# Patient Record
Sex: MALE | Race: WHITE | NOT HISPANIC OR LATINO | Employment: OTHER | URBAN - METROPOLITAN AREA
[De-identification: names, ages, dates, MRNs, and addresses within clinical notes are randomized per-mention and may not be internally consistent; named-entity substitution may affect disease eponyms.]

---

## 2019-11-26 ENCOUNTER — OFFICE VISIT (OUTPATIENT)
Dept: FAMILY MEDICINE | Facility: CLINIC | Age: 44
End: 2019-11-26
Payer: MEDICARE

## 2019-11-26 VITALS
HEIGHT: 74 IN | OXYGEN SATURATION: 96 % | SYSTOLIC BLOOD PRESSURE: 122 MMHG | BODY MASS INDEX: 37.81 KG/M2 | TEMPERATURE: 98 F | DIASTOLIC BLOOD PRESSURE: 80 MMHG | WEIGHT: 294.63 LBS | HEART RATE: 73 BPM

## 2019-11-26 DIAGNOSIS — J30.9 ALLERGIC RHINITIS, UNSPECIFIED SEASONALITY, UNSPECIFIED TRIGGER: ICD-10-CM

## 2019-11-26 DIAGNOSIS — I10 ESSENTIAL HYPERTENSION: Primary | ICD-10-CM

## 2019-11-26 DIAGNOSIS — E78.2 HYPERLIPIDEMIA, MIXED: ICD-10-CM

## 2019-11-26 DIAGNOSIS — Z00.00 ANNUAL PHYSICAL EXAM: ICD-10-CM

## 2019-11-26 DIAGNOSIS — J45.20 MILD INTERMITTENT ASTHMA WITHOUT COMPLICATION: ICD-10-CM

## 2019-11-26 LAB
ALBUMIN SERPL BCP-MCNC: 3.9 G/DL (ref 3.5–5.2)
ALP SERPL-CCNC: 147 U/L (ref 55–135)
ALT SERPL W/O P-5'-P-CCNC: 26 U/L (ref 10–44)
ANION GAP SERPL CALC-SCNC: 12 MMOL/L (ref 8–16)
AST SERPL-CCNC: 25 U/L (ref 10–40)
BASOPHILS # BLD AUTO: 0.07 K/UL (ref 0–0.2)
BASOPHILS NFR BLD: 0.8 % (ref 0–1.9)
BILIRUB SERPL-MCNC: 1 MG/DL (ref 0.1–1)
BUN SERPL-MCNC: 10 MG/DL (ref 6–20)
CALCIUM SERPL-MCNC: 9.4 MG/DL (ref 8.7–10.5)
CHLORIDE SERPL-SCNC: 103 MMOL/L (ref 95–110)
CHOLEST SERPL-MCNC: 171 MG/DL (ref 120–199)
CHOLEST/HDLC SERPL: 5.3 {RATIO} (ref 2–5)
CO2 SERPL-SCNC: 26 MMOL/L (ref 23–29)
CREAT SERPL-MCNC: 0.8 MG/DL (ref 0.5–1.4)
DIFFERENTIAL METHOD: NORMAL
EOSINOPHIL # BLD AUTO: 0.4 K/UL (ref 0–0.5)
EOSINOPHIL NFR BLD: 4.1 % (ref 0–8)
ERYTHROCYTE [DISTWIDTH] IN BLOOD BY AUTOMATED COUNT: 12.7 % (ref 11.5–14.5)
EST. GFR  (AFRICAN AMERICAN): >60 ML/MIN/1.73 M^2
EST. GFR  (NON AFRICAN AMERICAN): >60 ML/MIN/1.73 M^2
GLUCOSE SERPL-MCNC: 116 MG/DL (ref 70–110)
HCT VFR BLD AUTO: 47.3 % (ref 40–54)
HDLC SERPL-MCNC: 32 MG/DL (ref 40–75)
HDLC SERPL: 18.7 % (ref 20–50)
HGB BLD-MCNC: 15.4 G/DL (ref 14–18)
IMM GRANULOCYTES # BLD AUTO: 0.04 K/UL (ref 0–0.04)
IMM GRANULOCYTES NFR BLD AUTO: 0.5 % (ref 0–0.5)
LDLC SERPL CALC-MCNC: 102.6 MG/DL (ref 63–159)
LYMPHOCYTES # BLD AUTO: 1.8 K/UL (ref 1–4.8)
LYMPHOCYTES NFR BLD: 20.4 % (ref 18–48)
MCH RBC QN AUTO: 29.5 PG (ref 27–31)
MCHC RBC AUTO-ENTMCNC: 32.6 G/DL (ref 32–36)
MCV RBC AUTO: 91 FL (ref 82–98)
MONOCYTES # BLD AUTO: 0.7 K/UL (ref 0.3–1)
MONOCYTES NFR BLD: 7.7 % (ref 4–15)
NEUTROPHILS # BLD AUTO: 5.9 K/UL (ref 1.8–7.7)
NEUTROPHILS NFR BLD: 66.5 % (ref 38–73)
NONHDLC SERPL-MCNC: 139 MG/DL
NRBC BLD-RTO: 0 /100 WBC
PLATELET # BLD AUTO: 324 K/UL (ref 150–350)
PMV BLD AUTO: 9.7 FL (ref 9.2–12.9)
POTASSIUM SERPL-SCNC: 3.6 MMOL/L (ref 3.5–5.1)
PROT SERPL-MCNC: 8.1 G/DL (ref 6–8.4)
RBC # BLD AUTO: 5.22 M/UL (ref 4.6–6.2)
SODIUM SERPL-SCNC: 141 MMOL/L (ref 136–145)
T4 FREE SERPL-MCNC: 1.17 NG/DL (ref 0.71–1.51)
TRIGL SERPL-MCNC: 182 MG/DL (ref 30–150)
TSH SERPL DL<=0.005 MIU/L-ACNC: 1.35 UIU/ML (ref 0.4–4)
WBC # BLD AUTO: 8.84 K/UL (ref 3.9–12.7)

## 2019-11-26 PROCEDURE — 99386 PR PREVENTIVE VISIT,NEW,40-64: ICD-10-PCS | Mod: S$GLB,,, | Performed by: INTERNAL MEDICINE

## 2019-11-26 PROCEDURE — 80061 LIPID PANEL: CPT

## 2019-11-26 PROCEDURE — 84443 ASSAY THYROID STIM HORMONE: CPT

## 2019-11-26 PROCEDURE — 84439 ASSAY OF FREE THYROXINE: CPT

## 2019-11-26 PROCEDURE — 36415 COLL VENOUS BLD VENIPUNCTURE: CPT

## 2019-11-26 PROCEDURE — 85025 COMPLETE CBC W/AUTO DIFF WBC: CPT

## 2019-11-26 PROCEDURE — 80053 COMPREHEN METABOLIC PANEL: CPT

## 2019-11-26 PROCEDURE — 99999 PR PBB SHADOW E&M-NEW PATIENT-LVL III: CPT | Mod: PBBFAC,,, | Performed by: INTERNAL MEDICINE

## 2019-11-26 PROCEDURE — 99999 PR PBB SHADOW E&M-NEW PATIENT-LVL III: ICD-10-PCS | Mod: PBBFAC,,, | Performed by: INTERNAL MEDICINE

## 2019-11-26 PROCEDURE — 99386 PREV VISIT NEW AGE 40-64: CPT | Mod: S$GLB,,, | Performed by: INTERNAL MEDICINE

## 2019-11-26 RX ORDER — AMLODIPINE BESYLATE 10 MG/1
TABLET ORAL
Refills: 1 | COMMUNITY
Start: 2019-11-02 | End: 2021-06-17 | Stop reason: SDUPTHER

## 2019-11-26 RX ORDER — AZELASTINE 1 MG/ML
1 SPRAY, METERED NASAL 2 TIMES DAILY
Qty: 30 ML | Refills: 11 | Status: SHIPPED | OUTPATIENT
Start: 2019-11-26 | End: 2021-03-03 | Stop reason: SDUPTHER

## 2019-11-26 RX ORDER — MONTELUKAST SODIUM 10 MG/1
TABLET ORAL
Refills: 1 | COMMUNITY
Start: 2019-11-01 | End: 2021-10-28 | Stop reason: SDUPTHER

## 2019-11-26 RX ORDER — ESCITALOPRAM OXALATE 20 MG/1
TABLET ORAL
Refills: 3 | COMMUNITY
Start: 2019-11-16 | End: 2020-10-07 | Stop reason: SDUPTHER

## 2019-11-26 RX ORDER — FLUTICASONE FUROATE AND VILANTEROL 100; 25 UG/1; UG/1
1 POWDER RESPIRATORY (INHALATION) DAILY
Qty: 1 EACH | Refills: 11 | Status: SHIPPED | OUTPATIENT
Start: 2019-11-26 | End: 2021-01-26 | Stop reason: SDUPTHER

## 2019-11-26 RX ORDER — AZELASTINE HCL 205.5 UG/1
SPRAY NASAL
COMMUNITY
End: 2019-11-26

## 2019-11-26 RX ORDER — FLUTICASONE PROPIONATE 50 MCG
SPRAY, SUSPENSION (ML) NASAL
COMMUNITY
End: 2020-12-29 | Stop reason: SDUPTHER

## 2019-11-26 RX ORDER — ALBUTEROL SULFATE 0.83 MG/ML
SOLUTION RESPIRATORY (INHALATION)
COMMUNITY
End: 2021-01-06 | Stop reason: SDUPTHER

## 2019-11-26 RX ORDER — PRAVASTATIN SODIUM 20 MG/1
TABLET ORAL
Refills: 1 | COMMUNITY
Start: 2019-11-02 | End: 2021-12-27 | Stop reason: SDUPTHER

## 2019-11-26 RX ORDER — ALBUTEROL SULFATE 90 UG/1
AEROSOL, METERED RESPIRATORY (INHALATION)
Refills: 2 | COMMUNITY
Start: 2019-10-31 | End: 2023-04-17 | Stop reason: SDUPTHER

## 2019-11-26 NOTE — PROGRESS NOTES
[facility] Note    Chief Complaint      Chief Complaint   Patient presents with    [facility]    Asthma    Nasal Congestion       History of Present Illness      Teddy Ennis is a 44 y.o. male with chronic conditions of HTN, HLD, asthma, moderate persistent, allergic rhinitis, depression who presents today for: establish care and annual preventative visit. Previously seen by Dr. Rodriguez.    HTN: BP at goal on amlodipine.    HLD: Controlled on pravastatin.  LDL requested from [facility].    Asthma, moderate persistent: On proair, singulair.  Has been taking rescue every day.   Allergic rhinitis: Controlled on singulair and flonase, azelastine.  Diet: Prepares own food but needs to clean up carbs and fatty foods.    Exercise: Stays active and walks.  Needs to increase.  Denies drinking and driving, drinking more than 4 drinks on occasion, drug use.     Depression: Controlled on lexapro.  Doing well on this regimen.  Flu shot UTD.  Td unsure, thinks 2005.      Past Medical History:  History reviewed. No pertinent past medical history.    Past Surgical History:   has a past surgical history that includes Ankle surgery (Left, 1995).    Family History:  family history includes Hypertension in his mother; No Known Problems in his father; Stroke in his mother.     Social History:  Social History     Tobacco Use    Smoking status: Never Smoker   Substance Use Topics    Alcohol use: Not Currently    Drug use: Not on file       Review of Systems   Constitutional: Negative for chills, fever and malaise/fatigue.   Respiratory: Negative for shortness of breath.    Cardiovascular: Negative for chest pain.   Gastrointestinal: Negative for constipation, diarrhea, nausea and vomiting.   Skin: Negative for rash.   Neurological: Negative for weakness.        Medications:  Outpatient Encounter Medications as of 11/26/2019   Medication Sig Dispense Refill    albuterol (PROVENTIL) 2.5 mg /3 mL (0.083 %) nebulizer  "solution albuterol sulfate 2.5 mg/3 mL (0.083 %) solution for nebulization      amLODIPine (NORVASC) 10 MG tablet TK 1 T PO D  1    azelastine 0.15 % (205.5 mcg) Spry azelastine 0.15 % (205.5 mcg) nasal spray      escitalopram oxalate (LEXAPRO) 20 MG tablet TK 1 T PO D  3    fluticasone propionate (FLONASE) 50 mcg/actuation nasal spray fluticasone propionate 50 mcg/actuation nasal spray,suspension      montelukast (SINGULAIR) 10 mg tablet TK 1 T PO D  1    pravastatin (PRAVACHOL) 20 MG tablet TK 1 T PO D  1    VENTOLIN HFA 90 mcg/actuation inhaler INL 2 PFS ITL TID PRF WHZ  2     No facility-administered encounter medications on file as of 11/26/2019.        Allergies:  Review of patient's allergies indicates:   Allergen Reactions    Penicillins Hives       Health Maintenance:    There is no immunization history on file for this patient.   Health Maintenance   Topic Date Due    Lipid Panel  1976    TETANUS VACCINE  08/11/1994        Physical Exam      Vital Signs  Temp: 97.8 °F (36.6 °C)  Temp src: Oral  Pulse: 73  SpO2: 96 %  BP: 122/80  BP Location: Right arm  Patient Position: Sitting  Height and Weight  Height: 6' 2" (188 cm)  Weight: 133.7 kg (294 lb 10.3 oz)  BSA (Calculated - sq m): 2.64 sq meters  BMI (Calculated): 37.8  Weight in (lb) to have BMI = 25: 194.3]    Physical Exam   Constitutional: He appears well-developed and well-nourished.   HENT:   Head: Normocephalic and atraumatic.   Right Ear: External ear normal.   Left Ear: External ear normal.   Mouth/Throat: Oropharynx is clear and moist.   Eyes: Pupils are equal, round, and reactive to light. Conjunctivae and EOM are normal.   Cardiovascular: Normal rate, regular rhythm, normal heart sounds and intact distal pulses.   No murmur heard.  Pulmonary/Chest: Effort normal and breath sounds normal. He has no wheezes. He has no rales.   Abdominal: Soft. Bowel sounds are normal. He exhibits no distension and no abdominal bruit. There is no " hepatosplenomegaly. There is no tenderness.   Vitals reviewed.       Laboratory:  CBC:      CMP:        Invalid input(s): CREATININ  URINALYSIS:       LIPIDS:      TSH:      A1C:        Assessment/Plan     Teddy Ennis is a 44 y.o.male with:    1. Annual physical exam  - CBC auto differential  - Comprehensive metabolic panel  - Lipid panel  - TSH  - T4, free  Discussed diet and exercise, vaccines and cancer screening, risk factors.  Screening labs ordered.     2. Essential hypertension  - CBC auto differential  - Comprehensive metabolic panel  - Lipid panel  - TSH  - T4, free  Continue current meds.    3. Hyperlipidemia, mixed  - CBC auto differential  - Comprehensive metabolic panel  - Lipid panel  - TSH  - T4, free  Continue current meds.    4. Mild intermittent asthma without complication  Continue current meds.    5. Allergic rhinitis, unspecified seasonality, unspecified trigger  Continue current meds.      Chronic conditions status updated as per HPI.  Other than changes above, cont current medications and maintain follow up with specialists.  Return to clinic in 6 months.    Doni Wood MD  [facility]

## 2019-11-28 DIAGNOSIS — R73.01 IMPAIRED FASTING GLUCOSE: Primary | ICD-10-CM

## 2019-11-28 NOTE — PROGRESS NOTES
Labs show elevated blood sugar.  Recommend sending back to lab to get an A1C to further evaluate for diabetes.  Otherwise labs ok.  Order in.

## 2019-12-03 ENCOUNTER — LAB VISIT (OUTPATIENT)
Dept: LAB | Facility: HOSPITAL | Age: 44
End: 2019-12-03
Attending: INTERNAL MEDICINE
Payer: MEDICARE

## 2019-12-03 DIAGNOSIS — R73.01 IMPAIRED FASTING GLUCOSE: ICD-10-CM

## 2019-12-03 LAB
ESTIMATED AVG GLUCOSE: 108 MG/DL (ref 68–131)
HBA1C MFR BLD HPLC: 5.4 % (ref 4–5.6)

## 2019-12-03 PROCEDURE — 83036 HEMOGLOBIN GLYCOSYLATED A1C: CPT

## 2019-12-03 PROCEDURE — 36415 COLL VENOUS BLD VENIPUNCTURE: CPT

## 2019-12-03 NOTE — PROGRESS NOTES
Further testing confirms that patient does not have diabetes.  Elevated blood sugar must have been a temporary finding.  In general, recommend paying attention to low carb, low sugar diet.

## 2020-02-12 ENCOUNTER — TELEPHONE (OUTPATIENT)
Dept: FAMILY MEDICINE | Facility: CLINIC | Age: 45
End: 2020-02-12

## 2020-02-12 ENCOUNTER — OFFICE VISIT (OUTPATIENT)
Dept: URGENT CARE | Facility: CLINIC | Age: 45
End: 2020-02-12
Payer: MEDICARE

## 2020-02-12 VITALS
OXYGEN SATURATION: 97 % | BODY MASS INDEX: 37.73 KG/M2 | HEIGHT: 74 IN | DIASTOLIC BLOOD PRESSURE: 82 MMHG | HEART RATE: 73 BPM | SYSTOLIC BLOOD PRESSURE: 133 MMHG | RESPIRATION RATE: 20 BRPM | TEMPERATURE: 98 F | WEIGHT: 294 LBS

## 2020-02-12 DIAGNOSIS — J01.00 ACUTE MAXILLARY SINUSITIS, RECURRENCE NOT SPECIFIED: Primary | ICD-10-CM

## 2020-02-12 PROCEDURE — 99214 OFFICE O/P EST MOD 30 MIN: CPT | Mod: 25,S$GLB,, | Performed by: FAMILY MEDICINE

## 2020-02-12 PROCEDURE — 99214 PR OFFICE/OUTPT VISIT, EST, LEVL IV, 30-39 MIN: ICD-10-PCS | Mod: 25,S$GLB,, | Performed by: FAMILY MEDICINE

## 2020-02-12 PROCEDURE — 96372 PR INJECTION,THERAP/PROPH/DIAG2ST, IM OR SUBCUT: ICD-10-PCS | Mod: S$GLB,,, | Performed by: FAMILY MEDICINE

## 2020-02-12 PROCEDURE — 96372 THER/PROPH/DIAG INJ SC/IM: CPT | Mod: S$GLB,,, | Performed by: FAMILY MEDICINE

## 2020-02-12 RX ORDER — DOXYCYCLINE 100 MG/1
100 CAPSULE ORAL 2 TIMES DAILY
Qty: 14 CAPSULE | Refills: 0 | Status: SHIPPED | OUTPATIENT
Start: 2020-02-12 | End: 2020-02-19

## 2020-02-12 RX ORDER — BETAMETHASONE SODIUM PHOSPHATE AND BETAMETHASONE ACETATE 3; 3 MG/ML; MG/ML
6 INJECTION, SUSPENSION INTRA-ARTICULAR; INTRALESIONAL; INTRAMUSCULAR; SOFT TISSUE
Status: COMPLETED | OUTPATIENT
Start: 2020-02-12 | End: 2020-02-12

## 2020-02-12 RX ADMIN — BETAMETHASONE SODIUM PHOSPHATE AND BETAMETHASONE ACETATE 6 MG: 3; 3 INJECTION, SUSPENSION INTRA-ARTICULAR; INTRALESIONAL; INTRAMUSCULAR; SOFT TISSUE at 10:02

## 2020-02-12 NOTE — TELEPHONE ENCOUNTER
----- Message from Marcia Martinez sent at 2/12/2020 10:23 AM CST -----  Contact: self  Patient is requesting a call back concerning Merly(mother) to the pt took him to Urgent Care for having a bad sinus infection.

## 2020-02-12 NOTE — TELEPHONE ENCOUNTER
"Spoke with patient's mother, Merly. Merly states that "pt is sick as a dog and has caught what she had."  Merly states that the patient has a sinus drip. She also states that patient is unavailable for me to talk to. Merly refuses to schedule appointment for patient to come in to be seen. Merly states that she would like a medication called in. Informed Merly that I could only forward a message to Dr Wood and once he responds someone would given them a call back. Merly verbalizes understanding.  "

## 2020-02-12 NOTE — PATIENT INSTRUCTIONS
Sinusitis (Antibiotic Treatment)    The sinuses are air-filled spaces within the bones of the face. They connect to the inside of the nose. Sinusitis is an inflammation of the tissue lining the sinus cavity. Sinus inflammation can occur during a cold. It can also be due to allergies to pollens and other particles in the air. Sinusitis can cause symptoms of sinus congestion and fullness. A sinus infection causes fever, headache and facial pain. There is often green or yellow drainage from the nose or into the back of the throat (post-nasal drip). You have been given antibiotics to treat this condition.  Home care:  · Take the full course of antibiotics as instructed. Do not stop taking them, even if you feel better.  · Drink plenty of water, hot tea, and other liquids. This may help thin mucus. It also may promote sinus drainage.  · Heat may help soothe painful areas of the face. Use a towel soaked in hot water. Or,  the shower and direct the hot spray onto your face. Using a vaporizer along with a menthol rub at night may also help.   · An expectorant containing guaifenesin may help thin the mucus and promote drainage from the sinuses.  · Over-the-counter decongestants may be used unless a similar medicine was prescribed. Nasal sprays work the fastest. Use one that contains phenylephrine or oxymetazoline. First blow the nose gently. Then use the spray. Do not use these medicines more often than directed on the label or symptoms may get worse. You may also use tablets containing pseudoephedrine. Avoid products that combine ingredients, because side effects may be increased. Read labels. You can also ask the pharmacist for help. (NOTE: Persons with high blood pressure should not use decongestants. They can raise blood pressure.)  · Over-the-counter antihistamines may help if allergies contributed to your sinusitis.    · Do not use nasal rinses or irrigation during an acute sinus infection, unless told to by  your health care provider. Rinsing may spread the infection to other sinuses.  · Use acetaminophen or ibuprofen to control pain, unless another pain medicine was prescribed. (If you have chronic liver or kidney disease or ever had a stomach ulcer, talk with your doctor before using these medicines. Aspirin should never be used in anyone under 18 years of age who is ill with a fever. It may cause severe liver damage.)  · Don't smoke. This can worsen symptoms.  Follow-up care  Follow up with your healthcare provider or our staff if you are not improving within the next week.  When to seek medical advice  Call your healthcare provider if any of these occur:  · Facial pain or headache becoming more severe  · Stiff neck  · Unusual drowsiness or confusion  · Swelling of the forehead or eyelids  · Vision problems, including blurred or double vision  · Fever of 100.4ºF (38ºC) or higher, or as directed by your healthcare provider  · Seizure  · Breathing problems  · Symptoms not resolving within 10 days  Date Last Reviewed: 4/13/2015  © 2833-2226 [employer]. [address] [place] [address], [place] [address]. All rights reserved. This information is not intended as a substitute for professional medical care. Always follow your healthcare professional's instructions.

## 2020-02-12 NOTE — TELEPHONE ENCOUNTER
Spoke with patient's mother, Merly. Merly states that she took patient to Urgent Care for evaluation.

## 2020-02-12 NOTE — PROGRESS NOTES
"Subjective:       Patient ID: Teddy Ennis is a 44 y.o. male.    Vitals:  height is 6' 2" (1.88 m) and weight is 133.4 kg (294 lb). His oral temperature is 97.9 °F (36.6 °C). His blood pressure is 133/82 and his pulse is 73. His respiration is 20 and oxygen saturation is 97%.     Chief Complaint: Sinus Problem    This is a 44 y.o. male   who presents today with a chief complaint of a sinus problem that began a week ago. He's complaining of sinus pain, sinus pressure and a cough. He's been taking allegra and nasal spray to help relieve his symptoms.     Sinus Problem   This is a new problem. The current episode started in the past 7 days. The problem has been gradually worsening since onset. There has been no fever. His pain is at a severity of 8/10. The pain is severe. Associated symptoms include coughing and sinus pressure. Pertinent negatives include no chills, congestion, diaphoresis, ear pain, shortness of breath or sore throat. Treatments tried: allegra and nasal spray. The treatment provided no relief.       Constitution: Negative for chills, sweating, fatigue and fever.   HENT: Positive for sinus pain and sinus pressure. Negative for ear pain, congestion, sore throat and voice change.    Neck: Negative for painful lymph nodes.   Eyes: Negative for eye redness.   Respiratory: Positive for cough. Negative for chest tightness, sputum production, bloody sputum, COPD, shortness of breath, stridor, wheezing and asthma.    Gastrointestinal: Negative for nausea and vomiting.   Musculoskeletal: Negative for muscle ache.   Skin: Negative for rash.   Allergic/Immunologic: Negative for seasonal allergies and asthma.   Hematologic/Lymphatic: Negative for swollen lymph nodes.       Objective:      Physical Exam   Constitutional: He appears well-developed and well-nourished.   HENT:   Head: Normocephalic and atraumatic.   Nose: Mucosal edema, rhinorrhea and purulent discharge present. Right sinus exhibits maxillary " sinus tenderness. Left sinus exhibits maxillary sinus tenderness.   Mouth/Throat: Posterior oropharyngeal erythema present.   Eyes: Pupils are equal, round, and reactive to light. EOM are normal.   Neck: Normal range of motion. Neck supple.   Cardiovascular: Normal rate, regular rhythm and normal heart sounds.   Pulmonary/Chest: Effort normal and breath sounds normal.   Abdominal: Soft.   Nursing note and vitals reviewed.        Assessment:       1. Acute maxillary sinusitis, recurrence not specified        Plan:         Acute maxillary sinusitis, recurrence not specified  -     betamethasone acetate-betamethasone sodium phosphate injection 6 mg  -     doxycycline (VIBRAMYCIN) 100 MG Cap; Take 1 capsule (100 mg total) by mouth 2 (two) times daily. for 7 days  Dispense: 14 capsule; Refill: 0    recommend OTC cold remedies

## 2020-03-10 ENCOUNTER — TELEPHONE (OUTPATIENT)
Dept: FAMILY MEDICINE | Facility: CLINIC | Age: 45
End: 2020-03-10

## 2020-03-10 NOTE — TELEPHONE ENCOUNTER
----- Message from Merly Tomlinson sent at 3/10/2020 11:32 AM CDT -----  Contact: [phone] Merly mother   Pt's mother is requesting to speak with you re: pt's asthma and past wellness history from February 2020 . Pt's mother would like to know if the pt should attend the [place] parad. She is not certain if the pt should be in big crowds due to the asthma. Please advise

## 2020-03-10 NOTE — TELEPHONE ENCOUNTER
Not so worried about the crowds but definitely with the pollen count recently.  I would recommend avoiding being outside for prolonged times considering his asthma history.

## 2020-03-16 ENCOUNTER — TELEPHONE (OUTPATIENT)
Dept: FAMILY MEDICINE | Facility: CLINIC | Age: 45
End: 2020-03-16

## 2020-03-16 NOTE — TELEPHONE ENCOUNTER
Patient is not having any symptoms just wanted to be tested, explained that we can not just do testing if she is having no symptoms at all

## 2020-03-16 NOTE — TELEPHONE ENCOUNTER
----- Message from Cherri Elizalde sent at 3/16/2020  4:20 PM CDT -----  Contact: Pt Mom Merly   Pt is requesting a call back regarding testing for COVID-19     Pt Mom Merly can be reached at [phone]

## 2020-04-16 ENCOUNTER — TELEPHONE (OUTPATIENT)
Dept: FAMILY MEDICINE | Facility: CLINIC | Age: 45
End: 2020-04-16

## 2020-04-16 NOTE — LETTER
[place] [address]  [place] [address]  Phone: [phone]  Fax: [phone] April 20, 2020    Teddy Ennis  [address] [place] [id number]      To Whom It May Concern:    Teddy Ennis is unable to participate in jury duty due to chronic conditions of hypertension and asthma which put him at a higher risk if he were to contract COVID-19.    If you have any questions or concerns, please feel free to call my office.    Sincerely,         Doni Wood MD

## 2020-04-16 NOTE — TELEPHONE ENCOUNTER
Spoke with patient's mother, Lizzie.  Lizzie would like a letter to excuse patient from jury duty.  Informed lizzie that I would forward this message to Dr Wood and once he responds someone would give her a call back. Lizzie verbalizes understanding.

## 2020-04-16 NOTE — TELEPHONE ENCOUNTER
----- Message from Romi Almonte sent at 4/16/2020  2:35 PM CDT -----  Contact: Merly carson [phone]  Patients mother is calling to get an excuse letter from the doctor since patient received a letter to attend Jury Duty.

## 2020-04-28 ENCOUNTER — TELEPHONE (OUTPATIENT)
Dept: FAMILY MEDICINE | Facility: CLINIC | Age: 45
End: 2020-04-28

## 2020-04-28 ENCOUNTER — OFFICE VISIT (OUTPATIENT)
Dept: FAMILY MEDICINE | Facility: CLINIC | Age: 45
End: 2020-04-28
Payer: MEDICARE

## 2020-04-28 DIAGNOSIS — J32.9 SINUSITIS, UNSPECIFIED CHRONICITY, UNSPECIFIED LOCATION: Primary | ICD-10-CM

## 2020-04-28 PROCEDURE — 99441 PR PHYSICIAN TELEPHONE EVALUATION 5-10 MIN: ICD-10-PCS | Mod: HCNC,95,, | Performed by: INTERNAL MEDICINE

## 2020-04-28 PROCEDURE — 99441 PR PHYSICIAN TELEPHONE EVALUATION 5-10 MIN: CPT | Mod: HCNC,95,, | Performed by: INTERNAL MEDICINE

## 2020-04-28 RX ORDER — AZITHROMYCIN 250 MG/1
TABLET, FILM COATED ORAL
Qty: 6 TABLET | Refills: 0 | Status: SHIPPED | OUTPATIENT
Start: 2020-04-28 | End: 2020-05-03

## 2020-04-28 NOTE — TELEPHONE ENCOUNTER
----- Message from Noemy Medrano sent at 4/28/2020  7:35 AM CDT -----  Contact: Mother [phone]  Patient's mother is calling to report he has a sinus infection and would like to be seen. Please call and advise.

## 2020-05-22 ENCOUNTER — TELEPHONE (OUTPATIENT)
Dept: FAMILY MEDICINE | Facility: CLINIC | Age: 45
End: 2020-05-22

## 2020-06-02 ENCOUNTER — OFFICE VISIT (OUTPATIENT)
Dept: FAMILY MEDICINE | Facility: CLINIC | Age: 45
End: 2020-06-02
Payer: MEDICARE

## 2020-06-02 VITALS
DIASTOLIC BLOOD PRESSURE: 70 MMHG | TEMPERATURE: 98 F | BODY MASS INDEX: 38.04 KG/M2 | HEIGHT: 74 IN | HEART RATE: 81 BPM | WEIGHT: 296.44 LBS | SYSTOLIC BLOOD PRESSURE: 120 MMHG | OXYGEN SATURATION: 97 %

## 2020-06-02 DIAGNOSIS — R73.01 IMPAIRED FASTING GLUCOSE: ICD-10-CM

## 2020-06-02 DIAGNOSIS — Z11.59 SCREENING FOR VIRAL DISEASE: ICD-10-CM

## 2020-06-02 DIAGNOSIS — I10 ESSENTIAL HYPERTENSION: ICD-10-CM

## 2020-06-02 DIAGNOSIS — J30.9 ALLERGIC RHINITIS, UNSPECIFIED SEASONALITY, UNSPECIFIED TRIGGER: Primary | ICD-10-CM

## 2020-06-02 DIAGNOSIS — E78.2 HYPERLIPIDEMIA, MIXED: ICD-10-CM

## 2020-06-02 DIAGNOSIS — J45.20 MILD INTERMITTENT ASTHMA WITHOUT COMPLICATION: ICD-10-CM

## 2020-06-02 DIAGNOSIS — Z20.822 CLOSE EXPOSURE TO COVID-19 VIRUS: ICD-10-CM

## 2020-06-02 PROCEDURE — 99499 RISK ADDL DX/OHS AUDIT: ICD-10-PCS | Mod: HCNC,S$GLB,, | Performed by: INTERNAL MEDICINE

## 2020-06-02 PROCEDURE — 99499 UNLISTED E&M SERVICE: CPT | Mod: HCNC,S$GLB,, | Performed by: INTERNAL MEDICINE

## 2020-06-02 PROCEDURE — 99214 PR OFFICE/OUTPT VISIT, EST, LEVL IV, 30-39 MIN: ICD-10-PCS | Mod: HCNC,S$GLB,, | Performed by: INTERNAL MEDICINE

## 2020-06-02 PROCEDURE — 99999 PR PBB SHADOW E&M-EST. PATIENT-LVL IV: CPT | Mod: PBBFAC,HCNC,, | Performed by: INTERNAL MEDICINE

## 2020-06-02 PROCEDURE — 3078F PR MOST RECENT DIASTOLIC BLOOD PRESSURE < 80 MM HG: ICD-10-PCS | Mod: HCNC,CPTII,S$GLB, | Performed by: INTERNAL MEDICINE

## 2020-06-02 PROCEDURE — 3078F DIAST BP <80 MM HG: CPT | Mod: HCNC,CPTII,S$GLB, | Performed by: INTERNAL MEDICINE

## 2020-06-02 PROCEDURE — 3074F SYST BP LT 130 MM HG: CPT | Mod: HCNC,CPTII,S$GLB, | Performed by: INTERNAL MEDICINE

## 2020-06-02 PROCEDURE — 3008F PR BODY MASS INDEX (BMI) DOCUMENTED: ICD-10-PCS | Mod: HCNC,CPTII,S$GLB, | Performed by: INTERNAL MEDICINE

## 2020-06-02 PROCEDURE — 3074F PR MOST RECENT SYSTOLIC BLOOD PRESSURE < 130 MM HG: ICD-10-PCS | Mod: HCNC,CPTII,S$GLB, | Performed by: INTERNAL MEDICINE

## 2020-06-02 PROCEDURE — 3008F BODY MASS INDEX DOCD: CPT | Mod: HCNC,CPTII,S$GLB, | Performed by: INTERNAL MEDICINE

## 2020-06-02 PROCEDURE — 99214 OFFICE O/P EST MOD 30 MIN: CPT | Mod: HCNC,S$GLB,, | Performed by: INTERNAL MEDICINE

## 2020-06-02 PROCEDURE — 99999 PR PBB SHADOW E&M-EST. PATIENT-LVL IV: ICD-10-PCS | Mod: PBBFAC,HCNC,, | Performed by: INTERNAL MEDICINE

## 2020-06-02 NOTE — PROGRESS NOTES
[facility] Note    Chief Complaint      Chief Complaint   Patient presents with    Hypertension     6 month       History of Present Illness      Teddy Ennis is a 44 y.o. male with chronic conditions of HTN, HLD, asthma, IFG, depression, allergic rhinitis who presents today for: follow up chronic conditions.    HTN: BP at goal on amlodipine.  HLD: Controlled on pravastatin.  last check.  Asthma, mild intermittent, allergic rhinitis: previously on immunotherapy.  Asking for referral to allergist.   IFG: Blood sugar 116 last check.  Has been working on diet.  Will update.  Flu shot UTD.  Td unsure, thinks 2005.      Past Medical History:  History reviewed. No pertinent past medical history.    Past Surgical History:   has a past surgical history that includes Ankle surgery (Left, 1995).    Family History:  family history includes Hypertension in his mother; No Known Problems in his father; Stroke in his mother.     Social History:  Social History     Tobacco Use    Smoking status: Never Smoker    Smokeless tobacco: Never Used   Substance Use Topics    Alcohol use: Not Currently    Drug use: Not on file       Review of Systems   Constitutional: Negative for chills, fever and malaise/fatigue.   Respiratory: Negative for shortness of breath.    Cardiovascular: Negative for chest pain.   Gastrointestinal: Negative for constipation, diarrhea, nausea and vomiting.   Skin: Negative for rash.   Neurological: Negative for weakness.        Medications:  Outpatient Encounter Medications as of 6/2/2020   Medication Sig Dispense Refill    albuterol (PROVENTIL) 2.5 mg /3 mL (0.083 %) nebulizer solution albuterol sulfate 2.5 mg/3 mL (0.083 %) solution for nebulization      amLODIPine (NORVASC) 10 MG tablet TK 1 T PO D  1    azelastine (ASTELIN) 137 mcg (0.1 %) nasal spray 1 spray (137 mcg total) by Nasal route 2 (two) times daily. 30 mL 11    escitalopram oxalate (LEXAPRO) 20 MG tablet TK 1 T  "PO D  3    fluticasone furoate-vilanterol (BREO ELLIPTA) 100-25 mcg/dose diskus inhaler Inhale 1 puff into the lungs once daily. Controller 1 each 11    fluticasone propionate (FLONASE) 50 mcg/actuation nasal spray fluticasone propionate 50 mcg/actuation nasal spray,suspension      montelukast (SINGULAIR) 10 mg tablet TK 1 T PO D  1    pravastatin (PRAVACHOL) 20 MG tablet TK 1 T PO D  1    VENTOLIN HFA 90 mcg/actuation inhaler INL 2 PFS ITL TID PRF WHZ  2     No facility-administered encounter medications on file as of 6/2/2020.        Allergies:  Review of patient's allergies indicates:   Allergen Reactions    Penicillins Hives       Health Maintenance:    There is no immunization history on file for this patient.   Health Maintenance   Topic Date Due    TETANUS VACCINE  08/11/1994    Lipid Panel  11/26/2024        Physical Exam      Vital Signs  Temp: 98 °F (36.7 °C)  Temp src: Oral  Pulse: 81  SpO2: 97 %  BP: 120/70  BP Location: Left arm  Patient Position: Sitting  Pain Score: 0-No pain  Height and Weight  Height: 6' 2" (188 cm)  Weight: 134.5 kg (296 lb 6.5 oz)  BSA (Calculated - sq m): 2.65 sq meters  BMI (Calculated): 38  Weight in (lb) to have BMI = 25: 194.3]    Physical Exam   Constitutional: He appears well-developed and well-nourished.   HENT:   Head: Normocephalic and atraumatic.   Right Ear: External ear normal.   Left Ear: External ear normal.   Mouth/Throat: Oropharynx is clear and moist.   Eyes: Pupils are equal, round, and reactive to light. Conjunctivae and EOM are normal.   Cardiovascular: Normal rate, regular rhythm, normal heart sounds and intact distal pulses.   No murmur heard.  Pulmonary/Chest: Effort normal and breath sounds normal. He has no wheezes. He has no rales.   Abdominal: Soft. Bowel sounds are normal. He exhibits no distension and no abdominal bruit. There is no hepatosplenomegaly. There is no tenderness.   Vitals reviewed.       Laboratory:  CBC:  Recent Labs   Lab " 11/26/19  0803   WBC 8.84   RBC 5.22   Hemoglobin 15.4   Hematocrit 47.3   Platelets 324   Mean Corpuscular Volume 91   Mean Corpuscular Hemoglobin 29.5   Mean Corpuscular Hemoglobin Conc 32.6     CMP:  Recent Labs   Lab 11/26/19  0803   Glucose 116 H   Calcium 9.4   Albumin 3.9   Total Protein 8.1   Sodium 141   Potassium 3.6   CO2 26   Chloride 103   BUN, Bld 10   Alkaline Phosphatase 147 H   ALT 26   AST 25   Total Bilirubin 1.0     URINALYSIS:       LIPIDS:  Recent Labs   Lab 11/26/19  0803   TSH 1.351   HDL 32 L   Cholesterol 171   Triglycerides 182 H   LDL Cholesterol 102.6   Hdl/Cholesterol Ratio 18.7 L   Non-HDL Cholesterol 139   Total Cholesterol/HDL Ratio 5.3 H     TSH:  Recent Labs   Lab 11/26/19  0803   TSH 1.351     A1C:  Recent Labs   Lab 12/03/19  0803   Hemoglobin A1C 5.4       Assessment/Plan     Teddy Ennis is a 44 y.o.male with:    1. Essential hypertension  - Comprehensive metabolic panel; Future  Continue current meds.    2. Hyperlipidemia, mixed  - Comprehensive metabolic panel; Future  - Lipid Panel; Future  Continue current meds.    3. Impaired fasting glucose  - Hemoglobin A1C; Future  Update labs  4. Mild intermittent asthma without complication  Continue current meds.    5. Allergic rhinitis, unspecified seasonality, unspecified trigger  - Ambulatory referral/consult to Allergy; Future  Continue current meds.  Referring to allergist to evaluate for immunotherapy again.  6. Screening for viral disease  - COVID-19 (SARS CoV-2) IgG Antibody; Future    7. Close Exposure to Covid-19 Virus  - COVID-19 (SARS CoV-2) IgG Antibody; Future       Chronic conditions status updated as per HPI.  Other than changes above, cont current medications and maintain follow up with specialists.  Return to clinic in 6 months.    Doni Wood MD  [facility]

## 2020-06-03 NOTE — PROGRESS NOTES
Patient, Teddy Ennis (MRN #[id number]), presented with a recorded BMI of 38.06 kg/m^2 and a documented comorbidity(s):  - Hypertension  - Hyperlipidemia  to which the severe obesity is a contributing factor. This is consistent with the definition of severe obesity (BMI 35.0-39.9) with comorbidity (ICD-10 E66.01, Z68.35). The patient's severe obesity was monitored, evaluated, addressed and/or treated. This addendum to the medical record is made on 06/03/2020.

## 2020-06-15 ENCOUNTER — PATIENT OUTREACH (OUTPATIENT)
Dept: ADMINISTRATIVE | Facility: OTHER | Age: 45
End: 2020-06-15

## 2020-06-16 ENCOUNTER — OFFICE VISIT (OUTPATIENT)
Dept: ALLERGY | Facility: CLINIC | Age: 45
End: 2020-06-16
Payer: MEDICARE

## 2020-06-16 ENCOUNTER — LAB VISIT (OUTPATIENT)
Dept: LAB | Facility: HOSPITAL | Age: 45
End: 2020-06-16
Payer: MEDICARE

## 2020-06-16 VITALS — BODY MASS INDEX: 38.7 KG/M2 | TEMPERATURE: 97 F | HEIGHT: 74 IN | WEIGHT: 301.56 LBS

## 2020-06-16 DIAGNOSIS — R05.3 CHRONIC COUGH: ICD-10-CM

## 2020-06-16 DIAGNOSIS — J31.0 CHRONIC RHINITIS: ICD-10-CM

## 2020-06-16 DIAGNOSIS — H10.423 SIMPLE CHRONIC CONJUNCTIVITIS OF BOTH EYES: Primary | ICD-10-CM

## 2020-06-16 DIAGNOSIS — H10.423 SIMPLE CHRONIC CONJUNCTIVITIS OF BOTH EYES: ICD-10-CM

## 2020-06-16 LAB
BASOPHILS # BLD AUTO: 0.08 K/UL (ref 0–0.2)
BASOPHILS NFR BLD: 0.7 % (ref 0–1.9)
DIFFERENTIAL METHOD: ABNORMAL
EOSINOPHIL # BLD AUTO: 0.3 K/UL (ref 0–0.5)
EOSINOPHIL NFR BLD: 2.4 % (ref 0–8)
ERYTHROCYTE [DISTWIDTH] IN BLOOD BY AUTOMATED COUNT: 12.8 % (ref 11.5–14.5)
HCT VFR BLD AUTO: 49.4 % (ref 40–54)
HGB BLD-MCNC: 15.9 G/DL (ref 14–18)
IMM GRANULOCYTES # BLD AUTO: 0.05 K/UL (ref 0–0.04)
IMM GRANULOCYTES NFR BLD AUTO: 0.4 % (ref 0–0.5)
LYMPHOCYTES # BLD AUTO: 2.2 K/UL (ref 1–4.8)
LYMPHOCYTES NFR BLD: 17.9 % (ref 18–48)
MCH RBC QN AUTO: 29 PG (ref 27–31)
MCHC RBC AUTO-ENTMCNC: 32.2 G/DL (ref 32–36)
MCV RBC AUTO: 90 FL (ref 82–98)
MONOCYTES # BLD AUTO: 0.8 K/UL (ref 0.3–1)
MONOCYTES NFR BLD: 6.6 % (ref 4–15)
NEUTROPHILS # BLD AUTO: 8.8 K/UL (ref 1.8–7.7)
NEUTROPHILS NFR BLD: 72 % (ref 38–73)
NRBC BLD-RTO: 0 /100 WBC
PLATELET # BLD AUTO: 355 K/UL (ref 150–350)
PMV BLD AUTO: 9.2 FL (ref 9.2–12.9)
RBC # BLD AUTO: 5.48 M/UL (ref 4.6–6.2)
WBC # BLD AUTO: 12.26 K/UL (ref 3.9–12.7)

## 2020-06-16 PROCEDURE — 86003 ALLG SPEC IGE CRUDE XTRC EA: CPT | Mod: HCNC

## 2020-06-16 PROCEDURE — 82784 ASSAY IGA/IGD/IGG/IGM EACH: CPT | Mod: 59,HCNC

## 2020-06-16 PROCEDURE — 82784 ASSAY IGA/IGD/IGG/IGM EACH: CPT | Mod: HCNC

## 2020-06-16 PROCEDURE — 99204 PR OFFICE/OUTPT VISIT, NEW, LEVL IV, 45-59 MIN: ICD-10-PCS | Mod: HCNC,S$GLB,, | Performed by: ALLERGY & IMMUNOLOGY

## 2020-06-16 PROCEDURE — 82785 ASSAY OF IGE: CPT | Mod: HCNC

## 2020-06-16 PROCEDURE — 85025 COMPLETE CBC W/AUTO DIFF WBC: CPT | Mod: HCNC

## 2020-06-16 PROCEDURE — 99999 PR PBB SHADOW E&M-EST. PATIENT-LVL III: CPT | Mod: PBBFAC,HCNC,, | Performed by: ALLERGY & IMMUNOLOGY

## 2020-06-16 PROCEDURE — 82787 IGG 1 2 3 OR 4 EACH: CPT | Mod: HCNC

## 2020-06-16 PROCEDURE — 86003 ALLG SPEC IGE CRUDE XTRC EA: CPT | Mod: 59,HCNC

## 2020-06-16 PROCEDURE — 99999 PR PBB SHADOW E&M-EST. PATIENT-LVL III: ICD-10-PCS | Mod: PBBFAC,HCNC,, | Performed by: ALLERGY & IMMUNOLOGY

## 2020-06-16 PROCEDURE — 3008F BODY MASS INDEX DOCD: CPT | Mod: HCNC,CPTII,S$GLB, | Performed by: ALLERGY & IMMUNOLOGY

## 2020-06-16 PROCEDURE — 99204 OFFICE O/P NEW MOD 45 MIN: CPT | Mod: HCNC,S$GLB,, | Performed by: ALLERGY & IMMUNOLOGY

## 2020-06-16 PROCEDURE — 86774 TETANUS ANTIBODY: CPT | Mod: HCNC

## 2020-06-16 PROCEDURE — 3008F PR BODY MASS INDEX (BMI) DOCUMENTED: ICD-10-PCS | Mod: HCNC,CPTII,S$GLB, | Performed by: ALLERGY & IMMUNOLOGY

## 2020-06-16 PROCEDURE — 36415 COLL VENOUS BLD VENIPUNCTURE: CPT | Mod: HCNC,PO

## 2020-06-16 NOTE — PROGRESS NOTES
Subjective:       Patient ID: Teddy Ennis is a 44 y.o. male.    Chief Complaint:  Allergies (PND, sneezing, runny nose, itchy watery eyes) and Asthma      43 yo man presents for new patient evaluation of allergies and asthma. He is accompanied by his mother who gives lot of history. He has long time history of allergic rhinitis and asthma. He was seen Dr Sutherland. He had allergy test via intradermals and had shots for 3 years. He reports they did help but been off for awhile. He has lots of sneeze and runny nose with thick white mucus. He has itchy nose, eyes and ears. He has PND and congestion with sinus pressure. He has chest tightness and feels pressure in chest. Is bad in AM. He often has to get up and use his albuterol for relief of tight chest. Does most days. He takes azelastine and Flonase daily, Singulair daily, Breo 100 daily and albuterol. He has been in hospital for asthma, last 2018. He is worse in winter. Worse round dust. No other triggers he can tell. No eczema. No known food, insect or latex allergy.       Environmental History: see history section for home environment  Review of Systems   Constitutional: Positive for fatigue. Negative for activity change, appetite change, chills, fever and unexpected weight change.   HENT: Positive for congestion, postnasal drip, rhinorrhea, sinus pressure and sneezing. Negative for ear discharge, ear pain, facial swelling, hearing loss, mouth sores, nosebleeds, sore throat, tinnitus, trouble swallowing and voice change.    Eyes: Positive for discharge, redness and itching. Negative for visual disturbance.   Respiratory: Positive for cough and chest tightness. Negative for shortness of breath and wheezing.    Cardiovascular: Negative for chest pain, palpitations and leg swelling.   Gastrointestinal: Negative for abdominal distention, abdominal pain, constipation, diarrhea, nausea and vomiting.   Genitourinary: Negative for difficulty urinating.    Musculoskeletal: Negative for arthralgias, back pain, joint swelling and myalgias.   Skin: Negative for color change, pallor and rash.   Neurological: Positive for headaches. Negative for dizziness, tremors, speech difficulty, weakness and light-headedness.   Hematological: Negative for adenopathy. Does not bruise/bleed easily.   Psychiatric/Behavioral: Negative for agitation, confusion, decreased concentration and sleep disturbance. The patient is not nervous/anxious.         Objective:      Physical Exam  Vitals signs and nursing note reviewed.   Constitutional:       General: He is not in acute distress.     Appearance: He is well-developed.   HENT:      Head: Normocephalic and atraumatic.      Right Ear: Hearing, tympanic membrane, ear canal and external ear normal.      Left Ear: Hearing, tympanic membrane, ear canal and external ear normal.      Nose: No septal deviation, mucosal edema (pink turbinates) or rhinorrhea.      Mouth/Throat:      Pharynx: No uvula swelling.   Eyes:      General:         Right eye: No discharge.         Left eye: No discharge.      Conjunctiva/sclera: Conjunctivae normal.   Neck:      Musculoskeletal: Normal range of motion.      Thyroid: No thyromegaly.   Cardiovascular:      Rate and Rhythm: Normal rate and regular rhythm.      Heart sounds: Normal heart sounds. No murmur.   Pulmonary:      Effort: Pulmonary effort is normal. No respiratory distress.      Breath sounds: Normal breath sounds. No wheezing.   Abdominal:      General: There is no distension.      Palpations: Abdomen is soft.      Tenderness: There is no abdominal tenderness.   Musculoskeletal: Normal range of motion.   Lymphadenopathy:      Cervical: No cervical adenopathy.   Skin:     General: Skin is warm and dry.      Findings: No erythema or rash.   Neurological:      Mental Status: He is alert and oriented to person, place, and time.      Coordination: Coordination normal.   Psychiatric:         Behavior:  Behavior normal.         Thought Content: Thought content normal.         Judgment: Judgment normal.         Laboratory:   none performed   Assessment:       1. Simple chronic conjunctivitis of both eyes    2. Chronic rhinitis    3. Chronic cough         Plan:       1. Immunocaps to identify if any allergic triggers  2. Increase azelastine to 2 SEN BID and fluticasone to 1 SEN BID  3. Continue montelukast daily and Breo 1 puff daily, may need to increase Breo dose  4. albuterol 2 puffs every 4 hours as needed  5. Phone review

## 2020-06-16 NOTE — LETTER
June 16, 2020      Doni Wood MD  [address]  [address]  [place] [address]           [facility]  [address].  [place] [address]  Phone: [phone]          Patient: Teddy Enins   MR Number: [id number]   YOB: 1975   Date of Visit: 6/16/2020       Dear Dr. Doni Wood:    Thank you for referring Teddy Ennis to me for evaluation. Attached you will find relevant portions of my assessment and plan of care.    If you have questions, please do not hesitate to call me. I look forward to following Teddy Ennis along with you.    Sincerely,    Inna Lugo MD    Enclosure  CC:  No Recipients    If you would like to receive this communication electronically, please contact [email or web address] or [phone] to request more information on [employer] Link access.    For providers and/or their staff who would like to refer a patient to [facility], please contact us through our one-stop-shop provider referral line, Cleopatra Gonzales, at [phone].    If you feel you have received this communication in error or would no longer like to receive these types of communications, please e-mail [email or web address]

## 2020-06-17 LAB
IGA SERPL-MCNC: 454 MG/DL (ref 40–350)
IGE SERPL-ACNC: <35 IU/ML (ref 0–100)
IGG SERPL-MCNC: 1597 MG/DL (ref 650–1600)
IGM SERPL-MCNC: 111 MG/DL (ref 50–300)

## 2020-06-19 LAB
A ALTERNATA IGE QN: <0.1 KU/L
A FUMIGATUS IGE QN: <0.1 KU/L
ALLERGEN CHAETOMIUM GLOBOSUM IGE: <0.1 KU/L
ALLERGEN WALNUT TREE IGE: <0.1 KU/L
ALLERGEN WHITE PINE TREE IGE: <0.1 KU/L
BAHIA GRASS IGE QN: <0.1 KU/L
BALD CYPRESS IGE QN: <0.1 KU/L
BERMUDA GRASS IGE QN: <0.1 KU/L
C HERBARUM IGE QN: <0.1 KU/L
C LUNATA IGE QN: <0.1 KU/L
CAT DANDER IGE QN: <0.1 KU/L
CHAETOMIUM GLOB. CLASS: NORMAL
COMMON RAGWEED IGE QN: <0.1 KU/L
COTTONWOOD IGE QN: <0.1 KU/L
D FARINAE IGE QN: 1.44 KU/L
D PTERONYSS IGE QN: 0.93 KU/L
DEPRECATED A ALTERNATA IGE RAST QL: NORMAL
DEPRECATED A FUMIGATUS IGE RAST QL: NORMAL
DEPRECATED BAHIA GRASS IGE RAST QL: NORMAL
DEPRECATED BALD CYPRESS IGE RAST QL: NORMAL
DEPRECATED BERMUDA GRASS IGE RAST QL: NORMAL
DEPRECATED C HERBARUM IGE RAST QL: NORMAL
DEPRECATED C LUNATA IGE RAST QL: NORMAL
DEPRECATED CAT DANDER IGE RAST QL: NORMAL
DEPRECATED COMMON RAGWEED IGE RAST QL: NORMAL
DEPRECATED COTTONWOOD IGE RAST QL: NORMAL
DEPRECATED D FARINAE IGE RAST QL: ABNORMAL
DEPRECATED D PTERONYSS IGE RAST QL: ABNORMAL
DEPRECATED DOG DANDER IGE RAST QL: NORMAL
DEPRECATED ELDER IGE RAST QL: NORMAL
DEPRECATED ENGL PLANTAIN IGE RAST QL: NORMAL
DEPRECATED JOHNSON GRASS IGE RAST QL: NORMAL
DEPRECATED LONDON PLANE IGE RAST QL: NORMAL
DEPRECATED MUGWORT IGE RAST QL: NORMAL
DEPRECATED P NOTATUM IGE RAST QL: NORMAL
DEPRECATED PECAN/HICK TREE IGE RAST QL: NORMAL
DEPRECATED ROACH IGE RAST QL: NORMAL
DEPRECATED S ROSTRATA IGE RAST QL: NORMAL
DEPRECATED SALTWORT IGE RAST QL: NORMAL
DEPRECATED SILVER BIRCH IGE RAST QL: NORMAL
DEPRECATED TIMOTHY IGE RAST QL: NORMAL
DEPRECATED WHITE OAK IGE RAST QL: NORMAL
DEPRECATED WILLOW IGE RAST QL: NORMAL
DOG DANDER IGE QN: <0.1 KU/L
ELDER IGE QN: <0.1 KU/L
ENGL PLANTAIN IGE QN: <0.1 KU/L
IGG1 SER-MCNC: ABNORMAL MG/DL (ref 382–929)
IGG2 SER-MCNC: 459 MG/DL (ref 242–700)
IGG3 SER-MCNC: 66 MG/DL (ref 22–176)
IGG4 SER-MCNC: 72 MG/DL (ref 4–86)
JOHNSON GRASS IGE QN: <0.1 KU/L
LONDON PLANE IGE QN: <0.1 KU/L
MUGWORT IGE QN: <0.1 KU/L
P NOTATUM IGE QN: <0.1 KU/L
PECAN/HICK TREE IGE QN: <0.1 KU/L
ROACH IGE QN: <0.1 KU/L
S ROSTRATA IGE QN: <0.1 KU/L
SALTWORT IGE QN: <0.1 KU/L
SILVER BIRCH IGE QN: <0.1 KU/L
TIMOTHY IGE QN: <0.1 KU/L
WALNUT TREE CLASS: NORMAL
WHITE OAK IGE QN: <0.1 KU/L
WHITE PINE CLASS: NORMAL
WILLOW IGE QN: <0.1 KU/L

## 2020-06-24 ENCOUNTER — TELEPHONE (OUTPATIENT)
Dept: ALLERGY | Facility: CLINIC | Age: 45
End: 2020-06-24

## 2020-06-24 NOTE — TELEPHONE ENCOUNTER
----- Message from Bee Harvey sent at 6/24/2020 12:14 PM CDT -----  Regarding: Results  Reason: Patient calling to see if results are in          Contact: [phone]

## 2020-06-25 LAB
C DIPHTHERIAE AB SER IA-ACNC: 0.8 IU/ML
C TETANI TOXOID AB SER-ACNC: 3.22 IU/ML
DEPRECATED S PNEUM23 IGG SER-MCNC: 4.9 UG/ML
DEPRECATED S PNEUM4 IGG SER-MCNC: 1.4 UG/ML
HAEM INFLU B IGG SER IA-MCNC: <0.15 MCG/ML
S PN DA SERO 19F IGG SER-MCNC: 2.7 UG/ML
S PNEUM DA 1 IGG SER-MCNC: 5.6 UG/ML
S PNEUM DA 14 IGG SER-MCNC: 4.8
S PNEUM DA 18C IGG SER-MCNC: 0.5
S PNEUM DA 19A IGG SER-MCNC: 1.3 UG/ML
S PNEUM DA 3 IGG SER-MCNC: 0.6 UG/ML
S PNEUM DA 5 IGG SER-MCNC: 2.3 UG/ML
S PNEUM DA 6A IGG SER-MCNC: 2.5 UG/ML
S PNEUM DA 6B IGG SER-MCNC: 0.3 UG/ML
S PNEUM DA 7F IGG SER-MCNC: 6.2 UG/ML
S PNEUM DA 9V IGG SER-MCNC: <0.3 UG/ML

## 2020-06-25 NOTE — TELEPHONE ENCOUNTER
Patient was notified of below. Patient states he is doing much better with the nose spray but couldn't tell me the names of the the nose sprays nor which one he had increased. I re-educated patient on directions for the nasal sprays.     Increase azelastine to 2 SEN BID and fluticasone to 1 SEN BID

## 2020-07-02 ENCOUNTER — TELEPHONE (OUTPATIENT)
Dept: FAMILY MEDICINE | Facility: CLINIC | Age: 45
End: 2020-07-02

## 2020-07-02 DIAGNOSIS — J32.9 SINUSITIS, UNSPECIFIED CHRONICITY, UNSPECIFIED LOCATION: Primary | ICD-10-CM

## 2020-07-02 RX ORDER — DOXYCYCLINE HYCLATE 100 MG
100 TABLET ORAL 2 TIMES DAILY
Qty: 14 TABLET | Refills: 0 | Status: SHIPPED | OUTPATIENT
Start: 2020-07-02 | End: 2021-02-26

## 2020-07-02 NOTE — TELEPHONE ENCOUNTER
Spoke to Ms Moreland, she said that Teddy needs a note written from you stating that he doesn't have to wear a mask? Said they went out yesterday an pt had an asthma attack and couldn't breath/almost passed out do to wearing his mask...

## 2020-07-02 NOTE — TELEPHONE ENCOUNTER
----- Message from Phoebe Chang sent at 7/2/2020  2:10 PM CDT -----  Contact: pt  Pt had a asthma attack yesterday from wearing a mask and need antibiotic called into pharmacy on file.

## 2020-07-02 NOTE — TELEPHONE ENCOUNTER
Patient said he had an asthma attack from wearing his mask., he is asking for ABX for this?   Mom(lizzie) is asking for a note for patient so he does NOT have to wear mask in public, he does NOT work,this letter would be only so when he goes out he does NOT have to wear the mask???

## 2020-07-10 ENCOUNTER — TELEPHONE (OUTPATIENT)
Dept: FAMILY MEDICINE | Facility: CLINIC | Age: 45
End: 2020-07-10

## 2020-07-10 NOTE — TELEPHONE ENCOUNTER
----- Message from David Chase sent at 7/10/2020  2:15 PM CDT -----  Regarding: Asthma flare up  Type:  Needs Medical Advice    Who Called:  Merly  Symptoms (please be specific): asthma flare up  How long has patient had these symptoms:   since yesterday  Would the patient rather a call back or a response via [facility]?  Call back  Best Call Back Number: [phone]  Additional Information:  wearing the mask is causing him severe distress as he cannot breathe

## 2020-07-10 NOTE — TELEPHONE ENCOUNTER
This is the same person that we have already said no... Can you please call and speak to him/mother

## 2020-08-14 ENCOUNTER — TELEPHONE (OUTPATIENT)
Dept: PRIMARY CARE CLINIC | Facility: CLINIC | Age: 45
End: 2020-08-14

## 2020-08-14 NOTE — TELEPHONE ENCOUNTER
Spoke with Merly. He seen the allergist and only had dust mite allergy. He is using his inhaler and nebulizer. Cough been going on for 2 weeks and now has a sinus drip.

## 2020-08-14 NOTE — TELEPHONE ENCOUNTER
I can see that Dr. Lugo recommended Azelastine nasal spray 2 sprays each nostril twice daily and fluticasone nasal spray 1 spray each nostril twice daily.  He's also taking singulair, breo and albuterol.  I don't know anything else to recommend beyond what he's already taking.  I'd recommend calling his allergist to see if he's a candidate for allergy shots.

## 2020-08-14 NOTE — TELEPHONE ENCOUNTER
----- Message from Hilaria Thakkar sent at 8/14/2020  3:18 PM CDT -----  Pt mom called stating that pt has a cough and sinus drip she wants to speak to the office please reach out to mom at [phone]

## 2020-08-15 ENCOUNTER — OFFICE VISIT (OUTPATIENT)
Dept: URGENT CARE | Facility: CLINIC | Age: 45
End: 2020-08-15
Payer: MEDICARE

## 2020-08-15 VITALS
HEIGHT: 74 IN | TEMPERATURE: 97 F | RESPIRATION RATE: 16 BRPM | WEIGHT: 301 LBS | HEART RATE: 107 BPM | OXYGEN SATURATION: 97 % | SYSTOLIC BLOOD PRESSURE: 110 MMHG | DIASTOLIC BLOOD PRESSURE: 79 MMHG | BODY MASS INDEX: 38.63 KG/M2

## 2020-08-15 DIAGNOSIS — R06.02 SHORTNESS OF BREATH: ICD-10-CM

## 2020-08-15 DIAGNOSIS — J20.8 ACUTE BACTERIAL BRONCHITIS: Primary | ICD-10-CM

## 2020-08-15 DIAGNOSIS — R05.9 COUGH: ICD-10-CM

## 2020-08-15 DIAGNOSIS — B96.89 ACUTE BACTERIAL BRONCHITIS: Primary | ICD-10-CM

## 2020-08-15 DIAGNOSIS — J45.20 MILD INTERMITTENT ASTHMA WITHOUT COMPLICATION: ICD-10-CM

## 2020-08-15 DIAGNOSIS — R05.8 COUGH PRODUCTIVE OF YELLOW SPUTUM: ICD-10-CM

## 2020-08-15 PROCEDURE — 99214 PR OFFICE/OUTPT VISIT, EST, LEVL IV, 30-39 MIN: ICD-10-PCS | Mod: S$GLB,,, | Performed by: NURSE PRACTITIONER

## 2020-08-15 PROCEDURE — 71046 XR CHEST PA AND LATERAL: ICD-10-PCS | Mod: S$GLB,,, | Performed by: RADIOLOGY

## 2020-08-15 PROCEDURE — 99499 RISK ADDL DX/OHS AUDIT: ICD-10-PCS | Mod: HCNC,S$GLB,, | Performed by: NURSE PRACTITIONER

## 2020-08-15 PROCEDURE — 99214 OFFICE O/P EST MOD 30 MIN: CPT | Mod: S$GLB,,, | Performed by: NURSE PRACTITIONER

## 2020-08-15 PROCEDURE — 99499 UNLISTED E&M SERVICE: CPT | Mod: HCNC,S$GLB,, | Performed by: NURSE PRACTITIONER

## 2020-08-15 PROCEDURE — U0003 INFECTIOUS AGENT DETECTION BY NUCLEIC ACID (DNA OR RNA); SEVERE ACUTE RESPIRATORY SYNDROME CORONAVIRUS 2 (SARS-COV-2) (CORONAVIRUS DISEASE [COVID-19]), AMPLIFIED PROBE TECHNIQUE, MAKING USE OF HIGH THROUGHPUT TECHNOLOGIES AS DESCRIBED BY CMS-2020-01-R: HCPCS | Mod: HCNC

## 2020-08-15 PROCEDURE — 71046 X-RAY EXAM CHEST 2 VIEWS: CPT | Mod: S$GLB,,, | Performed by: RADIOLOGY

## 2020-08-15 RX ORDER — AZITHROMYCIN 250 MG/1
TABLET, FILM COATED ORAL
Qty: 6 TABLET | Refills: 0 | Status: SHIPPED | OUTPATIENT
Start: 2020-08-15 | End: 2021-02-26

## 2020-08-15 RX ORDER — PREDNISONE 20 MG/1
40 TABLET ORAL DAILY
Qty: 10 TABLET | Refills: 0 | Status: SHIPPED | OUTPATIENT
Start: 2020-08-15 | End: 2020-08-20

## 2020-08-15 NOTE — PROGRESS NOTES
"Subjective:       Patient ID: Teddy Ennis is a 45 y.o. male.    Vitals:  height is 6' 2" (1.88 m) and weight is 136.5 kg (301 lb) (abnormal). His temperature is 97.3 °F (36.3 °C). His blood pressure is 110/79 and his pulse is 107. His respiration is 16 and oxygen saturation is 97%.     Chief Complaint: Asthma and Cough    Pt is a 45 y.o. male complaining of cough and asthma. PT states this has been going on for 2 weeks. Pt states this normally happens during in the winter, it is atypical for this time of year. PT takes albuterol, flonase, Singulair.     Asthma  He complains of cough, shortness of breath and sputum production. There is no hemoptysis or wheezing. This is a chronic problem. The current episode started 1 to 4 weeks ago. The problem has been gradually worsening. The cough is productive of sputum, hacking, harsh, productive and dry. Associated symptoms include chest pain, sneezing, a sore throat and sweats. Pertinent negatives include no ear pain, fever or myalgias. His symptoms are aggravated by climbing stairs and minimal activity. His symptoms are alleviated by OTC cough suppressant. He reports no improvement on treatment. His past medical history is significant for asthma.   Cough  Associated symptoms include chest pain, a sore throat, shortness of breath and sweats. Pertinent negatives include no chills, ear pain, eye redness, fever, hemoptysis, myalgias, rash or wheezing. His past medical history is significant for asthma.       Constitution: Positive for sweating. Negative for chills, fatigue and fever.   HENT: Positive for sinus pain, sinus pressure and sore throat. Negative for ear pain, congestion and voice change.    Neck: Negative for painful lymph nodes.   Cardiovascular: Positive for chest pain.   Eyes: Negative for eye redness.   Respiratory: Positive for chest tightness, cough, sputum production, shortness of breath and asthma. Negative for bloody sputum, COPD, stridor and wheezing.  "   Gastrointestinal: Negative for nausea and vomiting.   Musculoskeletal: Negative for muscle ache.   Skin: Negative for rash.   Allergic/Immunologic: Positive for seasonal allergies, asthma and sneezing.   Hematologic/Lymphatic: Negative for swollen lymph nodes.       Objective:      Physical Exam   Constitutional: He is oriented to person, place, and time. He appears well-developed. He is cooperative.  Non-toxic appearance. He does not appear ill. No distress.   HENT:   Head: Normocephalic and atraumatic.   Ears:   Right Ear: Hearing and external ear normal.   Left Ear: Hearing and external ear normal.   Nose: Nose normal. No mucosal edema, rhinorrhea or nasal deformity. No epistaxis. Right sinus exhibits no maxillary sinus tenderness and no frontal sinus tenderness. Left sinus exhibits no maxillary sinus tenderness and no frontal sinus tenderness.   Mouth/Throat: Uvula is midline, oropharynx is clear and moist and mucous membranes are normal. No trismus in the jaw. Normal dentition. No uvula swelling. No oropharyngeal exudate, posterior oropharyngeal edema or posterior oropharyngeal erythema.   Eyes: Conjunctivae and lids are normal. No scleral icterus.   Neck: Trachea normal, full passive range of motion without pain and phonation normal. Neck supple. No neck rigidity. No edema and no erythema present.   Cardiovascular: Normal rate, regular rhythm, normal heart sounds and normal pulses.   Pulmonary/Chest: Effort normal. No accessory muscle usage. No respiratory distress. He has no decreased breath sounds. He has wheezes. He has rhonchi.   Abdominal: Normal appearance.   Musculoskeletal: Normal range of motion.         General: No deformity.   Neurological: He is alert and oriented to person, place, and time. He exhibits normal muscle tone. Coordination normal.   Skin: Skin is warm, dry, intact, not diaphoretic and not pale. Psychiatric: His speech is normal and behavior is normal. Judgment and thought content  normal.   Nursing note and vitals reviewed.    X-ray Chest Pa And Lateral    Result Date: 8/15/2020  EXAMINATION: XR CHEST PA AND LATERAL CLINICAL HISTORY: Cough TECHNIQUE: PA and lateral views of the chest were performed. COMPARISON: None FINDINGS: The lungs are clear, with normal appearance of pulmonary vasculature and no pleural effusion or pneumothorax. The cardiac silhouette is normal in size. The hilar and mediastinal contours are unremarkable. Bones are intact.     No acute abnormality. Electronically signed by: Meliton Castro MD Date:    08/15/2020 Time:    10:11        Assessment:       1. Acute bacterial bronchitis    2. Cough    3. Cough productive of yellow sputum    4. Mild intermittent asthma without complication        Plan:         Acute bacterial bronchitis  -     azithromycin (Z-RM) 250 MG tablet; Take 2 tablets by mouth on day 1; Take 1 tablet by mouth on days 2-5  Dispense: 6 tablet; Refill: 0  -     predniSONE (DELTASONE) 20 MG tablet; Take 2 tablets (40 mg total) by mouth once daily. for 5 days  Dispense: 10 tablet; Refill: 0    Cough  -     X-Ray Chest PA And Lateral; Future; Expected date: 08/15/2020  -     COVID-19 Routine Screening  -     predniSONE (DELTASONE) 20 MG tablet; Take 2 tablets (40 mg total) by mouth once daily. for 5 days  Dispense: 10 tablet; Refill: 0    Cough productive of yellow sputum  -     X-Ray Chest PA And Lateral; Future; Expected date: 08/15/2020  -     azithromycin (Z-RM) 250 MG tablet; Take 2 tablets by mouth on day 1; Take 1 tablet by mouth on days 2-5  Dispense: 6 tablet; Refill: 0  -     predniSONE (DELTASONE) 20 MG tablet; Take 2 tablets (40 mg total) by mouth once daily. for 5 days  Dispense: 10 tablet; Refill: 0    Mild intermittent asthma without complication  -     predniSONE (DELTASONE) 20 MG tablet; Take 2 tablets (40 mg total) by mouth once daily. for 5 days  Dispense: 10 tablet; Refill: 0

## 2020-08-15 NOTE — PATIENT INSTRUCTIONS
Return to Urgent Care or go to ER if symptoms worsen or fail to improve.  Follow up with PCP as recommended for further management.   We will contact you in 3-5 days with your test results.  Please remain home quarantined at least until you are notified of your test results.   If your Covid-19 PCR test is positive, you will need to remain in isolation x 10 days from today and at least 3 days with no fever, without the use of fever reducing medications (ibuprofen or acetaminophen)--- whichever is longer.             Bronchitis, Antibiotic Treatment (Adult)    Bronchitis is an infection of the air passages (bronchial tubes) in your lungs. It often occurs when you have a cold. This illness is contagious during the first few days and is spread through the air by coughing and sneezing, or by direct contact (touching the sick person and then touching your own eyes, nose, or mouth).  Symptoms of bronchitis include cough with mucus (phlegm) and low-grade fever. Bronchitis usually lasts 7 to 14 days. Mild cases can be treated with simple home remedies. More severe infection is treated with an antibiotic.  Home care  Follow these guidelines when caring for yourself at home:  · If your symptoms are severe, rest at home for the first 2 to 3 days. When you go back to your usual activities, don't let yourself get too tired.  · Do not smoke. Also avoid being exposed to secondhand smoke.  · You may use over-the-counter medicines to control fever or pain, unless another medicine was prescribed. (Note: If you have chronic liver or kidney disease or have ever had a stomach ulcer or gastrointestinal bleeding, talk with your healthcare provider before using these medicines. Also talk to your provider if you are taking medicine to prevent blood clots.) Aspirin should never be given to anyone younger than 18 years of age who is ill with a viral infection or fever. It may cause severe liver or brain damage.  · Your appetite may be poor, so  a light diet is fine. Avoid dehydration by drinking 6 to 8 glasses of fluids per day (such as water, soft drinks, sports drinks, juices, tea, or soup). Extra fluids will help loosen secretions in the nose and lungs.  · Over-the-counter cough, cold, and sore-throat medicines will not shorten the length of the illness, but they may be helpful to reduce symptoms. (Note: Do not use decongestants if you have high blood pressure.)  · Finish all antibiotic medicine. Do this even if you are feeling better after only a few days.  Follow-up care  Follow up with your healthcare provider, or as advised. If you had an X-ray or ECG (electrocardiogram), a specialist will review it. You will be notified of any new findings that may affect your care.  Note: If you are age 65 or older, or if you have a chronic lung disease or condition that affects your immune system, or you smoke, talk to your healthcare provider about having pneumococcal vaccinations and a yearly influenza vaccination (flu shot).  When to seek medical advice  Call your healthcare provider right away if any of these occur:  · Fever of 100.4°F (38°C) or higher  · Coughing up increased amounts of colored sputum  · Weakness, drowsiness, headache, facial pain, ear pain, or a stiff neck  Call 911, or get immediate medical care  Contact emergency services right away if any of these occur.  · Coughing up blood  · Worsening weakness, drowsiness, headache, or stiff neck  · Trouble breathing, wheezing, or pain with breathing  Date Last Reviewed: 9/13/2015  © 5156-2035 [employer]. [address] [place] [address], [place] [address]. All rights reserved. This information is not intended as a substitute for professional medical care. Always follow your healthcare professional's instructions.

## 2020-08-17 ENCOUNTER — TELEPHONE (OUTPATIENT)
Dept: FAMILY MEDICINE | Facility: CLINIC | Age: 45
End: 2020-08-17

## 2020-08-17 NOTE — TELEPHONE ENCOUNTER
Patient wife is calling because she took patient to urgent care Saturday. Patient has bronchitis and had an asthma. Patient wife wanted to know if Dr. Wood needs him to follow up after the 14 days because of the bronchitis.

## 2020-08-17 NOTE — TELEPHONE ENCOUNTER
----- Message from Romi Almonte sent at 8/17/2020 11:37 AM CDT -----  Regarding: call back  Contact: [phone]  Patient's wife is calling to talk to nurse in regards to his visit to Urgent Care and covi19 test done there and diagnosis was bronchitis and also had a asthma attack.

## 2020-08-18 LAB — SARS-COV-2 RNA RESP QL NAA+PROBE: NOT DETECTED

## 2020-08-19 ENCOUNTER — TELEPHONE (OUTPATIENT)
Dept: URGENT CARE | Facility: CLINIC | Age: 45
End: 2020-08-19

## 2020-08-19 NOTE — TELEPHONE ENCOUNTER
----- Message from Sita Hernandez NP sent at 8/19/2020 10:07 AM CDT -----  Please notify patient of negative covid pcr test result.

## 2020-09-25 ENCOUNTER — TELEPHONE (OUTPATIENT)
Dept: FAMILY MEDICINE | Facility: CLINIC | Age: 45
End: 2020-09-25

## 2020-09-25 NOTE — TELEPHONE ENCOUNTER
----- Message from Jerome Castro sent at 9/25/2020  9:27 AM CDT -----  Regarding: Lab Request  Contact: Self/ [phone]  Patient is requesting orders for lab work. Please advise.

## 2020-10-07 RX ORDER — ESCITALOPRAM OXALATE 20 MG/1
20 TABLET ORAL DAILY
Qty: 90 TABLET | Refills: 3 | Status: SHIPPED | OUTPATIENT
Start: 2020-10-07 | End: 2021-08-27 | Stop reason: SDUPTHER

## 2020-10-07 NOTE — TELEPHONE ENCOUNTER
No new care gaps identified.  Powered by [employer]. Reference number: [id number]. 10/07/2020 1:58:11 PM   CDT

## 2020-10-27 ENCOUNTER — TELEPHONE (OUTPATIENT)
Dept: FAMILY MEDICINE | Facility: CLINIC | Age: 45
End: 2020-10-27

## 2020-10-27 NOTE — TELEPHONE ENCOUNTER
----- Message from Noemy Medrano sent at 10/27/2020  7:50 AM CDT -----  Type:  Needs Medical Advice    Who Called: Merly grandmother  Symptoms (please be specific): exposed to covid   How long has patient had these symptoms:    Pharmacy name and phone #:    Would the patient rather a call back or a response via [facility]?   Best Call Back Number: [phone]  Additional Information:

## 2020-10-28 ENCOUNTER — OFFICE VISIT (OUTPATIENT)
Dept: URGENT CARE | Facility: CLINIC | Age: 45
End: 2020-10-28
Payer: MEDICARE

## 2020-10-28 VITALS
SYSTOLIC BLOOD PRESSURE: 135 MMHG | BODY MASS INDEX: 38.63 KG/M2 | DIASTOLIC BLOOD PRESSURE: 82 MMHG | HEART RATE: 82 BPM | RESPIRATION RATE: 18 BRPM | OXYGEN SATURATION: 97 % | WEIGHT: 301 LBS | TEMPERATURE: 97 F | HEIGHT: 74 IN

## 2020-10-28 DIAGNOSIS — J32.9 SINUSITIS, UNSPECIFIED CHRONICITY, UNSPECIFIED LOCATION: Primary | ICD-10-CM

## 2020-10-28 LAB
CTP QC/QA: YES
SARS-COV-2 RDRP RESP QL NAA+PROBE: NEGATIVE

## 2020-10-28 PROCEDURE — U0002: ICD-10-PCS | Mod: QW,S$GLB,, | Performed by: PHYSICIAN ASSISTANT

## 2020-10-28 PROCEDURE — 99214 OFFICE O/P EST MOD 30 MIN: CPT | Mod: 25,S$GLB,, | Performed by: PHYSICIAN ASSISTANT

## 2020-10-28 PROCEDURE — 96372 THER/PROPH/DIAG INJ SC/IM: CPT | Mod: S$GLB,,, | Performed by: EMERGENCY MEDICINE

## 2020-10-28 PROCEDURE — 96372 PR INJECTION,THERAP/PROPH/DIAG2ST, IM OR SUBCUT: ICD-10-PCS | Mod: S$GLB,,, | Performed by: EMERGENCY MEDICINE

## 2020-10-28 PROCEDURE — U0002 COVID-19 LAB TEST NON-CDC: HCPCS | Mod: QW,S$GLB,, | Performed by: PHYSICIAN ASSISTANT

## 2020-10-28 PROCEDURE — 99214 PR OFFICE/OUTPT VISIT, EST, LEVL IV, 30-39 MIN: ICD-10-PCS | Mod: 25,S$GLB,, | Performed by: PHYSICIAN ASSISTANT

## 2020-10-28 RX ORDER — INFLUENZA A VIRUS A/VICTORIA/2454/2019 IVR-207 (H1N1) ANTIGEN (PROPIOLACTONE INACTIVATED), INFLUENZA A VIRUS A/HONG KONG/2671/2019 IVR-208 (H3N2) ANTIGEN (PROPIOLACTONE INACTIVATED), INFLUENZA B VIRUS B/VICTORIA/705/2018 BVR-11 ANTIGEN (PROPIOLACTONE INACTIVATED), INFLUENZA B VIRUS B/PHUKET/3073/2013 BVR-1B ANTIGEN (PROPIOLACTONE INACTIVATED) 15; 15; 15; 15 UG/.5ML; UG/.5ML; UG/.5ML; UG/.5ML
INJECTION, SUSPENSION INTRAMUSCULAR
COMMUNITY
Start: 2020-09-12 | End: 2023-11-28

## 2020-10-28 RX ORDER — BETAMETHASONE SODIUM PHOSPHATE AND BETAMETHASONE ACETATE 3; 3 MG/ML; MG/ML
9 INJECTION, SUSPENSION INTRA-ARTICULAR; INTRALESIONAL; INTRAMUSCULAR; SOFT TISSUE
Status: COMPLETED | OUTPATIENT
Start: 2020-10-28 | End: 2020-10-28

## 2020-10-28 RX ADMIN — BETAMETHASONE SODIUM PHOSPHATE AND BETAMETHASONE ACETATE 9 MG: 3; 3 INJECTION, SUSPENSION INTRA-ARTICULAR; INTRALESIONAL; INTRAMUSCULAR; SOFT TISSUE at 10:10

## 2020-10-28 NOTE — PATIENT INSTRUCTIONS
Self-Care for Sinusitis     Drinking plenty of water can help sinuses drain.   Sinusitis can often be managed with self-care. Self-care can keep sinuses moist and make you feel more comfortable. Remember to follow your doctor's instructions closely. This can make a big difference in getting your sinus problem under control.  Drink fluids  Drinking extra fluids helps thin your mucus. This lets it drain from your sinuses more easily. Have a glass of water every hour or two. A humidifier helps in much the same way. Fluids can also offset the drying effects of certain medicines. If you use a humidifier, follow the product maker's instructions on how to use it. Clean it on a regular schedule.  Use saltwater rinses  Rinses help keep your sinuses and nose moist. Mix a teaspoon of salt in 8 ounces of fresh, warm water. Use a bulb syringe to gently squirt the water into your nose a few times a day. You can also buy ready-made saline nasal sprays.  Apply hot or cold packs  Applying heat to the area surrounding your sinuses may make you feel more comfortable. Use a hot water bottle or a hand towel dipped in hot water. Some people also find ice packs effective for relieving pain.  Medicines  Your doctor may prescribe medications to help treat your sinusitis. If you have an infection, antibiotics can help clear it up. If you are prescribed antibiotics, take all pills on schedule until they are gone, even if you feel better. Decongestants help relieve swelling. Use decongestant sprays for short periods only under the direction of your doctor. If you have allergies, your doctor may prescribe medications to help relieve them.   Date Last Reviewed: 10/1/2016  © 3226-3189 [employer]. [address] [place] [address]. All rights reserved. This information is not intended as a substitute for professional medical care. Always follow your healthcare professional's instructions.    Please follow up with your  Primary care provider within 2-5 days if your signs and symptoms have not resolved or worsen.     If your condition worsens or fails to improve we recommend that you receive another evaluation at the emergency room immediately or contact your primary medical clinic to discuss your concerns.   You must understand that you have received an Urgent Care treatment only and that you may be released before all of your medical problems are known or treated. You, the patient, will arrange for follow up care as instructed.     RED FLAGS/WARNING SYMPTOMS DISCUSSED WITH PATIENT THAT WOULD WARRANT EMERGENT MEDICAL ATTENTION. PATIENT VERBALIZED UNDERSTANDING.

## 2020-10-28 NOTE — PROGRESS NOTES
"Subjective:       Patient ID: Teddy Ennis is a 45 y.o. male.    Vitals:  height is 6' 2" (1.88 m) and weight is 136.5 kg (301 lb) (abnormal). His temperature is 97.4 °F (36.3 °C). His blood pressure is 135/82 and his pulse is 82. His respiration is 18 and oxygen saturation is 97%.     Chief Complaint: Sinus Problem    This is a 45 y.o. male who presents today with a chief complaint of   Sinus pressure and headache started yesterday. Taking Aleve    Sinus Problem  This is a new problem. The current episode started yesterday. The problem has been gradually worsening since onset. There has been no fever. His pain is at a severity of 6/10. The pain is moderate. Associated symptoms include congestion, headaches, sinus pressure and sneezing. Pertinent negatives include no chills, coughing, diaphoresis, ear pain, hoarse voice, neck pain, shortness of breath, sore throat or swollen glands. Treatments tried: aleve. The treatment provided no relief.       Constitution: Negative for chills, sweating, fatigue and fever.   HENT: Positive for congestion and sinus pressure. Negative for ear pain, sinus pain, sore throat and voice change.    Neck: Negative for neck pain and painful lymph nodes.   Eyes: Negative for eye redness.   Respiratory: Negative for chest tightness, cough, sputum production, bloody sputum, COPD, shortness of breath, stridor, wheezing and asthma.    Gastrointestinal: Negative for nausea and vomiting.   Musculoskeletal: Negative for muscle ache.   Skin: Negative for rash.   Allergic/Immunologic: Positive for sneezing. Negative for seasonal allergies and asthma.   Neurological: Positive for headaches.   Hematologic/Lymphatic: Negative for swollen lymph nodes.       Objective:      Physical Exam   Constitutional: He is oriented to person, place, and time. He appears well-developed. He is cooperative.  Non-toxic appearance. He does not appear ill. No distress.   HENT:   Head: Normocephalic and atraumatic. "   Ears:   Right Ear: Hearing, tympanic membrane, external ear and ear canal normal.   Left Ear: Hearing, tympanic membrane, external ear and ear canal normal.   Nose: Mucosal edema present. No rhinorrhea or nasal deformity. No epistaxis. Right sinus exhibits maxillary sinus tenderness. Right sinus exhibits no frontal sinus tenderness. Left sinus exhibits maxillary sinus tenderness. Left sinus exhibits no frontal sinus tenderness.   Mouth/Throat: Uvula is midline, oropharynx is clear and moist and mucous membranes are normal. No trismus in the jaw. Normal dentition. No uvula swelling. No oropharyngeal exudate, posterior oropharyngeal edema or posterior oropharyngeal erythema.   Eyes: Conjunctivae and lids are normal. No scleral icterus.   Neck: Trachea normal, full passive range of motion without pain and phonation normal. Neck supple. No neck rigidity. No edema and no erythema present.   Cardiovascular: Normal rate, regular rhythm, normal heart sounds and normal pulses.   Pulmonary/Chest: Effort normal and breath sounds normal. No respiratory distress. He has no decreased breath sounds. He has no rhonchi.   Abdominal: Normal appearance.   Musculoskeletal: Normal range of motion.         General: No deformity.   Lymphadenopathy:        Head (right side): No submental, no submandibular, no tonsillar, no preauricular and no posterior auricular adenopathy present.        Head (left side): No submental, no submandibular, no tonsillar, no preauricular and no posterior auricular adenopathy present.     He has no cervical adenopathy.        Right cervical: No superficial cervical and no deep cervical adenopathy present.       Left cervical: No superficial cervical and no deep cervical adenopathy present.   Neurological: He is alert and oriented to person, place, and time. He exhibits normal muscle tone. Coordination normal.   Skin: Skin is warm, dry, intact, not diaphoretic and not pale. Psychiatric: His speech is normal  and behavior is normal. Judgment and thought content normal.   Nursing note and vitals reviewed.        Assessment:       1. Sinusitis, unspecified chronicity, unspecified location        Plan:         Sinusitis, unspecified chronicity, unspecified location  -     POCT COVID-19 Rapid Screening    Other orders  -     betamethasone acetate-betamethasone sodium phosphate injection 9 mg    reviewed lab results with patient. Discussed diagnosis with patient as well as treatment and home care. Discussed risks and benefits of steroids. Patient still wanted to obtain steroid injection. Discussed return to clinic precautions vs ER precautions. All patients questions answered. Patient verbalized understanding. Patient agreed with plan of care.         Self-Care for Sinusitis     Drinking plenty of water can help sinuses drain.   Sinusitis can often be managed with self-care. Self-care can keep sinuses moist and make you feel more comfortable. Remember to follow your doctor's instructions closely. This can make a big difference in getting your sinus problem under control.  Drink fluids  Drinking extra fluids helps thin your mucus. This lets it drain from your sinuses more easily. Have a glass of water every hour or two. A humidifier helps in much the same way. Fluids can also offset the drying effects of certain medicines. If you use a humidifier, follow the product maker's instructions on how to use it. Clean it on a regular schedule.  Use saltwater rinses  Rinses help keep your sinuses and nose moist. Mix a teaspoon of salt in 8 ounces of fresh, warm water. Use a bulb syringe to gently squirt the water into your nose a few times a day. You can also buy ready-made saline nasal sprays.  Apply hot or cold packs  Applying heat to the area surrounding your sinuses may make you feel more comfortable. Use a hot water bottle or a hand towel dipped in hot water. Some people also find ice packs effective for relieving  pain.  Medicines  Your doctor may prescribe medications to help treat your sinusitis. If you have an infection, antibiotics can help clear it up. If you are prescribed antibiotics, take all pills on schedule until they are gone, even if you feel better. Decongestants help relieve swelling. Use decongestant sprays for short periods only under the direction of your doctor. If you have allergies, your doctor may prescribe medications to help relieve them.   Date Last Reviewed: 10/1/2016  © 3555-7918 [employer]. [address] [place] [address]. All rights reserved. This information is not intended as a substitute for professional medical care. Always follow your healthcare professional's instructions.    Please follow up with your Primary care provider within 2-5 days if your signs and symptoms have not resolved or worsen.     If your condition worsens or fails to improve we recommend that you receive another evaluation at the emergency room immediately or contact your primary medical clinic to discuss your concerns.   You must understand that you have received an Urgent Care treatment only and that you may be released before all of your medical problems are known or treated. You, the patient, will arrange for follow up care as instructed.     RED FLAGS/WARNING SYMPTOMS DISCUSSED WITH PATIENT THAT WOULD WARRANT EMERGENT MEDICAL ATTENTION. PATIENT VERBALIZED UNDERSTANDING.

## 2020-11-03 ENCOUNTER — LAB VISIT (OUTPATIENT)
Dept: LAB | Facility: HOSPITAL | Age: 45
End: 2020-11-03
Attending: INTERNAL MEDICINE
Payer: MEDICARE

## 2020-11-03 DIAGNOSIS — E78.2 HYPERLIPIDEMIA, MIXED: ICD-10-CM

## 2020-11-03 DIAGNOSIS — Z11.59 SCREENING FOR VIRAL DISEASE: ICD-10-CM

## 2020-11-03 DIAGNOSIS — I10 ESSENTIAL HYPERTENSION: ICD-10-CM

## 2020-11-03 DIAGNOSIS — R73.01 IMPAIRED FASTING GLUCOSE: ICD-10-CM

## 2020-11-03 DIAGNOSIS — Z20.822 CLOSE EXPOSURE TO COVID-19 VIRUS: ICD-10-CM

## 2020-11-03 LAB
ALBUMIN SERPL BCP-MCNC: 3.7 G/DL (ref 3.5–5.2)
ALP SERPL-CCNC: 148 U/L (ref 55–135)
ALT SERPL W/O P-5'-P-CCNC: 24 U/L (ref 10–44)
ANION GAP SERPL CALC-SCNC: 9 MMOL/L (ref 8–16)
AST SERPL-CCNC: 17 U/L (ref 10–40)
BILIRUB SERPL-MCNC: 1.3 MG/DL (ref 0.1–1)
BUN SERPL-MCNC: 12 MG/DL (ref 6–20)
CALCIUM SERPL-MCNC: 8.7 MG/DL (ref 8.7–10.5)
CHLORIDE SERPL-SCNC: 101 MMOL/L (ref 95–110)
CHOLEST SERPL-MCNC: 157 MG/DL (ref 120–199)
CHOLEST/HDLC SERPL: 4.6 {RATIO} (ref 2–5)
CO2 SERPL-SCNC: 30 MMOL/L (ref 23–29)
CREAT SERPL-MCNC: 0.8 MG/DL (ref 0.5–1.4)
EST. GFR  (AFRICAN AMERICAN): >60 ML/MIN/1.73 M^2
EST. GFR  (NON AFRICAN AMERICAN): >60 ML/MIN/1.73 M^2
ESTIMATED AVG GLUCOSE: 108 MG/DL (ref 68–131)
GLUCOSE SERPL-MCNC: 116 MG/DL (ref 70–110)
HBA1C MFR BLD HPLC: 5.4 % (ref 4–5.6)
HDLC SERPL-MCNC: 34 MG/DL (ref 40–75)
HDLC SERPL: 21.7 % (ref 20–50)
LDLC SERPL CALC-MCNC: 99.4 MG/DL (ref 63–159)
NONHDLC SERPL-MCNC: 123 MG/DL
POTASSIUM SERPL-SCNC: 3.5 MMOL/L (ref 3.5–5.1)
PROT SERPL-MCNC: 7.7 G/DL (ref 6–8.4)
SARS-COV-2 IGG SERPLBLD QL IA.RAPID: NEGATIVE
SODIUM SERPL-SCNC: 140 MMOL/L (ref 136–145)
TRIGL SERPL-MCNC: 118 MG/DL (ref 30–150)

## 2020-11-03 PROCEDURE — 80053 COMPREHEN METABOLIC PANEL: CPT | Mod: HCNC

## 2020-11-03 PROCEDURE — 83036 HEMOGLOBIN GLYCOSYLATED A1C: CPT | Mod: HCNC

## 2020-11-03 PROCEDURE — 86769 SARS-COV-2 COVID-19 ANTIBODY: CPT | Mod: HCNC

## 2020-11-03 PROCEDURE — 80061 LIPID PANEL: CPT | Mod: HCNC

## 2020-11-10 ENCOUNTER — OFFICE VISIT (OUTPATIENT)
Dept: FAMILY MEDICINE | Facility: CLINIC | Age: 45
End: 2020-11-10
Payer: MEDICARE

## 2020-11-10 VITALS
HEIGHT: 74 IN | OXYGEN SATURATION: 96 % | HEART RATE: 86 BPM | BODY MASS INDEX: 38.68 KG/M2 | WEIGHT: 301.38 LBS | TEMPERATURE: 98 F | DIASTOLIC BLOOD PRESSURE: 76 MMHG | SYSTOLIC BLOOD PRESSURE: 110 MMHG

## 2020-11-10 DIAGNOSIS — J45.20 MILD INTERMITTENT ASTHMA WITHOUT COMPLICATION: ICD-10-CM

## 2020-11-10 DIAGNOSIS — J30.9 ALLERGIC RHINITIS, UNSPECIFIED SEASONALITY, UNSPECIFIED TRIGGER: ICD-10-CM

## 2020-11-10 DIAGNOSIS — E78.2 HYPERLIPIDEMIA, MIXED: ICD-10-CM

## 2020-11-10 DIAGNOSIS — R73.01 IMPAIRED FASTING GLUCOSE: ICD-10-CM

## 2020-11-10 DIAGNOSIS — I10 ESSENTIAL HYPERTENSION: Primary | ICD-10-CM

## 2020-11-10 PROCEDURE — 3008F BODY MASS INDEX DOCD: CPT | Mod: HCNC,CPTII,S$GLB, | Performed by: INTERNAL MEDICINE

## 2020-11-10 PROCEDURE — 3074F PR MOST RECENT SYSTOLIC BLOOD PRESSURE < 130 MM HG: ICD-10-PCS | Mod: HCNC,CPTII,S$GLB, | Performed by: INTERNAL MEDICINE

## 2020-11-10 PROCEDURE — 99214 OFFICE O/P EST MOD 30 MIN: CPT | Mod: HCNC,S$GLB,, | Performed by: INTERNAL MEDICINE

## 2020-11-10 PROCEDURE — 99999 PR PBB SHADOW E&M-EST. PATIENT-LVL III: CPT | Mod: PBBFAC,HCNC,, | Performed by: INTERNAL MEDICINE

## 2020-11-10 PROCEDURE — 3078F PR MOST RECENT DIASTOLIC BLOOD PRESSURE < 80 MM HG: ICD-10-PCS | Mod: HCNC,CPTII,S$GLB, | Performed by: INTERNAL MEDICINE

## 2020-11-10 PROCEDURE — 3008F PR BODY MASS INDEX (BMI) DOCUMENTED: ICD-10-PCS | Mod: HCNC,CPTII,S$GLB, | Performed by: INTERNAL MEDICINE

## 2020-11-10 PROCEDURE — 99999 PR PBB SHADOW E&M-EST. PATIENT-LVL III: ICD-10-PCS | Mod: PBBFAC,HCNC,, | Performed by: INTERNAL MEDICINE

## 2020-11-10 PROCEDURE — 3078F DIAST BP <80 MM HG: CPT | Mod: HCNC,CPTII,S$GLB, | Performed by: INTERNAL MEDICINE

## 2020-11-10 PROCEDURE — 99214 PR OFFICE/OUTPT VISIT, EST, LEVL IV, 30-39 MIN: ICD-10-PCS | Mod: HCNC,S$GLB,, | Performed by: INTERNAL MEDICINE

## 2020-11-10 PROCEDURE — 3074F SYST BP LT 130 MM HG: CPT | Mod: HCNC,CPTII,S$GLB, | Performed by: INTERNAL MEDICINE

## 2020-11-10 NOTE — PROGRESS NOTES
[facility] Note    Chief Complaint      Chief Complaint   Patient presents with    Hypertension     6 month f/u       History of Present Illness      Teddy Ennis is a 45 y.o. male with chronic conditions of HTN, HLD, asthma, IFG, depression, allergic rhinitis who presents today for: follow up chronic conditions.  HTN: BP at goal on amlodipine.  HLD: Controlled on pravastatin. LDL 99.  Asthma, mild intermittent, allergic rhinitis: previously on immunotherapy.  Had exacerbation seen at urgent care.    IFG: Blood sugar 116. A1C 5.4.  Flu shot UTD.  TDAP 2019.    Past Medical History:  Past Medical History:   Diagnosis Date    Allergy     Asthma        Past Surgical History:   has a past surgical history that includes Ankle surgery (Left, 1995); Adenoidectomy; and Sinus surgery.    Family History:  family history includes Hypertension in his mother; No Known Problems in his father; Stroke in his mother.     Social History:  Social History     Tobacco Use    Smoking status: Never Smoker    Smokeless tobacco: Never Used   Substance Use Topics    Alcohol use: Not Currently    Drug use: Not on file       I personally reviewed all past medical, surgical, social and family history.    Review of Systems   Constitutional: Negative for chills, fever and malaise/fatigue.   Respiratory: Negative for shortness of breath.    Cardiovascular: Negative for chest pain.   Gastrointestinal: Negative for constipation, diarrhea, nausea and vomiting.   Skin: Negative for rash.   Neurological: Negative for weakness.   All other systems reviewed and are negative.       Medications:  Outpatient Encounter Medications as of 11/10/2020   Medication Sig Dispense Refill    albuterol (PROVENTIL) 2.5 mg /3 mL (0.083 %) nebulizer solution albuterol sulfate 2.5 mg/3 mL (0.083 %) solution for nebulization      amLODIPine (NORVASC) 10 MG tablet TK 1 T PO D  1    azelastine (ASTELIN) 137 mcg (0.1 %) nasal spray 1 spray (137  "mcg total) by Nasal route 2 (two) times daily. 30 mL 11    doxycycline (VIBRA-TABS) 100 MG tablet Take 1 tablet (100 mg total) by mouth 2 (two) times daily. 14 tablet 0    escitalopram oxalate (LEXAPRO) 20 MG tablet Take 1 tablet (20 mg total) by mouth once daily. 90 tablet 3    fluticasone furoate-vilanterol (BREO ELLIPTA) 100-25 mcg/dose diskus inhaler Inhale 1 puff into the lungs once daily. Controller 1 each 11    fluticasone propionate (FLONASE) 50 mcg/actuation nasal spray fluticasone propionate 50 mcg/actuation nasal spray,suspension      montelukast (SINGULAIR) 10 mg tablet TK 1 T PO D  1    pravastatin (PRAVACHOL) 20 MG tablet TK 1 T PO D  1    VENTOLIN HFA 90 mcg/actuation inhaler INL 2 PFS ITL TID PRF WHZ  2    AFLURIA QD 2020-21,3YR UP,,PF, 60 mcg (15 mcg x 4)/0.5 mL Syrg ADM 0.5ML IM UTD      azithromycin (Z-RM) 250 MG tablet Take 2 tablets by mouth on day 1; Take 1 tablet by mouth on days 2-5 (Patient not taking: Reported on 11/10/2020) 6 tablet 0     No facility-administered encounter medications on file as of 11/10/2020.        Allergies:  Review of patient's allergies indicates:   Allergen Reactions    Penicillins Hives       Health Maintenance:  Immunization History   Administered Date(s) Administered    Influenza - Quadrivalent - PF *Preferred* (6 months and older) 09/03/2016, 09/29/2018, 08/26/2019, 09/12/2020    Influenza Split 09/30/2009, 09/30/2010, 09/27/2011, 09/30/2013, 09/17/2014    Tdap 09/29/2018, 11/29/2019      Health Maintenance   Topic Date Due    Hepatitis C Screening  1975    Pneumococcal Vaccine (Medium Risk) (1 of 1 - PPSV23) 08/11/1995    Lipid Panel  11/03/2025    TETANUS VACCINE  11/29/2029        Physical Exam      Vital Signs  Temp: 97.7 °F (36.5 °C)  Temp src: Oral  Pulse: 86  SpO2: 96 %  BP: 110/76  BP Location: Right arm  Patient Position: Sitting  Pain Score: 0-No pain  Height and Weight  Height: 6' 2" (188 cm)  Weight: (!) 136.7 kg (301 lb 5.9 " oz)  BSA (Calculated - sq m): 2.67 sq meters  BMI (Calculated): 38.7  Weight in (lb) to have BMI = 25: 194.3]    Physical Exam  Vitals signs reviewed.   Constitutional:       Appearance: He is well-developed.   HENT:      Head: Normocephalic and atraumatic.      Right Ear: External ear normal.      Left Ear: External ear normal.   Eyes:      Conjunctiva/sclera: Conjunctivae normal.      Pupils: Pupils are equal, round, and reactive to light.   Cardiovascular:      Rate and Rhythm: Normal rate and regular rhythm.      Heart sounds: Normal heart sounds. No murmur.   Pulmonary:      Effort: Pulmonary effort is normal.      Breath sounds: Normal breath sounds. No wheezing or rales.   Abdominal:      General: Bowel sounds are normal. There is no distension or abdominal bruit.      Palpations: Abdomen is soft.      Tenderness: There is no abdominal tenderness.          Laboratory:  CBC:  Recent Labs   Lab 11/26/19  0803 06/16/20  1455   WBC 8.84 12.26   RBC 5.22 5.48   Hemoglobin 15.4 15.9   Hematocrit 47.3 49.4   Platelets 324 355 H   MCV 91 90   MCH 29.5 29.0   MCHC 32.6 32.2     CMP:  Recent Labs   Lab 11/26/19  0803 11/03/20  0740   Glucose 116 H 116 H   Calcium 9.4 8.7   Albumin 3.9 3.7   Total Protein 8.1 7.7   Sodium 141 140   Potassium 3.6 3.5   CO2 26 30 H   Chloride 103 101   BUN 10 12   Alkaline Phosphatase 147 H 148 H   ALT 26 24   AST 25 17   Total Bilirubin 1.0 1.3 H     URINALYSIS:       LIPIDS:  Recent Labs   Lab 11/26/19  0803 11/03/20  0740   TSH 1.351  --    HDL 32 L 34 L   Cholesterol 171 157   Triglycerides 182 H 118   LDL Cholesterol 102.6 99.4   HDL/Cholesterol Ratio 18.7 L 21.7   Non-HDL Cholesterol 139 123   Total Cholesterol/HDL Ratio 5.3 H 4.6     TSH:  Recent Labs   Lab 11/26/19  0803   TSH 1.351     A1C:  Recent Labs   Lab 12/03/19  0803 11/03/20  0740   Hemoglobin A1C 5.4 5.4       Assessment/Plan     Teddy Ennis is a 45 y.o.male with:    1. Essential hypertension  Continue current  meds.    2. Hyperlipidemia, mixed  Continue current meds.  Labs reviewed  3. Mild intermittent asthma without complication  4. Allergic rhinitis, unspecified seasonality, unspecified trigger  Continue current meds.    5. Impaired fasting glucose  Cont diet.    Chronic conditions status updated as per HPI.  Other than changes above, cont current medications and maintain follow up with specialists.  Return to clinic in 6 months.    Doni Wood MD  [facility]

## 2020-12-29 RX ORDER — FLUTICASONE PROPIONATE 50 MCG
SPRAY, SUSPENSION (ML) NASAL
Qty: 16 G | Refills: 11 | Status: SHIPPED | OUTPATIENT
Start: 2020-12-29 | End: 2022-03-24 | Stop reason: SDUPTHER

## 2020-12-29 NOTE — TELEPHONE ENCOUNTER
No new care gaps identified.  Powered by [employer]. Reference number: [id number]. 12/29/2020 8:42:31 AM   CST

## 2020-12-29 NOTE — TELEPHONE ENCOUNTER
----- Message from Romi Almonte sent at 12/29/2020  8:32 AM CST -----  Regarding: Refills  Contact: [phone]  Patient is calling to get refills on his medication sent to [id number] - [place], [place] - [address] AT [facility] [address] [place] [address] [phone] (Phone) [phone] (Fax)    1. fluticasone propionate (FLONASE) 50 mcg/actuation nasal spray

## 2020-12-29 NOTE — TELEPHONE ENCOUNTER
----- Message from Reema Medel sent at 12/29/2020  9:10 AM CST -----  Contact: Felicitas lorenzo/ [facility] [phone]  Type: Requesting to speak with nurse    Who Called: Felicitas   Regarding: needs refill on fluticasone propionate (FLONASE) 50 mcg/actuation nasal spray  Would the patient rather a call back or a response via [facility]? Call back  Best Call Back Number: [phone]  Additional Information: [facility] [address] and [place]

## 2021-01-06 ENCOUNTER — TELEPHONE (OUTPATIENT)
Dept: FAMILY MEDICINE | Facility: CLINIC | Age: 46
End: 2021-01-06

## 2021-01-06 RX ORDER — ALBUTEROL SULFATE 0.83 MG/ML
SOLUTION RESPIRATORY (INHALATION)
Qty: 1 BOX | Refills: 11 | Status: SHIPPED | OUTPATIENT
Start: 2021-01-06 | End: 2022-01-11 | Stop reason: SDUPTHER

## 2021-01-25 ENCOUNTER — TELEPHONE (OUTPATIENT)
Dept: PRIMARY CARE CLINIC | Facility: CLINIC | Age: 46
End: 2021-01-25

## 2021-01-25 ENCOUNTER — TELEPHONE (OUTPATIENT)
Dept: FAMILY MEDICINE | Facility: CLINIC | Age: 46
End: 2021-01-25

## 2021-01-25 DIAGNOSIS — J45.901 EXACERBATION OF ASTHMA, UNSPECIFIED ASTHMA SEVERITY, UNSPECIFIED WHETHER PERSISTENT: Primary | ICD-10-CM

## 2021-01-25 RX ORDER — METHYLPREDNISOLONE 4 MG/1
TABLET ORAL
Qty: 1 PACKAGE | Refills: 0 | Status: SHIPPED | OUTPATIENT
Start: 2021-01-25 | End: 2021-02-15

## 2021-01-26 DIAGNOSIS — J45.20 MILD INTERMITTENT ASTHMA WITHOUT COMPLICATION: ICD-10-CM

## 2021-01-26 RX ORDER — FLUTICASONE FUROATE AND VILANTEROL TRIFENATATE 100; 25 UG/1; UG/1
1 POWDER RESPIRATORY (INHALATION) DAILY
Qty: 1 EACH | Refills: 11 | Status: SHIPPED | OUTPATIENT
Start: 2021-01-26 | End: 2021-06-07 | Stop reason: SDUPTHER

## 2021-02-03 ENCOUNTER — TELEPHONE (OUTPATIENT)
Dept: FAMILY MEDICINE | Facility: CLINIC | Age: 46
End: 2021-02-03

## 2021-02-03 ENCOUNTER — TELEPHONE (OUTPATIENT)
Dept: ALLERGY | Facility: CLINIC | Age: 46
End: 2021-02-03

## 2021-02-04 ENCOUNTER — PATIENT OUTREACH (OUTPATIENT)
Dept: ADMINISTRATIVE | Facility: OTHER | Age: 46
End: 2021-02-04

## 2021-02-05 ENCOUNTER — OFFICE VISIT (OUTPATIENT)
Dept: ALLERGY | Facility: CLINIC | Age: 46
End: 2021-02-05
Payer: MEDICARE

## 2021-02-05 VITALS — HEIGHT: 74 IN | HEART RATE: 95 BPM | BODY MASS INDEX: 40.07 KG/M2 | WEIGHT: 312.19 LBS | OXYGEN SATURATION: 93 %

## 2021-02-05 DIAGNOSIS — R05.3 CHRONIC COUGH: ICD-10-CM

## 2021-02-05 DIAGNOSIS — J31.0 CHRONIC RHINITIS: Primary | ICD-10-CM

## 2021-02-05 DIAGNOSIS — H10.423 SIMPLE CHRONIC CONJUNCTIVITIS OF BOTH EYES: ICD-10-CM

## 2021-02-05 PROCEDURE — 3008F PR BODY MASS INDEX (BMI) DOCUMENTED: ICD-10-PCS | Mod: CPTII,S$GLB,, | Performed by: ALLERGY & IMMUNOLOGY

## 2021-02-05 PROCEDURE — 99214 PR OFFICE/OUTPT VISIT, EST, LEVL IV, 30-39 MIN: ICD-10-PCS | Mod: S$GLB,,, | Performed by: ALLERGY & IMMUNOLOGY

## 2021-02-05 PROCEDURE — 99214 OFFICE O/P EST MOD 30 MIN: CPT | Mod: S$GLB,,, | Performed by: ALLERGY & IMMUNOLOGY

## 2021-02-05 PROCEDURE — 99999 PR PBB SHADOW E&M-EST. PATIENT-LVL III: ICD-10-PCS | Mod: PBBFAC,,, | Performed by: ALLERGY & IMMUNOLOGY

## 2021-02-05 PROCEDURE — 99999 PR PBB SHADOW E&M-EST. PATIENT-LVL III: CPT | Mod: PBBFAC,,, | Performed by: ALLERGY & IMMUNOLOGY

## 2021-02-05 PROCEDURE — 3008F BODY MASS INDEX DOCD: CPT | Mod: CPTII,S$GLB,, | Performed by: ALLERGY & IMMUNOLOGY

## 2021-02-05 RX ORDER — PREDNISONE 10 MG/1
TABLET ORAL
Qty: 24 TABLET | Refills: 0 | Status: SHIPPED | OUTPATIENT
Start: 2021-02-05 | End: 2021-02-26

## 2021-02-05 RX ORDER — MINERAL OIL
180 ENEMA (ML) RECTAL DAILY
COMMUNITY

## 2021-02-19 ENCOUNTER — TELEPHONE (OUTPATIENT)
Dept: ALLERGY | Facility: CLINIC | Age: 46
End: 2021-02-19

## 2021-02-26 ENCOUNTER — TELEPHONE (OUTPATIENT)
Dept: PRIMARY CARE CLINIC | Facility: CLINIC | Age: 46
End: 2021-02-26

## 2021-02-26 DIAGNOSIS — J06.9 URTI (ACUTE UPPER RESPIRATORY INFECTION): Primary | ICD-10-CM

## 2021-02-26 RX ORDER — AZITHROMYCIN 250 MG/1
TABLET, FILM COATED ORAL
Qty: 6 TABLET | Refills: 0 | Status: SHIPPED | OUTPATIENT
Start: 2021-02-26 | End: 2021-03-03

## 2021-03-01 DIAGNOSIS — J30.9 ALLERGIC RHINITIS, UNSPECIFIED SEASONALITY, UNSPECIFIED TRIGGER: ICD-10-CM

## 2021-03-03 DIAGNOSIS — J30.9 ALLERGIC RHINITIS, UNSPECIFIED SEASONALITY, UNSPECIFIED TRIGGER: ICD-10-CM

## 2021-03-03 RX ORDER — AZELASTINE 1 MG/ML
1 SPRAY, METERED NASAL 2 TIMES DAILY
Qty: 30 ML | Refills: 11 | OUTPATIENT
Start: 2021-03-03 | End: 2022-03-03

## 2021-03-03 RX ORDER — AZELASTINE 1 MG/ML
1 SPRAY, METERED NASAL 2 TIMES DAILY
Qty: 30 ML | Refills: 11 | Status: SHIPPED | OUTPATIENT
Start: 2021-03-03 | End: 2023-04-21 | Stop reason: SDUPTHER

## 2021-05-11 ENCOUNTER — OFFICE VISIT (OUTPATIENT)
Dept: FAMILY MEDICINE | Facility: CLINIC | Age: 46
End: 2021-05-11
Payer: MEDICARE

## 2021-05-11 VITALS
HEIGHT: 74 IN | SYSTOLIC BLOOD PRESSURE: 120 MMHG | TEMPERATURE: 98 F | DIASTOLIC BLOOD PRESSURE: 70 MMHG | WEIGHT: 309.19 LBS | BODY MASS INDEX: 39.68 KG/M2 | HEART RATE: 81 BPM | OXYGEN SATURATION: 97 %

## 2021-05-11 DIAGNOSIS — R73.01 IMPAIRED FASTING GLUCOSE: ICD-10-CM

## 2021-05-11 DIAGNOSIS — J45.20 MILD INTERMITTENT ASTHMA WITHOUT COMPLICATION: ICD-10-CM

## 2021-05-11 DIAGNOSIS — E66.01 MORBID (SEVERE) OBESITY DUE TO EXCESS CALORIES: ICD-10-CM

## 2021-05-11 DIAGNOSIS — I10 ESSENTIAL HYPERTENSION: Primary | ICD-10-CM

## 2021-05-11 DIAGNOSIS — J30.9 ALLERGIC RHINITIS, UNSPECIFIED SEASONALITY, UNSPECIFIED TRIGGER: ICD-10-CM

## 2021-05-11 DIAGNOSIS — E78.2 HYPERLIPIDEMIA, MIXED: ICD-10-CM

## 2021-05-11 DIAGNOSIS — L72.9 SKIN CYST: ICD-10-CM

## 2021-05-11 PROCEDURE — 3074F PR MOST RECENT SYSTOLIC BLOOD PRESSURE < 130 MM HG: ICD-10-PCS | Mod: CPTII,S$GLB,, | Performed by: INTERNAL MEDICINE

## 2021-05-11 PROCEDURE — 99214 PR OFFICE/OUTPT VISIT, EST, LEVL IV, 30-39 MIN: ICD-10-PCS | Mod: S$GLB,,, | Performed by: INTERNAL MEDICINE

## 2021-05-11 PROCEDURE — 3008F BODY MASS INDEX DOCD: CPT | Mod: CPTII,S$GLB,, | Performed by: INTERNAL MEDICINE

## 2021-05-11 PROCEDURE — 99499 RISK ADDL DX/OHS AUDIT: ICD-10-PCS | Mod: S$GLB,,, | Performed by: INTERNAL MEDICINE

## 2021-05-11 PROCEDURE — 99499 UNLISTED E&M SERVICE: CPT | Mod: S$GLB,,, | Performed by: INTERNAL MEDICINE

## 2021-05-11 PROCEDURE — 1126F PR PAIN SEVERITY QUANTIFIED, NO PAIN PRESENT: ICD-10-PCS | Mod: S$GLB,,, | Performed by: INTERNAL MEDICINE

## 2021-05-11 PROCEDURE — 99999 PR PBB SHADOW E&M-EST. PATIENT-LVL IV: CPT | Mod: PBBFAC,,, | Performed by: INTERNAL MEDICINE

## 2021-05-11 PROCEDURE — 99999 PR PBB SHADOW E&M-EST. PATIENT-LVL IV: ICD-10-PCS | Mod: PBBFAC,,, | Performed by: INTERNAL MEDICINE

## 2021-05-11 PROCEDURE — 99214 OFFICE O/P EST MOD 30 MIN: CPT | Mod: S$GLB,,, | Performed by: INTERNAL MEDICINE

## 2021-05-11 PROCEDURE — 3078F DIAST BP <80 MM HG: CPT | Mod: CPTII,S$GLB,, | Performed by: INTERNAL MEDICINE

## 2021-05-11 PROCEDURE — 3078F PR MOST RECENT DIASTOLIC BLOOD PRESSURE < 80 MM HG: ICD-10-PCS | Mod: CPTII,S$GLB,, | Performed by: INTERNAL MEDICINE

## 2021-05-11 PROCEDURE — 3008F PR BODY MASS INDEX (BMI) DOCUMENTED: ICD-10-PCS | Mod: CPTII,S$GLB,, | Performed by: INTERNAL MEDICINE

## 2021-05-11 PROCEDURE — 1126F AMNT PAIN NOTED NONE PRSNT: CPT | Mod: S$GLB,,, | Performed by: INTERNAL MEDICINE

## 2021-05-11 PROCEDURE — 3074F SYST BP LT 130 MM HG: CPT | Mod: CPTII,S$GLB,, | Performed by: INTERNAL MEDICINE

## 2021-06-07 ENCOUNTER — OFFICE VISIT (OUTPATIENT)
Dept: URGENT CARE | Facility: CLINIC | Age: 46
End: 2021-06-07
Payer: MEDICARE

## 2021-06-07 ENCOUNTER — TELEPHONE (OUTPATIENT)
Dept: FAMILY MEDICINE | Facility: CLINIC | Age: 46
End: 2021-06-07

## 2021-06-07 VITALS
BODY MASS INDEX: 30.8 KG/M2 | RESPIRATION RATE: 22 BRPM | DIASTOLIC BLOOD PRESSURE: 75 MMHG | WEIGHT: 240 LBS | OXYGEN SATURATION: 97 % | HEART RATE: 80 BPM | HEIGHT: 74 IN | SYSTOLIC BLOOD PRESSURE: 123 MMHG | TEMPERATURE: 98 F

## 2021-06-07 DIAGNOSIS — J45.901 EXACERBATION OF ASTHMA, UNSPECIFIED ASTHMA SEVERITY, UNSPECIFIED WHETHER PERSISTENT: Primary | ICD-10-CM

## 2021-06-07 DIAGNOSIS — J45.20 MILD INTERMITTENT ASTHMA WITHOUT COMPLICATION: ICD-10-CM

## 2021-06-07 LAB
CTP QC/QA: YES
SARS-COV-2 RDRP RESP QL NAA+PROBE: NEGATIVE

## 2021-06-07 PROCEDURE — 94640 AIRWAY INHALATION TREATMENT: CPT | Mod: S$GLB,,, | Performed by: FAMILY MEDICINE

## 2021-06-07 PROCEDURE — 96372 THER/PROPH/DIAG INJ SC/IM: CPT | Mod: 59,S$GLB,, | Performed by: FAMILY MEDICINE

## 2021-06-07 PROCEDURE — 94640 PR INHAL RX, AIRWAY OBST/DX SPUTUM INDUCT: ICD-10-PCS | Mod: S$GLB,,, | Performed by: FAMILY MEDICINE

## 2021-06-07 PROCEDURE — 99214 OFFICE O/P EST MOD 30 MIN: CPT | Mod: 25,S$GLB,, | Performed by: FAMILY MEDICINE

## 2021-06-07 PROCEDURE — 3008F PR BODY MASS INDEX (BMI) DOCUMENTED: ICD-10-PCS | Mod: CPTII,S$GLB,, | Performed by: FAMILY MEDICINE

## 2021-06-07 PROCEDURE — 96372 PR INJECTION,THERAP/PROPH/DIAG2ST, IM OR SUBCUT: ICD-10-PCS | Mod: 59,S$GLB,, | Performed by: FAMILY MEDICINE

## 2021-06-07 PROCEDURE — 3008F BODY MASS INDEX DOCD: CPT | Mod: CPTII,S$GLB,, | Performed by: FAMILY MEDICINE

## 2021-06-07 PROCEDURE — U0002: ICD-10-PCS | Mod: QW,S$GLB,, | Performed by: FAMILY MEDICINE

## 2021-06-07 PROCEDURE — 99214 PR OFFICE/OUTPT VISIT, EST, LEVL IV, 30-39 MIN: ICD-10-PCS | Mod: 25,S$GLB,, | Performed by: FAMILY MEDICINE

## 2021-06-07 PROCEDURE — U0002 COVID-19 LAB TEST NON-CDC: HCPCS | Mod: QW,S$GLB,, | Performed by: FAMILY MEDICINE

## 2021-06-07 RX ORDER — FLUTICASONE FUROATE AND VILANTEROL TRIFENATATE 100; 25 UG/1; UG/1
1 POWDER RESPIRATORY (INHALATION) DAILY
Qty: 1 EACH | Refills: 10 | Status: SHIPPED | OUTPATIENT
Start: 2021-06-07 | End: 2021-11-10

## 2021-06-07 RX ORDER — PREDNISONE 20 MG/1
40 TABLET ORAL DAILY
Qty: 10 TABLET | Refills: 0 | Status: SHIPPED | OUTPATIENT
Start: 2021-06-07 | End: 2021-06-12

## 2021-06-07 RX ORDER — ALBUTEROL SULFATE 90 UG/1
2 AEROSOL, METERED RESPIRATORY (INHALATION) EVERY 6 HOURS PRN
Qty: 18 G | Refills: 3 | Status: SHIPPED | OUTPATIENT
Start: 2021-06-07 | End: 2022-01-11 | Stop reason: SDUPTHER

## 2021-06-07 RX ORDER — ALBUTEROL SULFATE 0.83 MG/ML
2.5 SOLUTION RESPIRATORY (INHALATION)
Status: COMPLETED | OUTPATIENT
Start: 2021-06-07 | End: 2021-06-07

## 2021-06-07 RX ORDER — IPRATROPIUM BROMIDE 0.5 MG/2.5ML
0.5 SOLUTION RESPIRATORY (INHALATION)
Status: COMPLETED | OUTPATIENT
Start: 2021-06-07 | End: 2021-06-07

## 2021-06-07 RX ORDER — BETAMETHASONE SODIUM PHOSPHATE AND BETAMETHASONE ACETATE 3; 3 MG/ML; MG/ML
9 INJECTION, SUSPENSION INTRA-ARTICULAR; INTRALESIONAL; INTRAMUSCULAR; SOFT TISSUE
Status: COMPLETED | OUTPATIENT
Start: 2021-06-07 | End: 2021-06-07

## 2021-06-07 RX ADMIN — IPRATROPIUM BROMIDE 0.5 MG: 0.5 SOLUTION RESPIRATORY (INHALATION) at 01:06

## 2021-06-07 RX ADMIN — ALBUTEROL SULFATE 2.5 MG: 0.83 SOLUTION RESPIRATORY (INHALATION) at 01:06

## 2021-06-07 RX ADMIN — BETAMETHASONE SODIUM PHOSPHATE AND BETAMETHASONE ACETATE 9 MG: 3; 3 INJECTION, SUSPENSION INTRA-ARTICULAR; INTRALESIONAL; INTRAMUSCULAR; SOFT TISSUE at 01:06

## 2021-06-09 ENCOUNTER — TELEPHONE (OUTPATIENT)
Dept: FAMILY MEDICINE | Facility: CLINIC | Age: 46
End: 2021-06-09

## 2021-06-09 DIAGNOSIS — R05.9 COUGH: Primary | ICD-10-CM

## 2021-06-09 RX ORDER — PROMETHAZINE HYDROCHLORIDE AND DEXTROMETHORPHAN HYDROBROMIDE 6.25; 15 MG/5ML; MG/5ML
5 SYRUP ORAL EVERY 4 HOURS PRN
Qty: 118 ML | Refills: 0 | Status: SHIPPED | OUTPATIENT
Start: 2021-06-09 | End: 2023-04-17 | Stop reason: ALTCHOICE

## 2021-06-17 ENCOUNTER — OFFICE VISIT (OUTPATIENT)
Dept: FAMILY MEDICINE | Facility: CLINIC | Age: 46
End: 2021-06-17
Payer: MEDICARE

## 2021-06-17 VITALS
TEMPERATURE: 98 F | DIASTOLIC BLOOD PRESSURE: 70 MMHG | SYSTOLIC BLOOD PRESSURE: 130 MMHG | BODY MASS INDEX: 39.27 KG/M2 | HEART RATE: 77 BPM | WEIGHT: 306 LBS | HEIGHT: 74 IN | OXYGEN SATURATION: 98 %

## 2021-06-17 DIAGNOSIS — I10 ESSENTIAL HYPERTENSION: Primary | ICD-10-CM

## 2021-06-17 DIAGNOSIS — J45.20 MILD INTERMITTENT ASTHMA WITHOUT COMPLICATION: ICD-10-CM

## 2021-06-17 PROCEDURE — 1126F PR PAIN SEVERITY QUANTIFIED, NO PAIN PRESENT: ICD-10-PCS | Mod: S$GLB,,, | Performed by: INTERNAL MEDICINE

## 2021-06-17 PROCEDURE — 1126F AMNT PAIN NOTED NONE PRSNT: CPT | Mod: S$GLB,,, | Performed by: INTERNAL MEDICINE

## 2021-06-17 PROCEDURE — 3075F PR MOST RECENT SYSTOLIC BLOOD PRESS GE 130-139MM HG: ICD-10-PCS | Mod: CPTII,S$GLB,, | Performed by: INTERNAL MEDICINE

## 2021-06-17 PROCEDURE — 3008F BODY MASS INDEX DOCD: CPT | Mod: CPTII,S$GLB,, | Performed by: INTERNAL MEDICINE

## 2021-06-17 PROCEDURE — 3078F DIAST BP <80 MM HG: CPT | Mod: CPTII,S$GLB,, | Performed by: INTERNAL MEDICINE

## 2021-06-17 PROCEDURE — 3008F PR BODY MASS INDEX (BMI) DOCUMENTED: ICD-10-PCS | Mod: CPTII,S$GLB,, | Performed by: INTERNAL MEDICINE

## 2021-06-17 PROCEDURE — 99214 OFFICE O/P EST MOD 30 MIN: CPT | Mod: S$GLB,,, | Performed by: INTERNAL MEDICINE

## 2021-06-17 PROCEDURE — 99999 PR PBB SHADOW E&M-EST. PATIENT-LVL III: CPT | Mod: PBBFAC,,, | Performed by: INTERNAL MEDICINE

## 2021-06-17 PROCEDURE — 3078F PR MOST RECENT DIASTOLIC BLOOD PRESSURE < 80 MM HG: ICD-10-PCS | Mod: CPTII,S$GLB,, | Performed by: INTERNAL MEDICINE

## 2021-06-17 PROCEDURE — 99999 PR PBB SHADOW E&M-EST. PATIENT-LVL III: ICD-10-PCS | Mod: PBBFAC,,, | Performed by: INTERNAL MEDICINE

## 2021-06-17 PROCEDURE — 99214 PR OFFICE/OUTPT VISIT, EST, LEVL IV, 30-39 MIN: ICD-10-PCS | Mod: S$GLB,,, | Performed by: INTERNAL MEDICINE

## 2021-06-17 PROCEDURE — 3075F SYST BP GE 130 - 139MM HG: CPT | Mod: CPTII,S$GLB,, | Performed by: INTERNAL MEDICINE

## 2021-06-17 RX ORDER — BENZONATATE 200 MG/1
200 CAPSULE ORAL 3 TIMES DAILY PRN
Qty: 30 CAPSULE | Refills: 0 | Status: SHIPPED | OUTPATIENT
Start: 2021-06-17 | End: 2021-06-27

## 2021-06-17 RX ORDER — AZITHROMYCIN 250 MG/1
TABLET, FILM COATED ORAL
Qty: 6 TABLET | Refills: 0 | Status: SHIPPED | OUTPATIENT
Start: 2021-06-17 | End: 2021-06-22

## 2021-06-17 RX ORDER — AMLODIPINE BESYLATE 10 MG/1
10 TABLET ORAL DAILY
Qty: 30 TABLET | Refills: 5 | Status: SHIPPED | OUTPATIENT
Start: 2021-06-17 | End: 2021-12-17

## 2021-08-27 RX ORDER — ESCITALOPRAM OXALATE 20 MG/1
20 TABLET ORAL DAILY
Qty: 90 TABLET | Refills: 3 | Status: SHIPPED | OUTPATIENT
Start: 2021-08-27 | End: 2022-08-23 | Stop reason: SDUPTHER

## 2021-10-05 ENCOUNTER — TELEPHONE (OUTPATIENT)
Dept: FAMILY MEDICINE | Facility: CLINIC | Age: 46
End: 2021-10-05

## 2021-10-05 DIAGNOSIS — J32.9 SINUSITIS, UNSPECIFIED CHRONICITY, UNSPECIFIED LOCATION: Primary | ICD-10-CM

## 2021-10-05 RX ORDER — AZITHROMYCIN 250 MG/1
TABLET, FILM COATED ORAL
Qty: 6 TABLET | Refills: 0 | Status: SHIPPED | OUTPATIENT
Start: 2021-10-05 | End: 2021-10-10

## 2021-10-11 ENCOUNTER — TELEPHONE (OUTPATIENT)
Dept: FAMILY MEDICINE | Facility: CLINIC | Age: 46
End: 2021-10-11

## 2021-10-11 DIAGNOSIS — I10 ESSENTIAL HYPERTENSION: Primary | ICD-10-CM

## 2021-10-11 DIAGNOSIS — Z12.5 SCREENING FOR MALIGNANT NEOPLASM OF PROSTATE: ICD-10-CM

## 2021-10-11 DIAGNOSIS — E78.2 HYPERLIPIDEMIA, MIXED: ICD-10-CM

## 2021-10-11 DIAGNOSIS — R73.01 IMPAIRED FASTING GLUCOSE: ICD-10-CM

## 2021-10-28 RX ORDER — MONTELUKAST SODIUM 10 MG/1
TABLET ORAL
Qty: 30 TABLET | Refills: 1 | Status: SHIPPED | OUTPATIENT
Start: 2021-10-28 | End: 2021-12-28

## 2021-11-02 ENCOUNTER — TELEPHONE (OUTPATIENT)
Dept: FAMILY MEDICINE | Facility: CLINIC | Age: 46
End: 2021-11-02
Payer: MEDICARE

## 2021-11-02 DIAGNOSIS — J32.9 SINUSITIS, UNSPECIFIED CHRONICITY, UNSPECIFIED LOCATION: Primary | ICD-10-CM

## 2021-11-02 RX ORDER — AZITHROMYCIN 250 MG/1
TABLET, FILM COATED ORAL
Qty: 6 TABLET | Refills: 0 | Status: SHIPPED | OUTPATIENT
Start: 2021-11-02 | End: 2021-11-07

## 2021-11-03 ENCOUNTER — LAB VISIT (OUTPATIENT)
Dept: LAB | Facility: HOSPITAL | Age: 46
End: 2021-11-03
Attending: INTERNAL MEDICINE
Payer: MEDICARE

## 2021-11-03 DIAGNOSIS — R73.01 IMPAIRED FASTING GLUCOSE: ICD-10-CM

## 2021-11-03 DIAGNOSIS — E78.2 HYPERLIPIDEMIA, MIXED: ICD-10-CM

## 2021-11-03 DIAGNOSIS — Z12.5 SCREENING FOR MALIGNANT NEOPLASM OF PROSTATE: ICD-10-CM

## 2021-11-03 LAB
ALBUMIN SERPL BCP-MCNC: 3.7 G/DL (ref 3.5–5.2)
ALP SERPL-CCNC: 149 U/L (ref 55–135)
ALT SERPL W/O P-5'-P-CCNC: 30 U/L (ref 10–44)
ANION GAP SERPL CALC-SCNC: 7 MMOL/L (ref 8–16)
AST SERPL-CCNC: 28 U/L (ref 10–40)
BILIRUB SERPL-MCNC: 0.9 MG/DL (ref 0.1–1)
BUN SERPL-MCNC: 12 MG/DL (ref 6–20)
CALCIUM SERPL-MCNC: 9.2 MG/DL (ref 8.7–10.5)
CHLORIDE SERPL-SCNC: 100 MMOL/L (ref 95–110)
CHOLEST SERPL-MCNC: 144 MG/DL (ref 120–199)
CHOLEST/HDLC SERPL: 4.6 {RATIO} (ref 2–5)
CO2 SERPL-SCNC: 29 MMOL/L (ref 23–29)
COMPLEXED PSA SERPL-MCNC: 0.06 NG/ML (ref 0–4)
CREAT SERPL-MCNC: 0.8 MG/DL (ref 0.5–1.4)
EST. GFR  (AFRICAN AMERICAN): >60 ML/MIN/1.73 M^2
EST. GFR  (NON AFRICAN AMERICAN): >60 ML/MIN/1.73 M^2
ESTIMATED AVG GLUCOSE: 123 MG/DL (ref 68–131)
GLUCOSE SERPL-MCNC: 126 MG/DL (ref 70–110)
HBA1C MFR BLD: 5.9 % (ref 4–5.6)
HDLC SERPL-MCNC: 31 MG/DL (ref 40–75)
HDLC SERPL: 21.5 % (ref 20–50)
LDLC SERPL CALC-MCNC: 93.4 MG/DL (ref 63–159)
NONHDLC SERPL-MCNC: 113 MG/DL
POTASSIUM SERPL-SCNC: 3.5 MMOL/L (ref 3.5–5.1)
PROT SERPL-MCNC: 8 G/DL (ref 6–8.4)
SODIUM SERPL-SCNC: 136 MMOL/L (ref 136–145)
TRIGL SERPL-MCNC: 98 MG/DL (ref 30–150)

## 2021-11-03 PROCEDURE — 80061 LIPID PANEL: CPT | Performed by: INTERNAL MEDICINE

## 2021-11-03 PROCEDURE — 80053 COMPREHEN METABOLIC PANEL: CPT | Performed by: INTERNAL MEDICINE

## 2021-11-03 PROCEDURE — 84153 ASSAY OF PSA TOTAL: CPT | Performed by: INTERNAL MEDICINE

## 2021-11-03 PROCEDURE — 83036 HEMOGLOBIN GLYCOSYLATED A1C: CPT | Performed by: INTERNAL MEDICINE

## 2021-11-03 PROCEDURE — 36415 COLL VENOUS BLD VENIPUNCTURE: CPT | Performed by: INTERNAL MEDICINE

## 2021-11-10 ENCOUNTER — OFFICE VISIT (OUTPATIENT)
Dept: FAMILY MEDICINE | Facility: CLINIC | Age: 46
End: 2021-11-10
Payer: MEDICARE

## 2021-11-10 VITALS
SYSTOLIC BLOOD PRESSURE: 120 MMHG | OXYGEN SATURATION: 98 % | WEIGHT: 310.63 LBS | TEMPERATURE: 98 F | HEIGHT: 74 IN | DIASTOLIC BLOOD PRESSURE: 68 MMHG | BODY MASS INDEX: 39.87 KG/M2 | HEART RATE: 83 BPM

## 2021-11-10 DIAGNOSIS — J30.9 ALLERGIC RHINITIS, UNSPECIFIED SEASONALITY, UNSPECIFIED TRIGGER: ICD-10-CM

## 2021-11-10 DIAGNOSIS — E78.2 HYPERLIPIDEMIA, MIXED: ICD-10-CM

## 2021-11-10 DIAGNOSIS — J45.20 MILD INTERMITTENT ASTHMA WITHOUT COMPLICATION: ICD-10-CM

## 2021-11-10 DIAGNOSIS — R73.01 IMPAIRED FASTING GLUCOSE: ICD-10-CM

## 2021-11-10 DIAGNOSIS — E66.01 MORBID (SEVERE) OBESITY DUE TO EXCESS CALORIES: ICD-10-CM

## 2021-11-10 DIAGNOSIS — I10 ESSENTIAL HYPERTENSION: Primary | ICD-10-CM

## 2021-11-10 PROCEDURE — 99214 PR OFFICE/OUTPT VISIT, EST, LEVL IV, 30-39 MIN: ICD-10-PCS | Mod: S$GLB,,, | Performed by: INTERNAL MEDICINE

## 2021-11-10 PROCEDURE — 3074F SYST BP LT 130 MM HG: CPT | Mod: CPTII,S$GLB,, | Performed by: INTERNAL MEDICINE

## 2021-11-10 PROCEDURE — 3078F DIAST BP <80 MM HG: CPT | Mod: CPTII,S$GLB,, | Performed by: INTERNAL MEDICINE

## 2021-11-10 PROCEDURE — 3008F PR BODY MASS INDEX (BMI) DOCUMENTED: ICD-10-PCS | Mod: CPTII,S$GLB,, | Performed by: INTERNAL MEDICINE

## 2021-11-10 PROCEDURE — 99499 UNLISTED E&M SERVICE: CPT | Mod: S$GLB,,, | Performed by: INTERNAL MEDICINE

## 2021-11-10 PROCEDURE — 3044F HG A1C LEVEL LT 7.0%: CPT | Mod: CPTII,S$GLB,, | Performed by: INTERNAL MEDICINE

## 2021-11-10 PROCEDURE — 3044F PR MOST RECENT HEMOGLOBIN A1C LEVEL <7.0%: ICD-10-PCS | Mod: CPTII,S$GLB,, | Performed by: INTERNAL MEDICINE

## 2021-11-10 PROCEDURE — 1159F MED LIST DOCD IN RCRD: CPT | Mod: CPTII,S$GLB,, | Performed by: INTERNAL MEDICINE

## 2021-11-10 PROCEDURE — 99999 PR PBB SHADOW E&M-EST. PATIENT-LVL IV: ICD-10-PCS | Mod: PBBFAC,,, | Performed by: INTERNAL MEDICINE

## 2021-11-10 PROCEDURE — 1159F PR MEDICATION LIST DOCUMENTED IN MEDICAL RECORD: ICD-10-PCS | Mod: CPTII,S$GLB,, | Performed by: INTERNAL MEDICINE

## 2021-11-10 PROCEDURE — 3074F PR MOST RECENT SYSTOLIC BLOOD PRESSURE < 130 MM HG: ICD-10-PCS | Mod: CPTII,S$GLB,, | Performed by: INTERNAL MEDICINE

## 2021-11-10 PROCEDURE — 99214 OFFICE O/P EST MOD 30 MIN: CPT | Mod: S$GLB,,, | Performed by: INTERNAL MEDICINE

## 2021-11-10 PROCEDURE — 1160F RVW MEDS BY RX/DR IN RCRD: CPT | Mod: CPTII,S$GLB,, | Performed by: INTERNAL MEDICINE

## 2021-11-10 PROCEDURE — 99499 RISK ADDL DX/OHS AUDIT: ICD-10-PCS | Mod: S$GLB,,, | Performed by: INTERNAL MEDICINE

## 2021-11-10 PROCEDURE — 3008F BODY MASS INDEX DOCD: CPT | Mod: CPTII,S$GLB,, | Performed by: INTERNAL MEDICINE

## 2021-11-10 PROCEDURE — 99999 PR PBB SHADOW E&M-EST. PATIENT-LVL IV: CPT | Mod: PBBFAC,,, | Performed by: INTERNAL MEDICINE

## 2021-11-10 PROCEDURE — 1160F PR REVIEW ALL MEDS BY PRESCRIBER/CLIN PHARMACIST DOCUMENTED: ICD-10-PCS | Mod: CPTII,S$GLB,, | Performed by: INTERNAL MEDICINE

## 2021-11-10 PROCEDURE — 3078F PR MOST RECENT DIASTOLIC BLOOD PRESSURE < 80 MM HG: ICD-10-PCS | Mod: CPTII,S$GLB,, | Performed by: INTERNAL MEDICINE

## 2021-11-10 RX ORDER — FLUTICASONE FUROATE AND VILANTEROL 200; 25 UG/1; UG/1
1 POWDER RESPIRATORY (INHALATION) DAILY
Qty: 90 EACH | Refills: 3 | Status: SHIPPED | OUTPATIENT
Start: 2021-11-10 | End: 2022-03-23

## 2021-11-30 ENCOUNTER — TELEPHONE (OUTPATIENT)
Dept: FAMILY MEDICINE | Facility: CLINIC | Age: 46
End: 2021-11-30
Payer: MEDICARE

## 2021-11-30 DIAGNOSIS — R05.9 COUGH: Primary | ICD-10-CM

## 2021-11-30 RX ORDER — DOXYCYCLINE HYCLATE 100 MG
100 TABLET ORAL 2 TIMES DAILY
Qty: 14 TABLET | Refills: 0 | Status: SHIPPED | OUTPATIENT
Start: 2021-11-30 | End: 2023-04-17 | Stop reason: ALTCHOICE

## 2021-11-30 RX ORDER — CODEINE PHOSPHATE AND GUAIFENESIN 10; 100 MG/5ML; MG/5ML
10 SOLUTION ORAL 3 TIMES DAILY PRN
Qty: 150 ML | Refills: 0 | Status: SHIPPED | OUTPATIENT
Start: 2021-11-30 | End: 2021-12-10

## 2021-12-14 DIAGNOSIS — I10 ESSENTIAL HYPERTENSION: ICD-10-CM

## 2021-12-16 DIAGNOSIS — I10 ESSENTIAL HYPERTENSION: ICD-10-CM

## 2021-12-17 DIAGNOSIS — I10 ESSENTIAL HYPERTENSION: ICD-10-CM

## 2021-12-17 RX ORDER — AMLODIPINE BESYLATE 10 MG/1
TABLET ORAL
Qty: 90 TABLET | Refills: 3 | Status: SHIPPED | OUTPATIENT
Start: 2021-12-17 | End: 2022-08-23 | Stop reason: SDUPTHER

## 2021-12-17 RX ORDER — AMLODIPINE BESYLATE 10 MG/1
10 TABLET ORAL DAILY
Qty: 30 TABLET | Refills: 5 | OUTPATIENT
Start: 2021-12-17

## 2021-12-27 RX ORDER — PRAVASTATIN SODIUM 20 MG/1
TABLET ORAL
Qty: 90 TABLET | Refills: 3 | Status: SHIPPED | OUTPATIENT
Start: 2021-12-27 | End: 2022-08-23 | Stop reason: SDUPTHER

## 2021-12-28 RX ORDER — MONTELUKAST SODIUM 10 MG/1
TABLET ORAL
Qty: 90 TABLET | Refills: 3 | Status: SHIPPED | OUTPATIENT
Start: 2021-12-28 | End: 2022-08-23 | Stop reason: SDUPTHER

## 2021-12-29 ENCOUNTER — TELEPHONE (OUTPATIENT)
Dept: INTERNAL MEDICINE | Facility: CLINIC | Age: 46
End: 2021-12-29
Payer: MEDICARE

## 2021-12-29 ENCOUNTER — LAB VISIT (OUTPATIENT)
Dept: FAMILY MEDICINE | Facility: CLINIC | Age: 46
End: 2021-12-29
Payer: MEDICARE

## 2021-12-29 DIAGNOSIS — R05.9 COUGH: Primary | ICD-10-CM

## 2021-12-29 DIAGNOSIS — R05.9 COUGH: ICD-10-CM

## 2021-12-29 PROCEDURE — U0005 INFEC AGEN DETEC AMPLI PROBE: HCPCS | Performed by: INTERNAL MEDICINE

## 2021-12-29 PROCEDURE — U0003 INFECTIOUS AGENT DETECTION BY NUCLEIC ACID (DNA OR RNA); SEVERE ACUTE RESPIRATORY SYNDROME CORONAVIRUS 2 (SARS-COV-2) (CORONAVIRUS DISEASE [COVID-19]), AMPLIFIED PROBE TECHNIQUE, MAKING USE OF HIGH THROUGHPUT TECHNOLOGIES AS DESCRIBED BY CMS-2020-01-R: HCPCS | Performed by: INTERNAL MEDICINE

## 2021-12-31 LAB
SARS-COV-2 RNA RESP QL NAA+PROBE: NOT DETECTED
SARS-COV-2- CYCLE NUMBER: NORMAL

## 2022-01-11 DIAGNOSIS — J45.901 EXACERBATION OF ASTHMA, UNSPECIFIED ASTHMA SEVERITY, UNSPECIFIED WHETHER PERSISTENT: ICD-10-CM

## 2022-01-11 NOTE — TELEPHONE ENCOUNTER
----- Message from Margareth Romero sent at 1/11/2022  2:44 PM CST -----  Contact: Self [phone]  Requesting an RX refill or new RX.    Is this a refill or new RX: refill    RX name and strength (copy/paste from chart): albuterol (PROAIR HFA) 90 mcg/actuation inhaler    Is this a 30 day or 90 day RX:     Pharmacy name and phone # (copy/paste from chart):     [facility] [address]  [address] [facility] [address]  Phone: [phone] Fax: [phone]    Comments: Calling to refill albuterol (PROAIR HFA) 90 mcg/actuation inhaler. Pt states he purchased albuterol solution last year and would like to confirm if the solution is still good. Pt is requesting a call back.

## 2022-01-11 NOTE — TELEPHONE ENCOUNTER
No new care gaps identified.  Powered by [employer]. Reference number: [id number].   1/11/2022 2:59:50 PM CST

## 2022-01-18 RX ORDER — ALBUTEROL SULFATE 90 UG/1
2 AEROSOL, METERED RESPIRATORY (INHALATION) EVERY 6 HOURS PRN
Qty: 54 G | Refills: 3 | Status: SHIPPED | OUTPATIENT
Start: 2022-01-18 | End: 2022-05-03 | Stop reason: SDUPTHER

## 2022-01-18 RX ORDER — ALBUTEROL SULFATE 0.83 MG/ML
2.5 SOLUTION RESPIRATORY (INHALATION)
Qty: 1080 ML | Refills: 3 | Status: SHIPPED | OUTPATIENT
Start: 2022-01-18 | End: 2023-10-05 | Stop reason: SDUPTHER

## 2022-01-18 NOTE — TELEPHONE ENCOUNTER
Refill Authorization Note   Teddy Ennis  is requesting a refill authorization.  Brief Assessment and Rationale for Refill:  Approve     Medication Therapy Plan:       Medication Reconciliation Completed: No   Comments:   --->Care Gap information included below if applicable.   Orders Placed This Encounter    albuterol (PROAIR HFA) 90 mcg/actuation inhaler    albuterol (PROVENTIL) 2.5 mg /3 mL (0.083 %) nebulizer solution      Requested Prescriptions   Signed Prescriptions Disp Refills    albuterol (PROAIR HFA) 90 mcg/actuation inhaler 54 g 3     Sig: Inhale 2 puffs into the lungs every 6 (six) hours as needed for Wheezing. Rescue       Pulmonology:  [facility] Passed - 1/11/2022  2:59 PM        Passed - Patient is at least 18 years old        Passed - Last Heart Rate in normal range within 360 days     Pulse Readings from Last 1 Encounters:   11/10/21 83              Passed - Valid encounter within last 15 months     Recent Visits  Date Type Provider Dept   11/10/21 Office Visit Doni Wood MD [facility]   05/11/21 Office Visit Doni Wood MD [facility]   11/10/20 Office Visit Doni Wood MD [facility]   06/02/20 Office Visit Doni Wood MD [facility]   04/28/20 Office Visit Doni Wood MD [facility]   Showing recent visits within past 720 days and meeting all other requirements  Future Appointments  No visits were found meeting these conditions.  Showing future appointments within next 150 days and meeting all other requirements      Future Appointments              [facility]                  albuterol (PROVENTIL) 2.5 mg /3 mL (0.083 %) nebulizer solution 1080 mL 3     Sig: Take 3 mLs (2.5 mg total) by nebulization every 4 to 6 hours as needed for Wheezing.       Pulmonology:  [facility] Passed - 1/11/2022  2:59 PM        Passed -  Patient is at least 18 years old        Passed - Last Heart Rate in normal range within 360 days     Pulse Readings from Last 1 Encounters:   11/10/21 83              Passed - Valid encounter within last 15 months     Recent Visits  Date Type Provider Dept   11/10/21 Office Visit Doni Wood MD [facility]   05/11/21 Office Visit Doni Wood MD [facility]   11/10/20 Office Visit Doni Wood MD [facility]   06/02/20 Office Visit Doni Wood MD [facility]   04/28/20 Office Visit Doni Wood MD [facility]   Showing recent visits within past 720 days and meeting all other requirements  Future Appointments  No visits were found meeting these conditions.  Showing future appointments within next 150 days and meeting all other requirements      Future Appointments              In 3 months MD [facility]                    Appointments  past 12m or future 3m with PCP    Date Provider   Last Visit   11/10/2021 Doni Wood MD   Next Visit   5/11/2022 Doni Wood MD   ED visits in past 90 days: 0     Note composed:12:53 PM 01/18/2022

## 2022-03-21 ENCOUNTER — TELEPHONE (OUTPATIENT)
Dept: INTERNAL MEDICINE | Facility: CLINIC | Age: 47
End: 2022-03-21
Payer: MEDICARE

## 2022-03-21 NOTE — TELEPHONE ENCOUNTER
Patient c/o a post nasal drip and cough x 1 week with no other symptoms states his cough has been messing with his asthma. He has been using his inhaler and neb treatment .

## 2022-03-21 NOTE — TELEPHONE ENCOUNTER
----- Message from Lorena Jeffery sent at 3/21/2022 12:04 PM CDT -----  Contact: Pt @[phone]  Patient would like to get medical advice.  Symptoms   Coughing   Asthma     How long have you had these symptoms:   Over a week     Would you like a call back, or a response through your [facility] portal?:   call back     Pharmacy name and phone # (copy from chart):    [id number]  [place] - [address] AT [facility] [address]   Phone:  [phone]  Fax:  [phone]            Comments:     Pt is requesting a call back to advise if something will be sent over.

## 2022-03-22 ENCOUNTER — PATIENT MESSAGE (OUTPATIENT)
Dept: ADMINISTRATIVE | Facility: HOSPITAL | Age: 47
End: 2022-03-22
Payer: MEDICARE

## 2022-03-23 DIAGNOSIS — J45.20 MILD INTERMITTENT ASTHMA WITHOUT COMPLICATION: ICD-10-CM

## 2022-03-23 RX ORDER — FLUTICASONE FUROATE AND VILANTEROL TRIFENATATE 200; 25 UG/1; UG/1
POWDER RESPIRATORY (INHALATION)
Qty: 60 EACH | Refills: 11 | Status: SHIPPED | OUTPATIENT
Start: 2022-03-23 | End: 2022-11-16 | Stop reason: SDUPTHER

## 2022-03-23 NOTE — TELEPHONE ENCOUNTER
No new care gaps identified.  Powered by [employer]. Reference number: [id number].   3/23/2022 10:27:16 AM CDT

## 2022-03-24 NOTE — TELEPHONE ENCOUNTER
No new care gaps identified.  Powered by [facility] by [employer]. Reference number: [id number].   3/24/2022 11:55:41 AM CDT

## 2022-03-24 NOTE — TELEPHONE ENCOUNTER
----- Message from Jessica Chase sent at 3/24/2022 11:28 AM CDT -----  Contact: pt  Requesting an RX refill or new RX.  Is this a refill or new RX: refill  RX name and strength (copy/paste from chart): fluticasone propionate (FLONASE) 50 mcg/actuation nasal spray   Is this a 30 day or 90 day RX:   Pharmacy name and phone # (copy/paste from chart):   [id number] - [place] - [facility] [place] [address] AT [address]  [place] [address] [place] [address]  Phone: [phone] Fax: [phone]  The doctors have asked that we provide their patients with the following 2 reminders -- prescription refills can take up to 72 hours, and a friendly reminder that in the future you can use your [facility] account to request refills:

## 2022-03-25 RX ORDER — FLUTICASONE PROPIONATE 50 MCG
SPRAY, SUSPENSION (ML) NASAL
Qty: 16 G | Refills: 11 | Status: SHIPPED | OUTPATIENT
Start: 2022-03-25 | End: 2023-09-25 | Stop reason: SDUPTHER

## 2022-04-07 ENCOUNTER — TELEPHONE (OUTPATIENT)
Dept: INTERNAL MEDICINE | Facility: CLINIC | Age: 47
End: 2022-04-07
Payer: MEDICARE

## 2022-04-07 NOTE — TELEPHONE ENCOUNTER
----- Message from Naina Romero sent at 4/7/2022  1:07 PM CDT -----  Contact: [phone]  Patient would like to know if he need lab work befor his appointment on 5/11/22, please call and advise.

## 2022-04-22 ENCOUNTER — HOSPITAL ENCOUNTER (EMERGENCY)
Facility: HOSPITAL | Age: 47
Discharge: HOME | End: 2022-04-23
Attending: EMERGENCY MEDICINE
Payer: MEDICARE

## 2022-04-22 DIAGNOSIS — R06.02 SOB (SHORTNESS OF BREATH): ICD-10-CM

## 2022-04-22 DIAGNOSIS — J44.1 COPD EXACERBATION: Primary | ICD-10-CM

## 2022-04-22 DIAGNOSIS — R05.9 COUGH: ICD-10-CM

## 2022-04-22 LAB
ALBUMIN SERPL BCP-MCNC: 3.8 G/DL (ref 3.5–5.2)
ALP SERPL-CCNC: 149 U/L (ref 55–135)
ALT SERPL W/O P-5'-P-CCNC: 23 U/L (ref 10–44)
ANION GAP SERPL CALC-SCNC: 11 MMOL/L (ref 8–16)
AST SERPL-CCNC: 20 U/L (ref 10–40)
BASOPHILS # BLD AUTO: 0.09 K/UL (ref 0–0.2)
BASOPHILS NFR BLD: 0.7 % (ref 0–1.9)
BILIRUB SERPL-MCNC: 0.6 MG/DL (ref 0.1–1)
BNP SERPL-MCNC: <10 PG/ML (ref 0–99)
BUN SERPL-MCNC: 9 MG/DL (ref 6–20)
CALCIUM SERPL-MCNC: 9.3 MG/DL (ref 8.7–10.5)
CHLORIDE SERPL-SCNC: 101 MMOL/L (ref 95–110)
CO2 SERPL-SCNC: 26 MMOL/L (ref 23–29)
CREAT SERPL-MCNC: 0.8 MG/DL (ref 0.5–1.4)
CTP QC/QA: YES
CTP QC/QA: YES
DIFFERENTIAL METHOD: ABNORMAL
EOSINOPHIL # BLD AUTO: 0.3 K/UL (ref 0–0.5)
EOSINOPHIL NFR BLD: 2.3 % (ref 0–8)
ERYTHROCYTE [DISTWIDTH] IN BLOOD BY AUTOMATED COUNT: 12.7 % (ref 11.5–14.5)
EST. GFR  (AFRICAN AMERICAN): >60 ML/MIN/1.73 M^2
EST. GFR  (NON AFRICAN AMERICAN): >60 ML/MIN/1.73 M^2
GLUCOSE SERPL-MCNC: 190 MG/DL (ref 70–110)
HCT VFR BLD AUTO: 45.6 % (ref 40–54)
HGB BLD-MCNC: 15.2 G/DL (ref 14–18)
IMM GRANULOCYTES # BLD AUTO: 0.08 K/UL (ref 0–0.04)
IMM GRANULOCYTES NFR BLD AUTO: 0.6 % (ref 0–0.5)
INR PPP: 1 (ref 0.8–1.2)
LYMPHOCYTES # BLD AUTO: 1.7 K/UL (ref 1–4.8)
LYMPHOCYTES NFR BLD: 13.1 % (ref 18–48)
MCH RBC QN AUTO: 29.6 PG (ref 27–31)
MCHC RBC AUTO-ENTMCNC: 33.3 G/DL (ref 32–36)
MCV RBC AUTO: 89 FL (ref 82–98)
MONOCYTES # BLD AUTO: 0.8 K/UL (ref 0.3–1)
MONOCYTES NFR BLD: 6 % (ref 4–15)
NEUTROPHILS # BLD AUTO: 9.9 K/UL (ref 1.8–7.7)
NEUTROPHILS NFR BLD: 77.3 % (ref 38–73)
NRBC BLD-RTO: 0 /100 WBC
PLATELET # BLD AUTO: 295 K/UL (ref 150–450)
PMV BLD AUTO: 9 FL (ref 9.2–12.9)
POC MOLECULAR INFLUENZA A AGN: NEGATIVE
POC MOLECULAR INFLUENZA B AGN: NEGATIVE
POTASSIUM SERPL-SCNC: 3.2 MMOL/L (ref 3.5–5.1)
PROT SERPL-MCNC: 8.3 G/DL (ref 6–8.4)
PROTHROMBIN TIME: 10.9 SEC (ref 9–12.5)
RBC # BLD AUTO: 5.13 M/UL (ref 4.6–6.2)
SARS-COV-2 RDRP RESP QL NAA+PROBE: NEGATIVE
SODIUM SERPL-SCNC: 138 MMOL/L (ref 136–145)
TROPONIN I SERPL DL<=0.01 NG/ML-MCNC: <0.006 NG/ML (ref 0–0.03)
WBC # BLD AUTO: 12.78 K/UL (ref 3.9–12.7)

## 2022-04-22 PROCEDURE — 94761 N-INVAS EAR/PLS OXIMETRY MLT: CPT

## 2022-04-22 PROCEDURE — 99285 PR EMERGENCY DEPT VISIT,LEVEL V: ICD-10-PCS | Mod: GC,CS,, | Performed by: EMERGENCY MEDICINE

## 2022-04-22 PROCEDURE — 99900035 HC TECH TIME PER 15 MIN (STAT)

## 2022-04-22 PROCEDURE — 93010 ELECTROCARDIOGRAM REPORT: CPT | Mod: ,,, | Performed by: INTERNAL MEDICINE

## 2022-04-22 PROCEDURE — 93010 EKG 12-LEAD: ICD-10-PCS | Mod: ,,, | Performed by: INTERNAL MEDICINE

## 2022-04-22 PROCEDURE — 84484 ASSAY OF TROPONIN QUANT: CPT | Performed by: EMERGENCY MEDICINE

## 2022-04-22 PROCEDURE — 93005 ELECTROCARDIOGRAM TRACING: CPT

## 2022-04-22 PROCEDURE — U0002 COVID-19 LAB TEST NON-CDC: HCPCS

## 2022-04-22 PROCEDURE — 96374 THER/PROPH/DIAG INJ IV PUSH: CPT

## 2022-04-22 PROCEDURE — 85025 COMPLETE CBC W/AUTO DIFF WBC: CPT | Performed by: EMERGENCY MEDICINE

## 2022-04-22 PROCEDURE — 99285 EMERGENCY DEPT VISIT HI MDM: CPT | Mod: GC,CS,, | Performed by: EMERGENCY MEDICINE

## 2022-04-22 PROCEDURE — 87502 INFLUENZA DNA AMP PROBE: CPT

## 2022-04-22 PROCEDURE — 85610 PROTHROMBIN TIME: CPT | Performed by: EMERGENCY MEDICINE

## 2022-04-22 PROCEDURE — 83880 ASSAY OF NATRIURETIC PEPTIDE: CPT | Performed by: EMERGENCY MEDICINE

## 2022-04-22 PROCEDURE — 94640 AIRWAY INHALATION TREATMENT: CPT | Mod: XB

## 2022-04-22 PROCEDURE — 63600175 PHARM REV CODE 636 W HCPCS

## 2022-04-22 PROCEDURE — 25000242 PHARM REV CODE 250 ALT 637 W/ HCPCS

## 2022-04-22 PROCEDURE — 27000221 HC OXYGEN, UP TO 24 HOURS

## 2022-04-22 PROCEDURE — 80053 COMPREHEN METABOLIC PANEL: CPT | Performed by: EMERGENCY MEDICINE

## 2022-04-22 PROCEDURE — 99285 EMERGENCY DEPT VISIT HI MDM: CPT | Mod: 25

## 2022-04-22 RX ORDER — IPRATROPIUM BROMIDE AND ALBUTEROL SULFATE 2.5; .5 MG/3ML; MG/3ML
3 SOLUTION RESPIRATORY (INHALATION)
Status: COMPLETED | OUTPATIENT
Start: 2022-04-22 | End: 2022-04-22

## 2022-04-22 RX ORDER — PREDNISONE 20 MG/1
60 TABLET ORAL
Status: COMPLETED | OUTPATIENT
Start: 2022-04-22 | End: 2022-04-22

## 2022-04-22 RX ORDER — MAGNESIUM SULFATE HEPTAHYDRATE 40 MG/ML
2 INJECTION, SOLUTION INTRAVENOUS ONCE
Status: COMPLETED | OUTPATIENT
Start: 2022-04-22 | End: 2022-04-23

## 2022-04-22 RX ADMIN — PREDNISONE 60 MG: 20 TABLET ORAL at 11:04

## 2022-04-22 RX ADMIN — IPRATROPIUM BROMIDE AND ALBUTEROL SULFATE 3 ML: 2.5; .5 SOLUTION RESPIRATORY (INHALATION) at 11:04

## 2022-04-22 RX ADMIN — MAGNESIUM SULFATE 2 G: 2 INJECTION INTRAVENOUS at 11:04

## 2022-04-23 VITALS
TEMPERATURE: 98 F | DIASTOLIC BLOOD PRESSURE: 88 MMHG | HEART RATE: 82 BPM | SYSTOLIC BLOOD PRESSURE: 144 MMHG | RESPIRATION RATE: 22 BRPM | OXYGEN SATURATION: 99 %

## 2022-04-23 NOTE — DISCHARGE INSTRUCTIONS
Today we gave you steroids, nebulizer treatments, and magnesium to help with your COPD exacerbation.     You should continue to take your home COPD meds and follow up with your PCP in the next week or so.

## 2022-04-23 NOTE — ED NOTES
Adult Physical Assessment    Pt c/o SOB onset 3 hours PTA similar to asthma attacks in the past, pt used inhaler 2X and SX did not improve.   Pt reports he has been coughing for 1 week, has has 2 vaccines, no booster, has not has his flu vaccination     LOC: Teddy Ennis, 46 y.o. male verified via two identifiers.  The patient is awake, alert, oriented and speaking appropriately at this time.  APPEARANCE: Patient resting comfortably and appears to be in no acute distress at this time. Patient is clean and well groomed, patient's clothing is properly fastened.  SKIN:The skin is warm and dry, color consistent with ethnicity, patient has normal skin turgor and moist mucus membranes, skin intact, no breakdown or brusing noted.  MUSCULOSKELETAL: Patient moving all extremities well, no obvious swelling or deformities noted.  RESPIRATORY: Airway is open and patent, respirations are spontaneous, patient has a normal effort and rate, no accessory muscle use noted.o2 sat is 99% on 4 L NC  CARDIAC: Patient has a normal rate and rhythm, no periphreal edema noted in any extremity, capillary refill < 3 seconds in all extremities   ABDOMEN: Soft and non tender to palpation, no abdominal distention noted. Bowel sounds present in all four quadrants.  NEUROLOGIC: Eyes open spontaneously, behavior appropriate to situation, follows commands, facial expression symmetrical, bilateral hand grasp equal and even, purposeful motor response noted, normal sensation in all extremities when touched with a finger.

## 2022-04-23 NOTE — ED NOTES
Patient turned over to me by Dr. Modi at 2300    Briefly:  46-year-old male presents with shortness of breath.  Pending re-evaluation.    Vastly improved after therapy.  Lungs clear to auscultation bilateral.  Will discharge home.     Patient will return to ED for worsening symptoms, inability to eat/drink, fever greater than 100.4, or any other concerns.  Did bedside teaching with return precautions.  All questions answered.  The patient acknowledges understanding.  Gave verbal discharge instructions.     Jimmie Rangel MD  04/23/22 4713

## 2022-04-23 NOTE — ED PROVIDER NOTES
Encounter Date: 4/22/2022       History     Chief Complaint   Patient presents with    Shortness of Breath     Since 5pm. Hx of asthma. No relief with inhalers and treatment. Put on CPAP with EMS for comfort but satting 99% on room air.     Mr. Ennis is a 47 yo M PMH of HTN, HLD, asthma presenting with SOB starting at 5pm today. SOB persistent and not associated with exertion, refractory to home nebulizers and Breo with associated lightheadedness and chest tightness. Pt says he has had a cold for 2 weeks with occasional cough producing non-bloody mucus. Pt's mother at bedside states that pt has asthma attack episodes like today's presentation once or twice a year. No fevers, chills, abdominal pain, N/V/D, no urinary changes, no LE edema, swelling, nor erythema. 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 Pt COVID vaccinated x2 and received flu vaccination last year.         Review of patient's allergies indicates:   Allergen Reactions    Penicillins Hives     Past Medical History:   Diagnosis Date    Allergy     Asthma      Past Surgical History:   Procedure Laterality Date    ADENOIDECTOMY      ANKLE SURGERY Left 1995    SINUS SURGERY       Family History   Problem Relation Age of Onset    Hypertension Mother     Stroke Mother     No Known Problems Father      Social History     Tobacco Use    Smoking status: Never Smoker    Smokeless tobacco: Never Used   Substance Use Topics    Alcohol use: Not Currently     Review of Systems   Constitutional: Negative for fever.   HENT: Negative for sore throat.    Eyes: Negative.    Respiratory: Positive for cough, chest tightness and shortness of breath.    Cardiovascular: Positive for chest pain. Negative for leg swelling.   Gastrointestinal: Negative for abdominal pain, diarrhea, nausea and vomiting.   Genitourinary: Negative for dysuria.   Musculoskeletal: Negative for back pain.   Skin: Negative for rash.   Allergic/Immunologic: Negative.    Neurological: Negative  "for weakness.   Hematological: Does not bruise/bleed easily.   Psychiatric/Behavioral: Negative.         Possible intellectual delay, mother at bedside says pt "is special"       Physical Exam     Initial Vitals [04/22/22 2057]   BP Pulse Resp Temp SpO2   (!) 144/88 104 (!) 26 98 °F (36.7 °C) (!) 94 %      MAP       --         Physical Exam    Nursing note and vitals reviewed.  Constitutional: He appears well-developed and well-nourished.   HENT:   Head: Normocephalic and atraumatic.   Eyes: Conjunctivae and EOM are normal. Pupils are equal, round, and reactive to light.   Neck: Neck supple.   Normal range of motion.  Cardiovascular: Regular rhythm and normal heart sounds.   tachycardia   Pulmonary/Chest: No respiratory distress. He has wheezes. He has no rales.   Abdominal: Abdomen is soft. Bowel sounds are normal. There is no abdominal tenderness.   Musculoskeletal:         General: Normal range of motion.      Cervical back: Normal range of motion and neck supple.     Neurological: He is alert and oriented to person, place, and time.   Skin: Skin is warm and dry.   Psychiatric: He has a normal mood and affect.         ED Course   Procedures  Labs Reviewed   CBC W/ AUTO DIFFERENTIAL - Abnormal; Notable for the following components:       Result Value    WBC 12.78 (*)     MPV 9.0 (*)     Immature Granulocytes 0.6 (*)     Gran # (ANC) 9.9 (*)     Immature Grans (Abs) 0.08 (*)     Gran % 77.3 (*)     Lymph % 13.1 (*)     All other components within normal limits   COMPREHENSIVE METABOLIC PANEL - Abnormal; Notable for the following components:    Potassium 3.2 (*)     Glucose 190 (*)     Alkaline Phosphatase 149 (*)     All other components within normal limits   TROPONIN I   B-TYPE NATRIURETIC PEPTIDE   PROTIME-INR   SARS-COV-2 RDRP GENE    Narrative:     This test utilizes isothermal nucleic acid amplification   technology to detect the SARS-CoV-2 RdRp nucleic acid segment.   The analytical sensitivity (limit of " detection) is 125 genome   equivalents/mL.   A POSITIVE result implies infection with the SARS-CoV-2 virus;   the patient is presumed to be contagious.     A NEGATIVE result means that SARS-CoV-2 nucleic acids are not   present above the limit of detection. A NEGATIVE result should be   treated as presumptive. It does not rule out the possibility of   COVID-19 and should not be the sole basis for treatment decisions.   If COVID-19 is strongly suspected based on clinical and exposure   history, re-testing using an alternate molecular assay should be   considered.   This test is only for use under the Food and Drug   Administration s Emergency Use Authorization (EUA).   Commercial kits are provided by [employer].   Performance characteristics of the EUA have been independently   verified by [facility] Department of   Pathology and Laboratory Medicine.   _________________________________________________________________   The authorized Fact Sheet for Healthcare Providers and the authorized Fact   Sheet for Patients of the ID NOW COVID-19 are available on the FDA   website:     https://www.fda.gov/media/734946/download  https://www.fda.gov/media/574767/download          POCT INFLUENZA A/B MOLECULAR     EKG Readings: (Independently Interpreted)   Initial Reading: No STEMI. Rhythm: Normal Sinus Rhythm. Other Findings: Shortened TX Interval.     ECG Results          EKG 12-lead (Final result)  Result time 04/23/22 15:42:24    Final result by Interface, Lab In [facility] (04/23/22 15:42:24)                 Narrative:    Test Reason :     Vent. Rate : 082 BPM     Atrial Rate : 082 BPM     P-R Int : 102 ms          QRS Dur : 094 ms      QT Int : 380 ms       P-R-T Axes : 030 065 006 degrees     QTc Int : 443 ms    Sinus rhythm with short TX  Possible Right ventricular hypertrophy  Low septal forces  Borderline Abnormal ECG  No previous ECGs available  Confirmed by Alo EDWARDS MD ([id number]) on 4/23/2022 3:42:16  PM    Referred By: BRENDAN   SELF           Confirmed By:Alo EDWARDS MD                            Imaging Results          X-Ray Chest AP Portable (Final result)  Result time 04/22/22 22:30:24    Final result by Edison Mcpherson MD (04/22/22 22:30:24)                 Impression:      Stable cardiomegaly with mild interstitial edema.    No other significant change from prior study.      Electronically signed by: Edison Mcpherson MD  Date:    04/22/2022  Time:    22:30             Narrative:    EXAMINATION:  XR CHEST AP PORTABLE    CLINICAL HISTORY:  sob;    TECHNIQUE:  Single frontal view of the chest was performed.    COMPARISON:  08/15/2020.    FINDINGS:  Stable cardiomegaly.  Mild interstitial edema.    Heart and lungs otherwise appear unchanged when allowing for differences in technique and positioning.                              X-Rays:   Independently Interpreted Readings:   Other Readings:  Central vascular congestion without evidence of pleural effusion nor consolidations concerning for PNA    Medications   magnesium sulfate 2g in water 50mL IVPB (premix) (2 g Intravenous New Bag 4/22/22 8068)   albuterol-ipratropium 2.5 mg-0.5 mg/3 mL nebulizer solution 3 mL (3 mLs Nebulization Given 4/22/22 1381)   predniSONE tablet 60 mg (60 mg Oral Given 4/22/22 3928)     Medical Decision Making:   History:   I obtained history from: someone other than patient.       <> Summary of History: Mother at bedside describes pt's asthma attack frequency and home meds  Old Medical Records: I decided to obtain old medical records.  Old Records Summarized: records from clinic visits.  Initial Assessment:   Pt with hx of asthma appears comfortable on NC, on CPAP while in route with EMS, no signs of respiratory distress, able to phonate and provide history. Tight breath sounds, wheezes auscultated, no rales detected.   Differential Diagnosis:   Acute on chronic hypoxemic/hypercapnic respiratory failure 2/2 asthma exacerbation,  CHF exacerbation, COVID-19, influenza, ACS  Independently Interpreted Test(s):   I have ordered and independently interpreted X-rays - see prior notes.  I have ordered and independently interpreted EKG Reading(s) - see prior notes  Clinical Tests:   Lab Tests: Ordered and Reviewed  Radiological Study: Ordered and Reviewed  ED Management:  Pt on NC with O2 sats >94%.     CBC: leukocytosis 12.78, slight granulocytosis  CMP: noncontributory  Troponin: WNL  BNP: <10  PT/INR: 10.9/1  CXR: Stable cardiomegaly with mild interstitial edema.  EKG: sinus tachycardia with shortened VT interval  COVID-19: negative  Influenza: negative    Pt evaluated after duonebs, steroids, and Mg administration with notable symptom improvement and no longer requiring O2. Pt able to ambulate without need for supplemental O2. In shared decision making with patient and mother, pt discharged with close follow up and advised to continue asthma meds.             Attending Attestation:   Physician Attestation Statement for Resident:  As the supervising MD   Physician Attestation Statement: I have personally seen and examined this patient.   I agree with the above history. -: 46-year-old man presents to the ED by EMS for evaluation of worsening respiratory distress.   As the supervising MD I agree with the above PE.    As the supervising MD I agree with the above treatment, course, plan, and disposition.  I have reviewed and agree with the residents interpretation of the following: lab data, x-rays and EKG.                         Clinical Impression:   Final diagnoses:  [R06.02] SOB (shortness of breath)  [J44.1] COPD exacerbation (Primary)  [R05.9] Cough          ED Disposition Condition    Discharge Stable        ED Prescriptions     None        Follow-up Information     Follow up With Specialties Details Why Contact Info    Doni Wood MD Internal Medicine In 1 week  [address] [facility]  [place] [id number] [phone]      [facility] - Emergency  Dept Emergency Medicine  If symptoms worsen [address] [facility] [address] [address]  [phone]           Carlo Cavanaugh MD  Resident  04/23/22 0258       Mik Modi MD  04/24/22 8433

## 2022-04-25 ENCOUNTER — TELEPHONE (OUTPATIENT)
Dept: INTERNAL MEDICINE | Facility: CLINIC | Age: 47
End: 2022-04-25
Payer: MEDICARE

## 2022-04-25 NOTE — TELEPHONE ENCOUNTER
----- Message from Raine Greene sent at 4/25/2022 10:06 AM CDT -----  Contact: [phone]  Pt wants to let pcp know he visited the ER on 4/22/22 with asthma attack

## 2022-04-27 ENCOUNTER — TELEPHONE (OUTPATIENT)
Dept: INTERNAL MEDICINE | Facility: CLINIC | Age: 47
End: 2022-04-27
Payer: MEDICARE

## 2022-04-27 DIAGNOSIS — J45.40 MODERATE PERSISTENT ASTHMA WITHOUT COMPLICATION: Primary | ICD-10-CM

## 2022-04-27 NOTE — TELEPHONE ENCOUNTER
His asthma is beyond my expertise.  I'm referring him to pulmonology.  Recommend continuing to use the albuterol nebulizer until can be seen

## 2022-04-27 NOTE — TELEPHONE ENCOUNTER
----- Message from Meri Ma sent at 4/27/2022 11:28 AM CDT -----  Contact: Merly nevarez [phone]  1MEDICALADVICE     Patient is calling for Medical Advice regarding:    How long has patient had these symptoms:    Pharmacy name and phone#:    Would like response via [employer]:call back    Comments: Pt's mom is calling because pt had an asthma attack on Friday and stated that he is still having a really bad cough

## 2022-05-03 DIAGNOSIS — J45.901 EXACERBATION OF ASTHMA, UNSPECIFIED ASTHMA SEVERITY, UNSPECIFIED WHETHER PERSISTENT: ICD-10-CM

## 2022-05-03 RX ORDER — ALBUTEROL SULFATE 90 UG/1
2 AEROSOL, METERED RESPIRATORY (INHALATION) EVERY 6 HOURS PRN
Qty: 54 G | Refills: 3 | Status: SHIPPED | OUTPATIENT
Start: 2022-05-03 | End: 2023-04-10 | Stop reason: SDUPTHER

## 2022-05-03 NOTE — TELEPHONE ENCOUNTER
No new care gaps identified.  [facility] Embedded Care Gaps. Reference number: [id number]. 5/03/2022   3:06:04 PM CDT

## 2022-05-03 NOTE — TELEPHONE ENCOUNTER
----- Message from Riccichel Salvatore sent at 5/3/2022  2:51 PM CDT -----  Contact: self [phone]  Requesting an RX refill or new RX.  Is this a refill or new RX: refill  RX name and strength (copy/paste from chart):albuterol (PROAIR HFA) 90 mcg/actuation inhaler    Is this a 30 day or 90 day RX:   Pharmacy name and phone # (copy/paste from chart):    [id number] - [place] - [address]  [place]  [facility] [place] [address]  Phone: [phone] Fax: [phone]      The doctors have asked that we provide their patients with the following 2 reminders -- prescription refills can take up to 72 hours, and a friendly reminder that in the future you can use your [facility] account to request refills: yes    Please call and advise

## 2022-05-11 ENCOUNTER — OFFICE VISIT (OUTPATIENT)
Dept: INTERNAL MEDICINE | Facility: CLINIC | Age: 47
End: 2022-05-11
Payer: MEDICARE

## 2022-05-11 ENCOUNTER — DOCUMENTATION ONLY (OUTPATIENT)
Dept: INTERNAL MEDICINE | Facility: CLINIC | Age: 47
End: 2022-05-11
Payer: MEDICARE

## 2022-05-11 VITALS
BODY MASS INDEX: 40.15 KG/M2 | HEIGHT: 74 IN | SYSTOLIC BLOOD PRESSURE: 123 MMHG | HEART RATE: 79 BPM | OXYGEN SATURATION: 96 % | WEIGHT: 312.81 LBS | DIASTOLIC BLOOD PRESSURE: 69 MMHG

## 2022-05-11 DIAGNOSIS — R73.01 IMPAIRED FASTING GLUCOSE: ICD-10-CM

## 2022-05-11 DIAGNOSIS — Z12.5 SCREENING FOR MALIGNANT NEOPLASM OF PROSTATE: ICD-10-CM

## 2022-05-11 DIAGNOSIS — E66.01 MORBID (SEVERE) OBESITY DUE TO EXCESS CALORIES: ICD-10-CM

## 2022-05-11 DIAGNOSIS — J45.40 MODERATE PERSISTENT ASTHMA WITHOUT COMPLICATION: ICD-10-CM

## 2022-05-11 DIAGNOSIS — Z12.11 SCREEN FOR COLON CANCER: ICD-10-CM

## 2022-05-11 DIAGNOSIS — J30.9 ALLERGIC RHINITIS, UNSPECIFIED SEASONALITY, UNSPECIFIED TRIGGER: ICD-10-CM

## 2022-05-11 DIAGNOSIS — I10 ESSENTIAL HYPERTENSION: Primary | ICD-10-CM

## 2022-05-11 DIAGNOSIS — E78.2 HYPERLIPIDEMIA, MIXED: ICD-10-CM

## 2022-05-11 PROCEDURE — 99214 PR OFFICE/OUTPT VISIT, EST, LEVL IV, 30-39 MIN: ICD-10-PCS | Mod: 25,S$GLB,, | Performed by: INTERNAL MEDICINE

## 2022-05-11 PROCEDURE — 3074F PR MOST RECENT SYSTOLIC BLOOD PRESSURE < 130 MM HG: ICD-10-PCS | Mod: CPTII,S$GLB,, | Performed by: INTERNAL MEDICINE

## 2022-05-11 PROCEDURE — 3008F PR BODY MASS INDEX (BMI) DOCUMENTED: ICD-10-PCS | Mod: CPTII,S$GLB,, | Performed by: INTERNAL MEDICINE

## 2022-05-11 PROCEDURE — 96372 THER/PROPH/DIAG INJ SC/IM: CPT | Mod: S$GLB,,, | Performed by: INTERNAL MEDICINE

## 2022-05-11 PROCEDURE — 99999 PR PBB SHADOW E&M-EST. PATIENT-LVL IV: CPT | Mod: PBBFAC,,, | Performed by: INTERNAL MEDICINE

## 2022-05-11 PROCEDURE — 3074F SYST BP LT 130 MM HG: CPT | Mod: CPTII,S$GLB,, | Performed by: INTERNAL MEDICINE

## 2022-05-11 PROCEDURE — 3078F DIAST BP <80 MM HG: CPT | Mod: CPTII,S$GLB,, | Performed by: INTERNAL MEDICINE

## 2022-05-11 PROCEDURE — 99999 PR PBB SHADOW E&M-EST. PATIENT-LVL IV: ICD-10-PCS | Mod: PBBFAC,,, | Performed by: INTERNAL MEDICINE

## 2022-05-11 PROCEDURE — 3078F PR MOST RECENT DIASTOLIC BLOOD PRESSURE < 80 MM HG: ICD-10-PCS | Mod: CPTII,S$GLB,, | Performed by: INTERNAL MEDICINE

## 2022-05-11 PROCEDURE — 1159F MED LIST DOCD IN RCRD: CPT | Mod: CPTII,S$GLB,, | Performed by: INTERNAL MEDICINE

## 2022-05-11 PROCEDURE — 1160F RVW MEDS BY RX/DR IN RCRD: CPT | Mod: CPTII,S$GLB,, | Performed by: INTERNAL MEDICINE

## 2022-05-11 PROCEDURE — 3008F BODY MASS INDEX DOCD: CPT | Mod: CPTII,S$GLB,, | Performed by: INTERNAL MEDICINE

## 2022-05-11 PROCEDURE — 96372 PR INJECTION,THERAP/PROPH/DIAG2ST, IM OR SUBCUT: ICD-10-PCS | Mod: S$GLB,,, | Performed by: INTERNAL MEDICINE

## 2022-05-11 PROCEDURE — 99214 OFFICE O/P EST MOD 30 MIN: CPT | Mod: 25,S$GLB,, | Performed by: INTERNAL MEDICINE

## 2022-05-11 PROCEDURE — 1160F PR REVIEW ALL MEDS BY PRESCRIBER/CLIN PHARMACIST DOCUMENTED: ICD-10-PCS | Mod: CPTII,S$GLB,, | Performed by: INTERNAL MEDICINE

## 2022-05-11 PROCEDURE — 1159F PR MEDICATION LIST DOCUMENTED IN MEDICAL RECORD: ICD-10-PCS | Mod: CPTII,S$GLB,, | Performed by: INTERNAL MEDICINE

## 2022-05-11 RX ORDER — TRIAMCINOLONE ACETONIDE 40 MG/ML
40 INJECTION, SUSPENSION INTRA-ARTICULAR; INTRAMUSCULAR ONCE
Status: COMPLETED | OUTPATIENT
Start: 2022-05-11 | End: 2022-05-11

## 2022-05-11 RX ADMIN — TRIAMCINOLONE ACETONIDE 40 MG: 40 INJECTION, SUSPENSION INTRA-ARTICULAR; INTRAMUSCULAR at 04:05

## 2022-05-11 NOTE — PROGRESS NOTES
[facility] Note    Chief Complaint      Chief Complaint   Patient presents with    Hypertension     6 month f/u       History of Present Illness      Teddy Ennis is a 46 y.o. male with chronic conditions of HTN, HLD, asthma, IFG, depression, allergic rhinitis who presents today for: follow up chronic conditions. Complains of tight cough and wheezing.  Has had a number of exacerbations since last visit.  Referral placed to pulmonology but earliest appt 7/2022.      HTN: BP at goal on amlodipine.  HLD: Controlled on pravastatin. LDL 93, doing well.    Asthma, moderate persistent, allergic rhinitis: previously on immunotherapy.  Had exacerbation seen by Dr. MARTINEZ  Will increase breo dose    IFG: A1C 5.9 slightly higher than last check.  Counseled on diet and exercise  Flu shot UTD.  TDAP 2019.  CRC screening due.      Past Medical History:  Past Medical History:   Diagnosis Date    Allergy     Asthma        Past Surgical History:   has a past surgical history that includes Ankle surgery (Left, 1995); Adenoidectomy; and Sinus surgery.    Family History:  family history includes Hypertension in his mother; No Known Problems in his father; Stroke in his mother.     Social History:  Social History     Tobacco Use    Smoking status: Never Smoker    Smokeless tobacco: Never Used   Substance Use Topics    Alcohol use: Not Currently       I personally reviewed all past medical, surgical, social and family history.    Review of Systems   Constitutional: Negative for chills, fever and malaise/fatigue.   Respiratory: Negative for shortness of breath.    Cardiovascular: Negative for chest pain.   Gastrointestinal: Negative for constipation, diarrhea, nausea and vomiting.   Skin: Negative for rash.   Neurological: Negative for weakness.   All other systems reviewed and are negative.       Medications:  Outpatient Encounter Medications as of 5/11/2022   Medication Sig Dispense Refill    AFLURIA QD  2020-21,3YR UP,,PF, 60 mcg (15 mcg x 4)/0.5 mL Syrg ADM 0.5ML IM UTD      albuterol (PROAIR HFA) 90 mcg/actuation inhaler Inhale 2 puffs into the lungs every 6 (six) hours as needed for Wheezing. Rescue 54 g 3    albuterol (PROVENTIL) 2.5 mg /3 mL (0.083 %) nebulizer solution Take 3 mLs (2.5 mg total) by nebulization every 4 to 6 hours as needed for Wheezing. 1080 mL 3    amLODIPine (NORVASC) 10 MG tablet TAKE 1 TABLET(10 MG) BY MOUTH EVERY DAY 90 tablet 3    azelastine (ASTELIN) 137 mcg (0.1 %) nasal spray 1 spray (137 mcg total) by Nasal route 2 (two) times daily. 30 mL 11    BREO ELLIPTA 200-25 mcg/dose DsDv diskus inhaler INHALE 1 PUFF INTO THE LUNGS EVERY DAY 60 each 11    doxycycline (VIBRA-TABS) 100 MG tablet Take 1 tablet (100 mg total) by mouth 2 (two) times daily. (Patient not taking: Reported on 5/11/2022) 14 tablet 0    EScitalopram oxalate (LEXAPRO) 20 MG tablet Take 1 tablet (20 mg total) by mouth once daily. 90 tablet 3    fexofenadine (ALLEGRA) 180 MG tablet Take 180 mg by mouth once daily.      fluticasone propionate (FLONASE) 50 mcg/actuation nasal spray 2 spray each nostril daily 16 g 11    montelukast (SINGULAIR) 10 mg tablet TAKE 1 TABLET BY MOUTH EVERY DAY 90 tablet 3    pravastatin (PRAVACHOL) 20 MG tablet TK 1 T PO D 90 tablet 3    promethazine-dextromethorphan (PROMETHAZINE-DM) 6.25-15 mg/5 mL Syrp Take 5 mLs by mouth every 4 (four) hours as needed (cough). (Patient not taking: Reported on 11/10/2021) 118 mL 0    VENTOLIN HFA 90 mcg/actuation inhaler INL 2 PFS ITL TID PRF WHZ  2     Facility-Administered Encounter Medications as of 5/11/2022   Medication Dose Route Frequency Provider Last Rate Last Admin    triamcinolone acetonide injection 40 mg  40 mg Intramuscular Once Doni Wood MD           Allergies:  Review of patient's allergies indicates:   Allergen Reactions    Penicillins Hives       Health Maintenance:  Immunization History   Administered Date(s)  "Administered    Influenza - Quadrivalent - PF *Preferred* (6 months and older) 09/03/2016, 09/29/2018, 08/26/2019, 09/12/2020    Influenza Split 09/30/2009, 09/30/2010, 09/27/2011, 09/30/2013, 09/17/2014    Tdap 09/29/2018, 11/29/2019      Health Maintenance   Topic Date Due    Hepatitis C Screening  Never done    Lipid Panel  11/03/2026    TETANUS VACCINE  11/29/2029        Physical Exam      Vital Signs  Pulse: 79  SpO2: 96 %  BP: 123/69  BP Location: Left arm  Patient Position: Sitting  Pain Score: 0-No pain  Height and Weight  Height: 6' 2" (188 cm)  Weight: (!) 141.9 kg (312 lb 13.3 oz)  BSA (Calculated - sq m): 2.72 sq meters  BMI (Calculated): 40.1  Weight in (lb) to have BMI = 25: 194.3]    Physical Exam  Vitals reviewed.   Constitutional:       Appearance: He is well-developed.   HENT:      Head: Normocephalic and atraumatic.      Right Ear: External ear normal.      Left Ear: External ear normal.   Cardiovascular:      Rate and Rhythm: Normal rate and regular rhythm.      Heart sounds: Normal heart sounds. No murmur heard.  Pulmonary:      Effort: Pulmonary effort is normal.      Breath sounds: Normal breath sounds. No wheezing or rales.   Abdominal:      General: Bowel sounds are normal.      Palpations: Abdomen is soft.          Laboratory:  CBC:  Recent Labs   Lab 11/26/19  0803 06/16/20  1455 04/22/22  2149   WBC 8.84 12.26 12.78 H   RBC 5.22 5.48 5.13   Hemoglobin 15.4 15.9 15.2   Hematocrit 47.3 49.4 45.6   Platelets 324 355 H 295   MCV 91 90 89   MCH 29.5 29.0 29.6   MCHC 32.6 32.2 33.3     CMP:  Recent Labs   Lab 11/03/20  0740 11/03/21  0827 04/22/22  2149   Glucose 116 H 126 H 190 H   Calcium 8.7 9.2 9.3   Albumin 3.7 3.7 3.8   Total Protein 7.7 8.0 8.3   Sodium 140 136 138   Potassium 3.5 3.5 3.2 L   CO2 30 H 29 26   Chloride 101 100 101   BUN 12 12 9   Alkaline Phosphatase 148 H 149 H 149 H   ALT 24 30 23   AST 17 28 20   Total Bilirubin 1.3 H 0.9 0.6     URINALYSIS:       LIPIDS:  Recent " Labs   Lab 11/26/19  0803 11/03/20  0740 11/03/21  0827   TSH 1.351  --   --    HDL 32 L 34 L 31 L   Cholesterol 171 157 144   Triglycerides 182 H 118 98   LDL Cholesterol 102.6 99.4 93.4   HDL/Cholesterol Ratio 18.7 L 21.7 21.5   Non-HDL Cholesterol 139 123 113   Total Cholesterol/HDL Ratio 5.3 H 4.6 4.6     TSH:  Recent Labs   Lab 11/26/19  0803   TSH 1.351     A1C:  Recent Labs   Lab 12/03/19  0803 11/03/20  0740 11/03/21  0827   Hemoglobin A1C 5.4 5.4 5.9 H       Assessment/Plan     Teddy Ennis is a 46 y.o.male with:    1. Essential hypertension  Continue current meds.    2. Hyperlipidemia, mixed  - Comprehensive Metabolic Panel; Future  - Lipid Panel; Future  Continue current meds.    3. Moderate persistent asthma without complication  - triamcinolone acetonide injection 40 mg  Steroid shot today but needs to establish with pulmonology to address uncontrolled asthma and frequent hospitalizations  4. Impaired fasting glucose  - Hemoglobin A1C; Future  Counseled on weight loss  5. Allergic rhinitis, unspecified seasonality, unspecified trigger  Continue current meds.    6. Morbid (severe) obesity due to excess calories  Counseled on weight loss  7. Screening for malignant neoplasm of prostate  - PSA, Screening; Future    8. Screen for colon cancer  - Fecal Immunochemical Test (iFOBT); Future       Chronic conditions status updated as per HPI.  Other than changes above, cont current medications and maintain follow up with specialists.  No follow-ups on file.    Future Appointments   Date Time Provider Department Center   7/7/2022  3:00 PM [facility]  [facility]

## 2022-05-13 ENCOUNTER — LAB VISIT (OUTPATIENT)
Dept: LAB | Facility: HOSPITAL | Age: 47
End: 2022-05-13
Attending: INTERNAL MEDICINE
Payer: MEDICARE

## 2022-05-13 DIAGNOSIS — Z12.11 SCREEN FOR COLON CANCER: ICD-10-CM

## 2022-05-13 PROCEDURE — 82274 ASSAY TEST FOR BLOOD FECAL: CPT | Performed by: INTERNAL MEDICINE

## 2022-05-19 LAB — HEMOCCULT STL QL IA: NEGATIVE

## 2022-06-30 ENCOUNTER — TELEPHONE (OUTPATIENT)
Dept: PULMONOLOGY | Facility: CLINIC | Age: 47
End: 2022-06-30
Payer: MEDICARE

## 2022-06-30 DIAGNOSIS — J45.909 ASTHMA, UNSPECIFIED ASTHMA SEVERITY, UNSPECIFIED WHETHER COMPLICATED, UNSPECIFIED WHETHER PERSISTENT: Primary | ICD-10-CM

## 2022-07-05 ENCOUNTER — TELEPHONE (OUTPATIENT)
Dept: PULMONOLOGY | Facility: CLINIC | Age: 47
End: 2022-07-05
Payer: MEDICARE

## 2022-07-05 NOTE — TELEPHONE ENCOUNTER
Left message on patient voicemail, informing him that I'm contacting him in regards to scheduling his Pulmonary Function Test prior to his appointment with Dr Alexander. I also advised patient that if he has any questions or concerns, he may contact the office. Office number has been provided.

## 2022-07-07 ENCOUNTER — OFFICE VISIT (OUTPATIENT)
Dept: PULMONOLOGY | Facility: CLINIC | Age: 47
End: 2022-07-07
Payer: MEDICARE

## 2022-07-07 VITALS
DIASTOLIC BLOOD PRESSURE: 82 MMHG | OXYGEN SATURATION: 97 % | HEIGHT: 74 IN | HEART RATE: 81 BPM | BODY MASS INDEX: 39.72 KG/M2 | SYSTOLIC BLOOD PRESSURE: 124 MMHG | WEIGHT: 309.5 LBS

## 2022-07-07 DIAGNOSIS — J45.20 MILD INTERMITTENT ASTHMA WITHOUT COMPLICATION: ICD-10-CM

## 2022-07-07 DIAGNOSIS — E66.01 CLASS 2 SEVERE OBESITY DUE TO EXCESS CALORIES WITH SERIOUS COMORBIDITY AND BODY MASS INDEX (BMI) OF 39.0 TO 39.9 IN ADULT: ICD-10-CM

## 2022-07-07 DIAGNOSIS — J45.40 MODERATE PERSISTENT ASTHMA WITHOUT COMPLICATION: ICD-10-CM

## 2022-07-07 DIAGNOSIS — J30.2 SEASONAL ALLERGIC RHINITIS, UNSPECIFIED TRIGGER: ICD-10-CM

## 2022-07-07 PROBLEM — E66.812 CLASS 2 SEVERE OBESITY DUE TO EXCESS CALORIES WITH SERIOUS COMORBIDITY AND BODY MASS INDEX (BMI) OF 39.0 TO 39.9 IN ADULT: Status: ACTIVE | Noted: 2021-05-11

## 2022-07-07 PROCEDURE — 3074F PR MOST RECENT SYSTOLIC BLOOD PRESSURE < 130 MM HG: ICD-10-PCS | Mod: CPTII,S$GLB,, | Performed by: STUDENT IN AN ORGANIZED HEALTH CARE EDUCATION/TRAINING PROGRAM

## 2022-07-07 PROCEDURE — 99999 PR PBB SHADOW E&M-EST. PATIENT-LVL IV: CPT | Mod: PBBFAC,,, | Performed by: STUDENT IN AN ORGANIZED HEALTH CARE EDUCATION/TRAINING PROGRAM

## 2022-07-07 PROCEDURE — 3079F PR MOST RECENT DIASTOLIC BLOOD PRESSURE 80-89 MM HG: ICD-10-PCS | Mod: CPTII,S$GLB,, | Performed by: STUDENT IN AN ORGANIZED HEALTH CARE EDUCATION/TRAINING PROGRAM

## 2022-07-07 PROCEDURE — 3079F DIAST BP 80-89 MM HG: CPT | Mod: CPTII,S$GLB,, | Performed by: STUDENT IN AN ORGANIZED HEALTH CARE EDUCATION/TRAINING PROGRAM

## 2022-07-07 PROCEDURE — 99999 PR PBB SHADOW E&M-EST. PATIENT-LVL IV: ICD-10-PCS | Mod: PBBFAC,,, | Performed by: STUDENT IN AN ORGANIZED HEALTH CARE EDUCATION/TRAINING PROGRAM

## 2022-07-07 PROCEDURE — 99499 UNLISTED E&M SERVICE: CPT | Mod: S$GLB,,, | Performed by: STUDENT IN AN ORGANIZED HEALTH CARE EDUCATION/TRAINING PROGRAM

## 2022-07-07 PROCEDURE — 3008F BODY MASS INDEX DOCD: CPT | Mod: CPTII,S$GLB,, | Performed by: STUDENT IN AN ORGANIZED HEALTH CARE EDUCATION/TRAINING PROGRAM

## 2022-07-07 PROCEDURE — 99203 PR OFFICE/OUTPT VISIT, NEW, LEVL III, 30-44 MIN: ICD-10-PCS | Mod: S$GLB,,, | Performed by: STUDENT IN AN ORGANIZED HEALTH CARE EDUCATION/TRAINING PROGRAM

## 2022-07-07 PROCEDURE — 99499 RISK ADDL DX/OHS AUDIT: ICD-10-PCS | Mod: S$GLB,,, | Performed by: STUDENT IN AN ORGANIZED HEALTH CARE EDUCATION/TRAINING PROGRAM

## 2022-07-07 PROCEDURE — 1159F PR MEDICATION LIST DOCUMENTED IN MEDICAL RECORD: ICD-10-PCS | Mod: CPTII,S$GLB,, | Performed by: STUDENT IN AN ORGANIZED HEALTH CARE EDUCATION/TRAINING PROGRAM

## 2022-07-07 PROCEDURE — 1159F MED LIST DOCD IN RCRD: CPT | Mod: CPTII,S$GLB,, | Performed by: STUDENT IN AN ORGANIZED HEALTH CARE EDUCATION/TRAINING PROGRAM

## 2022-07-07 PROCEDURE — 99203 OFFICE O/P NEW LOW 30 MIN: CPT | Mod: S$GLB,,, | Performed by: STUDENT IN AN ORGANIZED HEALTH CARE EDUCATION/TRAINING PROGRAM

## 2022-07-07 PROCEDURE — 3008F PR BODY MASS INDEX (BMI) DOCUMENTED: ICD-10-PCS | Mod: CPTII,S$GLB,, | Performed by: STUDENT IN AN ORGANIZED HEALTH CARE EDUCATION/TRAINING PROGRAM

## 2022-07-07 PROCEDURE — 3074F SYST BP LT 130 MM HG: CPT | Mod: CPTII,S$GLB,, | Performed by: STUDENT IN AN ORGANIZED HEALTH CARE EDUCATION/TRAINING PROGRAM

## 2022-07-07 RX ORDER — PREDNISONE 20 MG/1
TABLET ORAL
COMMUNITY
Start: 2022-04-22 | End: 2023-01-12

## 2022-07-07 RX ORDER — MAGNESIUM SULFATE HEPTAHYDRATE 40 MG/ML
INJECTION, SOLUTION INTRAVENOUS
COMMUNITY
Start: 2022-04-22

## 2022-07-07 RX ORDER — TRIAMCINOLONE ACETONIDE 40 MG/ML
INJECTION, SUSPENSION INTRA-ARTICULAR; INTRAMUSCULAR
COMMUNITY
Start: 2022-05-11 | End: 2023-01-12

## 2022-07-07 NOTE — ASSESSMENT & PLAN NOTE
Discussed role of allergic rhinitis and ongoing asthma symptoms.  Continue current regimen, but if symptoms worsen or become on resolved, would move to scheduled therapy with both Flonase and Azelastine and an antihistamine.

## 2022-07-07 NOTE — PROGRESS NOTES
Subjective:     Reason for visit:  Establishment of care for asthma    Patient ID:  Teddy Ennis is a 46 y.o. obese male with chronic allergic rhinitis and mild intermittent asthma    Family member present at time of interview who provides additional history    Interval History:  Typical by annual worsening during weather changes in the spring and fall, in particular cold weather.  Going from air-conditioned outside in back again is also a trigger.  Other triggers are cigarette smoker cigar smoke.  Previously followed by Allergy and was on immune shots for 2 years couple years ago but these were stopped due to insurance reasons.  Reports feeling well while on immune shots, but symptoms have worsened back to pre shot levels in the interim.  Was sick back in January around new yea, sputum production.  r's with respiratory illness.  Mother with similar symptoms.  Was very symptomatic for weeks following that but has since resolved.  During the summer months, minimal use of rescue inhaler and no nighttime symptoms.  During seasonal changes and fall and spring, frequent use of rescue inhaler and nebulizer treatments.  No fevers, chills, cough      Additional Pulmonary History:  Childhood Illnesses:  Asthma since childhood, then resolved.  Returned in his 20s.  Occupational Exposures:  None  Environmental Exposures:  None  Tobacco/Smoking History:  Never smoker  Travel History:  No recent travel    Review of Systems   Constitutional: Negative for chills, fever, malaise/fatigue and weight loss.   HENT: Negative for congestion and sinus pain.    Respiratory: Positive for shortness of breath ( during asthma flares). Negative for cough, sputum production and wheezing.    Cardiovascular: Positive for chest pain (Chest tightness during asthma flares). Negative for palpitations, orthopnea and leg swelling.   Gastrointestinal: Negative for abdominal pain, heartburn, nausea and vomiting.   Neurological: Negative for  "dizziness and headaches.   Endo/Heme/Allergies: Positive for environmental allergies.        Objective:     Vitals:    07/07/22 1500   BP: 124/82   BP Location: Left arm   Patient Position: Sitting   Pulse: 81   SpO2: 97%   Weight: (!) 140.4 kg (309 lb 8.4 oz)   Height: 6' 2" (1.88 m)         Physical Exam  Vitals and nursing note reviewed.   Constitutional:       General: He is not in acute distress.     Appearance: He is obese. He is not ill-appearing, toxic-appearing or diaphoretic.   HENT:      Head: Normocephalic and atraumatic.      Nose: No rhinorrhea.      Mouth/Throat:      Mouth: Mucous membranes are moist.      Pharynx: Oropharynx is clear. No oropharyngeal exudate or posterior oropharyngeal erythema.   Eyes:      General: No scleral icterus.     Extraocular Movements: Extraocular movements intact.   Cardiovascular:      Rate and Rhythm: Normal rate and regular rhythm.      Heart sounds: No murmur heard.  Pulmonary:      Effort: No tachypnea, accessory muscle usage, respiratory distress or retractions.      Breath sounds: No decreased breath sounds, wheezing, rhonchi or rales.   Abdominal:      General: There is no distension.      Palpations: Abdomen is soft.      Tenderness: There is no abdominal tenderness. There is no guarding.   Musculoskeletal:      Right lower leg: No edema.      Left lower leg: No edema.   Skin:     General: Skin is warm and dry.      Coloration: Skin is not jaundiced.      Findings: No rash.   Neurological:      General: No focal deficit present.      Mental Status: He is alert. Mental status is at baseline.      Cranial Nerves: No cranial nerve deficit.          Personal Diagnostic Review and Interpretation  04/22/2022 CXR AP:  Enlarged cardiac silhouette with CHF appearance      Pertinent Studies Reviewed and Interpreted:     Pulmonary Function Tests:   No PFTs    6 Minute Walk Tests:   No 6 MWT    Echocardiograms:   No echo    Assessment:     1. Moderate persistent asthma " without complication  Ambulatory referral/consult to Pulmonology   2. Seasonal allergic rhinitis, unspecified trigger     3. Mild intermittent asthma without complication     4. Class 2 severe obesity due to excess calories with serious comorbidity and body mass index (BMI) of 39.0 to 39.9 in adult         Current Outpatient Medications   Medication Instructions    AFLURIA QD 2020-21,3YR UP,,PF, 60 mcg (15 mcg x 4)/0.5 mL Syrg ADM 0.5ML IM UTD    albuterol (PROAIR HFA) 90 mcg/actuation inhaler 2 puffs, Inhalation, Every 6 hours PRN, Rescue    albuterol (PROVENTIL) 2.5 mg, Nebulization, Every 4-6 hours PRN    amLODIPine (NORVASC) 10 MG tablet TAKE 1 TABLET(10 MG) BY MOUTH EVERY DAY    azelastine (ASTELIN) 137 mcg, Nasal, 2 times daily    BREO ELLIPTA 200-25 mcg/dose DsDv diskus inhaler INHALE 1 PUFF INTO THE LUNGS EVERY DAY    doxycycline (VIBRA-TABS) 100 mg, Oral, 2 times daily    EScitalopram oxalate (LEXAPRO) 20 mg, Oral, Daily    fexofenadine (ALLEGRA) 180 mg, Oral, Daily    fluticasone propionate (FLONASE) 50 mcg/actuation nasal spray 2 spray each nostril daily    magnesium sulfate in water (MAGNESIUM SULFATE 2 GRAM/50 ML) 2 gram/50 mL PgBk No dose, route, or frequency recorded.    montelukast (SINGULAIR) 10 mg tablet TAKE 1 TABLET BY MOUTH EVERY DAY    pravastatin (PRAVACHOL) 20 MG tablet TK 1 T PO D    predniSONE (DELTASONE) 20 MG tablet No dose, route, or frequency recorded.    promethazine-dextromethorphan (PROMETHAZINE-DM) 6.25-15 mg/5 mL Syrp 5 mLs, Oral, Every 4 hours PRN    triamcinolone acetonide (KENALOG-40) 40 mg/mL injection No dose, route, or frequency recorded.    VENTOLIN HFA 90 mcg/actuation inhaler INL 2 PFS ITL TID PRF WHZ      Teddy Ennis had no medications administered during this visit.     No orders of the defined types were placed in this encounter.     Plan:       Problem List Items Addressed This Visit        ENT    Allergic rhinitis    Overview     History of  allergy shots.  Symptoms decent we controlled on intermittent nasal steroids and antihistamines           Current Assessment & Plan     Discussed role of allergic rhinitis and ongoing asthma symptoms.  Continue current regimen, but if symptoms worsen or become on resolved, would move to scheduled therapy with both Flonase and Azelastine and an antihistamine.              Pulmonary    Mild intermittent asthma without complication    Overview     Asthma since childhood.  Typical seasonal worsening.  Strong allergic component.  Accompanied by chronic allergic rhinitis.  Currently on Breo daily with albuterol PRN and montelukast.  Minimal rescue inhaler use and no nighttime symptoms at this time, but reports significant worsening during spring and winter changes.           Current Assessment & Plan     Continue current regimen.  Needs baseline PFTs should be obtained prior to his typical exacerbation period.              Endocrine    Class 2 severe obesity due to excess calories with serious comorbidity and body mass index (BMI) of 39.0 to 39.9 in adult    Overview     Obesity places patient at risk for exacerbation of chronic illnesses           Current Assessment & Plan     A significant amount weight loss is likely to help small airways disease                  40 minutes of total time spent on the encounter, which includes face-to-face time and non-face-to-face time preparing to see the patient (eg, review of tests), obtaining and/or reviewing separately obtained history, documenting clinical information in the electronic or other health record, independently interpreting results (not separately reported), and communicating results to the patient/family/caregiver, or care coordination (not separately reported).   Portions of the record may have been created with voice-recognition software. Occasional wrong-word or sound-a-like substitutions may have occurred due to the inherent limitations of voice-recognition  software. Read the chart carefully and recognize, using context, where substitutions have occurred.

## 2022-07-07 NOTE — ASSESSMENT & PLAN NOTE
Continue current regimen.  Needs baseline PFTs should be obtained prior to his typical exacerbation period.

## 2022-07-11 ENCOUNTER — HOSPITAL ENCOUNTER (OUTPATIENT)
Dept: PULMONOLOGY | Facility: CLINIC | Age: 47
Discharge: HOME | End: 2022-07-11
Payer: MEDICARE

## 2022-07-11 DIAGNOSIS — J45.909 ASTHMA, UNSPECIFIED ASTHMA SEVERITY, UNSPECIFIED WHETHER COMPLICATED, UNSPECIFIED WHETHER PERSISTENT: ICD-10-CM

## 2022-07-11 PROCEDURE — 94010 BREATHING CAPACITY TEST: ICD-10-PCS | Mod: 53,S$GLB,, | Performed by: INTERNAL MEDICINE

## 2022-07-11 PROCEDURE — 94010 BREATHING CAPACITY TEST: CPT | Mod: 53,S$GLB,, | Performed by: INTERNAL MEDICINE

## 2022-07-12 ENCOUNTER — TELEPHONE (OUTPATIENT)
Dept: PULMONOLOGY | Facility: CLINIC | Age: 47
End: 2022-07-12
Payer: MEDICARE

## 2022-07-12 NOTE — TELEPHONE ENCOUNTER
Left message on patient voicemail, informing him that I have received his message. I also advised patient that if he has any questions or concerns, he may contact the office. Office number has been provided.

## 2022-07-12 NOTE — TELEPHONE ENCOUNTER
----- Message from Rangel Lares sent at 7/12/2022  1:11 PM CDT -----  Regarding: PULMONARY FUNCTION Test  Good afternoon,       Patient is requesting a callback  as soon as possible regarding Pulmonary Function test not completed yesterday in office visit, and what to know where do go from here to complete test.  Please give the patient a callback at [phone].     Thank you,    KIT Lares

## 2022-08-23 DIAGNOSIS — I10 ESSENTIAL HYPERTENSION: ICD-10-CM

## 2022-08-23 RX ORDER — ESCITALOPRAM OXALATE 20 MG/1
20 TABLET ORAL DAILY
Qty: 90 TABLET | Refills: 3 | Status: SHIPPED | OUTPATIENT
Start: 2022-08-23 | End: 2023-08-15 | Stop reason: SDUPTHER

## 2022-08-23 RX ORDER — AMLODIPINE BESYLATE 10 MG/1
10 TABLET ORAL DAILY
Qty: 90 TABLET | Refills: 3 | Status: SHIPPED | OUTPATIENT
Start: 2022-08-23 | End: 2023-08-02 | Stop reason: SDUPTHER

## 2022-08-23 RX ORDER — MONTELUKAST SODIUM 10 MG/1
TABLET ORAL
Qty: 90 TABLET | Refills: 3 | Status: SHIPPED | OUTPATIENT
Start: 2022-08-23 | End: 2023-08-02 | Stop reason: SDUPTHER

## 2022-08-23 RX ORDER — PRAVASTATIN SODIUM 20 MG/1
TABLET ORAL
Qty: 90 TABLET | Refills: 3 | Status: SHIPPED | OUTPATIENT
Start: 2022-08-23 | End: 2023-08-21 | Stop reason: SDUPTHER

## 2022-08-23 NOTE — TELEPHONE ENCOUNTER
----- Message from Kendra Figueroa sent at 8/23/2022 12:42 PM CDT -----  Contact: [phone]  Requesting an RX refill or new RX.  Is this a refill or new RX: Refill 1  RX name and strength (copy/paste from chart):  EScitalopram oxalate (LEXAPRO) 20 MG tablet  Is this a 30 day or 90 day RX:   Pharmacy name and phone # (copy/paste from chart):  [facility] ([facility]) - [address]   Phone:  [phone] Fax:  [phone]      Requesting an RX refill or new RX.  Is this a refill or new RX: Refill 1  RX name and strength (copy/paste from chart): amLODIPine (NORVASC) 10 MG tablet  Is this a 30 day or 90 day RX:   Pharmacy name and phone # (copy/paste from chart):  [facility] (Now [facility]) - [address]   Phone:  [phone] Fax:  [phone]      Requesting an RX refill or new RX.  Is this a refill or new RX: Refill 2  RX name and strength (copy/paste from chart):  montelukast (SINGULAIR) 10 mg tablet  Is this a 30 day or 90 day RX:   Pharmacy name and phone # (copy/paste from chart):  [facility] ([facility]) - [address]   Phone:  [phone] Fax:  [phone]      Requesting an RX refill or new RX.  Is this a refill or new RX: Refill 3  RX name and strength (copy/paste from chart):  montelukast (SINGULAIR) 10 mg tablet  Is this a 30 day or 90 day RX:   Pharmacy name and phone # (copy/paste from chart):  [facility] ([facility]) - [facility] [address] [place] [address]   Phone:  [phone] Fax:  [phone]      Requesting an RX refill or new RX.  Is this a refill or new RX: Refill 4  RX name and strength (copy/paste from chart):  pravastatin (PRAVACHOL) 20 MG tablet  Is this a 30 day or 90 day RX:   Pharmacy name and phone # (copy/paste from chart):  [facility] ([facility]) - [place], [place] - [address]   Phone:  [phone] Fax:  [phone]    The doctors have asked that we provide their patients with the following 2 reminders -- prescription refills can take up to 72 hours, and a friendly reminder that in the future you can use your [facility] account to request refills:     Patient is requesting a courtesy call from office. Patient stated that [facility] is saying that PCP denied the rx. Thank you

## 2022-08-23 NOTE — TELEPHONE ENCOUNTER
Care Due:                  Date            Visit Type   Department     Provider  --------------------------------------------------------------------------------                                EP -                              PRIMARY      [facility] PRIMARY  Last Visit: 05-      CARE ([facility])   CARE           Doni Piña                               -                              [facility] PRIMARY  Next Visit: 11-      CARE ([facility])   CARE           Doni Piña                                                            Last  Test          Frequency    Reason                     Performed    Due Date  --------------------------------------------------------------------------------    Lipid Panel.  12 months..  pravastatin..............  11-   10-    [facility] Embedded Care Gaps. Reference number: [id number]. 8/23/2022   1:09:07 PM CDT   Continue all your routine medications avoid drinking alcohol in excess and follow-up with her primary care physician in several days return for any problems or concerns

## 2022-08-23 NOTE — TELEPHONE ENCOUNTER
----- Message from Knedra Figueroa sent at 8/23/2022  4:29 PM CDT -----  Contact: [phone]  Patient called, want to inform pcp that he is running low on meds (patient has been told that may take 72 hrs for rx to be refill). Patient is asking for nurse to call him. Thank you

## 2022-09-30 ENCOUNTER — TELEPHONE (OUTPATIENT)
Dept: PULMONOLOGY | Facility: CLINIC | Age: 47
End: 2022-09-30
Payer: MEDICARE

## 2022-09-30 NOTE — TELEPHONE ENCOUNTER
Spoke with informing him that I received his message in regards scheduling a follow up with Dr. Alexander. Patient wanted an appointment in December but the schedule was full. I advised patient that once the schedule for January becomes available I would contact him to put patient on schedule. Patient verbalized that he understands.

## 2022-10-12 ENCOUNTER — TELEPHONE (OUTPATIENT)
Dept: PULMONOLOGY | Facility: CLINIC | Age: 47
End: 2022-10-12
Payer: MEDICARE

## 2022-10-12 NOTE — TELEPHONE ENCOUNTER
Spoke with patient informing him that I received his message in regards to scheduling an appointment for Asthma and chest tightening. I advised patient that Dr. Alexander did not have any available appoints but I could schedule him with another doctor. I advised patient that Dr. Randhawa had an available appointment December 2, 2022 at 3:30 pm. Patient verbalized that he understands and accepted the appointment.

## 2022-11-11 ENCOUNTER — LAB VISIT (OUTPATIENT)
Dept: LAB | Facility: HOSPITAL | Age: 47
End: 2022-11-11
Attending: INTERNAL MEDICINE
Payer: MEDICARE

## 2022-11-11 DIAGNOSIS — R73.01 IMPAIRED FASTING GLUCOSE: ICD-10-CM

## 2022-11-11 DIAGNOSIS — Z12.5 SCREENING FOR MALIGNANT NEOPLASM OF PROSTATE: ICD-10-CM

## 2022-11-11 DIAGNOSIS — E78.2 HYPERLIPIDEMIA, MIXED: ICD-10-CM

## 2022-11-11 LAB
ALBUMIN SERPL BCP-MCNC: 3.6 G/DL (ref 3.5–5.2)
ALP SERPL-CCNC: 154 U/L (ref 55–135)
ALT SERPL W/O P-5'-P-CCNC: 22 U/L (ref 10–44)
ANION GAP SERPL CALC-SCNC: 9 MMOL/L (ref 8–16)
AST SERPL-CCNC: 18 U/L (ref 10–40)
BILIRUB SERPL-MCNC: 0.7 MG/DL (ref 0.1–1)
BUN SERPL-MCNC: 11 MG/DL (ref 6–20)
CALCIUM SERPL-MCNC: 9.1 MG/DL (ref 8.7–10.5)
CHLORIDE SERPL-SCNC: 99 MMOL/L (ref 95–110)
CHOLEST SERPL-MCNC: 153 MG/DL (ref 120–199)
CHOLEST/HDLC SERPL: 4.9 {RATIO} (ref 2–5)
CO2 SERPL-SCNC: 28 MMOL/L (ref 23–29)
COMPLEXED PSA SERPL-MCNC: 0.07 NG/ML (ref 0–4)
CREAT SERPL-MCNC: 0.8 MG/DL (ref 0.5–1.4)
EST. GFR  (NO RACE VARIABLE): >60 ML/MIN/1.73 M^2
ESTIMATED AVG GLUCOSE: 108 MG/DL (ref 68–131)
GLUCOSE SERPL-MCNC: 123 MG/DL (ref 70–110)
HBA1C MFR BLD: 5.4 % (ref 4–5.6)
HDLC SERPL-MCNC: 31 MG/DL (ref 40–75)
HDLC SERPL: 20.3 % (ref 20–50)
LDLC SERPL CALC-MCNC: 98.2 MG/DL (ref 63–159)
NONHDLC SERPL-MCNC: 122 MG/DL
POTASSIUM SERPL-SCNC: 3.7 MMOL/L (ref 3.5–5.1)
PROT SERPL-MCNC: 8.1 G/DL (ref 6–8.4)
SODIUM SERPL-SCNC: 136 MMOL/L (ref 136–145)
TRIGL SERPL-MCNC: 119 MG/DL (ref 30–150)

## 2022-11-11 PROCEDURE — 83036 HEMOGLOBIN GLYCOSYLATED A1C: CPT | Performed by: INTERNAL MEDICINE

## 2022-11-11 PROCEDURE — 36415 COLL VENOUS BLD VENIPUNCTURE: CPT | Performed by: INTERNAL MEDICINE

## 2022-11-11 PROCEDURE — 80053 COMPREHEN METABOLIC PANEL: CPT | Performed by: INTERNAL MEDICINE

## 2022-11-11 PROCEDURE — 80061 LIPID PANEL: CPT | Performed by: INTERNAL MEDICINE

## 2022-11-11 PROCEDURE — 84153 ASSAY OF PSA TOTAL: CPT | Performed by: INTERNAL MEDICINE

## 2022-11-16 ENCOUNTER — OFFICE VISIT (OUTPATIENT)
Dept: INTERNAL MEDICINE | Facility: CLINIC | Age: 47
End: 2022-11-16
Payer: MEDICARE

## 2022-11-16 VITALS
HEART RATE: 78 BPM | HEIGHT: 74 IN | BODY MASS INDEX: 38.66 KG/M2 | SYSTOLIC BLOOD PRESSURE: 131 MMHG | DIASTOLIC BLOOD PRESSURE: 78 MMHG | OXYGEN SATURATION: 97 % | WEIGHT: 301.25 LBS

## 2022-11-16 DIAGNOSIS — J45.20 MILD INTERMITTENT ASTHMA WITHOUT COMPLICATION: ICD-10-CM

## 2022-11-16 DIAGNOSIS — R73.01 IMPAIRED FASTING GLUCOSE: ICD-10-CM

## 2022-11-16 DIAGNOSIS — I10 ESSENTIAL HYPERTENSION: Primary | ICD-10-CM

## 2022-11-16 DIAGNOSIS — E78.2 HYPERLIPIDEMIA, MIXED: ICD-10-CM

## 2022-11-16 PROCEDURE — 3078F DIAST BP <80 MM HG: CPT | Mod: CPTII,S$GLB,, | Performed by: INTERNAL MEDICINE

## 2022-11-16 PROCEDURE — 3078F PR MOST RECENT DIASTOLIC BLOOD PRESSURE < 80 MM HG: ICD-10-PCS | Mod: CPTII,S$GLB,, | Performed by: INTERNAL MEDICINE

## 2022-11-16 PROCEDURE — 1159F PR MEDICATION LIST DOCUMENTED IN MEDICAL RECORD: ICD-10-PCS | Mod: CPTII,S$GLB,, | Performed by: INTERNAL MEDICINE

## 2022-11-16 PROCEDURE — 1159F MED LIST DOCD IN RCRD: CPT | Mod: CPTII,S$GLB,, | Performed by: INTERNAL MEDICINE

## 2022-11-16 PROCEDURE — 99999 PR PBB SHADOW E&M-EST. PATIENT-LVL III: ICD-10-PCS | Mod: PBBFAC,,, | Performed by: INTERNAL MEDICINE

## 2022-11-16 PROCEDURE — 3008F PR BODY MASS INDEX (BMI) DOCUMENTED: ICD-10-PCS | Mod: CPTII,S$GLB,, | Performed by: INTERNAL MEDICINE

## 2022-11-16 PROCEDURE — 3008F BODY MASS INDEX DOCD: CPT | Mod: CPTII,S$GLB,, | Performed by: INTERNAL MEDICINE

## 2022-11-16 PROCEDURE — 3044F HG A1C LEVEL LT 7.0%: CPT | Mod: CPTII,S$GLB,, | Performed by: INTERNAL MEDICINE

## 2022-11-16 PROCEDURE — 99214 PR OFFICE/OUTPT VISIT, EST, LEVL IV, 30-39 MIN: ICD-10-PCS | Mod: S$GLB,,, | Performed by: INTERNAL MEDICINE

## 2022-11-16 PROCEDURE — 3044F PR MOST RECENT HEMOGLOBIN A1C LEVEL <7.0%: ICD-10-PCS | Mod: CPTII,S$GLB,, | Performed by: INTERNAL MEDICINE

## 2022-11-16 PROCEDURE — 99999 PR PBB SHADOW E&M-EST. PATIENT-LVL III: CPT | Mod: PBBFAC,,, | Performed by: INTERNAL MEDICINE

## 2022-11-16 PROCEDURE — 3075F PR MOST RECENT SYSTOLIC BLOOD PRESS GE 130-139MM HG: ICD-10-PCS | Mod: CPTII,S$GLB,, | Performed by: INTERNAL MEDICINE

## 2022-11-16 PROCEDURE — 99214 OFFICE O/P EST MOD 30 MIN: CPT | Mod: S$GLB,,, | Performed by: INTERNAL MEDICINE

## 2022-11-16 PROCEDURE — 3075F SYST BP GE 130 - 139MM HG: CPT | Mod: CPTII,S$GLB,, | Performed by: INTERNAL MEDICINE

## 2022-11-16 RX ORDER — FLUTICASONE FUROATE AND VILANTEROL TRIFENATATE 200; 25 UG/1; UG/1
1 POWDER RESPIRATORY (INHALATION) DAILY
Qty: 60 EACH | Refills: 11 | Status: SHIPPED | OUTPATIENT
Start: 2022-11-16 | End: 2023-04-10 | Stop reason: SDUPTHER

## 2022-11-16 RX ORDER — FLUTICASONE FUROATE AND VILANTEROL 200; 25 UG/1; UG/1
1 POWDER RESPIRATORY (INHALATION) DAILY
Qty: 60 EACH | Refills: 11 | Status: SHIPPED | OUTPATIENT
Start: 2022-11-16 | End: 2022-11-16 | Stop reason: SDUPTHER

## 2022-11-16 NOTE — TELEPHONE ENCOUNTER
No new care gaps identified.  [facility] Embedded Care Gaps. Reference number: [id number]. 11/16/2022   3:51:03 PM CST

## 2022-11-16 NOTE — PROGRESS NOTES
[facility] Note    Chief Complaint      Chief Complaint   Patient presents with    Hypertension     6 month f/u         History of Present Illness      Teddy Ennis is a 47 y.o. male with chronic conditions of HTN, HLD, asthma, IFG, depression, allergic rhinitis who presents today for: follow up chronic conditions.  Complains of right flank pain.    HTN: BP at goal on amlodipine.  HLD: Controlled on pravastatin. LDL 98, doing well.    Asthma, moderate persistent, allergic rhinitis: previously on immunotherapy.  On Breo.    IFG: A1C 5.9 slightly higher than last check.  Counseled on diet and exercise  Flu shot UTD.  TDAP 2019.  FITKIT 2022.      Past Medical History:  Past Medical History:   Diagnosis Date    Allergy     Asthma        Past Surgical History:   has a past surgical history that includes Ankle surgery (Left, 1995); Adenoidectomy; and Sinus surgery.    Family History:  family history includes Hypertension in his mother; No Known Problems in his father; Stroke in his mother.     Social History:  Social History     Tobacco Use    Smoking status: Never    Smokeless tobacco: Never   Substance Use Topics    Alcohol use: Not Currently       I personally reviewed all past medical, surgical, social and family history.    Review of Systems   Constitutional:  Negative for chills, fever and malaise/fatigue.   Respiratory:  Negative for shortness of breath.    Cardiovascular:  Negative for chest pain.   Gastrointestinal:  Negative for constipation, diarrhea, nausea and vomiting.   Skin:  Negative for rash.   Neurological:  Negative for weakness.   All other systems reviewed and are negative.     Medications:  Outpatient Encounter Medications as of 11/16/2022   Medication Sig Dispense Refill    AFLURIA QD 2020-21,3YR UP,,PF, 60 mcg (15 mcg x 4)/0.5 mL Syrg ADM 0.5ML IM UTD      albuterol (PROAIR HFA) 90 mcg/actuation inhaler Inhale 2 puffs into the lungs every 6 (six) hours as needed for  Wheezing. Rescue 54 g 3    albuterol (PROVENTIL) 2.5 mg /3 mL (0.083 %) nebulizer solution Take 3 mLs (2.5 mg total) by nebulization every 4 to 6 hours as needed for Wheezing. 1080 mL 3    amLODIPine (NORVASC) 10 MG tablet Take 1 tablet (10 mg total) by mouth once daily. 90 tablet 3    azelastine (ASTELIN) 137 mcg (0.1 %) nasal spray 1 spray (137 mcg total) by Nasal route 2 (two) times daily. 30 mL 11    doxycycline (VIBRA-TABS) 100 MG tablet Take 1 tablet (100 mg total) by mouth 2 (two) times daily. 14 tablet 0    EScitalopram oxalate (LEXAPRO) 20 MG tablet Take 1 tablet (20 mg total) by mouth once daily. 90 tablet 3    fexofenadine (ALLEGRA) 180 MG tablet Take 180 mg by mouth once daily.      fluticasone furoate-vilanteroL (BREO ELLIPTA) 200-25 mcg/dose DsDv diskus inhaler Inhale 1 puff into the lungs once daily. Controller 60 each 11    fluticasone propionate (FLONASE) 50 mcg/actuation nasal spray 2 spray each nostril daily 16 g 11    magnesium sulfate in water (MAGNESIUM SULFATE 2 GRAM/50 ML) 2 gram/50 mL PgBk       montelukast (SINGULAIR) 10 mg tablet TAKE 1 TABLET BY MOUTH EVERY DAY 90 tablet 3    pravastatin (PRAVACHOL) 20 MG tablet TK 1 T PO D 90 tablet 3    predniSONE (DELTASONE) 20 MG tablet       promethazine-dextromethorphan (PROMETHAZINE-DM) 6.25-15 mg/5 mL Syrp Take 5 mLs by mouth every 4 (four) hours as needed (cough). (Patient not taking: Reported on 11/16/2022) 118 mL 0    triamcinolone acetonide (KENALOG-40) 40 mg/mL injection       VENTOLIN HFA 90 mcg/actuation inhaler INL 2 PFS ITL TID PRF WHZ  2    [DISCONTINUED] BREO ELLIPTA 200-25 mcg/dose DsDv diskus inhaler INHALE 1 PUFF INTO THE LUNGS EVERY DAY 60 each 11     No facility-administered encounter medications on file as of 11/16/2022.       Allergies:  Review of patient's allergies indicates:   Allergen Reactions    Penicillins Hives       Health Maintenance:  Immunization History   Administered Date(s) Administered    COVID-19, MRNA, LN-S, PF  "(Pfizer) (Purple Cap) 07/25/2021, 08/15/2021    Influenza - Quadrivalent - PF *Preferred* (6 months and older) 09/30/2013, 09/17/2014, 09/03/2016, 09/29/2018, 08/26/2019, 09/12/2020, 10/02/2021    Influenza Split 09/30/2009, 09/30/2010, 09/27/2011, 09/30/2013, 09/17/2014    Tdap 09/29/2018, 11/29/2019      Health Maintenance   Topic Date Due    Hepatitis C Screening  Never done    Lipid Panel  11/11/2027    TETANUS VACCINE  11/29/2029        Physical Exam      Vital Signs  Pulse: 78  SpO2: 97 %  BP: 131/78  BP Location: Left arm  Patient Position: Sitting  Pain Score: 0-No pain  Height and Weight  Height: 6' 2" (188 cm)  Weight: (!) 136.7 kg (301 lb 4.1 oz)  BSA (Calculated - sq m): 2.67 sq meters  BMI (Calculated): 38.7  Weight in (lb) to have BMI = 25: 194.3]    Physical Exam  Vitals reviewed.   Constitutional:       Appearance: He is well-developed.   HENT:      Head: Normocephalic and atraumatic.      Right Ear: External ear normal.      Left Ear: External ear normal.   Cardiovascular:      Rate and Rhythm: Normal rate and regular rhythm.      Heart sounds: Normal heart sounds. No murmur heard.  Pulmonary:      Effort: Pulmonary effort is normal.      Breath sounds: Normal breath sounds. No wheezing or rales.   Abdominal:      General: Bowel sounds are normal.      Palpations: Abdomen is soft.        Laboratory:  CBC:  Recent Labs   Lab 11/26/19  0803 06/16/20  1455 04/22/22  2149   WBC 8.84 12.26 12.78 H   RBC 5.22 5.48 5.13   Hemoglobin 15.4 15.9 15.2   Hematocrit 47.3 49.4 45.6   Platelets 324 355 H 295   MCV 91 90 89   MCH 29.5 29.0 29.6   MCHC 32.6 32.2 33.3     CMP:  Recent Labs   Lab 11/03/21  0827 04/22/22  2149 11/11/22  0854   Glucose 126 H 190 H 123 H   Calcium 9.2 9.3 9.1   Albumin 3.7 3.8 3.6   Total Protein 8.0 8.3 8.1   Sodium 136 138 136   Potassium 3.5 3.2 L 3.7   CO2 29 26 28   Chloride 100 101 99   BUN 12 9 11   Alkaline Phosphatase 149 H 149 H 154 H   ALT 30 23 22   AST 28 20 18   Total " Bilirubin 0.9 0.6 0.7     URINALYSIS:       LIPIDS:  Recent Labs   Lab 11/26/19  0803 11/03/20  0740 11/03/21  0827 11/11/22  0854   TSH 1.351  --   --   --    HDL 32 L 34 L 31 L 31 L   Cholesterol 171 157 144 153   Triglycerides 182 H 118 98 119   LDL Cholesterol 102.6 99.4 93.4 98.2   HDL/Cholesterol Ratio 18.7 L 21.7 21.5 20.3   Non-HDL Cholesterol 139 123 113 122   Total Cholesterol/HDL Ratio 5.3 H 4.6 4.6 4.9     TSH:  Recent Labs   Lab 11/26/19  0803   TSH 1.351     A1C:  Recent Labs   Lab 12/03/19  0803 11/03/20  0740 11/03/21  0827 11/11/22  0854   Hemoglobin A1C 5.4 5.4 5.9 H 5.4       Assessment/Plan     Teddy Ennis is a 47 y.o.male with:    1. Essential hypertension  Continue current meds.    2. Hyperlipidemia, mixed  Continue current meds.    3. Impaired fasting glucose  Reviewed labs.   4. Mild intermittent asthma without complication  - fluticasone furoate-vilanteroL (BREO ELLIPTA) 200-25 mcg/dose DsDv diskus inhaler; Inhale 1 puff into the lungs once daily. Controller  Dispense: 60 each; Refill: 11  Continue current meds.      Chronic conditions status updated as per HPI.  Other than changes above, cont current medications and maintain follow up with specialists.  Follow up in about 6 months (around 5/16/2023) for Follow up visit.    Future Appointments   Date Time Provider Department Center   11/16/2022  3:45 PM Doni Wood MD [facility]   12/2/2022  3:30 PM Gogo Randhawa MD [facility]       Doni Wood MD  [facility]

## 2022-12-08 ENCOUNTER — HOSPITAL ENCOUNTER (EMERGENCY)
Facility: HOSPITAL | Age: 47
Discharge: HOME | End: 2022-12-08
Attending: EMERGENCY MEDICINE
Payer: MEDICARE

## 2022-12-08 VITALS
HEART RATE: 85 BPM | OXYGEN SATURATION: 99 % | RESPIRATION RATE: 18 BRPM | WEIGHT: 301 LBS | TEMPERATURE: 99 F | BODY MASS INDEX: 38.63 KG/M2 | SYSTOLIC BLOOD PRESSURE: 135 MMHG | HEIGHT: 74 IN | DIASTOLIC BLOOD PRESSURE: 81 MMHG

## 2022-12-08 DIAGNOSIS — M25.529 ELBOW PAIN, UNSPECIFIED LATERALITY: ICD-10-CM

## 2022-12-08 DIAGNOSIS — M25.511 ACUTE PAIN OF RIGHT SHOULDER: ICD-10-CM
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
## 2022-12-08 PROCEDURE — 99284 EMERGENCY DEPT VISIT MOD MDM: CPT | Mod: ,,, | Performed by: EMERGENCY MEDICINE

## 2022-12-08 PROCEDURE — 99284 PR EMERGENCY DEPT VISIT,LEVEL IV: ICD-10-PCS | Mod: ,,, | Performed by: EMERGENCY MEDICINE

## 2022-12-08 PROCEDURE — 25000003 PHARM REV CODE 250

## 2022-12-08 PROCEDURE — 99284 EMERGENCY DEPT VISIT MOD MDM: CPT | Mod: 25

## 2022-12-08 RX ORDER — ACETAMINOPHEN 500 MG
1000 TABLET ORAL
Status: COMPLETED | OUTPATIENT
Start: 2022-12-08 | End: 2022-12-08

## 2022-12-08 RX ADMIN — ACETAMINOPHEN 1000 MG: 500 TABLET ORAL at 04:12

## 2022-12-08 NOTE — ED NOTES
Tdap    Current Status    Updated on: 6/15/2020  1:01 PM    Name Date Status Dose VIS Date Route Site  Lot# Given By Verified By   Tdap 11/29/2019 Given -- -- IM LEFT ARM [employer] [id number] -- --   Exp. Date NDC # Product Time Location External Inventory Class Comment   -- -- -- -- -- -- -- --   Updated by: Emmy Bhat MA

## 2022-12-08 NOTE — ED PROVIDER NOTES
Encounter Date: 12/8/2022       History     Chief Complaint   Patient presents with    Fall     Pt tripped over object in ground and fell.  C/O right shoulder, elbow and forearm pain.  Denies LOC-did not hit head.     47M hx of HTN, HLD, Asthma, and depression is here for fall. [place] [address] when he tripped and fell on his right side. He hit his shoulder, elbow, and the right side of his chest/abdomen. He rates the pain at a 5/10 in both his shoulder and elbow. The pain is generalized across his arm. He has difficulty with  strength in the right hand. He is unable to move the shoulder. He also reports soreness on the right side of his chest. He denies chest pain, palpitations, or SOB.       Review of patient's allergies indicates:   Allergen Reactions    Penicillins Hives     Past Medical History:   Diagnosis Date    Allergy     Asthma      Past Surgical History:   Procedure Laterality Date    ADENOIDECTOMY      ANKLE SURGERY Left 1995    SINUS SURGERY       Family History   Problem Relation Age of Onset    Hypertension Mother     Stroke Mother     No Known Problems Father      Social History     Tobacco Use    Smoking status: Never    Smokeless tobacco: Never   Substance Use Topics    Alcohol use: Not Currently     Review of Systems   Constitutional:  Negative for chills and fever.   HENT:  Negative for rhinorrhea and sneezing.    Respiratory:  Negative for cough and shortness of breath.    Cardiovascular:  Negative for chest pain and leg swelling.        Chest wall pain secondary to fall.    Gastrointestinal:  Negative for abdominal pain and nausea.        Right sided abdominal wall pain.    Genitourinary:  Negative for dysuria and hematuria.   Musculoskeletal:  Positive for arthralgias and myalgias.   Neurological:  Positive for weakness and numbness. Negative for tremors and headaches.   Psychiatric/Behavioral:  Negative for agitation and confusion.      Physical Exam     Initial Vitals  [12/08/22 1505]   BP Pulse Resp Temp SpO2   (!) 138/91 85 18 98.8 °F (37.1 °C) 98 %      MAP       --         Physical Exam    Constitutional:   Patient is obese.    HENT:   Head: Normocephalic and atraumatic.   Eyes: Pupils are equal, round, and reactive to light.   Neck: No JVD present.   No cervical, thoracic, or lumbar tenderness appreciated.    Normal range of motion.  Cardiovascular:  Normal rate and regular rhythm.           No murmur heard.  Pulmonary/Chest: Breath sounds normal. No respiratory distress. He has no wheezes.   No crepitus or ecchymosis of the chest wall.    Abdominal: Abdomen is soft. Bowel sounds are normal. He exhibits no distension. There is no abdominal tenderness.   No visible ecchymosis of the abdominal wall.  There is no rebound and no guarding.   Musculoskeletal:         General: No tenderness or edema.      Cervical back: Normal range of motion.      Comments: Impaired ROM in the right shoulder, elbow, and hand. Decreased  strength in the right hand. No visible ecchymosis or erythema of both the shoulder joint and elbow. Right arm in sling. 2+ radial pulse bilaterally.      Neurological: He is alert.   Skin: Skin is warm.       ED Course   Procedures  Labs Reviewed - No data to display       Imaging Results    None          Medications   acetaminophen tablet 1,000 mg (1,000 mg Oral Given 12/8/22 1618)     Medical Decision Making:   History:   Old Medical Records: I decided to obtain old medical records.  Initial Assessment:   Patient presents with acute fall on his right side. He hurt his shoulder and elbow. He has great difficulty moving his right shoulder. The patient could have fractures in multiple locations vs. sprain. No cervical or spine fractures suspected.   Differential Diagnosis:   Shoulder and elbow fracture  Shoulder and elbow sprain   Clinical Tests:   Radiological Study: Ordered  ED Management:  We will order complete xray of the right shoulder, chest, humerus,  elbow, forearm, wrist, and hand. We will give 1g tylenol for pain.   Other:   I have discussed this case with another health care provider.       <> Summary of the Discussion: In addition to the imaging studies ordered a TDAP is also indicated given he scraped his forearm on the [place] floor           Attending Attestation:   Physician Attestation Statement for Resident:  As the supervising MD   Physician Attestation Statement: I have personally seen and examined this patient.   I agree with the above history.  -:   As the supervising MD I agree with the above PE.     As the supervising MD I agree with the above treatment, course, plan, and disposition.   -: 46 yo M presents to the emergency department status post mechanical fall: he tripped and fell landed on his right side no loss of consciousness no headache, reports pain in the right shoulder and right upper extremity and right side of the chest.  Not on blood thinners    Patient alert awake and oriented x3 in the emergency department, normocephalic atraumatic, and no midline CTL tenderness palpation, full range of motion of the neck, no chest back or abdomen ecchymosis or tenderness palpation, clear to auscultation bilaterally, regular rate and rhythm,   Lower extremities and left upper extremity no ecchymosis, no deformity, no tenderness palpation, full range of motion of all joints, pelvis stable   Ruq upper extremity in a sling, right posterior surface forearm with abrasions, +2 radial pulse, sensation intact, good , full range of motion of the right wrist, tenderness to palpation over the right shoulder right humerus and right elbow, also reports burning pain over the right forearm abrasions  Will obtain x-rays of the right upper extremity, tetanus shot, Tylenol for pain control    XR neg for fx/disloc, sling removed, he can fully range right elbow and right shoulder. Tetanus cancelled as he had the vaccine in 2019. Discussed pain control and wound care  at home, advised to f/u with his PCP in approx 1 week for reassessment   I have reviewed and agree with the residents interpretation of the following: x-rays.                            Clinical Impression:   Final diagnoses:  [W19.XXXA] Fall (Primary)  [M25.529] Elbow pain, unspecified laterality  [M25.511] Acute pain of right shoulder             Doni Van MD  Resident  12/08/22 1636       Sandy Wu MD  12/08/22 1652       Doni Van MD  Resident  12/08/22 1702       Sandy Wu MD  12/09/22 0902

## 2022-12-08 NOTE — PROVIDER PROGRESS NOTES - EMERGENCY DEPT.
"Encounter Date: 12/8/2022    ED Physician Progress Notes        Physician Note:   Received hand off from Dr. Van due to shift change. Pending imaging to assess for dislocations/fractures from fall. Pain well controlled with tylenol. He continues to complain of "soreness" but states that pain has improved. Images showed no signs of acute abnormalities. Patient has no other complaints. He received his last Tdap in 2019. He is comfortable with being discharge home with PCP follow up.        As the supervising MD I agree with the above  course, plan and disposition. Patient was interviewed and examined by me.   Sandy Wu    "

## 2022-12-09 ENCOUNTER — TELEPHONE (OUTPATIENT)
Dept: INTERNAL MEDICINE | Facility: CLINIC | Age: 47
End: 2022-12-09
Payer: MEDICARE

## 2022-12-09 NOTE — TELEPHONE ENCOUNTER
----- Message from Kendra Figueroa sent at 12/9/2022 10:01 AM CST -----  Contact: [phone]  Patient called, want to inform pcp that he was seen by emergency yesterday 12/08/22, patient stated that he felt and hurt his right side. Please call and advise. Thank you

## 2023-01-11 ENCOUNTER — TELEPHONE (OUTPATIENT)
Dept: INTERNAL MEDICINE | Facility: CLINIC | Age: 48
End: 2023-01-11
Payer: MEDICARE

## 2023-01-11 NOTE — TELEPHONE ENCOUNTER
----- Message from Lorena Hu sent at 1/11/2023  3:12 PM CST -----  Contact: [phone]  Pt wants a call back about having a sinus drip with sore throat for 3 days now and isn't getting any better.

## 2023-01-11 NOTE — TELEPHONE ENCOUNTER
----- Message from Kay Bonilla sent at 1/11/2023  4:18 PM CST -----  Contact: [phone]  Patient is returning a phone call.  Who left a message for the patient: Valeriy Teague MA  Does patient know what this is regarding:  missed call  Would you like a call back, or a response through your [facility] portal?:   no  Comments:

## 2023-01-12 ENCOUNTER — TELEPHONE (OUTPATIENT)
Dept: INTERNAL MEDICINE | Facility: CLINIC | Age: 48
End: 2023-01-12
Payer: MEDICARE

## 2023-01-12 DIAGNOSIS — J06.9 URTI (ACUTE UPPER RESPIRATORY INFECTION): Primary | ICD-10-CM

## 2023-01-12 RX ORDER — METHYLPREDNISOLONE 4 MG/1
TABLET ORAL
Qty: 21 EACH | Refills: 0 | Status: SHIPPED | OUTPATIENT
Start: 2023-01-12 | End: 2023-02-02

## 2023-01-12 NOTE — TELEPHONE ENCOUNTER
----- Message from Karen Elizondo sent at 1/12/2023  8:31 AM CST -----  Contact: geri Moreland   [email or web address]     Patient is calling for Medical Advice regarding: Sinus dip & throat pain     How long has patient had these symptoms: 4 days     Pharmacy name and phone#: [facility] [address]     Would like response via [facility]:  Please call geri Moreland @ [phone]    Comments:

## 2023-01-13 ENCOUNTER — TELEPHONE (OUTPATIENT)
Dept: INTERNAL MEDICINE | Facility: CLINIC | Age: 48
End: 2023-01-13
Payer: MEDICARE

## 2023-01-13 NOTE — TELEPHONE ENCOUNTER
----- Message from Fidelina Novak sent at 1/13/2023  7:37 AM CST -----  Contact: mother/lizzie/[phone]  Pt mother called in regards to checking the call back due to the pt having a sinus infection.     Call back    Please advise

## 2023-02-07 DIAGNOSIS — Z00.00 ENCOUNTER FOR MEDICARE ANNUAL WELLNESS EXAM: ICD-10-CM

## 2023-02-09 DIAGNOSIS — Z00.00 ENCOUNTER FOR MEDICARE ANNUAL WELLNESS EXAM: ICD-10-CM

## 2023-03-02 ENCOUNTER — PES CALL (OUTPATIENT)
Dept: ADMINISTRATIVE | Facility: OTHER | Age: 48
End: 2023-03-02
Payer: MEDICARE

## 2023-03-08 ENCOUNTER — OFFICE VISIT (OUTPATIENT)
Dept: PULMONOLOGY | Facility: CLINIC | Age: 48
End: 2023-03-08
Payer: MEDICARE

## 2023-03-08 ENCOUNTER — TELEPHONE (OUTPATIENT)
Dept: PRIMARY CARE CLINIC | Facility: CLINIC | Age: 48
End: 2023-03-08
Payer: MEDICARE

## 2023-03-08 VITALS
BODY MASS INDEX: 39.92 KG/M2 | DIASTOLIC BLOOD PRESSURE: 76 MMHG | WEIGHT: 311.06 LBS | HEART RATE: 94 BPM | OXYGEN SATURATION: 97 % | HEIGHT: 74 IN | SYSTOLIC BLOOD PRESSURE: 130 MMHG

## 2023-03-08 DIAGNOSIS — J45.20 MILD INTERMITTENT ASTHMA WITHOUT COMPLICATION: ICD-10-CM

## 2023-03-08 DIAGNOSIS — J45.909 ASTHMA, UNSPECIFIED ASTHMA SEVERITY, UNSPECIFIED WHETHER COMPLICATED, UNSPECIFIED WHETHER PERSISTENT: Primary | ICD-10-CM

## 2023-03-08 DIAGNOSIS — E66.01 CLASS 2 SEVERE OBESITY DUE TO EXCESS CALORIES WITH SERIOUS COMORBIDITY AND BODY MASS INDEX (BMI) OF 39.0 TO 39.9 IN ADULT: Primary | ICD-10-CM

## 2023-03-08 DIAGNOSIS — R06.83 SNORING: ICD-10-CM

## 2023-03-08 DIAGNOSIS — J45.909 ASTHMA, UNSPECIFIED ASTHMA SEVERITY, UNSPECIFIED WHETHER COMPLICATED, UNSPECIFIED WHETHER PERSISTENT: ICD-10-CM

## 2023-03-08 PROCEDURE — 3078F DIAST BP <80 MM HG: CPT | Mod: HCNC,CPTII,S$GLB, | Performed by: INTERNAL MEDICINE

## 2023-03-08 PROCEDURE — 1159F PR MEDICATION LIST DOCUMENTED IN MEDICAL RECORD: ICD-10-PCS | Mod: HCNC,CPTII,S$GLB, | Performed by: INTERNAL MEDICINE

## 2023-03-08 PROCEDURE — 99999 PR PBB SHADOW E&M-EST. PATIENT-LVL IV: ICD-10-PCS | Mod: PBBFAC,HCNC,, | Performed by: INTERNAL MEDICINE

## 2023-03-08 PROCEDURE — 3008F BODY MASS INDEX DOCD: CPT | Mod: HCNC,CPTII,S$GLB, | Performed by: INTERNAL MEDICINE

## 2023-03-08 PROCEDURE — 99213 OFFICE O/P EST LOW 20 MIN: CPT | Mod: HCNC,S$GLB,, | Performed by: INTERNAL MEDICINE

## 2023-03-08 PROCEDURE — 3075F SYST BP GE 130 - 139MM HG: CPT | Mod: HCNC,CPTII,S$GLB, | Performed by: INTERNAL MEDICINE

## 2023-03-08 PROCEDURE — 1160F PR REVIEW ALL MEDS BY PRESCRIBER/CLIN PHARMACIST DOCUMENTED: ICD-10-PCS | Mod: HCNC,CPTII,S$GLB, | Performed by: INTERNAL MEDICINE

## 2023-03-08 PROCEDURE — 99999 PR PBB SHADOW E&M-EST. PATIENT-LVL IV: CPT | Mod: PBBFAC,HCNC,, | Performed by: INTERNAL MEDICINE

## 2023-03-08 PROCEDURE — 99213 PR OFFICE/OUTPT VISIT, EST, LEVL III, 20-29 MIN: ICD-10-PCS | Mod: HCNC,S$GLB,, | Performed by: INTERNAL MEDICINE

## 2023-03-08 PROCEDURE — 1159F MED LIST DOCD IN RCRD: CPT | Mod: HCNC,CPTII,S$GLB, | Performed by: INTERNAL MEDICINE

## 2023-03-08 PROCEDURE — 1160F RVW MEDS BY RX/DR IN RCRD: CPT | Mod: HCNC,CPTII,S$GLB, | Performed by: INTERNAL MEDICINE

## 2023-03-08 PROCEDURE — 3078F PR MOST RECENT DIASTOLIC BLOOD PRESSURE < 80 MM HG: ICD-10-PCS | Mod: HCNC,CPTII,S$GLB, | Performed by: INTERNAL MEDICINE

## 2023-03-08 PROCEDURE — 3075F PR MOST RECENT SYSTOLIC BLOOD PRESS GE 130-139MM HG: ICD-10-PCS | Mod: HCNC,CPTII,S$GLB, | Performed by: INTERNAL MEDICINE

## 2023-03-08 PROCEDURE — 3008F PR BODY MASS INDEX (BMI) DOCUMENTED: ICD-10-PCS | Mod: HCNC,CPTII,S$GLB, | Performed by: INTERNAL MEDICINE

## 2023-03-08 RX ORDER — METHYLPREDNISOLONE 4 MG/1
TABLET ORAL
Qty: 21 EACH | Refills: 0 | Status: SHIPPED | OUTPATIENT
Start: 2023-03-08 | End: 2023-03-29

## 2023-03-08 RX ORDER — AZITHROMYCIN 250 MG/1
TABLET, FILM COATED ORAL
Qty: 6 TABLET | Refills: 0 | Status: SHIPPED | OUTPATIENT
Start: 2023-03-08 | End: 2023-03-13

## 2023-03-08 NOTE — PROGRESS NOTES
Subjective:       Patient ID: Teddy Ennis is a 47 y.o. male.    Chief Complaint: Asthma    HPI  Teddy Ennis 47 y.o. male    has a past medical history of Allergy and Asthma.    has a past surgical history that includes Ankle surgery (Left, 1995); Adenoidectomy; and Sinus surgery.   reports that he has never smoked. He has never been exposed to tobacco smoke. He has never used smokeless tobacco. He reports that he does not currently use alcohol. He reports that he does not use drugs.  Referred by: Dr. Doni Wood  Who had concerns including Asthma.  The patient's last visit with me was on Visit date not found.  LAST VISIT    Interval History    Mr. Ennis is a 47-year-old man who presents for evaluation of asthma.  And cough.  Patient states that he has been having a cough for a week he fell recently hurt his right side but not sure if that is related.  States that he is had asthma since childhood he is here with his family member.  He is a lifetime nonsmoker.  Was hospitalized last year for asthma but has never been intubated.  Takes Flonase Breo nebulizers Astelin Singulair.  His last steroids for asthma was 6 months ago.  Recently COVID negative.  No treatment with antibiotics in the last 6 months.  Never had a diagnosis of sleep apnea.  Works in a [employer] shop and used to take allergy shots but currently no allergies per his blood work at his allergist office.  He is not using his nebulizer daily but notes increasing wheezing yellow phlegm with cough sore throat.  Denies any fevers chills night sweats nausea vomiting diarrhea constipation chest pain chest tightness or pressure.    Review of Systems    Objective:      Physical Exam   Pulmonary/Chest: He has wheezes. He has rhonchi.   Personal Diagnostic Review  none pertinent  No flowsheet data found.      Assessment:       1. Class 2 severe obesity due to excess calories with serious comorbidity and body mass index (BMI) of 39.0 to 39.9 in adult     2. Asthma, unspecified asthma severity, unspecified whether complicated, unspecified whether persistent    3. Snoring    4. Mild intermittent asthma without complication          Outpatient Encounter Medications as of 3/8/2023   Medication Sig Dispense Refill    AFLURIA QD -,3YR UP,,PF, 60 mcg (15 mcg x 4)/0.5 mL Syrg ADM 0.5ML IM UTD      albuterol (PROAIR HFA) 90 mcg/actuation inhaler Inhale 2 puffs into the lungs every 6 (six) hours as needed for Wheezing. Rescue 54 g 3    albuterol (PROVENTIL) 2.5 mg /3 mL (0.083 %) nebulizer solution Take 3 mLs (2.5 mg total) by nebulization every 4 to 6 hours as needed for Wheezing. 1080 mL 3    amLODIPine (NORVASC) 10 MG tablet Take 1 tablet (10 mg total) by mouth once daily. 90 tablet 3    BREO ELLIPTA 200-25 mcg/dose DsDv diskus inhaler Inhale 1 puff into the lungs once daily. 60 each 11    doxycycline (VIBRA-TABS) 100 MG tablet Take 1 tablet (100 mg total) by mouth 2 (two) times daily. 14 tablet 0    EScitalopram oxalate (LEXAPRO) 20 MG tablet Take 1 tablet (20 mg total) by mouth once daily. 90 tablet 3    fexofenadine (ALLEGRA) 180 MG tablet Take 180 mg by mouth once daily.      fluticasone propionate (FLONASE) 50 mcg/actuation nasal spray 2 spray each nostril daily 16 g 11    magnesium sulfate in water (MAGNESIUM SULFATE 2 GRAM/50 ML) 2 gram/50 mL PgBk       montelukast (SINGULAIR) 10 mg tablet TAKE 1 TABLET BY MOUTH EVERY DAY 90 tablet 3    pravastatin (PRAVACHOL) 20 MG tablet TK 1 T PO D 90 tablet 3    promethazine-dextromethorphan (PROMETHAZINE-DM) 6.25-15 mg/5 mL Syrp Take 5 mLs by mouth every 4 (four) hours as needed (cough). 118 mL 0    VENTOLIN HFA 90 mcg/actuation inhaler INL 2 PFS ITL TID PRF WHZ  2    azelastine (ASTELIN) 137 mcg (0.1 %) nasal spray 1 spray (137 mcg total) by Nasal route 2 (two) times daily. 30 mL 11    [] azithromycin (Z-RM) 250 MG tablet Take 2 tablets by mouth on day 1; Take 1 tablet by mouth on days 2-5 6 tablet 0     methylPREDNISolone (MEDROL DOSEPACK) 4 mg tablet use as directed 21 each 0     No facility-administered encounter medications on file as of 3/8/2023.     Orders Placed This Encounter   Procedures    Ambulatory referral/consult to Sleep Disorders     Standing Status:   Future     Standing Expiration Date:   4/8/2024     Referral Priority:   Routine     Referral Type:   Consultation     Referred to Provider:   Xavier Weaver MD     Requested Specialty:   Sleep Medicine     Number of Visits Requested:   1       Plan:              Assessment:  Teddy was seen today for asthma.    Diagnoses and all orders for this visit:    Class 2 severe obesity due to excess calories with serious comorbidity and body mass index (BMI) of 39.0 to 39.9 in adult  -     Ambulatory referral/consult to Sleep Disorders; Future    Asthma, unspecified asthma severity, unspecified whether complicated, unspecified whether persistent  -     Ambulatory referral/consult to Pulmonology    Snoring  -     Ambulatory referral/consult to Sleep Disorders; Future    Mild intermittent asthma without complication    Other orders  -     methylPREDNISolone (MEDROL DOSEPACK) 4 mg tablet; use as directed  -     azithromycin (Z-RM) 250 MG tablet; Take 2 tablets by mouth on day 1; Take 1 tablet by mouth on days 2-5        Plan:  Problem List Items Addressed This Visit       Asthma    Class 2 severe obesity due to excess calories with serious comorbidity and body mass index (BMI) of 39.0 to 39.9 in adult - Primary    Overview     Obesity places patient at risk for exacerbation of chronic illnesses         Relevant Orders    Ambulatory referral/consult to Sleep Disorders    Mild intermittent asthma without complication    Overview     Asthma since childhood.  Typical seasonal worsening.  Strong allergic component.  Accompanied by chronic allergic rhinitis.  Currently on Breo daily with albuterol PRN and montelukast.  Minimal rescue inhaler use and no nighttime symptoms  at this time, but reports significant worsening during spring and winter changes.    Patient with mild intermittent asthma but worsening likely secondary to allergic component seasonal allergies.  Patient is wheezing today an increasing cough with yellow sputum.  Will start Medrol Dosepak and azithromycin Z-Trev.  Patient will let us know how he is doing next week.         Snoring    Overview     Patient with significant snoring and body habitus consistent with obstructive sleep apnea.  Will refer to sleep Clinic Dr. Weaver to further evaluate for CPAP/sleep study         Relevant Orders    Ambulatory referral/consult to Sleep Disorders           No follow-ups on file.    There are no Patient Instructions on file for this visit.    Immunization History   Administered Date(s) Administered    COVID-19, MRNA, LN-S, PF (Pfizer) (Purple Cap) 07/25/2021, 08/15/2021    Influenza - Quadrivalent - PF *Preferred* (6 months and older) 09/30/2013, 09/17/2014, 09/03/2016, 09/29/2018, 08/26/2019, 09/12/2020, 10/02/2021    Influenza Split 09/30/2009, 09/30/2010, 09/27/2011, 09/30/2013, 09/17/2014    Tdap 09/29/2018, 11/29/2019

## 2023-03-08 NOTE — TELEPHONE ENCOUNTER
----- Message from Naina Romero sent at 3/8/2023  9:59 AM CST -----  Contact: [phone]  Patient would like to get a referral.  Referral to what specialty:  Pulmonary  Does the patient want the referral with a specific physician:   Dr. Gatica  [facility] the [facility]  Reason:  asthma  Does the patient already have the specialty clinic appointment scheduled:  no  If yes, what date is the appointment scheduled:     Is the insurance listed in Epic correct? (this is important for a referral):  yes  Advised patient that once provider approves this either a nurse or  will return their call?:   Would the patient like a call back, or a response through their [facility] portal?:   call back  Comments:

## 2023-03-17 PROBLEM — R06.83 SNORING: Status: ACTIVE | Noted: 2023-03-17

## 2023-03-30 ENCOUNTER — TELEPHONE (OUTPATIENT)
Dept: PULMONOLOGY | Facility: CLINIC | Age: 48
End: 2023-03-30
Payer: MEDICARE

## 2023-03-30 ENCOUNTER — OFFICE VISIT (OUTPATIENT)
Dept: URGENT CARE | Facility: CLINIC | Age: 48
End: 2023-03-30
Payer: MEDICARE

## 2023-03-30 ENCOUNTER — TELEPHONE (OUTPATIENT)
Dept: PRIMARY CARE CLINIC | Facility: CLINIC | Age: 48
End: 2023-03-30
Payer: MEDICARE

## 2023-03-30 VITALS
DIASTOLIC BLOOD PRESSURE: 76 MMHG | HEART RATE: 87 BPM | WEIGHT: 311 LBS | BODY MASS INDEX: 39.91 KG/M2 | RESPIRATION RATE: 16 BRPM | HEIGHT: 74 IN | OXYGEN SATURATION: 98 % | SYSTOLIC BLOOD PRESSURE: 122 MMHG

## 2023-03-30 DIAGNOSIS — J45.901 EXACERBATION OF ASTHMA, UNSPECIFIED ASTHMA SEVERITY, UNSPECIFIED WHETHER PERSISTENT: Primary | ICD-10-CM

## 2023-03-30 PROCEDURE — 99214 OFFICE O/P EST MOD 30 MIN: CPT | Mod: 25,S$GLB,, | Performed by: NURSE PRACTITIONER

## 2023-03-30 PROCEDURE — 99214 PR OFFICE/OUTPT VISIT, EST, LEVL IV, 30-39 MIN: ICD-10-PCS | Mod: 25,S$GLB,, | Performed by: NURSE PRACTITIONER

## 2023-03-30 PROCEDURE — 94640 PR INHAL RX, AIRWAY OBST/DX SPUTUM INDUCT: ICD-10-PCS | Mod: 59,S$GLB,, | Performed by: NURSE PRACTITIONER

## 2023-03-30 PROCEDURE — 96372 THER/PROPH/DIAG INJ SC/IM: CPT | Mod: 59,S$GLB,, | Performed by: NURSE PRACTITIONER

## 2023-03-30 PROCEDURE — 96372 PR INJECTION,THERAP/PROPH/DIAG2ST, IM OR SUBCUT: ICD-10-PCS | Mod: 59,S$GLB,, | Performed by: NURSE PRACTITIONER

## 2023-03-30 PROCEDURE — 94640 AIRWAY INHALATION TREATMENT: CPT | Mod: 59,S$GLB,, | Performed by: NURSE PRACTITIONER

## 2023-03-30 RX ORDER — LEVALBUTEROL INHALATION SOLUTION 1.25 MG/3ML
1.25 SOLUTION RESPIRATORY (INHALATION)
Status: COMPLETED | OUTPATIENT
Start: 2023-03-30 | End: 2023-03-30

## 2023-03-30 RX ORDER — DEXAMETHASONE SODIUM PHOSPHATE 100 MG/10ML
16 INJECTION INTRAMUSCULAR; INTRAVENOUS
Status: COMPLETED | OUTPATIENT
Start: 2023-03-30 | End: 2023-03-30

## 2023-03-30 RX ADMIN — LEVALBUTEROL INHALATION SOLUTION 1.25 MG: 1.25 SOLUTION RESPIRATORY (INHALATION) at 12:03

## 2023-03-30 RX ADMIN — DEXAMETHASONE SODIUM PHOSPHATE 16 MG: 100 INJECTION INTRAMUSCULAR; INTRAVENOUS at 12:03

## 2023-03-30 NOTE — TELEPHONE ENCOUNTER
----- Message from Malini Acosta sent at 3/30/2023 11:27 AM CDT -----  Patient is established with Dr. Howard and needs an appointment scheduled because his asthma is acting up. There are no available appointments showing until July. Patient would like to be contacted at [phone] for sooner appointment.    TY

## 2023-03-30 NOTE — PROGRESS NOTES
"Subjective:      Patient ID: Teddy Ennis is a 47 y.o. male.    Vitals:  height is 6' 2" (1.88 m) and weight is 141.1 kg (311 lb) (abnormal). His blood pressure is 122/76 and his pulse is 87. His respiration is 16 and oxygen saturation is 98%.     Chief Complaint: Asthma    This is a 47 y.o. male who presents to  today with a chief complaint of asthma attack. He reports trouble breathing, persistent coughing, and SOB. Pt has had symptoms for three weeks, which seem to be getting worse. Pt saw his pulmonology 3 weeks ago for the same symptoms. He was placed on a z-pack and a medrol dose pack at that time, which did help improve symptoms, but temporarily. He tried to get in with pulmonology again but no appt was available. He reports that when this has occurred in the past, only a steroid shot helps improve the symptoms. He denies fever and illness. Symptoms seem more asthma-related. Throat feels scratchy.     Asthma  He complains of chest tightness, cough, shortness of breath, sputum production and wheezing. There is no difficulty breathing. This is a chronic problem. The current episode started in the past 7 days. Asthma causes daytime symptoms monthly. Asthma causes nighttime symptoms monthly. The problem has been gradually worsening. The cough is productive of sputum and hacking. Associated symptoms include postnasal drip. Associated symptoms comments: Back pain. His symptoms are aggravated by minimal activity, pollen, change in weather, climbing stairs and lying down. His symptoms are alleviated by steroid inhaler. He reports minimal improvement on treatment. His past medical history is significant for asthma and pneumonia.     HENT:  Positive for postnasal drip.    Respiratory:  Positive for chest tightness, cough, sputum production, shortness of breath, wheezing and asthma.    Allergic/Immunologic: Positive for asthma.    Objective:     Physical Exam   Constitutional: He is oriented to person, place, and " time. He appears well-developed. He is cooperative.  Non-toxic appearance. He does not appear ill. No distress.   HENT:   Head: Normocephalic.   Ears:   Right Ear: Hearing normal.   Left Ear: Hearing normal.   Nose: Nose normal. No mucosal edema, rhinorrhea or nasal deformity. No epistaxis. Right sinus exhibits no maxillary sinus tenderness and no frontal sinus tenderness. Left sinus exhibits no maxillary sinus tenderness and no frontal sinus tenderness.   Mouth/Throat: Uvula is midline, oropharynx is clear and moist and mucous membranes are normal. Mucous membranes are moist. No trismus in the jaw. Normal dentition. No uvula swelling. No oropharyngeal exudate, posterior oropharyngeal edema or posterior oropharyngeal erythema. Oropharynx is clear.   Eyes: Lids are normal. No scleral icterus.   Neck: Trachea normal and phonation normal. Neck supple. No edema present. No erythema present. No neck rigidity present.   Cardiovascular: Normal rate, regular rhythm and normal heart sounds.   Pulmonary/Chest: No accessory muscle usage. No tachypnea and no bradypnea. No respiratory distress. He has decreased breath sounds. He has wheezes (mild, scattered, intermittent, and on expiration). He has no rhonchi.         Comments: Persistent cough noted    Abdominal: Normal appearance.   Musculoskeletal: Normal range of motion.         General: No deformity. Normal range of motion.   Neurological: He is alert and oriented to person, place, and time. He exhibits normal muscle tone. Coordination normal.   Skin: Skin is warm, dry, intact, not diaphoretic and not pale.   Psychiatric: His speech is normal and behavior is normal. Judgment and thought content normal.   Nursing note and vitals reviewed.    Assessment:     1. Exacerbation of asthma, unspecified asthma severity, unspecified whether persistent        Plan:       Exacerbation of asthma, unspecified asthma severity, unspecified whether persistent  -     levalbuterol nebulizer  solution 1.25 mg  -     dexAMETHasone injection 16 mg  -     levalbuterol nebulizer solution 1.25 mg    After 1st treatment, pt moving air better throughout lungs and there is no further wheezing; but pt still has persistent cough.     After 2nd treatment, there is less coughing from pt in the room. Lung sounds clear.     Patient Instructions   Stay well hydrated  Oral fluids--drink room temp or hot drinks, like hot tea with honey   Rest  Blow nose often  Avoid circulating air (such as ceiling fans) dries your airway  Avoid drinking cold drinks (worsens cough)  Therapeutic coughing to expel mucous  Sit in upright position often

## 2023-03-30 NOTE — PATIENT INSTRUCTIONS
Stay well hydrated  Oral fluids--drink room temp or hot drinks, like hot tea with honey   Rest  Blow nose often  Avoid circulating air (such as ceiling fans) dries your airway  Avoid drinking cold drinks (worsens cough)  Therapeutic coughing to expel mucous  Sit in upright position often

## 2023-03-30 NOTE — TELEPHONE ENCOUNTER
----- Message from Lorena Hu sent at 3/30/2023  1:03 PM CDT -----  Contact: [phone]  Pt said he needs a call back he is at urgent care. Please call pt back.

## 2023-03-30 NOTE — TELEPHONE ENCOUNTER
Spoke with patient  in regards to  follow up appointment with .  Patient following up due to Urgent Care visit on today [facility].  I advised patient  he's scheduled with Dr. Randhawa on 4/21/2023 4:00 pm.  Patient verbalized he understands.  Patient has also been placed on the waiting list for a sooner appointment.

## 2023-04-10 DIAGNOSIS — J45.901 EXACERBATION OF ASTHMA, UNSPECIFIED ASTHMA SEVERITY, UNSPECIFIED WHETHER PERSISTENT: ICD-10-CM

## 2023-04-10 DIAGNOSIS — J45.20 MILD INTERMITTENT ASTHMA WITHOUT COMPLICATION: ICD-10-CM

## 2023-04-10 RX ORDER — FLUTICASONE FUROATE AND VILANTEROL TRIFENATATE 200; 25 UG/1; UG/1
1 POWDER RESPIRATORY (INHALATION) DAILY
Qty: 60 EACH | Refills: 11 | Status: SHIPPED | OUTPATIENT
Start: 2023-04-10

## 2023-04-10 RX ORDER — ALBUTEROL SULFATE 90 UG/1
2 AEROSOL, METERED RESPIRATORY (INHALATION) EVERY 6 HOURS PRN
Qty: 54 G | Refills: 3 | Status: SHIPPED | OUTPATIENT
Start: 2023-04-10 | End: 2023-05-25

## 2023-04-10 NOTE — TELEPHONE ENCOUNTER
----- Message from Krystle Malave sent at 4/10/2023  4:15 PM CDT -----  Contact: [phone] patient  Requesting an RX refill or new RX.  Is this a refill or new RX:   RX name and strength BREO ELLIPTA 200-25 mcg/dose DsDv diskus inhaler  Is this a 30 day or 90 day RX:   Pharmacy name and phone #[id number]  [place], [place] - [address] AT [facility] [place] [address]  The doctors have asked that we provide their patients with the following 2 reminders -- prescription refills can take up to 72 hours, and a friendly reminder that in the future you can use your [facility] account to request refills: yes

## 2023-04-10 NOTE — TELEPHONE ENCOUNTER
----- Message from Lorena Jeffery sent at 4/10/2023  4:02 PM CDT -----  Contact: Pt @[phone]  Requesting an RX refill or new RX.  Is this a refill or new RX: refill     RX name and strength (copy/paste from chart):    albuterol (PROAIR HFA) 90 mcg/actuation inhaler      Is this a 30 day or 90 day RX:   Pharmacy name and phone # (copy/paste from chart):    [id number] - [place], [place] - [facility] [address] AT [facility] [address]   Phone:  [phone]  Fax:  [phone]        The doctors have asked that we provide their patients with the following 2 reminders -- prescription refills can take up to 72 hours, and a friendly reminder that in the future you can use your [facility] account to request refills: Yes

## 2023-04-12 ENCOUNTER — TELEPHONE (OUTPATIENT)
Dept: PRIMARY CARE CLINIC | Facility: CLINIC | Age: 48
End: 2023-04-12
Payer: MEDICARE

## 2023-04-12 NOTE — TELEPHONE ENCOUNTER
----- Message from Sam Thakkar sent at 4/12/2023  1:15 PM CDT -----  Type:  Patient Returning Call    Who Called:pt  Who Left Message for Patient:  Does the patient know what this is regarding?:pt advice   Would the patient rather a call back or a response via [facility]? Call  Best Call Back Number:[phone]  Additional Information: pt sinus drip is back and his throat feels scratchy, symptoms have been emile on for an week, pt would like medication sent to his pharmacy [id number]  [place], [place] - [address] AT [facility] [address]   Phone:  [phone]

## 2023-04-17 ENCOUNTER — TELEPHONE (OUTPATIENT)
Dept: PULMONOLOGY | Facility: CLINIC | Age: 48
End: 2023-04-17
Payer: MEDICARE

## 2023-04-17 ENCOUNTER — OFFICE VISIT (OUTPATIENT)
Dept: URGENT CARE | Facility: CLINIC | Age: 48
End: 2023-04-17
Payer: MEDICARE

## 2023-04-17 VITALS
TEMPERATURE: 97 F | OXYGEN SATURATION: 99 % | HEART RATE: 104 BPM | DIASTOLIC BLOOD PRESSURE: 79 MMHG | SYSTOLIC BLOOD PRESSURE: 143 MMHG | HEIGHT: 74 IN | WEIGHT: 311 LBS | BODY MASS INDEX: 39.91 KG/M2 | RESPIRATION RATE: 22 BRPM

## 2023-04-17 DIAGNOSIS — J45.21 EXACERBATION OF INTERMITTENT ASTHMA, UNSPECIFIED ASTHMA SEVERITY: Primary | ICD-10-CM

## 2023-04-17 DIAGNOSIS — J45.909 ASTHMA, UNSPECIFIED ASTHMA SEVERITY, UNSPECIFIED WHETHER COMPLICATED, UNSPECIFIED WHETHER PERSISTENT: Primary | ICD-10-CM

## 2023-04-17 PROCEDURE — 94640 PR INHAL RX, AIRWAY OBST/DX SPUTUM INDUCT: ICD-10-PCS | Mod: 59,S$GLB,, | Performed by: NURSE PRACTITIONER

## 2023-04-17 PROCEDURE — 94640 AIRWAY INHALATION TREATMENT: CPT | Mod: 59,S$GLB,, | Performed by: NURSE PRACTITIONER

## 2023-04-17 PROCEDURE — 99214 PR OFFICE/OUTPT VISIT, EST, LEVL IV, 30-39 MIN: ICD-10-PCS | Mod: 25,S$GLB,, | Performed by: NURSE PRACTITIONER

## 2023-04-17 PROCEDURE — 99214 OFFICE O/P EST MOD 30 MIN: CPT | Mod: 25,S$GLB,, | Performed by: NURSE PRACTITIONER

## 2023-04-17 RX ORDER — LEVALBUTEROL INHALATION SOLUTION 1.25 MG/3ML
1.25 SOLUTION RESPIRATORY (INHALATION)
Status: COMPLETED | OUTPATIENT
Start: 2023-04-17 | End: 2023-04-17

## 2023-04-17 RX ORDER — PREDNISONE 50 MG/1
50 TABLET ORAL DAILY
Qty: 5 TABLET | Refills: 0 | Status: SHIPPED | OUTPATIENT
Start: 2023-04-17 | End: 2023-04-22

## 2023-04-17 RX ORDER — BENZONATATE 200 MG/1
200 CAPSULE ORAL EVERY 8 HOURS PRN
Qty: 30 CAPSULE | Refills: 0 | Status: SHIPPED | OUTPATIENT
Start: 2023-04-17 | End: 2023-04-27

## 2023-04-17 RX ORDER — AZITHROMYCIN 250 MG/1
TABLET, FILM COATED ORAL
Qty: 6 TABLET | Refills: 0 | Status: SHIPPED | OUTPATIENT
Start: 2023-04-17 | End: 2023-04-22

## 2023-04-17 RX ADMIN — LEVALBUTEROL INHALATION SOLUTION 1.25 MG: 1.25 SOLUTION RESPIRATORY (INHALATION) at 10:04

## 2023-04-17 NOTE — PROGRESS NOTES
"Subjective:      Patient ID: Teddy Ennis is a 47 y.o. male.    Vitals:  height is 6' 2" (1.88 m) and weight is 141.1 kg (311 lb) (abnormal). His temperature is 97.2 °F (36.2 °C). His blood pressure is 143/79 (abnormal) and his pulse is 104. His respiration is 22 (abnormal) and oxygen saturation is 99%.     Chief Complaint: Asthma    This is a 47 y.o. male who presents today with a chief complaint of and asthma attack with cough and wheezing x2-3 days. Has been needing his rescue inhaler more often, lately. He was also seen around 3/30 for the same symptoms. He has upcoming appt with the pulmonologist on 4/24. Denies known fever. Pt takes a daily ICS, and albuterol as needed. He is also on allergy medications and singulair.     Asthma  He complains of cough, difficulty breathing, shortness of breath and wheezing. This is a chronic problem. The current episode started in the past 7 days. Asthma causes daytime symptoms frequently. Asthma causes nighttime symptoms frequently. The problem has been gradually worsening. Pertinent negatives include no fever. His symptoms are aggravated by any activity, pollen and minimal activity. He reports minimal improvement on treatment. Ineffective treatments: albuterol. His past medical history is significant for asthma.     Constitution: Positive for fatigue. Negative for fever.   HENT:  Positive for congestion.    Respiratory:  Positive for chest tightness, cough, shortness of breath, wheezing and asthma.    Allergic/Immunologic: Positive for asthma.    Objective:     Physical Exam   Constitutional: He is oriented to person, place, and time. He appears well-developed. He is cooperative.  Non-toxic appearance. He does not appear ill. No distress.   HENT:   Head: Normocephalic.   Ears:   Right Ear: Hearing, tympanic membrane, external ear and ear canal normal.   Left Ear: Hearing, tympanic membrane, external ear and ear canal normal.   Nose: Nose normal. No mucosal edema, " rhinorrhea or nasal deformity. No epistaxis. Right sinus exhibits no maxillary sinus tenderness and no frontal sinus tenderness. Left sinus exhibits no maxillary sinus tenderness and no frontal sinus tenderness.   Mouth/Throat: Uvula is midline, oropharynx is clear and moist and mucous membranes are normal. Mucous membranes are moist. No trismus in the jaw. Normal dentition. No uvula swelling. No oropharyngeal exudate, posterior oropharyngeal edema or posterior oropharyngeal erythema. Oropharynx is clear.   Eyes: Lids are normal. No scleral icterus.   Neck: Trachea normal and phonation normal. Neck supple. No edema present. No erythema present. No neck rigidity present.   Cardiovascular: Regular rhythm. Tachycardia present.   Pulmonary/Chest: Effort normal. No accessory muscle usage. No respiratory distress. He has decreased breath sounds. He has no rhonchi.         Comments: Persistent dry, wheezy cough noted     Abdominal: Normal appearance.   Musculoskeletal: Normal range of motion.         General: No deformity. Normal range of motion.   Neurological: He is alert and oriented to person, place, and time. He exhibits normal muscle tone. Coordination normal.   Skin: Skin is warm, intact and not pale.   Psychiatric: His speech is normal and behavior is normal. Judgment and thought content normal.   Nursing note and vitals reviewed.    Assessment:     1. Exacerbation of intermittent asthma, unspecified asthma severity        Plan:   Pt with improved cough and breath sounds after three breathing treatments.     Exacerbation of intermittent asthma, unspecified asthma severity  -     levalbuterol nebulizer solution 1.25 mg  -     levalbuterol nebulizer solution 1.25 mg  -     predniSONE (DELTASONE) 50 MG Tab; Take 1 tablet (50 mg total) by mouth once daily. for 5 days  Dispense: 5 tablet; Refill: 0  -     azithromycin (Z-RM) 250 MG tablet; Take 2 tablets by mouth on day 1; Take 1 tablet by mouth on days 2-5  Dispense:  6 tablet; Refill: 0  -     benzonatate (TESSALON) 200 MG capsule; Take 1 capsule (200 mg total) by mouth every 8 (eight) hours as needed for Cough.  Dispense: 30 capsule; Refill: 0  -     levalbuterol nebulizer solution 1.25 mg      Patient Instructions   Oral fluids  Rest  Honey is a natural cough suppressant   Blow nose often  Avoid circulating air (such as ceiling fans) dries your airway  Avoid drinking cold drinks (worsens cough)  Therapeutic coughing to expel mucous  Sit in upright position often   Keep your pulmonary doctor appointment

## 2023-04-17 NOTE — TELEPHONE ENCOUNTER
I spoke with patient in regards to going to urgent care for an asthma attack, I advised patient that I would message Dr. Randhawa and let him know. Patient verbalized understanding.

## 2023-04-17 NOTE — PATIENT INSTRUCTIONS
Oral fluids  Rest  Honey is a natural cough suppressant   Blow nose often  Avoid circulating air (such as ceiling fans) dries your airway  Avoid drinking cold drinks (worsens cough)  Therapeutic coughing to expel mucous  Sit in upright position often   Keep your pulmonary doctor appointment

## 2023-04-19 ENCOUNTER — PATIENT MESSAGE (OUTPATIENT)
Dept: ADMINISTRATIVE | Facility: HOSPITAL | Age: 48
End: 2023-04-19
Payer: MEDICARE

## 2023-04-20 NOTE — PROGRESS NOTES
Subjective:     Reason for visit:  Establishment of care for asthma    Patient ID:  Teddy Ennis is a 47 y.o. obese male with a chronic allergic rhinitis and historical diagnosis of mild intermittent asthma. Mother on the phone to provide additional history.     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 He has increased his albuterol/nebulizer use to 1-2 times weekly since then.     He otherwise is adherent to his antihistamine and flonase. He has stopped using his astelin nasal spray.     Asthma History:  Typical by annual worsening during weather changes in the spring and fall, in particular cold weather.  Going from air-conditioned outside in back again is also a trigger.  Other triggers are cigarette/cigar smoke.  Previously followed by Allergy and was on immune shots for 2 years couple years ago but these were stopped due to insurance reasons.  Reports feeling well while on immune shots, but symptoms have worsened back to pre shot levels in the interim.  Was sick back in January around new yea, sputum production. During seasonal changes and fall and spring, frequent use of rescue inhaler and nebulizer treatments.  No fevers, chills, cough      Additional Pulmonary History:  Childhood Illnesses:  Asthma since childhood, then resolved.  Returned in his 20s.  Occupational Exposures:  None  Environmental Exposures:  None  Tobacco/Smoking History:  Never smoker  Travel History:  No recent travel    Review of Systems   Constitutional:  Negative for chills, fever, malaise/fatigue and weight loss.   HENT:  Negative for congestion and sinus pain.    Respiratory:  Positive for shortness of breath ( during asthma flares). Negative for cough, sputum production and wheezing.    Cardiovascular:  Positive for chest pain (Chest tightness during asthma flares). Negative for palpitations,  "orthopnea and leg swelling.   Gastrointestinal:  Negative for abdominal pain, heartburn, nausea and vomiting.   Neurological:  Negative for dizziness and headaches.   Endo/Heme/Allergies:  Positive for environmental allergies.      Objective:     Vitals:    04/21/23 1601   BP: 130/70   BP Location: Left arm   Patient Position: Sitting   Pulse: 78   SpO2: 96%   Weight: (!) 141.1 kg (311 lb 1.1 oz)   Height: 6' 2" (1.88 m)         Physical Exam  Vitals and nursing note reviewed.   Constitutional:       General: He is not in acute distress.     Appearance: He is obese. He is not ill-appearing, toxic-appearing or diaphoretic.   HENT:      Head: Normocephalic and atraumatic.      Nose: No rhinorrhea.      Mouth/Throat:      Mouth: Mucous membranes are moist.      Pharynx: Oropharynx is clear. No oropharyngeal exudate or posterior oropharyngeal erythema.   Eyes:      General: No scleral icterus.     Extraocular Movements: Extraocular movements intact.   Cardiovascular:      Rate and Rhythm: Normal rate and regular rhythm.      Heart sounds: No murmur heard.  Pulmonary:      Effort: No tachypnea, accessory muscle usage, respiratory distress or retractions.      Breath sounds: No decreased breath sounds, wheezing, rhonchi or rales.   Abdominal:      General: There is no distension.      Palpations: Abdomen is soft.      Tenderness: There is no abdominal tenderness. There is no guarding.   Musculoskeletal:      Right lower leg: No edema.      Left lower leg: No edema.   Skin:     General: Skin is warm and dry.      Coloration: Skin is not jaundiced.      Findings: No rash.   Neurological:      General: No focal deficit present.      Mental Status: He is alert. Mental status is at baseline.      Cranial Nerves: No cranial nerve deficit.        Personal Diagnostic Review and Interpretation  04/22/2022 CXR AP:  Enlarged cardiac silhouette with CHF appearance.       Pertinent Studies Reviewed and Interpreted:     Pulmonary " Function Tests:   No PFTs    6 Minute Walk Tests:   No 6 MWT    Echocardiograms:   No echo    Assessment:     1. Mild persistent asthma without complication        2. Seasonal allergic rhinitis, unspecified trigger            Current Outpatient Medications   Medication Instructions    AFLURIA QD 2020-21,3YR UP,,PF, 60 mcg (15 mcg x 4)/0.5 mL Syrg ADM 0.5ML IM UTD    albuterol (PROAIR HFA) 90 mcg/actuation inhaler 2 puffs, Inhalation, Every 6 hours PRN, Rescue    albuterol (PROVENTIL) 2.5 mg, Nebulization, Every 4-6 hours PRN    amLODIPine (NORVASC) 10 mg, Oral, Daily    azelastine (ASTELIN) 137 mcg, Nasal, 2 times daily    azithromycin (Z-RM) 250 MG tablet Take 2 tablets by mouth on day 1; Take 1 tablet by mouth on days 2-5<BR>    benzonatate (TESSALON) 200 mg, Oral, Every 8 hours PRN    BREO ELLIPTA 200-25 mcg/dose DsDv diskus inhaler 1 puff, Inhalation, Daily    EScitalopram oxalate (LEXAPRO) 20 mg, Oral, Daily    fexofenadine (ALLEGRA) 180 mg, Oral, Daily    fluticasone propionate (FLONASE) 50 mcg/actuation nasal spray 2 spray each nostril daily    magnesium sulfate in water (MAGNESIUM SULFATE 2 GRAM/50 ML) 2 gram/50 mL PgBk No dose, route, or frequency recorded.    montelukast (SINGULAIR) 10 mg tablet TAKE 1 TABLET BY MOUTH EVERY DAY    pravastatin (PRAVACHOL) 20 MG tablet TK 1 T PO D    predniSONE (DELTASONE) 50 mg, Oral, Daily      Teddy Ennis had no medications administered during this visit.     No orders of the defined types were placed in this encounter.     Plan:       Problem List Items Addressed This Visit          ENT    Allergic rhinitis    Overview     History of allergy shots.  Symptoms decent we controlled on intermittent nasal steroids and antihistamines           Current Assessment & Plan     Discussed role of allergic rhinitis and ongoing asthma symptoms.  Continue current regimen, but if symptoms worsen or become on resolved, would move to scheduled therapy with both Flonase and  Azelastine and an antihistamine.    May need to re-visit allergy to more specific floral antigen testing if CT testing is unrevealing.            Relevant Medications    azelastine (ASTELIN) 137 mcg (0.1 %) nasal spray       Pulmonary    Mild persistent asthma without complication - Primary    Overview     Asthma since childhood.  Typical seasonal worsening.  Strong allergic component.  Accompanied by chronic allergic rhinitis.  Currently on Breo daily with albuterol PRN and montelukast.  Minimal rescue inhaler use and no nighttime symptoms at this time, but reports significant worsening during spring and winter changes.    Patient with mild intermittent asthma but worsening likely secondary to allergic component seasonal allergies. 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            Current Assessment & Plan     - continue breo  - albuterol nebulizer as patient demonstrates difficulty with timing with MDI   - instructed to use nebulizer before work every AM  - continue flonase and allegra  - restart astelin  - some elevated IgA+IgG1 - may have some immune component and reticular markings on CXR (although could be atelectasis)   - obtain CT chest and CT sinus  - if CTs negative - will consider TTE (given cardiomegaly) and resending to allergy for more specific floral antigen testing           Relevant Orders    CT Chest Without Contrast     Other Visit Diagnoses       Chronic sinusitis, unspecified location        Relevant Orders    CT Sinuses without Contrast             JEAN Randhawa MD  [facility] Fellow

## 2023-04-21 ENCOUNTER — HOSPITAL ENCOUNTER (OUTPATIENT)
Dept: PULMONOLOGY | Facility: CLINIC | Age: 48
Discharge: HOME | End: 2023-04-21
Payer: MEDICARE

## 2023-04-21 ENCOUNTER — OFFICE VISIT (OUTPATIENT)
Dept: PULMONOLOGY | Facility: CLINIC | Age: 48
End: 2023-04-21
Payer: MEDICARE

## 2023-04-21 VITALS
BODY MASS INDEX: 39.92 KG/M2 | DIASTOLIC BLOOD PRESSURE: 70 MMHG | HEIGHT: 74 IN | OXYGEN SATURATION: 96 % | HEART RATE: 78 BPM | SYSTOLIC BLOOD PRESSURE: 130 MMHG | WEIGHT: 311.06 LBS

## 2023-04-21 DIAGNOSIS — J30.9 ALLERGIC RHINITIS, UNSPECIFIED SEASONALITY, UNSPECIFIED TRIGGER: ICD-10-CM

## 2023-04-21 DIAGNOSIS — J32.9 CHRONIC SINUSITIS, UNSPECIFIED LOCATION: ICD-10-CM

## 2023-04-21 DIAGNOSIS — J30.2 SEASONAL ALLERGIC RHINITIS, UNSPECIFIED TRIGGER: ICD-10-CM

## 2023-04-21 DIAGNOSIS — J45.909 ASTHMA, UNSPECIFIED ASTHMA SEVERITY, UNSPECIFIED WHETHER COMPLICATED, UNSPECIFIED WHETHER PERSISTENT: ICD-10-CM

## 2023-04-21 DIAGNOSIS — J45.30 MILD PERSISTENT ASTHMA WITHOUT COMPLICATION: Primary | ICD-10-CM

## 2023-04-21 PROCEDURE — 1159F PR MEDICATION LIST DOCUMENTED IN MEDICAL RECORD: ICD-10-PCS | Mod: HCNC,CPTII,GC,S$GLB | Performed by: STUDENT IN AN ORGANIZED HEALTH CARE EDUCATION/TRAINING PROGRAM

## 2023-04-21 PROCEDURE — 3008F PR BODY MASS INDEX (BMI) DOCUMENTED: ICD-10-PCS | Mod: HCNC,CPTII,GC,S$GLB | Performed by: STUDENT IN AN ORGANIZED HEALTH CARE EDUCATION/TRAINING PROGRAM

## 2023-04-21 PROCEDURE — 1159F MED LIST DOCD IN RCRD: CPT | Mod: HCNC,CPTII,GC,S$GLB | Performed by: STUDENT IN AN ORGANIZED HEALTH CARE EDUCATION/TRAINING PROGRAM

## 2023-04-21 PROCEDURE — 94729 DIFFUSING CAPACITY: CPT | Mod: 53,HCNC,S$GLB, | Performed by: INTERNAL MEDICINE

## 2023-04-21 PROCEDURE — 3008F BODY MASS INDEX DOCD: CPT | Mod: HCNC,CPTII,GC,S$GLB | Performed by: STUDENT IN AN ORGANIZED HEALTH CARE EDUCATION/TRAINING PROGRAM

## 2023-04-21 PROCEDURE — 3078F DIAST BP <80 MM HG: CPT | Mod: HCNC,CPTII,GC,S$GLB | Performed by: STUDENT IN AN ORGANIZED HEALTH CARE EDUCATION/TRAINING PROGRAM

## 2023-04-21 PROCEDURE — 94010 BREATHING CAPACITY TEST: CPT | Mod: HCNC,S$GLB,, | Performed by: INTERNAL MEDICINE

## 2023-04-21 PROCEDURE — 3075F SYST BP GE 130 - 139MM HG: CPT | Mod: HCNC,CPTII,GC,S$GLB | Performed by: STUDENT IN AN ORGANIZED HEALTH CARE EDUCATION/TRAINING PROGRAM

## 2023-04-21 PROCEDURE — 99214 OFFICE O/P EST MOD 30 MIN: CPT | Mod: 25,HCNC,GC,S$GLB | Performed by: STUDENT IN AN ORGANIZED HEALTH CARE EDUCATION/TRAINING PROGRAM

## 2023-04-21 PROCEDURE — 94729 PR C02/MEMBANE DIFFUSE CAPACITY: ICD-10-PCS | Mod: 53,HCNC,S$GLB, | Performed by: INTERNAL MEDICINE

## 2023-04-21 PROCEDURE — 3078F PR MOST RECENT DIASTOLIC BLOOD PRESSURE < 80 MM HG: ICD-10-PCS | Mod: HCNC,CPTII,GC,S$GLB | Performed by: STUDENT IN AN ORGANIZED HEALTH CARE EDUCATION/TRAINING PROGRAM

## 2023-04-21 PROCEDURE — 94010 BREATHING CAPACITY TEST: ICD-10-PCS | Mod: HCNC,S$GLB,, | Performed by: INTERNAL MEDICINE

## 2023-04-21 PROCEDURE — 99214 PR OFFICE/OUTPT VISIT, EST, LEVL IV, 30-39 MIN: ICD-10-PCS | Mod: 25,HCNC,GC,S$GLB | Performed by: STUDENT IN AN ORGANIZED HEALTH CARE EDUCATION/TRAINING PROGRAM

## 2023-04-21 PROCEDURE — 3075F PR MOST RECENT SYSTOLIC BLOOD PRESS GE 130-139MM HG: ICD-10-PCS | Mod: HCNC,CPTII,GC,S$GLB | Performed by: STUDENT IN AN ORGANIZED HEALTH CARE EDUCATION/TRAINING PROGRAM

## 2023-04-21 RX ORDER — AZELASTINE 1 MG/ML
1 SPRAY, METERED NASAL 2 TIMES DAILY
Qty: 30 ML | Refills: 11 | Status: SHIPPED | OUTPATIENT
Start: 2023-04-21 | End: 2024-04-20

## 2023-04-21 NOTE — ASSESSMENT & PLAN NOTE
- continue breo  - albuterol nebulizer as patient demonstrates difficulty with timing with MDI   - instructed to use nebulizer before work every AM  - continue flonase and allegra  - restart astelin  - some elevated IgA+IgG1 - may have some immune component and reticular markings on CXR (although could be atelectasis)   - obtain CT chest and CT sinus  - if CTs negative - will consider TTE (given cardiomegaly) and resending to allergy for more specific floral antigen testing

## 2023-04-21 NOTE — ASSESSMENT & PLAN NOTE
Discussed role of allergic rhinitis and ongoing asthma symptoms.  Continue current regimen, but if symptoms worsen or become on resolved, would move to scheduled therapy with both Flonase and Azelastine and an antihistamine.    May need to re-visit allergy to more specific floral antigen testing if CT testing is unrevealing.

## 2023-04-23 LAB
FEF 25 75 LLN: 2.2
FEF 25 75 PRE REF: 154.2 %
FEF 25 75 REF: 3.95
FEV05 LLN: 2.1
FEV05 REF: 3.24
FEV1 FVC LLN: 69
FEV1 FVC PRE REF: 125 %
FEV1 FVC REF: 79
FEV1 LLN: 3.37
FEV1 PRE REF: 73.1 %
FEV1 REF: 4.29
FVC LLN: 4.28
FVC PRE REF: 58.1 %
FVC REF: 5.44
PEF LLN: 8
PEF PRE REF: 83.3 %
PEF REF: 10.45
PHYSICIAN COMMENT: ABNORMAL
PRE FEF 25 75: 6.08 L/S (ref 2.2–6.21)
PRE FET 100: 4.33 SEC
PRE FEV05 REF: 84.5 %
PRE FEV1 FVC: 99.09 % (ref 68.62–88.31)
PRE FEV1: 3.14 L (ref 3.37–5.17)
PRE FEV5: 2.74 L (ref 2.1–4.37)
PRE FVC: 3.16 L (ref 4.28–6.61)
PRE PEF: 8.71 L/S (ref 8–12.9)

## 2023-04-28 ENCOUNTER — HOSPITAL ENCOUNTER (OUTPATIENT)
Dept: RADIOLOGY | Facility: HOSPITAL | Age: 48
Discharge: HOME | End: 2023-04-28
Attending: STUDENT IN AN ORGANIZED HEALTH CARE EDUCATION/TRAINING PROGRAM
Payer: MEDICARE

## 2023-04-28 DIAGNOSIS — J32.9 CHRONIC SINUSITIS, UNSPECIFIED LOCATION: ICD-10-CM

## 2023-04-28 DIAGNOSIS — J45.30 MILD PERSISTENT ASTHMA WITHOUT COMPLICATION: ICD-10-CM

## 2023-04-28 PROCEDURE — 70486 CT SINUSES WITHOUT CONTRAST: ICD-10-PCS | Mod: 26,HCNC,, | Performed by: RADIOLOGY

## 2023-04-28 PROCEDURE — 71250 CT THORAX DX C-: CPT | Mod: TC,HCNC

## 2023-04-28 PROCEDURE — 70486 CT MAXILLOFACIAL W/O DYE: CPT | Mod: TC,HCNC

## 2023-04-28 PROCEDURE — 71250 CT CHEST WITHOUT CONTRAST: ICD-10-PCS | Mod: 26,HCNC,, | Performed by: RADIOLOGY

## 2023-04-28 PROCEDURE — 71250 CT THORAX DX C-: CPT | Mod: 26,HCNC,, | Performed by: RADIOLOGY

## 2023-04-28 PROCEDURE — 70486 CT MAXILLOFACIAL W/O DYE: CPT | Mod: 26,HCNC,, | Performed by: RADIOLOGY

## 2023-05-01 ENCOUNTER — TELEPHONE (OUTPATIENT)
Dept: PULMONOLOGY | Facility: CLINIC | Age: 48
End: 2023-05-01
Payer: MEDICARE

## 2023-05-01 NOTE — TELEPHONE ENCOUNTER
----- Message from Mona Fajardo sent at 5/1/2023  2:01 PM CDT -----  Regarding: results  Contact: [phone]       Test Results    Name of Test (Lab/Mammo/Etc): Ct Scan   Date of Test: 4/28/2023  Ordering Provider: Gogo Randhawa,   Where the test was performed: [facility]  Would the patient rather a call back or a response via [facility]? Call   Best Call Back Number: [phone]  Additional Information:

## 2023-05-03 ENCOUNTER — TELEPHONE (OUTPATIENT)
Dept: PULMONOLOGY | Facility: CLINIC | Age: 48
End: 2023-05-03
Payer: MEDICARE

## 2023-05-12 ENCOUNTER — TELEPHONE (OUTPATIENT)
Dept: PULMONOLOGY | Facility: CLINIC | Age: 48
End: 2023-05-12
Payer: MEDICARE

## 2023-05-12 NOTE — TELEPHONE ENCOUNTER
----- Message from Radha Brady sent at 5/12/2023 12:03 PM CDT -----  Teddy Ennis calling regarding Patient Advice for a missed call from the doctor, call back, [phone]

## 2023-05-12 NOTE — TELEPHONE ENCOUNTER
Called patient regarding CT sinus and chest results. Unable to reach, left voicemail. Will need to follow up solitary pulmonary nodule with repeat CT chest in 1 year.    JEAN Randhawa MD  [facility]

## 2023-05-23 ENCOUNTER — OFFICE VISIT (OUTPATIENT)
Dept: PRIMARY CARE CLINIC | Facility: CLINIC | Age: 48
End: 2023-05-23
Payer: MEDICARE

## 2023-05-23 VITALS
BODY MASS INDEX: 39.81 KG/M2 | DIASTOLIC BLOOD PRESSURE: 70 MMHG | SYSTOLIC BLOOD PRESSURE: 118 MMHG | OXYGEN SATURATION: 96 % | WEIGHT: 310.19 LBS | HEIGHT: 74 IN | HEART RATE: 92 BPM

## 2023-05-23 DIAGNOSIS — E78.2 HYPERLIPIDEMIA, MIXED: ICD-10-CM

## 2023-05-23 DIAGNOSIS — I10 ESSENTIAL HYPERTENSION: Primary | ICD-10-CM

## 2023-05-23 DIAGNOSIS — J30.2 SEASONAL ALLERGIC RHINITIS, UNSPECIFIED TRIGGER: ICD-10-CM

## 2023-05-23 DIAGNOSIS — Z12.11 ENCOUNTER FOR COLORECTAL CANCER SCREENING: ICD-10-CM

## 2023-05-23 DIAGNOSIS — R73.01 IMPAIRED FASTING GLUCOSE: ICD-10-CM

## 2023-05-23 DIAGNOSIS — J45.30 MILD PERSISTENT ASTHMA WITHOUT COMPLICATION: ICD-10-CM

## 2023-05-23 DIAGNOSIS — Z12.12 ENCOUNTER FOR COLORECTAL CANCER SCREENING: ICD-10-CM

## 2023-05-23 PROCEDURE — 3074F PR MOST RECENT SYSTOLIC BLOOD PRESSURE < 130 MM HG: ICD-10-PCS | Mod: CPTII,S$GLB,, | Performed by: INTERNAL MEDICINE

## 2023-05-23 PROCEDURE — 3078F DIAST BP <80 MM HG: CPT | Mod: CPTII,S$GLB,, | Performed by: INTERNAL MEDICINE

## 2023-05-23 PROCEDURE — 3074F SYST BP LT 130 MM HG: CPT | Mod: CPTII,S$GLB,, | Performed by: INTERNAL MEDICINE

## 2023-05-23 PROCEDURE — 99999 PR PBB SHADOW E&M-EST. PATIENT-LVL IV: ICD-10-PCS | Mod: PBBFAC,,, | Performed by: INTERNAL MEDICINE

## 2023-05-23 PROCEDURE — 99214 OFFICE O/P EST MOD 30 MIN: CPT | Mod: S$GLB,,, | Performed by: INTERNAL MEDICINE

## 2023-05-23 PROCEDURE — 3008F BODY MASS INDEX DOCD: CPT | Mod: CPTII,S$GLB,, | Performed by: INTERNAL MEDICINE

## 2023-05-23 PROCEDURE — 99999 PR PBB SHADOW E&M-EST. PATIENT-LVL IV: CPT | Mod: PBBFAC,,, | Performed by: INTERNAL MEDICINE

## 2023-05-23 PROCEDURE — 3078F PR MOST RECENT DIASTOLIC BLOOD PRESSURE < 80 MM HG: ICD-10-PCS | Mod: CPTII,S$GLB,, | Performed by: INTERNAL MEDICINE

## 2023-05-23 PROCEDURE — 99214 PR OFFICE/OUTPT VISIT, EST, LEVL IV, 30-39 MIN: ICD-10-PCS | Mod: S$GLB,,, | Performed by: INTERNAL MEDICINE

## 2023-05-23 PROCEDURE — 1159F PR MEDICATION LIST DOCUMENTED IN MEDICAL RECORD: ICD-10-PCS | Mod: CPTII,S$GLB,, | Performed by: INTERNAL MEDICINE

## 2023-05-23 PROCEDURE — 3008F PR BODY MASS INDEX (BMI) DOCUMENTED: ICD-10-PCS | Mod: CPTII,S$GLB,, | Performed by: INTERNAL MEDICINE

## 2023-05-23 PROCEDURE — 1159F MED LIST DOCD IN RCRD: CPT | Mod: CPTII,S$GLB,, | Performed by: INTERNAL MEDICINE

## 2023-05-23 NOTE — PROGRESS NOTES
[facility] Note    Chief Complaint      Chief Complaint   Patient presents with    Hypertension     6 month f/u       History of Present Illness      Teddy Ennis is a 47 y.o. male with chronic conditions of HTN, HLD, asthma, IFG, depression, allergic rhinitis who presents today for: follow up chronic conditions.  HTN: BP at goal on amlodipine.  HLD: Controlled on pravastatin. LDL 98, doing well.    Asthma, moderate persistent, allergic rhinitis: previously on immunotherapy.  On Breo.    IFG: A1C 5.9 slightly higher than last check.  Counseled on diet and exercise  Flu shot UTD.  TDAP 2019.  FITKIT 2022.         Past Medical History:  Past Medical History:   Diagnosis Date    Allergy     Asthma        Past Surgical History:   has a past surgical history that includes Ankle surgery (Left, 1995); Adenoidectomy; and Sinus surgery.    Family History:  family history includes Hypertension in his mother; No Known Problems in his father; Stroke in his mother.     Social History:  Social History     Tobacco Use    Smoking status: Never     Passive exposure: Never    Smokeless tobacco: Never   Substance Use Topics    Alcohol use: Not Currently    Drug use: Never       I personally reviewed all past medical, surgical, social and family history.    Review of Systems   Constitutional:  Negative for chills, fever and malaise/fatigue.   Respiratory:  Negative for shortness of breath.    Cardiovascular:  Negative for chest pain.   Gastrointestinal:  Negative for constipation, diarrhea, nausea and vomiting.   Skin:  Negative for rash.   Neurological:  Negative for weakness.   All other systems reviewed and are negative.     Medications:  Outpatient Encounter Medications as of 5/23/2023   Medication Sig Dispense Refill    AFLURIA QD 2020-21,3YR UP,,PF, 60 mcg (15 mcg x 4)/0.5 mL Syrg ADM 0.5ML IM UTD      albuterol (PROVENTIL) 2.5 mg /3 mL (0.083 %) nebulizer solution Take 3 mLs (2.5 mg total) by nebulization  every 4 to 6 hours as needed for Wheezing. 1080 mL 3    amLODIPine (NORVASC) 10 MG tablet Take 1 tablet (10 mg total) by mouth once daily. 90 tablet 3    azelastine (ASTELIN) 137 mcg (0.1 %) nasal spray 1 spray (137 mcg total) by Nasal route 2 (two) times daily. 30 mL 11    [] benzonatate (TESSALON) 200 MG capsule Take 1 capsule (200 mg total) by mouth every 8 (eight) hours as needed for Cough. 30 capsule 0    BREO ELLIPTA 200-25 mcg/dose DsDv diskus inhaler Inhale 1 puff into the lungs once daily. 60 each 11    EScitalopram oxalate (LEXAPRO) 20 MG tablet Take 1 tablet (20 mg total) by mouth once daily. 90 tablet 3    fexofenadine (ALLEGRA) 180 MG tablet Take 180 mg by mouth once daily.      fluticasone propionate (FLONASE) 50 mcg/actuation nasal spray 2 spray each nostril daily 16 g 11    magnesium sulfate in water (MAGNESIUM SULFATE 2 GRAM/50 ML) 2 gram/50 mL PgBk       montelukast (SINGULAIR) 10 mg tablet TAKE 1 TABLET BY MOUTH EVERY DAY 90 tablet 3    pravastatin (PRAVACHOL) 20 MG tablet TK 1 T PO D 90 tablet 3    [DISCONTINUED] albuterol (PROAIR HFA) 90 mcg/actuation inhaler Inhale 2 puffs into the lungs every 6 (six) hours as needed for Wheezing. Rescue 54 g 3     No facility-administered encounter medications on file as of 2023.       Allergies:  Review of patient's allergies indicates:   Allergen Reactions    Penicillins Hives       Health Maintenance:  Immunization History   Administered Date(s) Administered    COVID-19, MRNA, LN-S, PF (Pfizer) (Purple Cap) 2021, 08/15/2021    Influenza - Quadrivalent - PF *Preferred* (6 months and older) 2013, 2014, 2016, 2018, 2019, 2020, 10/02/2021    Influenza Split 2009, 2010, 2011, 2013, 2014    Tdap 2018, 2019      Health Maintenance   Topic Date Due    Hepatitis C Screening  Never done    Lipid Panel  2027    TETANUS VACCINE  2029        Physical Exam     "  Vital Signs  Pulse: 92  SpO2: 96 %  BP: 118/70  BP Location: Left arm  Patient Position: Sitting  Pain Score: 0-No pain  Height and Weight  Height: 6' 2" (188 cm)  Weight: (!) 140.7 kg (310 lb 3 oz)  BSA (Calculated - sq m): 2.71 sq meters  BMI (Calculated): 39.8  Weight in (lb) to have BMI = 25: 194.3]    Physical Exam  Vitals reviewed.   Constitutional:       Appearance: He is well-developed.   HENT:      Head: Normocephalic and atraumatic.      Right Ear: External ear normal.      Left Ear: External ear normal.   Cardiovascular:      Rate and Rhythm: Normal rate and regular rhythm.      Heart sounds: Normal heart sounds. No murmur heard.  Pulmonary:      Effort: Pulmonary effort is normal.      Breath sounds: Normal breath sounds. No wheezing or rales.   Abdominal:      General: Bowel sounds are normal.      Palpations: Abdomen is soft.        Laboratory:  CBC:  Recent Labs   Lab 06/16/20  1455 04/22/22  2149   WBC 12.26 12.78 H   RBC 5.48 5.13   Hemoglobin 15.9 15.2   Hematocrit 49.4 45.6   Platelets 355 H 295   MCV 90 89   MCH 29.0 29.6   MCHC 32.2 33.3     CMP:  Recent Labs   Lab 11/03/21  0827 04/22/22 2149 11/11/22  0854   Glucose 126 H 190 H 123 H   Calcium 9.2 9.3 9.1   Albumin 3.7 3.8 3.6   Total Protein 8.0 8.3 8.1   Sodium 136 138 136   Potassium 3.5 3.2 L 3.7   CO2 29 26 28   Chloride 100 101 99   BUN 12 9 11   Alkaline Phosphatase 149 H 149 H 154 H   ALT 30 23 22   AST 28 20 18   Total Bilirubin 0.9 0.6 0.7     URINALYSIS:       LIPIDS:  Recent Labs   Lab 11/03/20  0740 11/03/21  0827 11/11/22  0854   HDL 34 L 31 L 31 L   Cholesterol 157 144 153   Triglycerides 118 98 119   LDL Cholesterol 99.4 93.4 98.2   HDL/Cholesterol Ratio 21.7 21.5 20.3   Non-HDL Cholesterol 123 113 122   Total Cholesterol/HDL Ratio 4.6 4.6 4.9     TSH:      A1C:  Recent Labs   Lab 11/03/20  0740 11/03/21  0827 11/11/22  0854   Hemoglobin A1C 5.4 5.9 H 5.4       Assessment/Plan     Yasmany Sanchez is a 47 y.o.male " with:    1. Essential hypertension  Continue current meds.    2. Hyperlipidemia, mixed  Continue current meds.    3. Impaired fasting glucose  Continue diet and exercise  4. Mild persistent asthma without complication  Continue current meds.  F/U with pulmonology  5. Seasonal allergic rhinitis, unspecified trigger    6. Encounter for colorectal cancer screening  - Fecal Immunochemical Test (iFOBT); Future       Chronic conditions status updated as per HPI.  Other than changes above, cont current medications and maintain follow up with specialists.  No follow-ups on file.    Future Appointments   Date Time Provider Department Center   9/5/2023  3:00 PM Xavier Weaver MD [facility]   11/28/2023  3:30 PM Doni Wood MD [facility]       Doni Wood MD  [facility]

## 2023-05-29 ENCOUNTER — LAB VISIT (OUTPATIENT)
Dept: LAB | Facility: HOSPITAL | Age: 48
End: 2023-05-29
Attending: INTERNAL MEDICINE
Payer: MEDICARE

## 2023-05-29 DIAGNOSIS — Z12.12 ENCOUNTER FOR COLORECTAL CANCER SCREENING: ICD-10-CM

## 2023-05-29 DIAGNOSIS — Z12.11 ENCOUNTER FOR COLORECTAL CANCER SCREENING: ICD-10-CM

## 2023-05-29 PROCEDURE — 82274 ASSAY TEST FOR BLOOD FECAL: CPT | Performed by: INTERNAL MEDICINE

## 2023-06-02 ENCOUNTER — TELEPHONE (OUTPATIENT)
Dept: PRIMARY CARE CLINIC | Facility: CLINIC | Age: 48
End: 2023-06-02
Payer: MEDICARE

## 2023-06-02 NOTE — TELEPHONE ENCOUNTER
----- Message from Bree Olmstead sent at 6/2/2023  9:00 AM CDT -----  Contact: [phone] Patient  Pts mom is calling in regards to Dr Wood writing a letter to the  of Courts saying the pt is unable to attend jury duty. Fax to . Please call and advise

## 2023-06-02 NOTE — LETTER
[facility]  [address]  [place] [address] June 2, 2023    Teddy Ennis  [address] [place] [id number]      To Whom It May Concern:    Teddy Ennis is unable to participate in jury duty due to moderate persistent asthma and recurrent bronchitis exacerbations.    If you have any questions or concerns, please feel free to call my office.    Sincerely,         Doni Wood MD

## 2023-06-06 LAB — HEMOCCULT STL QL IA: NEGATIVE

## 2023-06-07 ENCOUNTER — TELEPHONE (OUTPATIENT)
Dept: PRIMARY CARE CLINIC | Facility: CLINIC | Age: 48
End: 2023-06-07
Payer: MEDICARE

## 2023-06-15 ENCOUNTER — TELEPHONE (OUTPATIENT)
Dept: PRIMARY CARE CLINIC | Facility: CLINIC | Age: 48
End: 2023-06-15
Payer: MEDICARE

## 2023-06-15 DIAGNOSIS — E78.2 HYPERLIPIDEMIA, MIXED: Primary | ICD-10-CM

## 2023-06-15 DIAGNOSIS — R73.01 IMPAIRED FASTING GLUCOSE: ICD-10-CM

## 2023-06-15 NOTE — TELEPHONE ENCOUNTER
----- Message from Sandra Gooden sent at 6/15/2023  2:04 PM CDT -----  Contact: Pt [phone]  Pt called in regards to getting lab orders in please call and advise

## 2023-07-31 DIAGNOSIS — I10 ESSENTIAL HYPERTENSION: ICD-10-CM

## 2023-07-31 RX ORDER — AMLODIPINE BESYLATE 10 MG/1
10 TABLET ORAL DAILY
Qty: 90 TABLET | Refills: 3 | Status: CANCELLED | OUTPATIENT
Start: 2023-07-31

## 2023-07-31 RX ORDER — MONTELUKAST SODIUM 10 MG/1
TABLET ORAL
Qty: 90 TABLET | Refills: 3 | Status: CANCELLED | OUTPATIENT
Start: 2023-07-31

## 2023-07-31 NOTE — TELEPHONE ENCOUNTER
----- Message from Amara Nieves sent at 7/31/2023  9:49 AM CDT -----  Contact: Mobile [phone]  Requesting an RX refill or new RX.  Is this a refill or new RX: Refill  RX name and strength montelukast (SINGULAIR) 10 mg tablet  Is this a 30 day or 90 day RX: 90  Pharmacy name and phone #   [id number] - [address] AT [facility] & [address] [address] [place] [address]  Phone: [phone] Fax: [phone]  The doctors have asked that we provide their patients with the following 2 reminders -- prescription refills can take up to 72 hours, and a friendly reminder that in the future you can use your [facility] account to request refills:        Requesting an RX refill or new RX.  Is this a refill or new RX: Refill  RX name and strength amLODIPine (NORVASC) 10 MG tablet  Is this a 30 day or 90 day RX: 90  Pharmacy name and phone #   [id number] - [place] - [address] AT [facility] & [address]  [address] [place] [address]  Phone: [phone] Fax: [phone]  The doctors have asked that we provide their patients with the following 2 reminders -- prescription refills can take up to 72 hours, and a friendly reminder that in the future you can use your [facility] account to request refills:

## 2023-07-31 NOTE — TELEPHONE ENCOUNTER
No care due was identified.  [facility] Embedded Care Due Messages. Reference number: [id number].   7/31/2023 3:10:43 PM CDT

## 2023-08-01 ENCOUNTER — PES CALL (OUTPATIENT)
Dept: ADMINISTRATIVE | Facility: CLINIC | Age: 48
End: 2023-08-01
Payer: MEDICARE

## 2023-08-02 DIAGNOSIS — I10 ESSENTIAL HYPERTENSION: ICD-10-CM

## 2023-08-02 RX ORDER — MONTELUKAST SODIUM 10 MG/1
TABLET ORAL
Qty: 90 TABLET | Refills: 3 | Status: SHIPPED | OUTPATIENT
Start: 2023-08-02

## 2023-08-02 RX ORDER — AMLODIPINE BESYLATE 10 MG/1
10 TABLET ORAL DAILY
Qty: 90 TABLET | Refills: 3 | Status: SHIPPED | OUTPATIENT
Start: 2023-08-02 | End: 2024-02-18

## 2023-08-02 NOTE — TELEPHONE ENCOUNTER
----- Message from Becki Sinha sent at 8/2/2023  3:30 PM CDT -----  Contact: [phone]  Pt is asking for the office to please call him     Requesting an RX refill or new RX.  Is this a refill or new RX: refill  RX name and strength (copy/paste from chart):  montelukast (SINGULAIR) 10 mg tablet  Is this a 30 day or 90 day RX: 30  Pharmacy name and phone # (copy/paste from chart):   [id number] - [place], [place] - [address] [place] [address] AT [facility]  [address] [place] [address]  Phone: [phone] Fax: [phone]    The doctors have asked that we provide their patients with the following 2 reminders -- prescription refills can take up to 72 hours, and a friendly reminder that in the future you can use your [facility] account to request refills: yes  pt states the pharmacy sent for this yesterday and he is needing this     Requesting an RX refill or new RX.  Is this a refill or new RX: refill  RX name and strength (copy/paste from chart):  amLODIPine (NORVASC) 10 MG tablet  Is this a 30 day or 90 day RX: 30  Pharmacy name and phone # (copy/paste from chart):   [facility] #[id number] - [place], [place] - [address] AT [facility]  [address] [facility] [address]  Phone: [phone] Fax: [phone]      The doctors have asked that we provide their patients with the following 2 reminders -- prescription refills can take up to 72 hours, and a friendly reminder that in the future you can use your [facility] account to request refills: yes

## 2023-08-15 RX ORDER — ESCITALOPRAM OXALATE 20 MG/1
20 TABLET ORAL DAILY
Qty: 90 TABLET | Refills: 3 | Status: SHIPPED | OUTPATIENT
Start: 2023-08-15

## 2023-08-21 RX ORDER — PRAVASTATIN SODIUM 20 MG/1
TABLET ORAL
Qty: 90 TABLET | Refills: 3 | Status: SHIPPED | OUTPATIENT
Start: 2023-08-21

## 2023-08-21 NOTE — TELEPHONE ENCOUNTER
----- Message from Becki Sinha sent at 8/21/2023  3:16 PM CDT -----  Contact: [phone]  Requesting an RX refill or new RX.  Is this a refill or new RX: refill  RX name and strength (copy/paste from chart):  pravastatin (PRAVACHOL) 20 MG tablet  Is this a 30 day or 90 day RX: 30  Pharmacy name and phone # (copy/paste from chart):   [id number] - [place] - [facility] [place] [address] [facility]  [place] [address]  [facility] [place] [address]  Phone: [phone] Fax: [phone]        The doctors have asked that we provide their patients with the following 2 reminders -- prescription refills can take up to 72 hours, and a friendly reminder that in the future you can use your [facility] account to request refills: yes

## 2023-09-05 ENCOUNTER — OFFICE VISIT (OUTPATIENT)
Dept: SLEEP MEDICINE | Facility: CLINIC | Age: 48
End: 2023-09-05
Payer: MEDICARE

## 2023-09-05 VITALS
HEART RATE: 78 BPM | BODY MASS INDEX: 41.27 KG/M2 | DIASTOLIC BLOOD PRESSURE: 78 MMHG | OXYGEN SATURATION: 96 % | WEIGHT: 304.69 LBS | SYSTOLIC BLOOD PRESSURE: 134 MMHG | HEIGHT: 72 IN

## 2023-09-05 DIAGNOSIS — E66.01 CLASS 2 SEVERE OBESITY DUE TO EXCESS CALORIES WITH SERIOUS COMORBIDITY AND BODY MASS INDEX (BMI) OF 39.0 TO 39.9 IN ADULT: ICD-10-CM

## 2023-09-05 DIAGNOSIS — R06.83 SNORING: ICD-10-CM

## 2023-09-05 PROCEDURE — 3008F BODY MASS INDEX DOCD: CPT | Mod: HCNC,CPTII,S$GLB, | Performed by: INTERNAL MEDICINE

## 2023-09-05 PROCEDURE — 3075F SYST BP GE 130 - 139MM HG: CPT | Mod: HCNC,CPTII,S$GLB, | Performed by: INTERNAL MEDICINE

## 2023-09-05 PROCEDURE — 1159F MED LIST DOCD IN RCRD: CPT | Mod: HCNC,CPTII,S$GLB, | Performed by: INTERNAL MEDICINE

## 2023-09-05 PROCEDURE — 3078F PR MOST RECENT DIASTOLIC BLOOD PRESSURE < 80 MM HG: ICD-10-PCS | Mod: HCNC,CPTII,S$GLB, | Performed by: INTERNAL MEDICINE

## 2023-09-05 PROCEDURE — 99999 PR PBB SHADOW E&M-EST. PATIENT-LVL IV: CPT | Mod: PBBFAC,HCNC,, | Performed by: INTERNAL MEDICINE

## 2023-09-05 PROCEDURE — 1159F PR MEDICATION LIST DOCUMENTED IN MEDICAL RECORD: ICD-10-PCS | Mod: HCNC,CPTII,S$GLB, | Performed by: INTERNAL MEDICINE

## 2023-09-05 PROCEDURE — 99999 PR PBB SHADOW E&M-EST. PATIENT-LVL IV: ICD-10-PCS | Mod: PBBFAC,HCNC,, | Performed by: INTERNAL MEDICINE

## 2023-09-05 PROCEDURE — 1160F PR REVIEW ALL MEDS BY PRESCRIBER/CLIN PHARMACIST DOCUMENTED: ICD-10-PCS | Mod: HCNC,CPTII,S$GLB, | Performed by: INTERNAL MEDICINE

## 2023-09-05 PROCEDURE — 3008F PR BODY MASS INDEX (BMI) DOCUMENTED: ICD-10-PCS | Mod: HCNC,CPTII,S$GLB, | Performed by: INTERNAL MEDICINE

## 2023-09-05 PROCEDURE — 1160F RVW MEDS BY RX/DR IN RCRD: CPT | Mod: HCNC,CPTII,S$GLB, | Performed by: INTERNAL MEDICINE

## 2023-09-05 PROCEDURE — 3078F DIAST BP <80 MM HG: CPT | Mod: HCNC,CPTII,S$GLB, | Performed by: INTERNAL MEDICINE

## 2023-09-05 PROCEDURE — 99213 PR OFFICE/OUTPT VISIT, EST, LEVL III, 20-29 MIN: ICD-10-PCS | Mod: HCNC,S$GLB,, | Performed by: INTERNAL MEDICINE

## 2023-09-05 PROCEDURE — 99213 OFFICE O/P EST LOW 20 MIN: CPT | Mod: HCNC,S$GLB,, | Performed by: INTERNAL MEDICINE

## 2023-09-05 PROCEDURE — 3075F PR MOST RECENT SYSTOLIC BLOOD PRESS GE 130-139MM HG: ICD-10-PCS | Mod: HCNC,CPTII,S$GLB, | Performed by: INTERNAL MEDICINE

## 2023-09-05 NOTE — PROGRESS NOTES
Subjective:   Patient ID: Teddy Ennis is a 48 y.o. male    Chief Complaint:   Chief Complaint   Patient presents with    Apnea       Referred by Dr. Anita Rodriguez    HPI    Teddy Ennis is a 48 y.o. male who was referred by Dr. Anita Rodriguez   for a sleep evaluation for possible sleep disordered breathing. Relevant medical history includes asthma, hypertension and morbid obesity.    History was corroborated by his mother over the phone.    The patient reports no witnessed snoring noticed. There is no witnessed apneas. He has not awoken from a snore and but has has had gasping for air related to changes in season and when his allergies act up. When he wakes up from sleep, he does feel refreshed. There is no dry mouth, jaw pain or grinding of teeth.     Patient reports no change in weight.    Symptoms of restless legs syndrome are not reported.    Symptoms began  ago.    Prior sleep evaluation: none    Sleep medication taken: none.    Other sleep remedies: none    Sleep summary  Bedtime: 12 AM  Sleep Latency: 10 min  # Awakenings: 1-2  WASO (wakefulness after sleep onset) a few min  Risetime: 9 AM    Bed partner is loud present.  Snoring is usually of  intensity.    Patient does  drink  caffeinated beverages daily.    Alcoholic beverages are not ingested on a regular basis.    The bedroom environment is adequate and  comfortable.     CONTRIBUTING and/or CONFOUNDING FACTORS:    Nocturnal pain:   n    Nocturia >2/night:   n  Heartburn/GI pain:   n  Environment:    n  Bed partner noise/movements : n      Patient provided ESS:    [place] Sleepiness Scale TOTAL   (validated sleepiness questionnaire with a higher score indicating greater sleepiness; range 0-24)      9/5/2023     3:23 PM   EPWORTH SLEEPINESS SCALE   Sitting and reading 2   Watching TV 2   Sitting, inactive in a public place (e.g. a theatre or a meeting) 1   As a passenger in a car for an hour without a break 1   Lying down to rest in the afternoon when  circumstances permit 1   Sitting and talking to someone 1   Sitting quietly after a lunch without alcohol 1   In a car, while stopped for a few minutes in traffic 1   Total score 10         STOP BANG questionnaire:    Snoring present: n  Tiredness present:n  Obstruction (apneas/choking episodes): n  Pressure (HTN): y    BMI greater than 35 kg/m2: n  Age greater than 50 years old: n  Neck circumference > than 17 inches if male or > than 16 inches if female : y  Gender being male: y    Total STOP BANG score = 3/8  Low risk GUILLERMO: 0-2, Intermediate risk GUILLERMO: 3-4, High risk GUILLERMO: 5+      Most Recent Vital Signs:    The patient's body mass index is 41.32 kg/m².    Wt Readings from Last 5 Encounters:   09/05/23 (!) 138.2 kg (304 lb 10.8 oz)   05/23/23 (!) 140.7 kg (310 lb 3 oz)   04/21/23 (!) 141.1 kg (311 lb 1.1 oz)   04/17/23 (!) 141.1 kg (311 lb)   03/30/23 (!) 141.1 kg (311 lb)     BMI Readings from Last 5 Encounters:   09/05/23 41.32 kg/m²   05/23/23 39.83 kg/m²   04/21/23 39.94 kg/m²   04/17/23 39.93 kg/m²   03/30/23 39.93 kg/m²     Pulse Readings from Last 3 Encounters:   09/05/23 78   05/23/23 92   04/21/23 78         Current Outpatient Medications:     AFLURIA QD 2020-21,3YR UP,,PF, 60 mcg (15 mcg x 4)/0.5 mL Syrg, ADM 0.5ML IM UTD, Disp: , Rfl:     albuterol (PROVENTIL) 2.5 mg /3 mL (0.083 %) nebulizer solution, Take 3 mLs (2.5 mg total) by nebulization every 4 to 6 hours as needed for Wheezing., Disp: 1080 mL, Rfl: 3    albuterol (PROVENTIL/VENTOLIN HFA) 90 mcg/actuation inhaler, INHALE 2 PUFFS INTO THE LUNGS EVERY 6 HOURS AS NEEDED FOR WHEEZING, Disp: 25.5 g, Rfl: 3    amLODIPine (NORVASC) 10 MG tablet, Take 1 tablet (10 mg total) by mouth once daily., Disp: 90 tablet, Rfl: 3    azelastine (ASTELIN) 137 mcg (0.1 %) nasal spray, 1 spray (137 mcg total) by Nasal route 2 (two) times daily., Disp: 30 mL, Rfl: 11    BREO ELLIPTA 200-25 mcg/dose DsDv diskus inhaler, Inhale 1 puff into the lungs once daily., Disp: 60  each, Rfl: 11    EScitalopram oxalate (LEXAPRO) 20 MG tablet, Take 1 tablet (20 mg total) by mouth once daily., Disp: 90 tablet, Rfl: 3    fexofenadine (ALLEGRA) 180 MG tablet, Take 180 mg by mouth once daily., Disp: , Rfl:     fluticasone propionate (FLONASE) 50 mcg/actuation nasal spray, 2 spray each nostril daily, Disp: 16 g, Rfl: 11    magnesium sulfate in water (MAGNESIUM SULFATE 2 GRAM/50 ML) 2 gram/50 mL PgBk, , Disp: , Rfl:     montelukast (SINGULAIR) 10 mg tablet, TAKE 1 TABLET BY MOUTH EVERY DAY, Disp: 90 tablet, Rfl: 3    pravastatin (PRAVACHOL) 20 MG tablet, TK 1 T PO D, Disp: 90 tablet, Rfl: 3       Objective:   Vitals reviewed   Physical Exam  Constitutional:       Appearance: Normal appearance.   HENT:      Head: Normocephalic.      Mouth/Throat:      Comments: Mallampati III  Micrognathia   Cardiovascular:      Rate and Rhythm: Normal rate and regular rhythm.   Pulmonary:      Effort: Pulmonary effort is normal. No respiratory distress.      Breath sounds: No wheezing or rales.   Musculoskeletal:      Cervical back: Normal range of motion and neck supple.   Neurological:      General: No focal deficit present.      Mental Status: He is alert and oriented to person, place, and time.   Psychiatric:         Mood and Affect: Mood normal.         Behavior: Behavior normal.         Assessment:     Problem List Items Addressed This Visit          Endocrine    Class 2 severe obesity due to excess calories with serious comorbidity and body mass index (BMI) of 39.0 to 39.9 in adult    Overview     Obesity places patient at risk for exacerbation of chronic illnesses            Other    Snoring    Overview                 Plan:       Patient and his mother reports no snoring.  Patient not interested in getting a sleep study done at this time. He and his mother reports no difficulty with sleep. Explained by rationale and potential effects of untreated sleep apnea on asthma and comorbid medical conditions.  -  patient will inform me when he wants to pursue sleep study  - he will continue following with pulmonary for asthma - he is well controlled at this time on Breo and PRN albuterol.

## 2023-09-25 RX ORDER — FLUTICASONE PROPIONATE 50 MCG
SPRAY, SUSPENSION (ML) NASAL
Qty: 48 G | Refills: 2 | Status: SHIPPED | OUTPATIENT
Start: 2023-09-25

## 2023-09-25 NOTE — TELEPHONE ENCOUNTER
No care due was identified.  [facility] Embedded Care Due Messages. Reference number: [id number].   9/25/2023 9:14:36 AM CDT

## 2023-09-25 NOTE — TELEPHONE ENCOUNTER
Refill Decision Note   Teddy Ennis  is requesting a refill authorization.  Brief Assessment and Rationale for Refill:  Approve     Medication Therapy Plan:         Comments:     Note composed:10:40 AM 09/25/2023             Appointments     Last Visit   5/23/2023 Doni Wood MD   Next Visit   11/28/2023 Doni Wood MD

## 2023-09-25 NOTE — TELEPHONE ENCOUNTER
----- Message from Yuliya Elliott sent at 2023  9:11 AM CDT -----  Contact: [phone]  Requesting an RX refill or new RX. refill  Is this a refill or new RX: refill    RX name and strength (copy/paste from chart):  fluticasone propionate (FLONASE) 50 mcg/actuation nasal spray 16 g 11 3/25/2022 -  Si spray each nostril daily        Is this a 30 day or 90 day RX: 30    Pharmacy name and phone # (copy/paste from chart):        [id number] - [place] - [facility] [address] AT [facility] [address]  [place] [address]  Phone: [phone] Fax: [phone]

## 2023-10-05 RX ORDER — ALBUTEROL SULFATE 0.83 MG/ML
2.5 SOLUTION RESPIRATORY (INHALATION)
Qty: 1080 ML | Refills: 2 | Status: SHIPPED | OUTPATIENT
Start: 2023-10-05

## 2023-10-05 NOTE — TELEPHONE ENCOUNTER
----- Message from Lorena Hu sent at 10/5/2023 12:39 PM CDT -----  Contact: [phone]  Requesting an RX refill or new RX.  Is this a refill or new RX: refill   RX name and strength (copy/paste from chart):  albuterol (PROVENTIL) 2.5 mg /3 mL (0.083 %) nebulizer solution  Is this a 30 day or 90 day RX:   Pharmacy name and phone # (copy/paste from chart):    [facility] #[id number] - [place], [place] - [facility] AT [facility] [place] [address] [place] [address]  Phone: [phone] Fax: [phone]      The doctors have asked that we provide their patients with the following 2 reminders -- prescription refills can take up to 72 hours, and a friendly reminder that in the future you can use your [facility] account to request refills: call back

## 2023-10-05 NOTE — TELEPHONE ENCOUNTER
No care due was identified.  [facility] Embedded Care Due Messages. Reference number: [id number].   10/05/2023 12:57:40 PM CDT

## 2023-10-05 NOTE — TELEPHONE ENCOUNTER
Refill Decision Note   Teddy Raymon  is requesting a refill authorization.  Brief Assessment and Rationale for Refill:  Approve     Medication Therapy Plan:         Alert overridden per protocol: Yes   Comments:     Note composed:3:17 PM 10/05/2023

## 2023-10-12 NOTE — TELEPHONE ENCOUNTER
----- Message from Jeanne England sent at 2/12/2020  9:01 AM CST -----  Contact: [phone]/pt's mother Merly   Type:  Needs Medical Advice    Who Called:pt's mother Merly   Symptoms (please be specific):sore throat and sinus   How long has patient had these symptoms: 3 days  Would the patient rather a call back or a response via [facility]?callback   Best Call Back Number:[phone]  Additional Information:Pt's mother is requesting medication for sinus infection.      Otezla Counseling: The side effects of Otezla were discussed with the patient, including but not limited to worsening or new depression, weight loss, diarrhea, nausea, upper respiratory tract infection, and headache. Patient instructed to call the office should any adverse effect occur.  The patient verbalized understanding of the proper use and possible adverse effects of Otezla.  All the patient's questions and concerns were addressed.

## 2023-11-22 ENCOUNTER — LAB VISIT (OUTPATIENT)
Dept: LAB | Facility: HOSPITAL | Age: 48
End: 2023-11-22
Attending: INTERNAL MEDICINE
Payer: MEDICARE

## 2023-11-22 DIAGNOSIS — E78.2 HYPERLIPIDEMIA, MIXED: ICD-10-CM

## 2023-11-22 DIAGNOSIS — R73.01 IMPAIRED FASTING GLUCOSE: ICD-10-CM

## 2023-11-22 LAB
ALBUMIN SERPL BCP-MCNC: 3.5 G/DL (ref 3.5–5.2)
ALP SERPL-CCNC: 147 U/L (ref 55–135)
ALT SERPL W/O P-5'-P-CCNC: 35 U/L (ref 10–44)
ANION GAP SERPL CALC-SCNC: 10 MMOL/L (ref 8–16)
AST SERPL-CCNC: 24 U/L (ref 10–40)
BILIRUB SERPL-MCNC: 0.9 MG/DL (ref 0.1–1)
BUN SERPL-MCNC: 9 MG/DL (ref 6–20)
CALCIUM SERPL-MCNC: 9.2 MG/DL (ref 8.7–10.5)
CHLORIDE SERPL-SCNC: 101 MMOL/L (ref 95–110)
CHOLEST SERPL-MCNC: 149 MG/DL (ref 120–199)
CHOLEST/HDLC SERPL: 4.7 {RATIO} (ref 2–5)
CO2 SERPL-SCNC: 26 MMOL/L (ref 23–29)
CREAT SERPL-MCNC: 0.7 MG/DL (ref 0.5–1.4)
EST. GFR  (NO RACE VARIABLE): >60 ML/MIN/1.73 M^2
ESTIMATED AVG GLUCOSE: 123 MG/DL (ref 68–131)
GLUCOSE SERPL-MCNC: 129 MG/DL (ref 70–110)
HBA1C MFR BLD: 5.9 % (ref 4–5.6)
HDLC SERPL-MCNC: 32 MG/DL (ref 40–75)
HDLC SERPL: 21.5 % (ref 20–50)
LDLC SERPL CALC-MCNC: 90.8 MG/DL (ref 63–159)
NONHDLC SERPL-MCNC: 117 MG/DL
POTASSIUM SERPL-SCNC: 3.4 MMOL/L (ref 3.5–5.1)
PROT SERPL-MCNC: 8 G/DL (ref 6–8.4)
SODIUM SERPL-SCNC: 137 MMOL/L (ref 136–145)
TRIGL SERPL-MCNC: 131 MG/DL (ref 30–150)

## 2023-11-22 PROCEDURE — 80061 LIPID PANEL: CPT | Mod: HCNC | Performed by: INTERNAL MEDICINE

## 2023-11-22 PROCEDURE — 80053 COMPREHEN METABOLIC PANEL: CPT | Mod: HCNC | Performed by: INTERNAL MEDICINE

## 2023-11-22 PROCEDURE — 36415 COLL VENOUS BLD VENIPUNCTURE: CPT | Mod: HCNC | Performed by: INTERNAL MEDICINE

## 2023-11-22 PROCEDURE — 83036 HEMOGLOBIN GLYCOSYLATED A1C: CPT | Mod: HCNC | Performed by: INTERNAL MEDICINE

## 2023-11-28 ENCOUNTER — OFFICE VISIT (OUTPATIENT)
Dept: PRIMARY CARE CLINIC | Facility: CLINIC | Age: 48
End: 2023-11-28
Payer: MEDICARE

## 2023-11-28 VITALS
BODY MASS INDEX: 41.6 KG/M2 | WEIGHT: 307.13 LBS | SYSTOLIC BLOOD PRESSURE: 120 MMHG | DIASTOLIC BLOOD PRESSURE: 60 MMHG | HEART RATE: 84 BPM | HEIGHT: 72 IN | OXYGEN SATURATION: 97 %

## 2023-11-28 DIAGNOSIS — J06.9 URTI (ACUTE UPPER RESPIRATORY INFECTION): ICD-10-CM

## 2023-11-28 DIAGNOSIS — I10 ESSENTIAL HYPERTENSION: Primary | ICD-10-CM

## 2023-11-28 DIAGNOSIS — J45.30 MILD PERSISTENT ASTHMA WITHOUT COMPLICATION: ICD-10-CM

## 2023-11-28 DIAGNOSIS — R73.01 IMPAIRED FASTING GLUCOSE: ICD-10-CM

## 2023-11-28 DIAGNOSIS — E78.2 HYPERLIPIDEMIA, MIXED: ICD-10-CM

## 2023-11-28 DIAGNOSIS — J30.2 SEASONAL ALLERGIC RHINITIS, UNSPECIFIED TRIGGER: ICD-10-CM

## 2023-11-28 PROCEDURE — 3074F SYST BP LT 130 MM HG: CPT | Mod: CPTII,S$GLB,, | Performed by: INTERNAL MEDICINE

## 2023-11-28 PROCEDURE — 3078F PR MOST RECENT DIASTOLIC BLOOD PRESSURE < 80 MM HG: ICD-10-PCS | Mod: CPTII,S$GLB,, | Performed by: INTERNAL MEDICINE

## 2023-11-28 PROCEDURE — 3008F PR BODY MASS INDEX (BMI) DOCUMENTED: ICD-10-PCS | Mod: CPTII,S$GLB,, | Performed by: INTERNAL MEDICINE

## 2023-11-28 PROCEDURE — 99999 PR PBB SHADOW E&M-EST. PATIENT-LVL III: ICD-10-PCS | Mod: PBBFAC,,, | Performed by: INTERNAL MEDICINE

## 2023-11-28 PROCEDURE — 99999 PR PBB SHADOW E&M-EST. PATIENT-LVL III: CPT | Mod: PBBFAC,,, | Performed by: INTERNAL MEDICINE

## 2023-11-28 PROCEDURE — 1159F PR MEDICATION LIST DOCUMENTED IN MEDICAL RECORD: ICD-10-PCS | Mod: CPTII,S$GLB,, | Performed by: INTERNAL MEDICINE

## 2023-11-28 PROCEDURE — 3044F HG A1C LEVEL LT 7.0%: CPT | Mod: CPTII,S$GLB,, | Performed by: INTERNAL MEDICINE

## 2023-11-28 PROCEDURE — 3044F PR MOST RECENT HEMOGLOBIN A1C LEVEL <7.0%: ICD-10-PCS | Mod: CPTII,S$GLB,, | Performed by: INTERNAL MEDICINE

## 2023-11-28 PROCEDURE — 3078F DIAST BP <80 MM HG: CPT | Mod: CPTII,S$GLB,, | Performed by: INTERNAL MEDICINE

## 2023-11-28 PROCEDURE — 3008F BODY MASS INDEX DOCD: CPT | Mod: CPTII,S$GLB,, | Performed by: INTERNAL MEDICINE

## 2023-11-28 PROCEDURE — 1159F MED LIST DOCD IN RCRD: CPT | Mod: CPTII,S$GLB,, | Performed by: INTERNAL MEDICINE

## 2023-11-28 PROCEDURE — 99214 PR OFFICE/OUTPT VISIT, EST, LEVL IV, 30-39 MIN: ICD-10-PCS | Mod: S$GLB,,, | Performed by: INTERNAL MEDICINE

## 2023-11-28 PROCEDURE — 99214 OFFICE O/P EST MOD 30 MIN: CPT | Mod: S$GLB,,, | Performed by: INTERNAL MEDICINE

## 2023-11-28 PROCEDURE — 3074F PR MOST RECENT SYSTOLIC BLOOD PRESSURE < 130 MM HG: ICD-10-PCS | Mod: CPTII,S$GLB,, | Performed by: INTERNAL MEDICINE

## 2023-11-28 RX ORDER — METHYLPREDNISOLONE 4 MG/1
TABLET ORAL
Qty: 21 EACH | Refills: 0 | Status: SHIPPED | OUTPATIENT
Start: 2023-11-28 | End: 2023-12-19

## 2023-11-28 NOTE — PROGRESS NOTES
[facility] Note    Chief Complaint      Chief Complaint   Patient presents with    Follow-up     6 month        History of Present Illness      Teddy Ennis is a 48 y.o. male with chronic conditions of HTN, HLD, asthma, IFG, depression, allergic rhinitis  who presents today for: follow up chronic conditions.    HTN: BP at goal on amlodipine.  HLD: Controlled on pravastatin. LDL 98, doing well.    Asthma, moderate persistent, allergic rhinitis: previously on immunotherapy.  On Breo.    IFG: A1C 5.9 slightly higher than last check.  Counseled on diet and exercise  Flu shot UTD.  TDAP 2019.  FITKIT 2022.       Past Medical History:  Past Medical History:   Diagnosis Date    Allergy     Asthma        Past Surgical History:   has a past surgical history that includes Ankle surgery (Left, 1995); Adenoidectomy; and Sinus surgery.    Family History:  family history includes Hypertension in his mother; No Known Problems in his father; Stroke in his mother.     Social History:  Social History     Tobacco Use    Smoking status: Never     Passive exposure: Never    Smokeless tobacco: Never   Substance Use Topics    Alcohol use: Not Currently    Drug use: Never       I personally reviewed all past medical, surgical, social and family history.    Review of Systems   Constitutional:  Negative for chills, fever and malaise/fatigue.   Respiratory:  Negative for shortness of breath.    Cardiovascular:  Negative for chest pain.   Gastrointestinal:  Negative for constipation, diarrhea, nausea and vomiting.   Skin:  Negative for rash.   Neurological:  Negative for weakness.   All other systems reviewed and are negative.       Medications:  Outpatient Encounter Medications as of 11/28/2023   Medication Sig Dispense Refill    albuterol (PROVENTIL) 2.5 mg /3 mL (0.083 %) nebulizer solution Take 3 mLs (2.5 mg total) by nebulization every 4 to 6 hours as needed for Wheezing. 1080 mL 2    albuterol (PROVENTIL/VENTOLIN  HFA) 90 mcg/actuation inhaler INHALE 2 PUFFS INTO THE LUNGS EVERY 6 HOURS AS NEEDED FOR WHEEZING 25.5 g 3    amLODIPine (NORVASC) 10 MG tablet Take 1 tablet (10 mg total) by mouth once daily. 90 tablet 3    azelastine (ASTELIN) 137 mcg (0.1 %) nasal spray 1 spray (137 mcg total) by Nasal route 2 (two) times daily. 30 mL 11    BREO ELLIPTA 200-25 mcg/dose DsDv diskus inhaler Inhale 1 puff into the lungs once daily. 60 each 11    EScitalopram oxalate (LEXAPRO) 20 MG tablet Take 1 tablet (20 mg total) by mouth once daily. 90 tablet 3    fexofenadine (ALLEGRA) 180 MG tablet Take 180 mg by mouth once daily.      fluticasone propionate (FLONASE) 50 mcg/actuation nasal spray 2 spray each nostril daily 48 g 2    magnesium sulfate in water (MAGNESIUM SULFATE 2 GRAM/50 ML) 2 gram/50 mL PgBk       methylPREDNISolone (MEDROL DOSEPACK) 4 mg tablet use as directed 21 each 0    montelukast (SINGULAIR) 10 mg tablet TAKE 1 TABLET BY MOUTH EVERY DAY 90 tablet 3    pravastatin (PRAVACHOL) 20 MG tablet TK 1 T PO D 90 tablet 3    [DISCONTINUED] AFLURIA QD 2020-21,3YR UP,,PF, 60 mcg (15 mcg x 4)/0.5 mL Syrg ADM 0.5ML IM UTD       No facility-administered encounter medications on file as of 11/28/2023.       Allergies:  Review of patient's allergies indicates:   Allergen Reactions    Penicillins Hives       Health Maintenance:  Immunization History   Administered Date(s) Administered    COVID-19, MRNA, LN-S, PF (Pfizer) (Purple Cap) 07/25/2021, 08/15/2021    Influenza - Quadrivalent - PF *Preferred* (6 months and older) 09/30/2013, 09/17/2014, 09/03/2016, 09/29/2018, 08/26/2019, 09/12/2020, 10/02/2021    Influenza Split 09/30/2009, 09/30/2010, 09/27/2011, 09/30/2013, 09/17/2014    Tdap 09/29/2018, 11/29/2019      Health Maintenance   Topic Date Due    Hepatitis C Screening  Never done    Colorectal Cancer Screening  06/06/2024    Lipid Panel  11/22/2028    TETANUS VACCINE  11/29/2029        Physical Exam      Vital Signs  Pulse: 84  SpO2:  97 %  BP: 120/60  BP Location: Right arm  Patient Position: Sitting  Pain Score: 0-No pain  Height and Weight  Height: 6' (182.9 cm)  Weight: (!) 139.3 kg (307 lb 1.6 oz)  BSA (Calculated - sq m): 2.66 sq meters  BMI (Calculated): 41.6  Weight in (lb) to have BMI = 25: 183.9]    Physical Exam  Vitals reviewed.   Constitutional:       Appearance: He is well-developed.   HENT:      Head: Normocephalic and atraumatic.      Right Ear: External ear normal.      Left Ear: External ear normal.   Cardiovascular:      Rate and Rhythm: Normal rate and regular rhythm.      Heart sounds: Normal heart sounds. No murmur heard.  Pulmonary:      Effort: Pulmonary effort is normal.      Breath sounds: Normal breath sounds. No wheezing or rales.   Abdominal:      General: Bowel sounds are normal.      Palpations: Abdomen is soft.          Laboratory:  CBC:  Recent Labs   Lab 04/22/22 2149   WBC 12.78 H   RBC 5.13   Hemoglobin 15.2   Hematocrit 45.6   Platelets 295   MCV 89   MCH 29.6   MCHC 33.3     CMP:  Recent Labs   Lab 04/22/22 2149 11/11/22  0854 11/22/23  0857   Glucose 190 H 123 H 129 H   Calcium 9.3 9.1 9.2   Albumin 3.8 3.6 3.5   Total Protein 8.3 8.1 8.0   Sodium 138 136 137   Potassium 3.2 L 3.7 3.4 L   CO2 26 28 26   Chloride 101 99 101   BUN 9 11 9   Alkaline Phosphatase 149 H 154 H 147 H   ALT 23 22 35   AST 20 18 24   Total Bilirubin 0.6 0.7 0.9     URINALYSIS:       LIPIDS:  Recent Labs   Lab 11/03/21  0827 11/11/22  0854 11/22/23  0857   HDL 31 L 31 L 32 L   Cholesterol 144 153 149   Triglycerides 98 119 131   LDL Cholesterol 93.4 98.2 90.8   HDL/Cholesterol Ratio 21.5 20.3 21.5   Non-HDL Cholesterol 113 122 117   Total Cholesterol/HDL Ratio 4.6 4.9 4.7     TSH:      A1C:  Recent Labs   Lab 11/03/21  0827 11/11/22  0854 11/22/23  0857   Hemoglobin A1C 5.9 H 5.4 5.9 H       Assessment/Plan     Teddy Ennis is a 48 y.o.male with:    1. Essential hypertension  Continue current meds.    2. Hyperlipidemia,  mixed  Continue current meds.    3. Mild persistent asthma without complication  Continue current meds.    4. Impaired fasting glucose  Cont diet. Reviewed labs.  5. Seasonal allergic rhinitis, unspecified trigger  Continue current meds.    6. URTI (acute upper respiratory infection)  - methylPREDNISolone (MEDROL DOSEPACK) 4 mg tablet; use as directed  Dispense: 21 each; Refill: 0       Chronic conditions status updated as per HPI.  Other than changes above, cont current medications and maintain follow up with specialists.  No follow-ups on file.    Future Appointments   Date Time Provider Department Center   5/28/2024  3:45 PM Doni Wood MD [facility]       Doni Wood MD  [facility]

## 2023-12-04 ENCOUNTER — TELEPHONE (OUTPATIENT)
Dept: PRIMARY CARE CLINIC | Facility: CLINIC | Age: 48
End: 2023-12-04
Payer: MEDICARE

## 2023-12-04 NOTE — TELEPHONE ENCOUNTER
----- Message from Bree Olmstead sent at 12/4/2023 10:05 AM CST -----  Contact: [phone] Pts mother  Pts mother is calling in regards to the pt getting summoned to jury duty. Pts mother is asking if Dr Wood can please right a letter stating the pt can not go to jury duty. The fax number . Please call and advise. Thank you

## 2023-12-04 NOTE — LETTER
[facility]  [address]  [place] [address] December 5, 2023    Teddy Ennis  [address] [place] [id number]      To Whom It May Concern:    Teddy Ennis is unable to participate in jury duty due to chronic medical conditions.    If you have any questions or concerns, please feel free to call my office.    Sincerely,         Doni Wood MD

## 2023-12-20 ENCOUNTER — TELEPHONE (OUTPATIENT)
Dept: PRIMARY CARE CLINIC | Facility: CLINIC | Age: 48
End: 2023-12-20
Payer: MEDICARE

## 2023-12-20 DIAGNOSIS — M79.643 PAIN OF HAND, UNSPECIFIED LATERALITY: Primary | ICD-10-CM

## 2023-12-20 NOTE — TELEPHONE ENCOUNTER
----- Message from Becki Sinha sent at 12/20/2023  2:56 PM CST -----  Contact: [phone]  1MEDICALADVICE     Patient is calling for Medical Advice regarding:recommend an ortho dr for the hand     How long has patient had these symptoms:    Pharmacy name and phone#:    Would like response via [employer]:  no     Comments:  Pts mother is calling she is stating they are needing a recommendation for a hand ortho dr please give return call

## 2023-12-20 NOTE — TELEPHONE ENCOUNTER
Spoke to pt and his mother. They report his R hand has been hurting and swelling. Pain level currently is a 7.  No recent injuries but he did break his hand in 3 different places in high school. They are not sure if it's related or not. Please advise.

## 2024-01-22 ENCOUNTER — PATIENT MESSAGE (OUTPATIENT)
Dept: ORTHOPEDICS | Facility: CLINIC | Age: 49
End: 2024-01-22
Payer: MEDICARE

## 2024-01-22 DIAGNOSIS — M79.641 RIGHT HAND PAIN: Primary | ICD-10-CM

## 2024-01-23 ENCOUNTER — OFFICE VISIT (OUTPATIENT)
Dept: ORTHOPEDICS | Facility: CLINIC | Age: 49
End: 2024-01-23
Payer: MEDICARE

## 2024-01-23 ENCOUNTER — HOSPITAL ENCOUNTER (OUTPATIENT)
Dept: RADIOLOGY | Facility: HOSPITAL | Age: 49
Discharge: HOME | End: 2024-01-23
Attending: ORTHOPAEDIC SURGERY
Payer: MEDICARE

## 2024-01-23 VITALS — WEIGHT: 307 LBS | HEIGHT: 72 IN | BODY MASS INDEX: 41.58 KG/M2

## 2024-01-23 DIAGNOSIS — M79.643 PAIN OF HAND, UNSPECIFIED LATERALITY: ICD-10-CM

## 2024-01-23 DIAGNOSIS — M79.641 RIGHT HAND PAIN: ICD-10-CM

## 2024-01-23 DIAGNOSIS — M25.531 RIGHT WRIST PAIN: Primary | ICD-10-CM

## 2024-01-23 PROCEDURE — 99999 PR PBB SHADOW E&M-EST. PATIENT-LVL III: CPT | Mod: PBBFAC,,, | Performed by: ORTHOPAEDIC SURGERY

## 2024-01-23 PROCEDURE — 73130 X-RAY EXAM OF HAND: CPT | Mod: 26,HCNC,RT, | Performed by: RADIOLOGY

## 2024-01-23 PROCEDURE — 99213 OFFICE O/P EST LOW 20 MIN: CPT | Mod: PBBFAC | Performed by: ORTHOPAEDIC SURGERY

## 2024-01-23 PROCEDURE — 1159F MED LIST DOCD IN RCRD: CPT | Mod: CPTII,S$GLB,, | Performed by: ORTHOPAEDIC SURGERY

## 2024-01-23 PROCEDURE — 3008F BODY MASS INDEX DOCD: CPT | Mod: CPTII,S$GLB,, | Performed by: ORTHOPAEDIC SURGERY

## 2024-01-23 PROCEDURE — 73130 X-RAY EXAM OF HAND: CPT | Mod: TC,HCNC,RT

## 2024-01-23 PROCEDURE — 99204 OFFICE O/P NEW MOD 45 MIN: CPT | Mod: S$GLB,,, | Performed by: ORTHOPAEDIC SURGERY

## 2024-01-23 NOTE — PROGRESS NOTES
[facility]  History & Physical  Orthopedics    SUBJECTIVE:      COVID-19 attestation:  This patient was treated during the COVID-19 pandemic.  This was discussed with the patient, they are aware of our current policies and procedures, were given the option of delaying their visit and or switching to a virtual visit, delaying their surgery when applicable, and they elect to proceed.    Chief Complaint: right wrist pain    Referring Provider: Doni Wood MD     History of Present Illness:  Patient is a 48 y.o. right hand dominant male who presents today with complaints of right wrist pain.  Patient reportedly had a ground level fall around 6 months ago onto his right wrist, and has since been experiencing progressively worsening pain.  Per mother and patient, he presented to the emergency department and no acute fractures were identified at that time (per chart review, last presentation to ED for ground level fall was in December 2022).  Describes his pain as throbbing and indicates it involves the radial aspect of the dorsum of the right wrist.  Denies any numbness, tingling, or weakness.  States he has had multiple fractures of the right hand that occurred as a child, but denies any other previous injuries or surgeries to the right upper extremity.      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    Onset of symptoms/DOI was reportedly 6 months prior to presentation today.    Symptoms are aggravated by activity and movement.    Symptoms are alleviated by rest.  Denies taking any medications, denies use of splints     Symptoms consist of pain, swelling, and erythema.    The patient rates their pain as a 7/10.    Attempted treatment(s) and/or interventions include activity modifications, rest, rest.     The patient denies any fevers, chills, N/V, D/C and presents for evaluation.       Past Medical History:   Diagnosis Date    Allergy     Asthma      Past Surgical History:    Procedure Laterality Date    ADENOIDECTOMY      ANKLE SURGERY Left 1995    SINUS SURGERY       Review of patient's allergies indicates:   Allergen Reactions    Penicillins Hives     Social History     Social History Narrative    Not on file     Family History   Problem Relation Age of Onset    Hypertension Mother     Stroke Mother     No Known Problems Father          Current Outpatient Medications:     albuterol (PROVENTIL) 2.5 mg /3 mL (0.083 %) nebulizer solution, Take 3 mLs (2.5 mg total) by nebulization every 4 to 6 hours as needed for Wheezing., Disp: 1080 mL, Rfl: 2    albuterol (PROVENTIL/VENTOLIN HFA) 90 mcg/actuation inhaler, INHALE 2 PUFFS INTO THE LUNGS EVERY 6 HOURS AS NEEDED FOR WHEEZING, Disp: 25.5 g, Rfl: 3    amLODIPine (NORVASC) 10 MG tablet, Take 1 tablet (10 mg total) by mouth once daily., Disp: 90 tablet, Rfl: 3    azelastine (ASTELIN) 137 mcg (0.1 %) nasal spray, 1 spray (137 mcg total) by Nasal route 2 (two) times daily., Disp: 30 mL, Rfl: 11    BREO ELLIPTA 200-25 mcg/dose DsDv diskus inhaler, Inhale 1 puff into the lungs once daily., Disp: 60 each, Rfl: 11    EScitalopram oxalate (LEXAPRO) 20 MG tablet, Take 1 tablet (20 mg total) by mouth once daily., Disp: 90 tablet, Rfl: 3    fexofenadine (ALLEGRA) 180 MG tablet, Take 180 mg by mouth once daily., Disp: , Rfl:     fluticasone propionate (FLONASE) 50 mcg/actuation nasal spray, 2 spray each nostril daily, Disp: 48 g, Rfl: 2    magnesium sulfate in water (MAGNESIUM SULFATE 2 GRAM/50 ML) 2 gram/50 mL PgBk, , Disp: , Rfl:     montelukast (SINGULAIR) 10 mg tablet, TAKE 1 TABLET BY MOUTH EVERY DAY, Disp: 90 tablet, Rfl: 3    pravastatin (PRAVACHOL) 20 MG tablet, TK 1 T PO D, Disp: 90 tablet, Rfl: 3      Review of Systems:  As per HPI otherwise noncontributory    OBJECTIVE:      Vital Signs (Most Recent):  Vitals:    01/23/24 1509   Weight: (!) 139.3 kg (307 lb)   Height: 6' (1.829 m)     Body mass index is 41.64 kg/m².      Physical  Exam:  Constitutional: The patient appears well-developed and well-nourished. No distress.   Skin: No lesions appreciated  Head: Normocephalic and atraumatic.   Nose: Nose normal.   Ears: No deformities seen  Eyes: Conjunctivae and EOM are normal.   Neck: No tracheal deviation present.   Cardiovascular: Normal rate and intact distal pulses.    Pulmonary/Chest: Effort normal. No respiratory distress.   Abdominal: There is no guarding.   Neurological: The patient is alert.   Psychiatric: The patient has a normal mood and affect.     Right Hand/Wrist Examination:    Observation/Inspection:  Swelling  none    Deformity  none  Discoloration  none     Scars   none    Atrophy  none    HAND/WRIST EXAMINATION:  Finkelstein's Test   Neg  WHAT Test    Neg  Snuff box tenderness   Positive  Alves's Test    Neg  Hook of Hamate Tenderness  Neg  CMC grind    Neg  Circumduction test   Neg    Neurovascular Exam:  Digits WWP, brisk CR < 3s throughout  NVI motor/LTS to M/R/U nerves, radial pulse 2+  Tinel's Test - Carpal Tunnel  Neg  Tinel's Test - Cubital Tunnel  Neg  Phalen's Test    Neg  Median Nerve Compression Test Neg    ROM hand full, painless    ROM wrist full, but with pain     ROM elbow full, painless    Abdomen not guarded  Respirations nonlabored  Perfusion intact    Diagnostic Results:     Imaging - I independently viewed the patient's imaging as well as the radiology report.  Xrays of the patient's right hand demonstrate no evidence of any acute fractures or dislocations or significant degenerative changes.      ASSESSMENT/PLAN:      48 y.o. yo male with right wrist pain that has been reportedly present for the past 6 months     Plan: The patient and I had a thorough discussion today.  We discussed the working diagnosis as well as several other potential alternative diagnoses.  Treatment options were discussed, both conservative and surgical.  Conservative treatment options would include things such as activity  modifications, workplace modifications, a period of rest, oral vs topical OTC and prescription anti-inflammatory medications, occupational therapy, splinting/bracing, immobilization, corticosteroid injections, and others.  Surgical options were discussed as well.     With no identifiable fractures on x-ray, and obvious pain on physical exam (painful wrist AROM & snuff box TTP), we will plan to proceed with MRI of the right wrist.  Recommendation was made to refrain from any weight-bearing activities to the right upper extremity and for patient to continue resting the right wrist.   Patient will follow up on 02/01/24 for review of imaging.      Should the patient's symptoms worsen, persist, or fail to improve they should return for reevaluation and I would be happy to see them back anytime.        Jose M Desai M.D.    Please be aware that this note has been generated with the assistance of [employer] voice-to-text.  Please excuse any spelling or grammatical errors.    Thank you for choosing Dr. Jose M Desai for your orthopedic hand and upper extremity care. It is our goal to provide you with exceptional care that will help keep you healthy, active, and get you back in the game.     If you felt that you received exemplary care today, please consider leaving feedback for Dr. Desai on [employer] at [email or web address].    Please do not hesitate to reach out to us via email, phone, or MyChart with any questions, concerns, or feedback.

## 2024-01-26 ENCOUNTER — HOSPITAL ENCOUNTER (OUTPATIENT)
Dept: RADIOLOGY | Facility: HOSPITAL | Age: 49
Discharge: HOME | End: 2024-01-26
Attending: ORTHOPAEDIC SURGERY
Payer: MEDICARE

## 2024-01-26 DIAGNOSIS — M25.531 RIGHT WRIST PAIN: ICD-10-CM

## 2024-01-26 PROCEDURE — 73221 MRI JOINT UPR EXTREM W/O DYE: CPT | Mod: TC,HCNC,RT

## 2024-01-26 PROCEDURE — 73221 MRI JOINT UPR EXTREM W/O DYE: CPT | Mod: 26,HCNC,RT, | Performed by: RADIOLOGY

## 2024-01-29 ENCOUNTER — TELEPHONE (OUTPATIENT)
Dept: PRIMARY CARE CLINIC | Facility: CLINIC | Age: 49
End: 2024-01-29
Payer: MEDICARE

## 2024-01-29 DIAGNOSIS — Z01.00 ENCOUNTER FOR ROUTINE EYE AND VISION EXAMINATION: Primary | ICD-10-CM

## 2024-02-01 ENCOUNTER — OFFICE VISIT (OUTPATIENT)
Dept: ORTHOPEDICS | Facility: CLINIC | Age: 49
End: 2024-02-01
Payer: MEDICARE

## 2024-02-01 VITALS — BODY MASS INDEX: 41.58 KG/M2 | WEIGHT: 307 LBS | HEIGHT: 72 IN

## 2024-02-01 DIAGNOSIS — G56.01 RIGHT CARPAL TUNNEL SYNDROME: Primary | ICD-10-CM

## 2024-02-01 PROCEDURE — 1159F MED LIST DOCD IN RCRD: CPT | Mod: HCNC,CPTII,S$GLB, | Performed by: ORTHOPAEDIC SURGERY

## 2024-02-01 PROCEDURE — 3008F BODY MASS INDEX DOCD: CPT | Mod: HCNC,CPTII,S$GLB, | Performed by: ORTHOPAEDIC SURGERY

## 2024-02-01 PROCEDURE — 99213 OFFICE O/P EST LOW 20 MIN: CPT | Mod: HCNC,S$GLB,, | Performed by: ORTHOPAEDIC SURGERY

## 2024-02-01 PROCEDURE — 99999 PR PBB SHADOW E&M-EST. PATIENT-LVL III: CPT | Mod: PBBFAC,HCNC,, | Performed by: ORTHOPAEDIC SURGERY

## 2024-02-01 NOTE — PROGRESS NOTES
[facility]  History & Physical  Orthopedics    SUBJECTIVE:      COVID-19 attestation:  This patient was treated during the COVID-19 pandemic.  This was discussed with the patient, they are aware of our current policies and procedures, were given the option of delaying their visit and or switching to a virtual visit, delaying their surgery when applicable, and they elect to proceed.    Chief Complaint: right wrist pain    Referring Provider: No ref. provider found     History of Present Illness:  Patient is a 48 y.o. right hand dominant male who presents today with complaints of right wrist pain.  Patient reportedly had a ground level fall around 6 months ago onto his right wrist, and has since been experiencing progressively worsening pain.  Per mother and patient, he presented to the emergency department and no acute fractures were identified at that time (per chart review, last presentation to ED for ground level fall was in December 2022).  Describes his pain as throbbing and indicates it involves the radial aspect of the dorsum of the right wrist.  Denies any numbness, tingling, or weakness.  States he has had multiple fractures of the right hand that occurred as a child, but denies any other previous injuries or surgeries to the right upper extremity.      Interval history February 1, 2024:  The patient returns today for re-evaluation.  He notes that his right wrist pain persists.  He was unable to fully complete his MRI unfortunately.  Today he is describing his right upper extremity wrist and hand pain as more of a numbness and tingling at nighttime which is unbearable.  He has no other complaints returns for re-evaluation.    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    Onset of symptoms/DOI was reportedly 6 months prior to presentation today.    Symptoms are aggravated by activity and movement.    Symptoms are alleviated by rest.  Denies taking any medications, denies use  of splints     Symptoms consist of pain, swelling, and erythema.    The patient rates their pain as a 7/10.    Attempted treatment(s) and/or interventions include activity modifications, rest, rest.     The patient denies any fevers, chills, N/V, D/C and presents for evaluation.       Past Medical History:   Diagnosis Date    Allergy     Asthma      Past Surgical History:   Procedure Laterality Date    ADENOIDECTOMY      ANKLE SURGERY Left 1995    SINUS SURGERY       Review of patient's allergies indicates:   Allergen Reactions    Penicillins Hives     Social History     Social History Narrative    Not on file     Family History   Problem Relation Age of Onset    Hypertension Mother     Stroke Mother     No Known Problems Father          Current Outpatient Medications:     albuterol (PROVENTIL) 2.5 mg /3 mL (0.083 %) nebulizer solution, Take 3 mLs (2.5 mg total) by nebulization every 4 to 6 hours as needed for Wheezing., Disp: 1080 mL, Rfl: 2    albuterol (PROVENTIL/VENTOLIN HFA) 90 mcg/actuation inhaler, INHALE 2 PUFFS INTO THE LUNGS EVERY 6 HOURS AS NEEDED FOR WHEEZING, Disp: 25.5 g, Rfl: 3    amLODIPine (NORVASC) 10 MG tablet, Take 1 tablet (10 mg total) by mouth once daily., Disp: 90 tablet, Rfl: 3    azelastine (ASTELIN) 137 mcg (0.1 %) nasal spray, 1 spray (137 mcg total) by Nasal route 2 (two) times daily., Disp: 30 mL, Rfl: 11    BREO ELLIPTA 200-25 mcg/dose DsDv diskus inhaler, Inhale 1 puff into the lungs once daily., Disp: 60 each, Rfl: 11    EScitalopram oxalate (LEXAPRO) 20 MG tablet, Take 1 tablet (20 mg total) by mouth once daily., Disp: 90 tablet, Rfl: 3    fexofenadine (ALLEGRA) 180 MG tablet, Take 180 mg by mouth once daily., Disp: , Rfl:     fluticasone propionate (FLONASE) 50 mcg/actuation nasal spray, 2 spray each nostril daily, Disp: 48 g, Rfl: 2    magnesium sulfate in water (MAGNESIUM SULFATE 2 GRAM/50 ML) 2 gram/50 mL PgBk, , Disp: , Rfl:     montelukast (SINGULAIR) 10 mg tablet, TAKE 1  TABLET BY MOUTH EVERY DAY, Disp: 90 tablet, Rfl: 3    pravastatin (PRAVACHOL) 20 MG tablet, TK 1 T PO D, Disp: 90 tablet, Rfl: 3      Review of Systems:  As per HPI otherwise noncontributory    OBJECTIVE:      Vital Signs (Most Recent):  Vitals:    02/01/24 1307   Weight: (!) 139.3 kg (307 lb)   Height: 6' (1.829 m)     Body mass index is 41.64 kg/m².      Physical Exam:  Constitutional: The patient appears well-developed and well-nourished. No distress.   Skin: No lesions appreciated  Head: Normocephalic and atraumatic.   Nose: Nose normal.   Ears: No deformities seen  Eyes: Conjunctivae and EOM are normal.   Neck: No tracheal deviation present.   Cardiovascular: Normal rate and intact distal pulses.    Pulmonary/Chest: Effort normal. No respiratory distress.   Abdominal: There is no guarding.   Neurological: The patient is alert.   Psychiatric: The patient has a normal mood and affect.     Right Hand/Wrist Examination:    Observation/Inspection:  Swelling  none    Deformity  none  Discoloration  none     Scars   none    Atrophy  none    HAND/WRIST EXAMINATION:  Finkelstein's Test   Neg  WHAT Test    Neg  Snuff box tenderness   Positive  Alves's Test    Neg  Hook of Hamate Tenderness  Neg  CMC grind    Neg  Circumduction test   Neg    Neurovascular Exam:  Digits WWP, brisk CR < 3s throughout  NVI motor/LTS to M/R/U nerves, radial pulse 2+  Tinel's Test - Carpal Tunnel  positive  Tinel's Test - Cubital Tunnel  Neg  Phalen's Test    Neg  Median Nerve Compression Test positive    ROM hand full, painless    ROM wrist full, but with pain     ROM elbow full, painless    Abdomen not guarded  Respirations nonlabored  Perfusion intact    Diagnostic Results:     Imaging - I independently viewed the patient's imaging as well as the radiology report.  Xrays of the patient's right hand demonstrate no evidence of any acute fractures or dislocations or significant degenerative changes.    MRI right wrist-Impression:     1.  Examination terminated early at patient's request.  Completed sequences are degraded by motion artifact.  2. Dorsal capsular edema signal, not well evaluated secondary to aforementioned limitations.  3. Nondiagnostic evaluation of the triangular fibrocartilage complex and intrinsic wrist ligaments.    ASSESSMENT/PLAN:      48 y.o. yo male with right hand and wrist pain, carpal tunnel syndrome  Plan: The patient and I had a thorough discussion today.  We discussed the working diagnosis as well as several other potential alternative diagnoses.  Treatment options were discussed, both conservative and surgical.  Conservative treatment options would include things such as activity modifications, workplace modifications, a period of rest, oral vs topical OTC and prescription anti-inflammatory medications, occupational therapy, splinting/bracing, immobilization, corticosteroid injections, and others.  Surgical options were discussed as well.     At this point in time, Teddy is reporting more carpal tunnel symptoms that anything else.  I would like to provide him with a nocturnal carpal tunnel splint and obtain an EMG.  He reports that he believes he will be able to complete this examination.  Follow-up after EMG for these results and treatment moving forward.      Thank you  Should the patient's symptoms worsen, persist, or fail to improve they should return for reevaluation and I would be happy to see them back anytime.        Jose M Desai M.D.    Please be aware that this note has been generated with the assistance of [employer] voice-to-text.  Please excuse any spelling or grammatical errors.    Thank you for choosing Dr. Jose M Desai for your orthopedic hand and upper extremity care. It is our goal to provide you with exceptional care that will help keep you healthy, active, and get you back in the game.     If you felt that you received exemplary care today, please consider leaving feedback for Dr. Desai on  [employer] at [email or web address].    Please do not hesitate to reach out to us via email, phone, or [employer] with any questions, concerns, or feedback.

## 2024-02-17 DIAGNOSIS — I10 ESSENTIAL HYPERTENSION: ICD-10-CM

## 2024-02-18 RX ORDER — AMLODIPINE BESYLATE 10 MG/1
10 TABLET ORAL
Qty: 90 TABLET | Refills: 3 | Status: SHIPPED | OUTPATIENT
Start: 2024-02-18

## 2024-02-18 NOTE — TELEPHONE ENCOUNTER
No care due was identified.  [facility] Embedded Care Due Messages. Reference number: [id number].   2/17/2024 11:17:36 PM CST

## 2024-02-18 NOTE — TELEPHONE ENCOUNTER
Refill Decision Note   Teddy Ennis  is requesting a refill authorization.  Brief Assessment and Rationale for Refill:  Approve     Medication Therapy Plan:         Comments:     Note composed:2:26 AM 02/18/2024

## 2024-03-08 ENCOUNTER — TELEPHONE (OUTPATIENT)
Dept: ORTHOPEDICS | Facility: CLINIC | Age: 49
End: 2024-03-08
Payer: MEDICARE

## 2024-03-08 NOTE — TELEPHONE ENCOUNTER
LVM for patient, unfortunately we can not call in pain rx for CTS while awaiting EMG.     Rajwinder Hansen, MS, OTC  Clinical & OR Assistant to Dr. Ross Dunbar [facility]  [phone]        ----- Message from Sarah Iverson sent at 3/8/2024  2:25 PM CST -----  Contact: [phone] or [phone] @patient  Good afternoon patient would like a call back to discuss getting some pain med called in due to his right wrist being in pain. Please give patient a call back [phone] or [phone]

## 2024-03-27 ENCOUNTER — PROCEDURE VISIT (OUTPATIENT)
Dept: NEUROLOGY | Facility: CLINIC | Age: 49
End: 2024-03-27
Payer: MEDICARE

## 2024-03-27 DIAGNOSIS — G56.01 RIGHT CARPAL TUNNEL SYNDROME: ICD-10-CM

## 2024-03-27 PROCEDURE — 95911 NRV CNDJ TEST 9-10 STUDIES: CPT | Mod: HCNC,S$GLB,, | Performed by: PHYSICAL MEDICINE & REHABILITATION

## 2024-03-27 PROCEDURE — 95886 MUSC TEST DONE W/N TEST COMP: CPT | Mod: HCNC,RT,S$GLB, | Performed by: PHYSICAL MEDICINE & REHABILITATION

## 2024-03-27 NOTE — PROCEDURES
Test Date:  3/27/2024    Patient: teddy ennis : 1975 Physician: Pradip Valles D.O.   ID#: [id number] SEX: Male Ref. Phys: Jose M Desai MD     HPI: Teddy Ennis is a 48 y.o.male who presents for NCS/EMG to evaluate for CTS on the right. Left side checked for comparison on NCS.      PROCEDURE:  Prior to the procedure, the procedure was discussed in detail with the patient.  All questions were answered, and verbal consent was obtained.  For nerve conduction studies, a combination of surface electrodes, bar electrodes, and/or ring electrodes were used as needed.  For needle EMG, each site was cleaned and prepped in usual fashion with an alcohol pad.  A monopolar needle (28G) was used.  There was no significant bleeding, and bandages were applied as needed.  The procedure was tolerated without adverse effect.  The patient was instructed on post-procedure care including ice if needed for 10-15 minutes up to 4 times/day for any sore muscles.  I discussed with the patient that the data would be reviewed and a report sent to the referring provider, where any follow up questions regarding next steps should be directed.        NCV & EMG Findings:  All nerve conduction studies (as indicated in the following tables) were within normal limits.  All examined muscles (as indicated in the following table) showed no evidence of electrical instability.      IMPRESSIONS:  This is a normal electrophysiologic study of the right upper extremity.  There is no electrodiagnostic evidence of a median neuropathy on the right.            ___________________________  Pradip Valles D.O.        NCS+  Motor Nerve Results      Latency Amplitude F-Lat Segment Distance CV Comment   Site (ms) Norm (mV) Norm (ms)  (cm) (m/s) Norm    Left Median (APB)   Wrist 3.4  < 4.6 5.3  > 4.2         Elbow 7.9 - 4.2 -  Elbow-Wrist 24.5 54  > 47    Right Median (APB)   Wrist 3.8  < 4.6 7.0  > 4.2         Elbow 8.0 - 7.3 -   Elbow-Wrist 24 57  > 47    Left Ulnar (ADM)   Wrist 2.8  < 3.7 10.5  > 3.0         Bel Elbow 7.0 - 10.3 -  Bel Elbow-Wrist 24 57  > 52    Abv Elbow 7.9 - 9.6 -  Abv Elbow-Bel Elbow 6.5 72  > 43    Right Ulnar (ADM)   Wrist 2.5  < 3.7 12.6  > 3.0         Bel Elbow 7.3 - 12.0 -  Bel Elbow-Wrist 27 56  > 52    Abv Elbow 8.2 - 12.3 -  Abv Elbow-Bel Elbow 7 78  > 43      Sensory Nerve Results      Start Lat Latency (Peak) Amplitude (P-P) Segment Distance Start CV Comment   Site (ms) Norm (ms) (µV) Norm  (cm) (m/s) Norm    Left Median (Rec:Dig II)   Wrist 2.7  < 3.3 3.4 40  > 13 Wrist-Dig II 14 52  > 42    Right Median   Wrist-Dig I 2.2 - 2.9 39 - Wrist-Dig I 10 45 -    Wrist-Dig II 2.5  < 3.3 3.2 39  > 13 Wrist-Dig II 14 56  > 42    Palm-Wrist 1.45 - 1.95 58 - Palm-Wrist - - -    Left Ulnar (Rec:Dig V)   Wrist 2.4  < 3.1 3.0 45  > 8 Wrist-Dig V 14 58  > 45    Right Ulnar (Rec:Wrist)   Palm 1.20 - 1.70 25 - Palm-Wrist - - -    Right Radial (Rec:Dig I)   Wrist 2.2 - 2.8 18 - Wrist-Dig I 10 45 -      Inter-Nerve Comparisons     Nerve 1 Value 1 Nerve 2 Value 2 Parameter Result Normal   Sensory Sites   R Median Wrist-Dig I 2.9 ms R Radial Wrist-Dig I 2.8 ms Peak Lat Diff 0.10 ms <0.40   R Median Palm-Wrist 1.95 ms R Ulnar Palm-Wrist 1.70 ms Peak Lat Diff 0.25 ms <0.30     EMG+     Side Muscle Nerve Root Ins Act Fibs Psw Amp Dur Poly Recrt Int Pat Comment   Right Deltoid Axillary C5-C6 Nml Nml Nml Nml Nml 0 Nml Nml    Right Biceps Musculocut C5-C6 Nml Nml Nml Nml Nml 0 Nml Nml    Right Triceps Radial C6-C8 Nml Nml Nml Nml Nml 0 Nml Nml    Right Pronator Teres Median C6-C7 Nml Nml Nml Nml Nml 0 Nml Nml    Right APB Median C8-T1 Nml Nml Nml Nml Nml 0 Nml Nml            Waveforms:    Motor              Sensory

## 2024-03-28 ENCOUNTER — OFFICE VISIT (OUTPATIENT)
Dept: ORTHOPEDICS | Facility: CLINIC | Age: 49
End: 2024-03-28
Payer: MEDICARE

## 2024-03-28 VITALS — BODY MASS INDEX: 41.6 KG/M2 | WEIGHT: 307.13 LBS | HEIGHT: 72 IN

## 2024-03-28 DIAGNOSIS — M25.531 RIGHT WRIST PAIN: Primary | ICD-10-CM

## 2024-03-28 PROCEDURE — 1159F MED LIST DOCD IN RCRD: CPT | Mod: HCNC,CPTII,S$GLB, | Performed by: ORTHOPAEDIC SURGERY

## 2024-03-28 PROCEDURE — 3008F BODY MASS INDEX DOCD: CPT | Mod: HCNC,CPTII,S$GLB, | Performed by: ORTHOPAEDIC SURGERY

## 2024-03-28 PROCEDURE — 99214 OFFICE O/P EST MOD 30 MIN: CPT | Mod: HCNC,S$GLB,, | Performed by: ORTHOPAEDIC SURGERY

## 2024-03-28 PROCEDURE — 99999 PR PBB SHADOW E&M-EST. PATIENT-LVL III: CPT | Mod: PBBFAC,HCNC,, | Performed by: ORTHOPAEDIC SURGERY

## 2024-03-28 RX ORDER — MELOXICAM 15 MG/1
15 TABLET ORAL DAILY
Qty: 30 TABLET | Refills: 2 | Status: SHIPPED | OUTPATIENT
Start: 2024-03-28

## 2024-03-28 NOTE — PROGRESS NOTES
[facility]  History & Physical  Orthopedics    SUBJECTIVE:      COVID-19 attestation:  This patient was treated during the COVID-19 pandemic.  This was discussed with the patient, they are aware of our current policies and procedures, were given the option of delaying their visit and or switching to a virtual visit, delaying their surgery when applicable, and they elect to proceed.    Chief Complaint: right wrist pain    Referring Provider: No ref. provider found     History of Present Illness:  Patient is a 48 y.o. right hand dominant male who presents today with complaints of right wrist pain.  Patient reportedly had a ground level fall around 6 months ago onto his right wrist, and has since been experiencing progressively worsening pain.  Per mother and patient, he presented to the emergency department and no acute fractures were identified at that time (per chart review, last presentation to ED for ground level fall was in December 2022).  Describes his pain as throbbing and indicates it involves the radial aspect of the dorsum of the right wrist.  Denies any numbness, tingling, or weakness.  States he has had multiple fractures of the right hand that occurred as a child, but denies any other previous injuries or surgeries to the right upper extremity.      Interval history February 1, 2024:  The patient returns today for re-evaluation.  He notes that his right wrist pain persists.  He was unable to fully complete his MRI unfortunately.  Today he is describing his right upper extremity wrist and hand pain as more of a numbness and tingling at nighttime which is unbearable.  He has no other complaints returns for re-evaluation.      Interval history March 28, 2024: The patient returns today for re-evaluation.  He notes that his pain persists.  He had a recent EMG to evaluate for carpal tunnel syndrome.  He returns for re-evaluation with no other new complaints.  He again notes that he was  unable to complete the MRI because precipitated his asthma and he got very red in the face.    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    Onset of symptoms/DOI was reportedly 6 months prior to presentation today.    Symptoms are aggravated by activity and movement.    Symptoms are alleviated by rest.  Denies taking any medications, denies use of splints     Symptoms consist of pain, swelling, and erythema.    The patient rates their pain as   8/10.    Attempted treatment(s) and/or interventions include activity modifications, rest, rest.     The patient denies any fevers, chills, N/V, D/C and presents for evaluation.       Past Medical History:   Diagnosis Date    Allergy     Asthma      Past Surgical History:   Procedure Laterality Date    ADENOIDECTOMY      ANKLE SURGERY Left 1995    SINUS SURGERY       Review of patient's allergies indicates:   Allergen Reactions    Penicillins Hives     Social History     Social History Narrative    Not on file     Family History   Problem Relation Age of Onset    Hypertension Mother     Stroke Mother     No Known Problems Father          Current Outpatient Medications:     albuterol (PROVENTIL) 2.5 mg /3 mL (0.083 %) nebulizer solution, Take 3 mLs (2.5 mg total) by nebulization every 4 to 6 hours as needed for Wheezing., Disp: 1080 mL, Rfl: 2    albuterol (PROVENTIL/VENTOLIN HFA) 90 mcg/actuation inhaler, INHALE 2 PUFFS INTO THE LUNGS EVERY 6 HOURS AS NEEDED FOR WHEEZING, Disp: 25.5 g, Rfl: 3    amLODIPine (NORVASC) 10 MG tablet, TAKE 1 TABLET(10 MG) BY MOUTH EVERY DAY, Disp: 90 tablet, Rfl: 3    azelastine (ASTELIN) 137 mcg (0.1 %) nasal spray, 1 spray (137 mcg total) by Nasal route 2 (two) times daily., Disp: 30 mL, Rfl: 11    BREO ELLIPTA 200-25 mcg/dose DsDv diskus inhaler, Inhale 1 puff into the lungs once daily., Disp: 60 each, Rfl: 11    EScitalopram oxalate (LEXAPRO) 20 MG tablet, Take 1 tablet (20 mg total) by mouth once daily., Disp: 90 tablet, Rfl: 3     fexofenadine (ALLEGRA) 180 MG tablet, Take 180 mg by mouth once daily., Disp: , Rfl:     fluticasone propionate (FLONASE) 50 mcg/actuation nasal spray, 2 spray each nostril daily, Disp: 48 g, Rfl: 2    magnesium sulfate in water (MAGNESIUM SULFATE 2 GRAM/50 ML) 2 gram/50 mL PgBk, , Disp: , Rfl:     montelukast (SINGULAIR) 10 mg tablet, TAKE 1 TABLET BY MOUTH EVERY DAY, Disp: 90 tablet, Rfl: 3    pravastatin (PRAVACHOL) 20 MG tablet, TK 1 T PO D, Disp: 90 tablet, Rfl: 3      Review of Systems:  As per HPI otherwise noncontributory    OBJECTIVE:      Vital Signs (Most Recent):  There were no vitals filed for this visit.    There is no height or weight on file to calculate BMI.      Physical Exam:  Constitutional: The patient appears well-developed and well-nourished. No distress.   Skin: No lesions appreciated  Head: Normocephalic and atraumatic.   Nose: Nose normal.   Ears: No deformities seen  Eyes: Conjunctivae and EOM are normal.   Neck: No tracheal deviation present.   Cardiovascular: Normal rate and intact distal pulses.    Pulmonary/Chest: Effort normal. No respiratory distress.   Abdominal: There is no guarding.   Neurological: The patient is alert.   Psychiatric: The patient has a normal mood and affect.     Right Hand/Wrist Examination:    Observation/Inspection:  Swelling  none    Deformity  none  Discoloration  none     Scars   none    Atrophy  None    Tenderness about the right radiocarpal joint   No effusion, no erythema, no swelling    HAND/WRIST EXAMINATION:  Finkelstein's Test   Neg  WHAT Test    Neg  Snuff box tenderness   Positive  Alves's Test    Neg  Hook of Hamate Tenderness  Neg  CMC grind    Neg  Circumduction test   Neg    Neurovascular Exam:  Digits WWP, brisk CR < 3s throughout  NVI motor/LTS to M/R/U nerves, radial pulse 2+  Tinel's Test - Carpal Tunnel  positive  Tinel's Test - Cubital Tunnel  Neg  Phalen's Test    Neg  Median Nerve Compression Test positive    ROM hand full,  painless    ROM wrist full, but with pain     ROM elbow full, painless    Abdomen not guarded  Respirations nonlabored  Perfusion intact    Diagnostic Results:     Imaging - I independently viewed the patient's imaging as well as the radiology report.  Xrays of the patient's right hand demonstrate no evidence of any acute fractures or dislocations or significant degenerative changes.    MRI right wrist-Impression:     1. Examination terminated early at patient's request.  Completed sequences are degraded by motion artifact.  2. Dorsal capsular edema signal, not well evaluated secondary to aforementioned limitations.  3. Nondiagnostic evaluation of the triangular fibrocartilage complex and intrinsic wrist ligaments.    EMG - IMPRESSIONS:  This is a normal electrophysiologic study of the right upper extremity.  There is no electrodiagnostic evidence of a median neuropathy on the right.           ASSESSMENT/PLAN:      48 y.o. yo male with right hand and wrist pain  Plan: The patient and I had a thorough discussion today.  We discussed the working diagnosis as well as several other potential alternative diagnoses.  Treatment options were discussed, both conservative and surgical.  Conservative treatment options would include things such as activity modifications, workplace modifications, a period of rest, oral vs topical OTC and prescription anti-inflammatory medications, occupational therapy, splinting/bracing, immobilization, corticosteroid injections, and others.  Surgical options were discussed as well.     At this point in time,  we discussed options.  Teddy is still having significant right wrist pain.  I would like to attempt a corticosteroid injection as he has been unable to tolerate an MRI.   EMG did not demonstrate any carpal tunnel syndrome or other abnormality.  I would like to also send meloxicam to his pharmacy and attempt a course of occupational therapy.  Follow-up in 2 months should symptoms worsen  persist or fail to improve.        Jose M Desai M.D.    Please be aware that this note has been generated with the assistance of [employer] voice-to-text.  Please excuse any spelling or grammatical errors.    Thank you for choosing Dr. Jose M Desai for your orthopedic hand and upper extremity care. It is our goal to provide you with exceptional care that will help keep you healthy, active, and get you back in the game.     If you felt that you received exemplary care today, please consider leaving feedback for Dr. Desai on [employer] at [email or web address].    Please do not hesitate to reach out to us via email, phone, or MyChart with any questions, concerns, or feedback.

## 2024-04-01 ENCOUNTER — TELEPHONE (OUTPATIENT)
Dept: ORTHOPEDICS | Facility: CLINIC | Age: 49
End: 2024-04-01
Payer: MEDICARE

## 2024-04-01 NOTE — TELEPHONE ENCOUNTER
Patient is has been seen by Dr. Desai. This patient does not have any oncologic related issues.    Jewels Hu MS, ATC, OTC  Clinical/Surgical Assistant - Dr. Colt Guajardo   Orthopedic Oncology   [facility]  Phone: [phone]        ----- Message from Hannah Gutierrez sent at 3/28/2024  3:34 PM CDT -----  Contact: [phone]  Caller is requesting an earlier appointment than what we can offer.      Did you offer to schedule the next available appt and put the patient on the wait list:  n/a    When is the first available appointment: n/a    Preference of timeframe to be scheduled:  first available    Symptoms:  M25.531 (ICD-10-CM) - Right wrist pain    Would the patient prefer a call back or a response via [facility]:  call    Additional Information:  n/a

## 2024-04-08 NOTE — TELEPHONE ENCOUNTER
----- Message from Kristina Bhat sent at 8/15/2023  2:46 PM CDT -----  Contact: Ervin   Requesting an RX refill or new RX.  Is this a refill or new RX: new  RX name and strength (copy/paste from chart): EScitalopram oxalate (LEXAPRO) 20 MG tablet   Is this a 30 day or 90 day RX: 90  Pharmacy name and phone # (copy/paste from chart):    [id number] - [place] - [address] AT [facility] [address] [place] [address]  Phone: [phone] Fax: [phone]      
----- Message from Yaneth Koch sent at 8/15/2023 12:27 PM CDT -----  Contact: [phone]  Requesting an RX refill or new RX.  Is this a refill or new RX: refill  RX name and strength (copy/paste from chart):  EScitalopram oxalate (LEXAPRO) 20 MG tablet  Is this a 30 day or 90 day RX: 90 day  Pharmacy name and phone # (copy/paste from chart):    [id number] - [place], [place] - [facility] AT [address]  [place]  [facility] [place] [address]  Phone: [phone] Fax: [phone]  The doctors have asked that we provide their patients with the following 2 reminders -- prescription refills can take up to 72 hours, and a friendly reminder that in the future you can use your [facility] account to request refills: aware      
Care Due:                  Date            Visit Type   Department     Provider  --------------------------------------------------------------------------------                                EP -                              PRIMARY  Last Visit: 05-      CARE ([facility])   None Found     Doni Piña                              EP -                              PRIMARY  Next Visit: 11-      CARE ([facility])   None Found     Doni Piña                                                            Last  Test          Frequency    Reason                     Performed    Due Date  --------------------------------------------------------------------------------    CMP.........  12 months..  pravastatin..............  11- 11-    Lipid Panel.  12 months..  pravastatin..............  11- 11-    [employer] Embedded Care Due Messages. Reference number: [id number].   8/15/2023 1:22:59 PM CDT  
No

## 2024-04-10 ENCOUNTER — TELEPHONE (OUTPATIENT)
Dept: PRIMARY CARE CLINIC | Facility: CLINIC | Age: 49
End: 2024-04-10
Payer: MEDICARE

## 2024-04-10 NOTE — TELEPHONE ENCOUNTER
----- Message from Renetta Yang MA sent at 4/10/2024  1:43 PM CDT -----  Contact: [phone]  Pt called                  In regards to let provider know that he has severe ear pain but have pressure on right side, dizziness,and Sinus is acting up.

## 2024-04-11 ENCOUNTER — OFFICE VISIT (OUTPATIENT)
Dept: PRIMARY CARE CLINIC | Facility: CLINIC | Age: 49
End: 2024-04-11
Payer: MEDICARE

## 2024-04-11 VITALS
HEART RATE: 76 BPM | SYSTOLIC BLOOD PRESSURE: 124 MMHG | RESPIRATION RATE: 16 BRPM | OXYGEN SATURATION: 97 % | BODY MASS INDEX: 40.72 KG/M2 | WEIGHT: 300.25 LBS | DIASTOLIC BLOOD PRESSURE: 70 MMHG

## 2024-04-11 DIAGNOSIS — E66.01 CLASS 2 SEVERE OBESITY DUE TO EXCESS CALORIES WITH SERIOUS COMORBIDITY AND BODY MASS INDEX (BMI) OF 39.0 TO 39.9 IN ADULT: ICD-10-CM

## 2024-04-11 DIAGNOSIS — I10 ESSENTIAL HYPERTENSION: ICD-10-CM

## 2024-04-11 DIAGNOSIS — J45.30 MILD PERSISTENT ASTHMA WITHOUT COMPLICATION: ICD-10-CM

## 2024-04-11 DIAGNOSIS — J30.1 SEASONAL ALLERGIC RHINITIS DUE TO POLLEN: ICD-10-CM

## 2024-04-11 DIAGNOSIS — J01.00 ACUTE NON-RECURRENT MAXILLARY SINUSITIS: Primary | ICD-10-CM

## 2024-04-11 PROCEDURE — 1159F MED LIST DOCD IN RCRD: CPT | Mod: CPTII,S$GLB,, | Performed by: NURSE PRACTITIONER

## 2024-04-11 PROCEDURE — 3074F SYST BP LT 130 MM HG: CPT | Mod: CPTII,S$GLB,, | Performed by: NURSE PRACTITIONER

## 2024-04-11 PROCEDURE — 99214 OFFICE O/P EST MOD 30 MIN: CPT | Mod: S$GLB,,, | Performed by: NURSE PRACTITIONER

## 2024-04-11 PROCEDURE — 1160F RVW MEDS BY RX/DR IN RCRD: CPT | Mod: CPTII,S$GLB,, | Performed by: NURSE PRACTITIONER

## 2024-04-11 PROCEDURE — 99999 PR PBB SHADOW E&M-EST. PATIENT-LVL III: CPT | Mod: PBBFAC,,, | Performed by: NURSE PRACTITIONER

## 2024-04-11 PROCEDURE — 3078F DIAST BP <80 MM HG: CPT | Mod: CPTII,S$GLB,, | Performed by: NURSE PRACTITIONER

## 2024-04-11 PROCEDURE — 3008F BODY MASS INDEX DOCD: CPT | Mod: CPTII,S$GLB,, | Performed by: NURSE PRACTITIONER

## 2024-04-11 RX ORDER — PREDNISONE 10 MG/1
10 TABLET ORAL DAILY
Qty: 3 TABLET | Refills: 0 | Status: SHIPPED | OUTPATIENT
Start: 2024-04-11 | End: 2024-04-14

## 2024-04-11 RX ORDER — AZITHROMYCIN 250 MG/1
TABLET, FILM COATED ORAL
Qty: 6 TABLET | Refills: 0 | Status: SHIPPED | OUTPATIENT
Start: 2024-04-11 | End: 2024-04-16

## 2024-04-11 NOTE — PROGRESS NOTES
[facility] Note    Chief Complaint      Chief Complaint   Patient presents with    Otalgia     Both ears x 2 weeks     Sinusitis    Dizziness     History of Present Illness      Teddy Ennis is a 48 y.o. male patient of Dr. Juarez who presents today for head congestion, dizziness, ear pain, sinus pressure postnasal drip for the last 2 weeks.  Pt reports has been using his inhalers and nebs for his asthma when needed. No c/o chest congestion, cough or sob at this time.   Patient has been taking Allegra and nasal sprays without relief.      Health Maintenance   Topic Date Due    Hepatitis C Screening  Never done    Colorectal Cancer Screening  06/06/2024    Lipid Panel  11/22/2028    TETANUS VACCINE  11/29/2029       Past Medical History:   Diagnosis Date    Allergy     Asthma        Past Surgical History:   Procedure Laterality Date    ADENOIDECTOMY      ANKLE SURGERY Left 1995    SINUS SURGERY         family history includes Hypertension in his mother; No Known Problems in his father; Stroke in his mother.     Social History     Tobacco Use    Smoking status: Never     Passive exposure: Never    Smokeless tobacco: Never   Substance Use Topics    Alcohol use: Not Currently    Drug use: Never       Review of Systems   Constitutional:  Negative for chills, fever and malaise/fatigue.   HENT:  Positive for congestion, ear pain, sinus pain and sore throat.    Respiratory:  Negative for cough, sputum production, shortness of breath and wheezing.    Cardiovascular:  Negative for chest pain and palpitations.   Gastrointestinal:  Negative for nausea.   Genitourinary:  Negative for dysuria.   Musculoskeletal:  Negative for myalgias.   Neurological:  Negative for dizziness, weakness and headaches.        Outpatient Encounter Medications as of 4/11/2024   Medication Sig Dispense Refill    albuterol (PROVENTIL) 2.5 mg /3 mL (0.083 %) nebulizer solution Take 3 mLs (2.5 mg total) by nebulization every 4 to  6 hours as needed for Wheezing. 1080 mL 2    albuterol (PROVENTIL/VENTOLIN HFA) 90 mcg/actuation inhaler INHALE 2 PUFFS INTO THE LUNGS EVERY 6 HOURS AS NEEDED FOR WHEEZING 25.5 g 3    amLODIPine (NORVASC) 10 MG tablet TAKE 1 TABLET(10 MG) BY MOUTH EVERY DAY 90 tablet 3    azelastine (ASTELIN) 137 mcg (0.1 %) nasal spray 1 spray (137 mcg total) by Nasal route 2 (two) times daily. 30 mL 11    BREO ELLIPTA 200-25 mcg/dose DsDv diskus inhaler Inhale 1 puff into the lungs once daily. 60 each 11    EScitalopram oxalate (LEXAPRO) 20 MG tablet Take 1 tablet (20 mg total) by mouth once daily. 90 tablet 3    fexofenadine (ALLEGRA) 180 MG tablet Take 180 mg by mouth once daily.      fluticasone propionate (FLONASE) 50 mcg/actuation nasal spray 2 spray each nostril daily 48 g 2    magnesium sulfate in water (MAGNESIUM SULFATE 2 GRAM/50 ML) 2 gram/50 mL PgBk       meloxicam (MOBIC) 15 MG tablet Take 1 tablet (15 mg total) by mouth once daily. 30 tablet 2    montelukast (SINGULAIR) 10 mg tablet TAKE 1 TABLET BY MOUTH EVERY DAY 90 tablet 3    pravastatin (PRAVACHOL) 20 MG tablet TK 1 T PO D 90 tablet 3    azithromycin (Z-RM) 250 MG tablet Take 2 tablets by mouth on day 1; Take 1 tablet by mouth on days 2-5 6 tablet 0    predniSONE (DELTASONE) 10 MG tablet Take 1 tablet (10 mg total) by mouth once daily. for 3 days 3 tablet 0     No facility-administered encounter medications on file as of 4/11/2024.       Review of patient's allergies indicates:   Allergen Reactions    Penicillins Hives       Physical Exam      Vital Signs  Pulse: 76  Resp: 16  SpO2: 97 %  BP: 124/70  BP Location: Right arm  Patient Position: Sitting  Height and Weight  Weight: (!) 136.2 kg (300 lb 4.3 oz)    Physical Exam  Vitals and nursing note reviewed.   Constitutional:       General: He is not in acute distress.     Appearance: Normal appearance. He is obese. He is ill-appearing.   HENT:      Head: Normocephalic and atraumatic.      Ears:      Comments:  "Redness to bilateral ear canals with clear effusion noted kee     Nose: Congestion present.      Mouth/Throat:      Mouth: Mucous membranes are dry.      Pharynx: Posterior oropharyngeal erythema present.   Cardiovascular:      Rate and Rhythm: Normal rate and regular rhythm.      Heart sounds: Normal heart sounds.   Pulmonary:      Effort: Pulmonary effort is normal. No respiratory distress.      Breath sounds: Normal breath sounds.   Musculoskeletal:         General: Normal range of motion.   Skin:     General: Skin is warm and dry.   Neurological:      General: No focal deficit present.      Mental Status: He is alert and oriented to person, place, and time.   Psychiatric:         Mood and Affect: Mood normal.         Behavior: Behavior normal.         Thought Content: Thought content normal.         Judgment: Judgment normal.          Laboratory:  CBC:  Lab Results   Component Value Date    WBC 12.78 (H) 04/22/2022    RBC 5.13 04/22/2022    HGB 15.2 04/22/2022    HCT 45.6 04/22/2022     04/22/2022    MCV 89 04/22/2022    MCH 29.6 04/22/2022    MCHC 33.3 04/22/2022    MCHC 32.2 06/16/2020    MCHC 32.6 11/26/2019     CMP:  Lab Results   Component Value Date     (H) 11/22/2023    CALCIUM 9.2 11/22/2023    ALBUMIN 3.5 11/22/2023    PROT 8.0 11/22/2023     11/22/2023    K 3.4 (L) 11/22/2023    CO2 26 11/22/2023     11/22/2023    BUN 9 11/22/2023    ALKPHOS 147 (H) 11/22/2023    ALT 35 11/22/2023    AST 24 11/22/2023    BILITOT 0.9 11/22/2023    BILITOT 0.7 11/11/2022    BILITOT 0.6 04/22/2022     URINALYSIS:  No results found for: "COLORU", "CLARITYU", "SPECGRAV", "PHUR", "PROTEINUA", "GLUCOSEU", "BILIRUBINCON", "BLOODU", "WBCU", "RBCU", "BACTERIA", "MUCUS", "NITRITE", "LEUKOCYTESUR", "UROBILINOGEN", "HYALINECASTS"   LIPIDS:  Lab Results   Component Value Date    TSH 1.351 11/26/2019    HDL 32 (L) 11/22/2023    HDL 31 (L) 11/11/2022    HDL 31 (L) 11/03/2021    CHOL 149 11/22/2023    CHOL 153 " 11/11/2022    CHOL 144 11/03/2021    TRIG 131 11/22/2023    TRIG 119 11/11/2022    TRIG 98 11/03/2021    LDLCALC 90.8 11/22/2023    LDLCALC 98.2 11/11/2022    LDLCALC 93.4 11/03/2021    CHOLHDL 21.5 11/22/2023    CHOLHDL 20.3 11/11/2022    CHOLHDL 21.5 11/03/2021    NONHDLCHOL 117 11/22/2023    NONHDLCHOL 122 11/11/2022    NONHDLCHOL 113 11/03/2021    TOTALCHOLEST 4.7 11/22/2023    TOTALCHOLEST 4.9 11/11/2022    TOTALCHOLEST 4.6 11/03/2021     TSH:  Lab Results   Component Value Date    TSH 1.351 11/26/2019     A1C:  Lab Results   Component Value Date    HGBA1C 5.9 (H) 11/22/2023    HGBA1C 5.4 11/11/2022    HGBA1C 5.9 (H) 11/03/2021    HGBA1C 5.4 11/03/2020    HGBA1C 5.4 12/03/2019         Assessment/Plan     Teddy Ennis is a 48 y.o.male with:    Acute non-recurrent maxillary sinusitis  -     predniSONE (DELTASONE) 10 MG tablet; Take 1 tablet (10 mg total) by mouth once daily. for 3 days  Dispense: 3 tablet; Refill: 0  -     azithromycin (Z-RM) 250 MG tablet; Take 2 tablets by mouth on day 1; Take 1 tablet by mouth on days 2-5  Dispense: 6 tablet; Refill: 0    Seasonal allergic rhinitis due to pollen  Continue nasal spray    Mild persistent asthma without complication  Continue meds for this as needed    Essential hypertension  /70    Class 2 severe obesity due to excess calories with serious comorbidity and body mass index (BMI) of 39.0 to 39.9 in adult  Will continue to monitor, needs to work on weight loss       Health Maintenance Due   Topic Date Due    Hepatitis C Screening  Never done    Pneumococcal Vaccines (Age 0-64) (1 of 2 - PCV) Never done    HIV Screening  Never done    Influenza Vaccine (1) 09/01/2023    COVID-19 Vaccine (3 - 2023-24 season) 09/01/2023    Colorectal Cancer Screening  06/06/2024          I spent 38 minutes on the day of this encounter for preparing for, evaluating, treating, and managing this patient.        -Continue current medications and maintain follow up with  specialists.  Return to clinic as needed for any concerns   No follow-ups on file.      Ade Andrew NP-C  [facility]

## 2024-04-17 ENCOUNTER — TELEPHONE (OUTPATIENT)
Dept: PRIMARY CARE CLINIC | Facility: CLINIC | Age: 49
End: 2024-04-17
Payer: MEDICARE

## 2024-04-17 ENCOUNTER — CLINICAL SUPPORT (OUTPATIENT)
Dept: REHABILITATION | Facility: HOSPITAL | Age: 49
End: 2024-04-17
Attending: ORTHOPAEDIC SURGERY
Payer: MEDICARE

## 2024-04-17 DIAGNOSIS — M25.531 RIGHT WRIST PAIN: ICD-10-CM

## 2024-04-17 PROCEDURE — 97166 OT EVAL MOD COMPLEX 45 MIN: CPT

## 2024-04-17 NOTE — PLAN OF CARE
"[facility]  Occupational Therapy Initial Evaluation     Name: Teddy Ennis  Clinic Number: [id number]    Therapy Diagnosis:   Encounter Diagnosis   Name Primary?    Right wrist pain      Physician: Jose M Desai MD    Physician Orders: Eval and Treat  Medical Diagnosis: M25.531 (ICD-10-CM) - Pain in right wrist   Surgical Procedure and Date: NA,   Evaluation Date: 4/17/2024  Insurance Authorization Period Expiration: 3/28/2024 - 3/28/2025  Plan of Care Certification Period: 07/17/2024  Date of Return to MD: none specified  Visit # / Visits authorized: 1 / 1  FOTO: 1/ 3    Precautions:  Standard    Time In: 1:00 PM  Time Out: 2:00 PM  Total Billable Time: 60 minutes    Subjective     Date of Onset: Approximately 6 months prior to initial evaluation.     History of Current Condition/Mechanism of Injury: Teddy reports: The patient fell approximately 6 months and has experienced pain in his right wrist ever since. It hurts more when it rains.     Falls: At initial injury    Involved Side: right wrist  Dominant Side: Right    Mechanism of Injury: overuse  Imaging:    "Impression:   1. Examination terminated early at patient's request.  Completed sequences are degraded by motion artifact.  2. Dorsal capsular edema signal, not well evaluated secondary to aforementioned limitations.  3. Nondiagnostic evaluation of the triangular fibrocartilage complex and intrinsic wrist ligaments." - MRI from 1/23/2024    Prior Therapy: no    Pain:  Functional Pain Scale Rating 0-10:   5/10 at best  7/10 at worst  Location:   Description: Throbbing, Sharp, and Shooting  Aggravating Factors: dishwashing; rain, some numbness with pins and needles.   Easing Factors: warm water; rest at times  He sleeps on his right side with his head resting on his right arm. He hasn't been wearing his brace at night.     Occupation:   company worker  Working presently: employed  Duties: lifting buckets of water, " deliver flowers.     Functional Limitations/Social History:    Previous functional status includes: Independent with all ADLs.     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    Teddy Ennis  has a past surgical history that includes Ankle surgery (Left, 1995); Adenoidectomy; and Sinus surgery.    Teddy has a current medication list which includes the following prescription(s): albuterol, albuterol, amlodipine, azelastine, breo ellipta, escitalopram oxalate, fexofenadine, fluticasone propionate, magnesium sulfate in water, meloxicam, montelukast, and pravastatin.    Review of patient's allergies indicates:   Allergen Reactions    Penicillins Hives        Objective     Observation/Appearance:  Skin intact and Skin dry. Some increase in observed muscle mass along the dorsal aspect of the hand.     Hand ROM. Measured in degrees.    Hand ROM WFL - full fist  Some pain with thumb opposition  Elbow and Wrist ROM. Measured in degrees.   4/17/2024 4/17/2024    Left Right   Supination/Pronation 85/85 75/90   Wrist Ext/Flex 75/46 65/40   Wrist RD/UD 20/15 16/15       R Thumb active range of motion 4/17/24  MP  IP  RA  PA  OPP    30 62 45 35 Full, p!    MASTERSON = 172    L Thumb active range of motion 4/17/24  MP  IP  RA  PA  OPP    43 57 55 47 Full   MASTERSON = 202    Sensation: Pt reports some tingling and numbness. Good results on [facility] [place].   Median Nerve Distribution 4/17/2024 4/17/2024    Right  Left     [place] Jo     Normal 1.65-2.83 Yes all points answered correctly    Diminished Light Touch 3.22-3.61 NT    Diminished Protective 3.84-4.31 NT    Loss of Protective 4.56-6.65     Untestable >6.65     2 Point Discrimination     Static     Dynamic       Special Tests:   Right wrist    Ballotment tests = +  Kleinmans = +     *Pt experiences Pisiform and lunate tenderness  Pronator Teres Test = -  Intrinsic tightness = +  ORL tightness = +     Strength (Dyanmometer) and Pinch Strength (Pinch Gauge)  Measured in pounds and psi. Average of three trials.   4/17/2024 4/17/2024    Right  Left    Rung II Deferred Deferred   Key Pinch     3pt Pinch     2pt Pinch         Intake Outcome Measure for FOTO wrist Survey    Therapist reviewed FOTO scores for Teddy Ennis on 4/17/2024.   FOTO report - see Media section or FOTO account episode details.    Intake Score: 37%         Treatment     Total Treatment time separate from Evaluation time:5 minutes    Teddy received the treatments listed below:      hot pack for 10 minutes to right wrist.    Patient Education and Home Exercises      Education provided:   -role of OT, goals for OT, scheduling/cancellations, insurance limitations with patient.  -Additional Education provided: hand and wrist anatomy; purpose of special testing    Written Home Exercises Provided: yes.  Exercises were reviewed and Teddy was able to demonstrate them prior to the end of the session.    Teddy demonstrated fair  understanding of the education provided.     Pt was advised to perform these exercises free of pain, and to stop performing them if pain occurs.    See EMR under Patient Instructions for exercises provided 4/17/2024.    Assessment     Teddy Ennis is a 48 y.o. male referred to outpatient occupational therapy and presents with a medical diagnosis of Medical Diagnosis: M25.531 (ICD-10-CM) - Pain in right wrist .  He presents with increased pain in the right wrist during  flexion and dynamic movement. He did test positive for some carpal instability with some intrinsic hand tightness. Strength t be assessed in later sessions as tolerated.   Following medical record review it is determined that pt will benefit from occupational therapy services in order to maximize pain free and/or functional use of right wrist. The following goals were discussed with the patient and patient is in agreement with them as to be addressed in the treatment plan. The patient's rehab potential is Fair.     Anticipated barriers to occupational therapy: sensory differences    Plan of care discussed with patient: Yes  Patient's spiritual, cultural and educational needs considered and patient is agreeable to the plan of care and goals as stated below:     Medical Necessity is demonstrated by the following  Occupational Profile/History  Co-morbidities and personal factors that may impact the plan of care [] LOW: Brief chart review  [x] MODERATE: Expanded chart review   [] HIGH: Extensive chart review    Moderate / High Support Documentation:      Examination  Performance deficits relating to physical, cognitive or psychosocial skills that result in activity limitations and/or participation restrictions  [] LOW: addressing 1-3 Performance deficits  [x] MODERATE: 3-5 Performance deficits  [] HIGH: 5+ Performance deficits (please support below)    Moderate / High Support Documentation:    Physical:  Joint Mobility  Muscle Power/Strength  Muscle Endurance  Proprioception Functions  Pain    Cognitive:  Emotional Control    Psychosocial:    Habits  Routines  Rituals     Treatment Options [] LOW: Limited options  [x] MODERATE: Several options  [] HIGH: Multiple options      Decision Making/ Complexity Score: moderate       The following goals were discussed with the patient and patient is in agreement with them as to be addressed in the treatment plan.       Goals:    LTG's (6 weeks):    1)   Decrease complaints of  pain to  1 out of 10 at worst to increase functional hand use for ADL/work/leisure activities. progressing not met 4/17/2024  2)   Patient to score at 58% or more on FOTO to demonstrate improved perception of functional wrist use. progressing not met 4/17/2024   3)   Pt will return to near to prior level of function for ADLs and household management reporting I or Mod I with ADLs (dressing, feeding, grooming, toileting). progressing not met 4/17/2024  4)  Increase right wrist MASTERSON by at worst 15 degrees to increase functional hand use for ADLs/work/leisure activities. progressing not met 4/17/2024  5)  Increase right thumb MASTERSON by at worst  15 degrees in Shoulder ABDuction to increase functional hand use for ADLs/work/leisure activities. progressing not met 4/17/2024      STG's (3 weeks)  1)   Patient to be IND with HEP and modalities for pain/edema managment. progressing not met 4/17/2024  2)   Increase right wrist MASTERSON by at worst 8 degrees to increase functional hand use for ADLs/work/leisure activities. progressing not met 4/17/2024  3)   Increase  right thumb MASTERSON by at worst  15 degrees in Shoulder ABDuction to increase functional hand use for ADLs/work/leisure activities. progressing not met 4/17/2024  4)   Pt to perform  strength assessment as tolerable in at least 3 weeks to show progression in wrist stability and pain levels. Progressing not met 4/17/2024  5)   Patient to score at 50% or more on FOTO to demonstrate improved perception of functional wrist use. progressing not met 4/17/2024  6)   Decrease complaints of pain to  3 out of 10 at worst to increase functional hand use for ADL/work/leisure activities. progressing not met 4/17/2024        Plan   Certification Period/Plan of care expiration: 4/17/2024 to 7/17/2024.    Outpatient Occupational Therapy 2 times weekly for 6 weeks to include the following interventions: Paraffin, Fluidotherapy, Manual therapy/joint mobilizations, Modalities for pain  management, US 3 mhz, Therapeutic exercises/activities., Iontophoresis with 2.0 cc Dexamethasone, Strengthening, Orthotic Fabrication/Fit/Training, and Joint Protection.    Mik Alexander, OT

## 2024-04-17 NOTE — TELEPHONE ENCOUNTER
----- Message from Prosper Holden sent at 4/17/2024 12:54 PM CDT -----  Contact: Mom/ Merly [phone]  [email or web address]     Patient is calling for Medical Advice regarding: Still has ringing in his ears and gets a little dizzy when he stands up    How long has patient had these symptoms: since his last visit    Pharmacy name and phone#: [id number] - [place], [place] - [address] AT [facility] [address] Phone: [phone]  Fax: [phone]    Comments: He finished taking the medicine

## 2024-04-17 NOTE — PATIENT INSTRUCTIONS
Tendon Glides         Start at position A and move through each position slowly attempting to achieve full glide.  A-E is ONE repetition.     Complete 10 reps 3 times per day.     PIP Flexion (Active Blocked)        Hold large knuckle straight using other hand. Bend middle joint of each finger as far as possible.   Repeat 20 times. Do 2 sessions per day.  Activity: Curl fingers around a jar cap.*     DIP Flexion (Active Blocked)        Hold each finger firmly at the middle so that only the tip joint can bend.   Repeat 20 times. Do 2 sessions per day.     MP Extension (Active)        With palm on table, straighten fingers completely at large knuckles, and lift fingers individually off table.   Repeat 20 times. Do 2 sessions per day.    Abduction / Adduction (Active)        With hand flat on table, spread all fingers apart, then bring them together as close as possible.  Repeat 20 times. Do 2 sessions per day.

## 2024-04-19 DIAGNOSIS — R42 VERTIGO: Primary | ICD-10-CM

## 2024-04-19 DIAGNOSIS — J01.00 ACUTE NON-RECURRENT MAXILLARY SINUSITIS: ICD-10-CM

## 2024-04-24 ENCOUNTER — CLINICAL SUPPORT (OUTPATIENT)
Dept: REHABILITATION | Facility: HOSPITAL | Age: 49
End: 2024-04-24
Payer: MEDICARE

## 2024-04-24 DIAGNOSIS — M25.531 RIGHT WRIST PAIN: Primary | ICD-10-CM

## 2024-04-24 PROCEDURE — 97140 MANUAL THERAPY 1/> REGIONS: CPT

## 2024-04-24 PROCEDURE — 97022 WHIRLPOOL THERAPY: CPT

## 2024-04-24 PROCEDURE — 97110 THERAPEUTIC EXERCISES: CPT

## 2024-04-24 NOTE — PROGRESS NOTES
"[facility]  Occupational Therapy Treatment Note     Date: 4/24/2024  Name: Teddy Ennis  Clinic Number: [id number]    Therapy Diagnosis: No diagnosis found.  Physician: Jose M Desai MD    Physician Orders: Eval and Treat  Medical Diagnosis: M25.531 (ICD-10-CM) - Pain in right wrist   Surgical Procedure and Date: NA,   Evaluation Date: 4/17/2024  Insurance Authorization Period Expiration: 3/28/2024 - 3/28/2025  Plan of Care Certification Period: 07/17/2024  Date of Return to MD: none specified  Visit # / Visits authorized: 1 / 1  FOTO: 1/ 3     Precautions:  Standard     Time In: 12:55 PM  Time Out: 1:50 PM  Total Billable Time: 55 minutes    Subjective     Patient reports: He had a tough time over the weekend due to increased pain with increase moisture in the air. He continues to have numbness and tingling at night with his effected limb outstretched and sleeping on his opposite side.   He was somewhat compliant with home exercise program given last session.   Response to previous treatment:mary well  Functional change: no change at this time    Pain: 5/10  Location: right hands      Objective     Hand ROM WFL - full fist  Some pain with thumb opposition  Elbow and Wrist ROM. Measured in degrees.    4/17/2024 4/17/2024     Left Right   Supination/Pronation 85/85 75/90   Wrist Ext/Flex 75/46 65/40   Wrist RD/UD 20/15 16/15         R Thumb active range of motion 4/17/24  MP  IP  RA  PA  OPP    30 62 45 35 Full, p!    MASTERSON = 172     L Thumb active range of motion 4/17/24  MP  IP  RA  PA  OPP    43 57 55 47 Full   MASTERSON = 202    Treatment     Teddy received the treatments listed below:      therapeutic exercises to develop endurance, ROM, and flexibility for 20 minutes including:  - Wrist active range of motion: flexion/extension x 20 each  - Wrist passive range of motion: flexion/extension x 10 each with 5 second hold  - intrinsic stretching for digits 2-5 x 10 with 5 " hold each  - " Education on performing a shorter arc of motion due to pain.   - Composite fist x 10  - passive range of motion of digits 2-5 x 10 with 5 second holds  - Thumb adductor stretch with chip clip x 2'    manual therapy techniques: Soft tissue Mobilization were applied to the: right forearm and wrist for 25 minutes, including:  STM to right wrist/FA  STM to dorsal wrist/FA  Kinesio tape application: circumferential I strip for wrist stability: I strip for wrist flexion with emphasis over FCU due to tenderness.   ViM x 2' for decreased pain      neuromuscular re-education activities to improve: Coordination, Kinesthetic, and Proprioception for 0 minutes. The following activities were included:  NT    therapeutic activities to improve functional performance for 0  minutes, including:  NT    Fluidotherapy: To the right hand for 10 min, continuous air, 115 deg, air speed 50 to decrease pain, edema & scar tissue, sensory re- education, and increased tissue extensibility prior to therex     Patient Education and Home Exercises     Education provided:   - anatomy of the wrist  - Progress towards goals     Written Home Exercises Provided: Patient instructed to cont prior HEP.  Exercises were reviewed and Teddy was able to demonstrate them prior to the end of the session.  eTddy demonstrated fair  understanding of the home exercise program provided. See electronic medical record under Patient Instructions for exercises provided during therapy sessions.       Assessment     The patient presents with his soft wrist cock up brace up. He remains stiff in the wrist flexors and extensors as well as the intrinsics of the right hand. Improved elasticity following manual therapy techniques but with continued pain in the TFCC area on the distal, ulnar aspect of the wrist upon palpation as well as over the dorsal hand and fingers when performing wrist flexion. Therapist performed kinesio taping for improved carpal stability and  inhibition of the FCU where the patient feels pain. The patient was instructed to remove the tape if it causes increased irritation. The patient has an appointment the following day.     Teddy is progressing with minor gains towards his goals and there are no updates to goals at this time. Pt prognosis is Fair.     Patient will continue to benefit from skilled outpatient occupational therapy to address the deficits listed in the problem list on initial evaluation provide patient/family education and to maximize patient's level of independence in the home and community environment.     Patient's spiritual, cultural and educational needs considered and patient agreeable to plan of care and goals.    Anticipated barriers to occupational therapy: Chronic condition form an acute incident. Guarding of the extremity    Goals:  LTG's (6 weeks):     1)   Decrease complaints of pain to  1 out of 10 at worst to increase functional hand use for ADL/work/leisure activities. progressing not met 4/17/2024  2)   Patient to score at 58% or more on FOTO to demonstrate improved perception of functional wrist use. progressing not met 4/17/2024   3)   Pt will return to near to prior level of function for ADLs and household management reporting I or Mod I with ADLs (dressing, feeding, grooming, toileting). progressing not met 4/17/2024  4)  Increase right wrist MASTERSON by at worst 15 degrees to increase functional hand use for ADLs/work/leisure activities. progressing not met 4/17/2024  5)  Increase right thumb MASTERSON by at worst  15 degrees in Shoulder ABDuction to increase functional hand use for ADLs/work/leisure activities. progressing not met 4/17/2024        STG's (3 weeks)  1)   Patient to be IND with HEP and modalities for pain/edema managment. progressing not met 4/17/2024  2)   Increase right wrist MASTERSON by at worst 8 degrees to increase functional hand use for ADLs/work/leisure activities. progressing not met 4/17/2024  3)   Increase   right thumb MASTERSON by at worst  15 degrees in Shoulder ABDuction to increase functional hand use for ADLs/work/leisure activities. progressing not met 4/17/2024  4)   Pt to perform  strength assessment as tolerable in at least 3 weeks to show progression in wrist stability and pain levels. Progressing not met 4/17/2024  5)   Patient to score at 50% or more on FOTO to demonstrate improved perception of functional wrist use. progressing not met 4/17/2024  6)   Decrease complaints of pain to  3 out of 10 at worst to increase functional hand use for ADL/work/leisure activities. progressing not met 4/17/2024          Plan     Updates/Grading for next session:        Continue OT POC    Mik Alexander OTR/L  4/24/2024

## 2024-04-25 ENCOUNTER — CLINICAL SUPPORT (OUTPATIENT)
Dept: REHABILITATION | Facility: HOSPITAL | Age: 49
End: 2024-04-25
Payer: MEDICARE

## 2024-04-25 DIAGNOSIS — M25.531 RIGHT WRIST PAIN: Primary | ICD-10-CM

## 2024-04-25 PROCEDURE — 97018 PARAFFIN BATH THERAPY: CPT

## 2024-04-25 PROCEDURE — 97035 APP MDLTY 1+ULTRASOUND EA 15: CPT

## 2024-04-25 PROCEDURE — 97140 MANUAL THERAPY 1/> REGIONS: CPT

## 2024-04-25 PROCEDURE — 97110 THERAPEUTIC EXERCISES: CPT

## 2024-04-25 NOTE — PROGRESS NOTES
"[facility]  Occupational Therapy Treatment Note     Date: 4/25/2024  Name: Teddy Ennis  Clinic Number: [id number]    Therapy Diagnosis: No diagnosis found.  Physician: Jose M Desai MD    Physician Orders: Eval and Treat  Medical Diagnosis: M25.531 (ICD-10-CM) - Pain in right wrist   Surgical Procedure and Date: NA,   Evaluation Date: 4/17/2024  Insurance Authorization Period Expiration: 3/28/2024 - 3/28/2025  Plan of Care Certification Period: 07/17/2024  Date of Return to MD: none specified  Visit # / Visits authorized: 2 / 10  FOTO: 1/ 3     Precautions:  Standard     Time In: 3:00 PM  Time Out: 4:00 PM  Total Billable Time: 60 minutes    Subjective     Patient reports: He did not have work today, so his wrist is not in a bunch of pain.    He was somewhat compliant with home exercise program given last session.   Response to previous treatment:mary well  Functional change: no change at this time    Pain: 5/10  Location: right hands      Objective     Hand ROM WFL - full fist  Some pain with thumb opposition  Elbow and Wrist ROM. Measured in degrees.    4/17/2024 4/17/2024     Left Right   Supination/Pronation 85/85 75/90   Wrist Ext/Flex 75/46 65/40   Wrist RD/UD 20/15 16/15         R Thumb active range of motion 4/17/24  MP  IP  RA  PA  OPP    30 62 45 35 Full, p!    MASTERSON = 172     L Thumb active range of motion 4/17/24  MP  IP  RA  PA  OPP    43 57 55 47 Full   MASTERSON = 202    Treatment     Teddy received the treatments listed below:      therapeutic exercises to develop endurance, ROM, and flexibility for 20 minutes including:  - Wrist active range of motion: flexion/extension x 20 each  - Wrist passive range of motion: flexion/extension x 10 each with 5 second hold  - intrinsic stretching for digits 2-5 x 10 with 5 " hold each  - Composite fist x 10  - passive range of motion of digits 2-5 x 10 with 5 second holds  - Thumb adductor stretch with chip clip x 2' (NT)    manual " therapy techniques: Soft tissue Mobilization were applied to the: right forearm and wrist for 20 minutes, including:  STM to right wrist/FA  STM to dorsal wrist/FA  Kinesio tape application: circumferential I strip for wrist stability: I strip for wrist flexion with emphasis over FCU due to tenderness.   ViM x 3' for decreased pain    neuromuscular re-education activities to improve: Coordination, Kinesthetic, and Proprioception for 0 minutes. The following activities were included:  NT    therapeutic activities to improve functional performance for 0  minutes, including:  NT    Patient received ultrasound to  the right, dorsal wrist area to increase blood flow, circulation, tissue elasticity, pain management and for wound/scar management for 8 minutes @ 3 Mhz, Intensity 1.0 w/cm2 at 100% duty cycle.      Fluidotherapy: To the right hand for 10 min, continuous air, 115 deg, air speed 50 to decrease pain, edema & scar tissue, sensory re- education, and increased tissue extensibility prior to therex     Patient Education and Home Exercises     Education provided:   - anatomy of the wrist  - Progress towards goals     Written Home Exercises Provided: Patient instructed to cont prior HEP.  Exercises were reviewed and Teddy was able to demonstrate them prior to the end of the session.  Tdedy demonstrated fair  understanding of the home exercise program provided. See electronic medical record under Patient Instructions for exercises provided during therapy sessions.       Assessment     The patient presents without his soft wrist cock up brace up. He remains guarded of his wrist during movement but still sporadically uses the extremity in daily activity. Improved tissue elasticity following manual therapy and modality use, especially over the dorsal aspect of the wrist. Therapy to continue to focus on wrist stability, mobility nd decreased pain during daily, functional activity.   Teddy is progressing with minor  gains towards his goals and there are no updates to goals at this time. Pt prognosis is Fair.     Patient will continue to benefit from skilled outpatient occupational therapy to address the deficits listed in the problem list on initial evaluation provide patient/family education and to maximize patient's level of independence in the home and community environment.     Patient's spiritual, cultural and educational needs considered and patient agreeable to plan of care and goals.    Anticipated barriers to occupational therapy: Chronic condition form an acute incident. Guarding of the extremity    Goals:  LTG's (6 weeks):     1)   Decrease complaints of pain to  1 out of 10 at worst to increase functional hand use for ADL/work/leisure activities. progressing not met 4/17/2024  2)   Patient to score at 58% or more on FOTO to demonstrate improved perception of functional wrist use. progressing not met 4/17/2024   3)   Pt will return to near to prior level of function for ADLs and household management reporting I or Mod I with ADLs (dressing, feeding, grooming, toileting). progressing not met 4/17/2024  4)  Increase right wrist MASTERSON by at worst 15 degrees to increase functional hand use for ADLs/work/leisure activities. progressing not met 4/17/2024  5)  Increase right thumb MASTERSON by at worst  15 degrees in Shoulder ABDuction to increase functional hand use for ADLs/work/leisure activities. progressing not met 4/17/2024        STG's (3 weeks)  1)   Patient to be IND with HEP and modalities for pain/edema managment. progressing not met 4/17/2024  2)   Increase right wrist MASTERSON by at worst 8 degrees to increase functional hand use for ADLs/work/leisure activities. progressing not met 4/17/2024  3)   Increase  right thumb MASTERSON by at worst  15 degrees in Shoulder ABDuction to increase functional hand use for ADLs/work/leisure activities. progressing not met 4/17/2024  4)   Pt to perform  strength assessment as tolerable in  at least 3 weeks to show progression in wrist stability and pain levels. Progressing not met 4/17/2024  5)   Patient to score at 50% or more on FOTO to demonstrate improved perception of functional wrist use. progressing not met 4/17/2024  6)   Decrease complaints of pain to  3 out of 10 at worst to increase functional hand use for ADL/work/leisure activities. progressing not met 4/17/2024          Plan     Updates/Grading for next session:        Continue OT POC    Mik Alexander OTR/L  4/25/2024

## 2024-05-01 ENCOUNTER — CLINICAL SUPPORT (OUTPATIENT)
Dept: REHABILITATION | Facility: HOSPITAL | Age: 49
End: 2024-05-01
Payer: MEDICARE

## 2024-05-01 DIAGNOSIS — M25.531 RIGHT WRIST PAIN: Primary | ICD-10-CM

## 2024-05-01 PROCEDURE — 97140 MANUAL THERAPY 1/> REGIONS: CPT | Mod: CO

## 2024-05-01 PROCEDURE — 97022 WHIRLPOOL THERAPY: CPT | Mod: CO

## 2024-05-01 PROCEDURE — 97110 THERAPEUTIC EXERCISES: CPT | Mod: CO

## 2024-05-01 PROCEDURE — 97035 APP MDLTY 1+ULTRASOUND EA 15: CPT | Mod: CO

## 2024-05-01 NOTE — PROGRESS NOTES
"[facility]  Occupational Therapy Treatment Note     Date: 5/1/2024  Name: Teddy Ennis  Clinic Number: [id number]    Therapy Diagnosis:   Encounter Diagnosis   Name Primary?    Right wrist pain Yes     Physician: Jose M Desai MD    Physician Orders: Eval and Treat  Medical Diagnosis: M25.531 (ICD-10-CM) - Pain in right wrist   Surgical Procedure and Date: NA,   Evaluation Date: 4/17/2024  Insurance Authorization Period Expiration: 3/28/2024 - 3/28/2025  Plan of Care Certification Period: 07/17/2024  Date of Return to MD: none specified  Visit # / Visits authorized: 3 / 10  FOTO: 1/ 3     Precautions:  Standard     Time In: 1:58 PM  Time Out: 2:51 PM  Total Billable Time: 53 minutes    Subjective     Patient reports: He is having a lot of pain today   He was somewhat compliant with home exercise program given last session.   Response to previous treatment:mary well  Functional change: no change at this time    Pain: 5/10  Location: right hands      Objective     Hand ROM WFL - full fist  Some pain with thumb opposition  Elbow and Wrist ROM. Measured in degrees.    4/17/2024 4/17/2024     Left Right   Supination/Pronation 85/85 75/90   Wrist Ext/Flex 75/46 65/40   Wrist RD/UD 20/15 16/15         R Thumb active range of motion 4/17/24  MP  IP  RA  PA  OPP    30 62 45 35 Full, p!    MASTERSON = 172     L Thumb active range of motion 4/17/24  MP  IP  RA  PA  OPP    43 57 55 47 Full   MASTERSON = 202    Treatment     Teddy received the treatments listed below:      therapeutic exercises to develop endurance, ROM, and flexibility for 15 minutes including:  - Wrist active range of motion: flexion/extension x 20 each  - Wrist passive range of motion: flexion/extension x 2 each with 30 second hold  - intrinsic stretching for digits 2-5 x 10 with 5 " hold each (NT)  - Composite fist x 10  - passive range of motion of digits 2-5 x 10 with 5 second holds (NT)  - Thumb adductor stretch with chip clip x 2' " (NT)  - vibration ball x 5 min   - wrist wheel flex/ext, RD/UD x 2 min ea   - spreads and lifts x 10     manual therapy techniques: Soft tissue Mobilization were applied to the: right forearm and wrist for 20 minutes, including:  STM to right wrist/FA  STM to dorsal wrist/FA  Kinesio tape application: distal fascial glide volar and dorsal wrist no stretch   ViM x 3' for decreased pain (NT)    neuromuscular re-education activities to improve: Coordination, Kinesthetic, and Proprioception for 0 minutes. The following activities were included:  NT    therapeutic activities to improve functional performance for 0  minutes, including:  NT    Patient received ultrasound to  the right, dorsal wrist area to increase blood flow, circulation, tissue elasticity, pain management and for wound/scar management for 8 minutes @ 3 Mhz, Intensity 1.0 w/cm2 at 100% duty cycle.      Fluidotherapy: To the right hand for 10 min, continuous air, 115 deg, air speed 50 to decrease pain, edema & scar tissue, sensory re- education, and increased tissue extensibility prior to therex     Patient Education and Home Exercises     Education provided:   - anatomy of the wrist  - Progress towards goals     Written Home Exercises Provided: Patient instructed to cont prior HEP.  Exercises were reviewed and Teddy was able to demonstrate them prior to the end of the session.  Teddy demonstrated fair  understanding of the home exercise program provided. See electronic medical record under Patient Instructions for exercises provided during therapy sessions.       Assessment     He remains guarded of his wrist during movement but still sporadically uses the extremity in daily activity. Fair tolerance to tx today. Some decr in pain with K tape. Therapy to continue to focus on wrist stability, mobility nd decreased pain during daily, functional activity.   Client Care conference completed with evaluating therapist in regards to this patients POC as  evidenced by co signature of supervising therapist.     Teddy is progressing with minor gains towards his goals and there are no updates to goals at this time. Pt prognosis is Fair.     Patient will continue to benefit from skilled outpatient occupational therapy to address the deficits listed in the problem list on initial evaluation provide patient/family education and to maximize patient's level of independence in the home and community environment.     Patient's spiritual, cultural and educational needs considered and patient agreeable to plan of care and goals.    Anticipated barriers to occupational therapy: Chronic condition form an acute incident. Guarding of the extremity    Goals:  LTG's (6 weeks):     1)   Decrease complaints of pain to  1 out of 10 at worst to increase functional hand use for ADL/work/leisure activities. progressing not met 4/17/2024  2)   Patient to score at 58% or more on FOTO to demonstrate improved perception of functional wrist use. progressing not met 4/17/2024   3)   Pt will return to near to prior level of function for ADLs and household management reporting I or Mod I with ADLs (dressing, feeding, grooming, toileting). progressing not met 4/17/2024  4)  Increase right wrist MASTERSON by at worst 15 degrees to increase functional hand use for ADLs/work/leisure activities. progressing not met 4/17/2024  5)  Increase right thumb MASTERSON by at worst  15 degrees in Shoulder ABDuction to increase functional hand use for ADLs/work/leisure activities. progressing not met 4/17/2024        STG's (3 weeks)  1)   Patient to be IND with HEP and modalities for pain/edema managment. progressing not met 4/17/2024  2)   Increase right wrist MASTERSON by at worst 8 degrees to increase functional hand use for ADLs/work/leisure activities. progressing not met 4/17/2024  3)   Increase  right thumb MASTERSON by at worst  15 degrees in Shoulder ABDuction to increase functional hand use for ADLs/work/leisure activities.  progressing not met 4/17/2024  4)   Pt to perform  strength assessment as tolerable in at least 3 weeks to show progression in wrist stability and pain levels. Progressing not met 4/17/2024  5)   Patient to score at 50% or more on FOTO to demonstrate improved perception of functional wrist use. progressing not met 4/17/2024  6)   Decrease complaints of pain to  3 out of 10 at worst to increase functional hand use for ADL/work/leisure activities. progressing not met 4/17/2024          Plan     Updates/Grading for next session:        Continue OT POC    RAUL Swanson/LR/L  5/1/2024

## 2024-05-02 ENCOUNTER — CLINICAL SUPPORT (OUTPATIENT)
Dept: REHABILITATION | Facility: HOSPITAL | Age: 49
End: 2024-05-02
Payer: MEDICARE

## 2024-05-02 DIAGNOSIS — M25.531 RIGHT WRIST PAIN: Primary | ICD-10-CM

## 2024-05-02 PROCEDURE — 97035 APP MDLTY 1+ULTRASOUND EA 15: CPT | Mod: CO

## 2024-05-02 PROCEDURE — 97140 MANUAL THERAPY 1/> REGIONS: CPT | Mod: CO

## 2024-05-02 PROCEDURE — 97110 THERAPEUTIC EXERCISES: CPT | Mod: CO

## 2024-05-02 PROCEDURE — 97022 WHIRLPOOL THERAPY: CPT | Mod: CO

## 2024-05-02 NOTE — PROGRESS NOTES
"[facility]  Occupational Therapy Treatment Note     Date: 5/2/2024  Name: Teddy Ennis  Clinic Number: [id number]    Therapy Diagnosis:   Encounter Diagnosis   Name Primary?    Right wrist pain Yes     Physician: Jose M Desai MD    Physician Orders: Eval and Treat  Medical Diagnosis: M25.531 (ICD-10-CM) - Pain in right wrist   Surgical Procedure and Date: NA,   Evaluation Date: 4/17/2024  Insurance Authorization Period Expiration: 3/28/2024 - 3/28/2025  Plan of Care Certification Period: 07/17/2024  Date of Return to MD: none specified  Visit # / Visits authorized: 4 / 10  FOTO: 1/ 3     Precautions:  Standard     Time In: 200 PM  Time Out: 2:54 PM  Total Billable Time: 54 minutes    Subjective     Patient reports: still having pain but has improved since yesterday  He was somewhat compliant with home exercise program given last session.   Response to previous treatment:mary well  Functional change: no change at this time    Pain: 4/10  Location: right hands      Objective     Hand ROM WFL - full fist  Some pain with thumb opposition  Elbow and Wrist ROM. Measured in degrees.    4/17/2024 4/17/2024     Left Right   Supination/Pronation 85/85 75/90   Wrist Ext/Flex 75/46 65/40   Wrist RD/UD 20/15 16/15         R Thumb active range of motion 4/17/24  MP  IP  RA  PA  OPP    30 62 45 35 Full, p!    MASTERSON = 172     L Thumb active range of motion 4/17/24  MP  IP  RA  PA  OPP    43 57 55 47 Full   MASTERSON = 202    Treatment     Teddy received the treatments listed below:      therapeutic exercises to develop endurance, ROM, and flexibility for 21 minutes including:  - Wrist active range of motion: flexion/extension x 20 each  - Wrist passive range of motion: flexion/extension x 2 each with 30 second hold  - intrinsic stretching for digits 2-5 x 10 with 5 " hold each (NT)  - Composite fist x 10  - passive range of motion of digits 2-5 x 10 with 5 second holds (NT)  - Thumb adductor stretch " with chip clip x 2' (NT)  - vibration ball x 5 min  (NT)  - wrist wheel flex/ext,sup/pro x 2 min ea   - spreads and lifts x 10  (NT)  - wrist maze x 3 min   - true balance x 3 min   - wrist ext off table x 10     manual therapy techniques: Soft tissue Mobilization were applied to the: right forearm and wrist for 15 minutes, including:  STM to right wrist/FA  STM to dorsal wrist/FA  Kinesio tape application: distal fascial glide volar and dorsal wrist no stretch, circumfrential   ViM x 3' for decreased pain (NT)    neuromuscular re-education activities to improve: Coordination, Kinesthetic, and Proprioception for 0 minutes. The following activities were included:  NT    therapeutic activities to improve functional performance for 0  minutes, including:  NT    Patient received ultrasound to  the right, dorsal wrist area to increase blood flow, circulation, tissue elasticity, pain management and for wound/scar management for 8 minutes @ 3 Mhz, Intensity 1.0 w/cm2 at 100% duty cycle.      Fluidotherapy: To the right hand for 10 min, continuous air, 115 deg, air speed 50 to decrease pain, edema & scar tissue, sensory re- education, and increased tissue extensibility prior to therex     Patient Education and Home Exercises     Education provided:   - anatomy of the wrist  - Progress towards goals     Written Home Exercises Provided: Patient instructed to cont prior HEP.  Exercises were reviewed and Teddy was able to demonstrate them prior to the end of the session.  Teddy demonstrated fair  understanding of the home exercise program provided. See electronic medical record under Patient Instructions for exercises provided during therapy sessions.       Assessment     Pt tolerated session slightly better today as compared to yesterday. Verbalized improvement from ENDY ryan. Therapy to continue to focus on wrist stability, mobility nd decreased pain during daily, functional activity.       Teddy is progressing with  minor gains towards his goals and there are no updates to goals at this time. Pt prognosis is Fair.     Patient will continue to benefit from skilled outpatient occupational therapy to address the deficits listed in the problem list on initial evaluation provide patient/family education and to maximize patient's level of independence in the home and community environment.     Patient's spiritual, cultural and educational needs considered and patient agreeable to plan of care and goals.    Anticipated barriers to occupational therapy: Chronic condition form an acute incident. Guarding of the extremity    Goals:  LTG's (6 weeks):     1)   Decrease complaints of pain to  1 out of 10 at worst to increase functional hand use for ADL/work/leisure activities. progressing not met 4/17/2024  2)   Patient to score at 58% or more on FOTO to demonstrate improved perception of functional wrist use. progressing not met 4/17/2024   3)   Pt will return to near to prior level of function for ADLs and household management reporting I or Mod I with ADLs (dressing, feeding, grooming, toileting). progressing not met 4/17/2024  4)  Increase right wrist MASTERSON by at worst 15 degrees to increase functional hand use for ADLs/work/leisure activities. progressing not met 4/17/2024  5)  Increase right thumb MASTERSON by at worst  15 degrees in Shoulder ABDuction to increase functional hand use for ADLs/work/leisure activities. progressing not met 4/17/2024        STG's (3 weeks)  1)   Patient to be IND with HEP and modalities for pain/edema managment. progressing not met 4/17/2024  2)   Increase right wrist MASTERSON by at worst 8 degrees to increase functional hand use for ADLs/work/leisure activities. progressing not met 4/17/2024  3)   Increase  right thumb MASTERSON by at worst  15 degrees in Shoulder ABDuction to increase functional hand use for ADLs/work/leisure activities. progressing not met 4/17/2024  4)   Pt to perform  strength assessment as  tolerable in at least 3 weeks to show progression in wrist stability and pain levels. Progressing not met 4/17/2024  5)   Patient to score at 50% or more on FOTO to demonstrate improved perception of functional wrist use. progressing not met 4/17/2024  6)   Decrease complaints of pain to  3 out of 10 at worst to increase functional hand use for ADL/work/leisure activities. progressing not met 4/17/2024          Plan     Updates/Grading for next session:        Continue OT POC    VINI Swanson  5/2/2024

## 2024-05-08 ENCOUNTER — CLINICAL SUPPORT (OUTPATIENT)
Dept: REHABILITATION | Facility: HOSPITAL | Age: 49
End: 2024-05-08
Payer: MEDICARE

## 2024-05-08 DIAGNOSIS — M25.531 RIGHT WRIST PAIN: Primary | ICD-10-CM

## 2024-05-08 PROCEDURE — 97110 THERAPEUTIC EXERCISES: CPT

## 2024-05-08 PROCEDURE — 97035 APP MDLTY 1+ULTRASOUND EA 15: CPT

## 2024-05-08 PROCEDURE — 97018 PARAFFIN BATH THERAPY: CPT | Mod: 59

## 2024-05-08 PROCEDURE — 97140 MANUAL THERAPY 1/> REGIONS: CPT

## 2024-05-08 NOTE — PROGRESS NOTES
[facility]  Occupational Therapy Treatment Note     Date: 5/8/2024  Name: Teddy Ennis  Clinic Number: [id number]    Therapy Diagnosis:   Encounter Diagnosis   Name Primary?    Right wrist pain Yes     Physician: Jose M Desai MD    Physician Orders: Eval and Treat  Medical Diagnosis: M25.531 (ICD-10-CM) - Pain in right wrist   Surgical Procedure and Date: NA,   Evaluation Date: 4/17/2024  Insurance Authorization Period Expiration: 3/28/2024 - 3/28/2025  Plan of Care Certification Period: 07/17/2024  Date of Return to MD: none specified  Visit # / Visits authorized: 5 / 10  FOTO: 1/ 3     [employer] Access Code: [email or web address]     Precautions:  Standard     Time In: 1:00 PM  Time Out: 1:55 PM  Total Billable Time: 55 minutes    Subjective     Patient reports: His entire hand went numb a couple nights prior. Symptoms lasted till the next day. He was sleeping on his right arm tucked under his pillow.   He was somewhat compliant with home exercise program given last session.   Response to previous treatment:mary well  Functional change: no change at this time    Pain: 5/10  Location: right hands      Objective     Hand ROM WFL - full fist  Some pain with thumb opposition  Elbow and Wrist ROM. Measured in degrees.    4/17/2024 4/17/2024 5/8/2024     Left Right Right   Supination/Pronation 85/85 75/90 65/90   Wrist Ext/Flex 75/46 65/40 70/40   Wrist RD/UD 20/15 16/15 15/20         R Thumb active range of motion 4/17/24  MP  IP  RA  PA  OPP    30 62 45 35 Full, p!    MASTERSON = 172     L Thumb active range of motion 4/17/24  MP  IP  RA  PA  OPP    43 57 55 47 Full   MASTERSON = 202    R Thumb active range of motion 5/8/24  MP  IP  RA  PA  OPP    36 64 40 32 Tip of SF with pain       Treatment     Teddy received the treatments listed below:      therapeutic exercises to develop endurance, ROM, and flexibility for 20 minutes including:  - Wrist active range of motion: flexion/extension x 20 each  - Wrist passive  "range of motion: flexion/extension x 2 each with 30 second hold  - Composite fist x 10  - wrist wheel flex/ext,sup/pro x 2 min ea   - wrist maze x 3 min   - true balance x 3 min  (NT)  - wrist ext off table x 10 (NT)  - intrinsic stretching for digits 2-5 x 10 with 5 " hold each (NT)  - passive range of motion of digits 2-5 x 10 with 5 second holds (NT)  - Thumb adductor stretch with chip clip x 2' (NT)  - vibration ball x 5 min  (NT)  - spreads and lifts x 10  (NT)  - objective active range of motion measurements re-assessed this date.    manual therapy techniques: Soft tissue Mobilization were applied to the: right forearm and wrist for 15 minutes, including:  STM to right wrist/FA (NT)  STM to dorsal wrist/FA  Kinesio tape application: distal fascial glide volar and dorsal wrist no stretch, circumfrential   ViM x 3' for decreased pain (NT)    neuromuscular re-education activities to improve: Coordination, Kinesthetic, and Proprioception for 0 minutes. The following activities were included:  NT    therapeutic activities to improve functional performance for 0  minutes, including:  NT    Patient received ultrasound to  the right, dorsal wrist area to increase blood flow, circulation, tissue elasticity, pain management and for wound/scar management for 8 minutes @ 3 Mhz, Intensity 1.0 w/cm2 at 100% duty cycle.      Fluidotherapy: To the right hand for 10 min, continuous air, 115 deg, air speed 50 to decrease pain, edema & scar tissue, sensory re- education, and increased tissue extensibility prior to therex  (NT)    Patient received paraffin bath to right hand(s) for 10 minutes to increase blood flow, circulation, pain management and for tissue elasticity prior to therex.      Patient Education and Home Exercises     Education provided:   - Radial nerve compression of the upper arm during sleep  - Progress towards goals     Written Home Exercises Provided: Patient instructed to cont prior HEP.  Exercises were " reviewed and Teddy was able to demonstrate them prior to the end of the session.  Teddy demonstrated fair  understanding of the home exercise program provided. See electronic medical record under Patient Instructions for exercises provided during therapy sessions.       Assessment     Pt tolerated session better today with noted improvement in smoothness of wrist coordination and decreased guarding. Decreased swelling over the dorsal aspect of the wrist this date but with gains to still be made in this area. Minor improvement in MASTERSON for the right wrist this date. Verbalized improvement from K taping. Therapy to continue to focus on wrist stability, mobility, tissue alignment and decreased pain during daily, functional activity. FOTO not re-assessed this date due to patient requiring some help interpreting written questions. Therapist or his mother to help with FOTO in the upcoming sessions.     Teddy is progressing with minor gains towards his goals and there are no updates to goals at this time. Pt prognosis is Fair.     Patient will continue to benefit from skilled outpatient occupational therapy to address the deficits listed in the problem list on initial evaluation provide patient/family education and to maximize patient's level of independence in the home and community environment.     Patient's spiritual, cultural and educational needs considered and patient agreeable to plan of care and goals.    Anticipated barriers to occupational therapy: Chronic condition form an acute incident. Guarding of the extremity    Goals:  LTG's (6 weeks):     1)   Decrease complaints of pain to  1 out of 10 at worst to increase functional hand use for ADL/work/leisure activities. progressing not met 4/17/2024  2)   Patient to score at 58% or more on FOTO to demonstrate improved perception of functional wrist use. progressing not met 4/17/2024   3)   Pt will return to near to prior level of function for ADLs and household  management reporting I or Mod I with ADLs (dressing, feeding, grooming, toileting). progressing not met 4/17/2024  4)  Increase right wrist MASTERSON by at worst 15 degrees to increase functional hand use for ADLs/work/leisure activities. progressing not met 4/17/2024  5)  Increase right thumb MASTERSON by at worst  15 degrees to increase functional hand use for ADLs/work/leisure activities. progressing not met 4/17/2024     STG's (3 weeks)  1)   Patient to be IND with HEP and modalities for pain/edema managment. MET  2)   Increase right wrist MASTERSON by at worst 8 degrees to increase functional hand use for ADLs/work/leisure activities. progressing not met 4/17/2024  3)   Increase  right thumb MASTERSON by at worst  15 degrees in Shoulder ABDuction to increase functional hand use for ADLs/work/leisure activities. MET  4)   Pt to perform  strength assessment as tolerable in at least 3 weeks to show progression in wrist stability and pain levels. Progressing not met 4/17/2024  5)   Patient to score at 50% or more on FOTO to demonstrate improved perception of functional wrist use. progressing not met 4/17/2024  6)   Decrease complaints of pain to  3 out of 10 at worst to increase functional hand use for ADL/work/leisure activities. progressing not met 4/17/2024     Plan     Updates/Grading for next session:        Continue OT POC    Mik Alexander OTR/L  5/8/2024

## 2024-05-09 ENCOUNTER — CLINICAL SUPPORT (OUTPATIENT)
Dept: REHABILITATION | Facility: HOSPITAL | Age: 49
End: 2024-05-09
Payer: MEDICARE

## 2024-05-09 DIAGNOSIS — M25.531 RIGHT WRIST PAIN: Primary | ICD-10-CM

## 2024-05-09 PROCEDURE — 97035 APP MDLTY 1+ULTRASOUND EA 15: CPT | Mod: KX,CO

## 2024-05-09 PROCEDURE — 97022 WHIRLPOOL THERAPY: CPT | Mod: KX,CO

## 2024-05-09 PROCEDURE — 97110 THERAPEUTIC EXERCISES: CPT | Mod: KX,CO

## 2024-05-09 PROCEDURE — 97140 MANUAL THERAPY 1/> REGIONS: CPT | Mod: KX,CO

## 2024-05-09 NOTE — PROGRESS NOTES
[facility]  Occupational Therapy Treatment Note     Date: 5/9/2024  Name: Teddy Ennis  Clinic Number: [id number]    Therapy Diagnosis:   Encounter Diagnosis   Name Primary?    Right wrist pain Yes       Physician: Jose M Desai MD    Physician Orders: Eval and Treat  Medical Diagnosis: M25.531 (ICD-10-CM) - Pain in right wrist   Surgical Procedure and Date: NA,   Evaluation Date: 4/17/2024  Insurance Authorization Period Expiration: 3/28/2024 - 3/28/2025  Plan of Care Certification Period: 07/17/2024  Date of Return to MD: none specified  Visit # / Visits authorized: 6 / 10  FOTO: 1/ 3     [employer] Access Code: [email or web address]     Precautions:  Standard     Time In: 2:03 PM  Time Out: 2:56 PM  Total Billable Time: 53 minutes    Subjective     Patient reports: used scissors at work to cut stems and had a shocking pain in his hand   He was somewhat compliant with home exercise program given last session.   Response to previous treatment:mary well  Functional change: no change at this time    Pain: 5/10  Location: right hands      Objective     Hand ROM WFL - full fist  Some pain with thumb opposition  Elbow and Wrist ROM. Measured in degrees.    4/17/2024 4/17/2024 5/8/2024     Left Right Right   Supination/Pronation 85/85 75/90 65/90   Wrist Ext/Flex 75/46 65/40 70/40   Wrist RD/UD 20/15 16/15 15/20         R Thumb active range of motion 4/17/24  MP  IP  RA  PA  OPP    30 62 45 35 Full, p!    MASTERSON = 172     L Thumb active range of motion 4/17/24  MP  IP  RA  PA  OPP    43 57 55 47 Full   MASTERSON = 202    R Thumb active range of motion 5/8/24  MP  IP  RA  PA  OPP    36 64 40 32 Tip of SF with pain       Treatment     Teddy received the treatments listed below:      therapeutic exercises to develop endurance, ROM, and flexibility for 25 minutes including:  - Wrist active range of motion: flexion/extension x 20 each  - Wrist passive range of motion: flexion/extension x 2 each with 30 second hold  "(NT)  - Composite fist x 10 (NT)   - wrist wheel flex/ext,sup/pro x 2 min ea   - wrist maze x 3 min   - true balance x 3 min  (NT)  - wrist ext off table x 10 (NT)  - intrinsic stretching for digits 2-5 x 10 with 5 " hold each (NT)  - passive range of motion of digits 2-5 x 10 with 5 second holds (NT)  - Thumb adductor stretch with chip clip and wrist flexion/ext   - vibration ball x 5 min    - spreads and lifts x 10  (NT)  - gyro wheel x 2 min   - strainer and tennis ball c/cc 1 min ea way     manual therapy techniques: Soft tissue Mobilization were applied to the: right forearm and wrist for 10 minutes, including:  STM to right wrist/FA (NT)  STM to dorsal wrist/FA  Kinesio tape application: distal glide dorsal wrist no stretch, 1st webspace corrector   ViM x 3' for decreased pain (NT)    neuromuscular re-education activities to improve: Coordination, Kinesthetic, and Proprioception for 0 minutes. The following activities were included:  NT    therapeutic activities to improve functional performance for 0  minutes, including:  NT    Patient received ultrasound to  the right, dorsal wrist area to increase blood flow, circulation, tissue elasticity, pain management and for wound/scar management for 8 minutes @ 3 Mhz, Intensity 1.0 w/cm2 at 100% duty cycle.      Fluidotherapy: To the right hand for 10 min, continuous air, 115 deg, air speed 50 to decrease pain, edema & scar tissue, sensory re- education, and increased tissue extensibility prior to therex      (NT) Patient received paraffin bath to right hand(s) for 10 minutes to increase blood flow, circulation, pain management and for tissue elasticity prior to therex.      Patient Education and Home Exercises     Education provided:   - Radial nerve compression of the upper arm during sleep  - Progress towards goals     Written Home Exercises Provided: Patient instructed to cont prior HEP.  Exercises were reviewed and Teddy was able to demonstrate them prior to " the end of the session.  Teddy demonstrated fair  understanding of the home exercise program provided. See electronic medical record under Patient Instructions for exercises provided during therapy sessions.       Assessment     Pt tolerated session well today. Continues to improve with guarding wrist and rest breaks. Therapy to continue to focus on wrist stability, mobility, tissue alignment and decreased pain during daily, functional activity. FOTO not re-assessed this date due to patient requiring some help interpreting written questions. Therapist or his mother to help with FOTO in the upcoming sessions.     Teddy is progressing with minor gains towards his goals and there are no updates to goals at this time. Pt prognosis is Fair.     Patient will continue to benefit from skilled outpatient occupational therapy to address the deficits listed in the problem list on initial evaluation provide patient/family education and to maximize patient's level of independence in the home and community environment.     Patient's spiritual, cultural and educational needs considered and patient agreeable to plan of care and goals.    Anticipated barriers to occupational therapy: Chronic condition form an acute incident. Guarding of the extremity    Goals:  LTG's (6 weeks):     1)   Decrease complaints of pain to  1 out of 10 at worst to increase functional hand use for ADL/work/leisure activities. progressing not met 4/17/2024  2)   Patient to score at 58% or more on FOTO to demonstrate improved perception of functional wrist use. progressing not met 4/17/2024   3)   Pt will return to near to prior level of function for ADLs and household management reporting I or Mod I with ADLs (dressing, feeding, grooming, toileting). progressing not met 4/17/2024  4)  Increase right wrist MASTERSON by at worst 15 degrees to increase functional hand use for ADLs/work/leisure activities. progressing not met 4/17/2024  5)  Increase right thumb  MASTERSON by at worst  15 degrees to increase functional hand use for ADLs/work/leisure activities. progressing not met 4/17/2024     STG's (3 weeks)  1)   Patient to be IND with HEP and modalities for pain/edema managment. MET  2)   Increase right wrist MASTERSON by at worst 8 degrees to increase functional hand use for ADLs/work/leisure activities. progressing not met 4/17/2024  3)   Increase  right thumb MASTERSON by at worst  15 degrees in Shoulder ABDuction to increase functional hand use for ADLs/work/leisure activities. MET  4)   Pt to perform  strength assessment as tolerable in at least 3 weeks to show progression in wrist stability and pain levels. Progressing not met 4/17/2024  5)   Patient to score at 50% or more on FOTO to demonstrate improved perception of functional wrist use. progressing not met 4/17/2024  6)   Decrease complaints of pain to  3 out of 10 at worst to increase functional hand use for ADL/work/leisure activities. progressing not met 4/17/2024     Plan     Updates/Grading for next session:        Continue OT POC    VINI Swanson   5/9/2024

## 2024-05-13 DIAGNOSIS — J45.20 MILD INTERMITTENT ASTHMA WITHOUT COMPLICATION: ICD-10-CM

## 2024-05-13 RX ORDER — FLUTICASONE FUROATE AND VILANTEROL TRIFENATATE 200; 25 UG/1; UG/1
1 POWDER RESPIRATORY (INHALATION) DAILY
Qty: 60 EACH | Refills: 11 | Status: SHIPPED | OUTPATIENT
Start: 2024-05-13

## 2024-05-13 NOTE — TELEPHONE ENCOUNTER
No care due was identified.  [facility] Embedded Care Due Messages. Reference number: [id number].   5/13/2024 10:20:25 AM CDT

## 2024-05-14 NOTE — PROGRESS NOTES
[facility]  Occupational Therapy Treatment Note     Date: 5/15/2024  Name: Teddy Ennis  Clinic Number: [id number]    Therapy Diagnosis:   Encounter Diagnosis   Name Primary?    Right wrist pain Yes         Physician: Jose M Desai MD    Physician Orders: Eval and Treat  Medical Diagnosis: M25.531 (ICD-10-CM) - Pain in right wrist   Surgical Procedure and Date: NA,   Evaluation Date: 4/17/2024  Insurance Authorization Period Expiration: 3/28/2024 - 3/28/2025  Plan of Care Certification Period: 07/17/2024  Date of Return to MD: none specified  Visit # / Visits authorized: 7 / 10  FOTO: 1/ 3     [facility] Access Code: [email or web address]     Precautions:  Standard     Time In: 1 PM  Time Out: 1:53 PM  Total Billable Time: 53 minutes    Subjective     Patient reports: he got dizzy and fell on his R wrist over the weekend, it was hurting a lot and is feeling a little better now but still has pain   He was somewhat compliant with home exercise program given last session.   Response to previous treatment:mary well  Functional change: no change at this time    Pain: 5/10  Location: right hands      Objective     Hand ROM WFL - full fist  Some pain with thumb opposition  Elbow and Wrist ROM. Measured in degrees.    4/17/2024 4/17/2024 5/8/2024     Left Right Right   Supination/Pronation 85/85 75/90 65/90   Wrist Ext/Flex 75/46 65/40 70/40   Wrist RD/UD 20/15 16/15 15/20         R Thumb active range of motion 4/17/24  MP  IP  RA  PA  OPP    30 62 45 35 Full, p!    MASTERSON = 172     L Thumb active range of motion 4/17/24  MP  IP  RA  PA  OPP    43 57 55 47 Full   MASTERSON = 202    R Thumb active range of motion 5/8/24  MP  IP  RA  PA  OPP    36 64 40 32 Tip of SF with pain       Treatment     Teddy received the treatments listed below:      therapeutic exercises to develop endurance, ROM, and flexibility for 35 minutes including:  - Wrist active range of motion: flexion/extension x 20 each (NT)  - Wrist passive  "range of motion: flexion/extension x 2 each with 30 second hold (NT)  - Composite fist x 10 (NT)   - AAROM with soccer ball flex/ext rolls on table x 2 min   - soccer ball sup/pro x 1 min   - wrist maze x 3 min   - intrinsic strengthening: adduction with foam wedges, abd with yellow RB x 1 min ea   - true balance x 3 min  (NT)  - wrist ext off table x 10 (NT)  - intrinsic stretching for digits 2-5 x 10 with 5 " hold each (NT)  - passive range of motion of digits 2-5 x 10 with 5 second holds (NT)  - Thumb adductor stretch with chip clip and wrist flexion/ext (NT)  - vibration ball x 5 min  (NT)  - spreads and lifts x 10  (NT)  - gyro wheel x 2 min   - strainer and tennis ball clockwise only 1 min ea    manual therapy techniques: Soft tissue Mobilization were applied to the: right forearm and wrist for 10 minutes, including:  STM to right wrist/FA (NT)  STM to dorsal wrist/FA  Kinesio tape application: distal glide dorsal wrist no stretch, CMC support strip high stretch   ViM x 3' for decreased pain (NT)    neuromuscular re-education activities to improve: Coordination, Kinesthetic, and Proprioception for 0 minutes. The following activities were included:  NT    therapeutic activities to improve functional performance for 0  minutes, including:  NT    Patient received ultrasound to  the right, dorsal wrist area to increase blood flow, circulation, tissue elasticity, pain management and for wound/scar management for 8 minutes @ 3 Mhz, Intensity 1.0 w/cm2 at 100% duty cycle.      Fluidotherapy: To the right hand for 10 min, continuous air, 115 deg, air speed 50 to decrease pain, edema & scar tissue, sensory re- education, and increased tissue extensibility prior to therex      (NT) Patient received paraffin bath to right hand(s) for 10 minutes to increase blood flow, circulation, pain management and for tissue elasticity prior to therex.      Patient Education and Home Exercises     Education provided:   - Radial nerve " compression of the upper arm during sleep  - Progress towards goals     Written Home Exercises Provided: Patient instructed to cont prior HEP.  Exercises were reviewed and Teddy was able to demonstrate them prior to the end of the session.  Teddy demonstrated fair  understanding of the home exercise program provided. See electronic medical record under Patient Instructions for exercises provided during therapy sessions.       Assessment     Pt tolerated tx fairly well with increasing pain and fatigue toward the end of session.  Therapy to continue to focus on wrist stability, mobility, tissue alignment and decreased pain during daily, functional activity. FOTO not re-assessed this date due to patient requiring some help interpreting written questions. Therapist or his mother to help with FOTO in the upcoming sessions.     Teddy is progressing with minor gains towards his goals and there are no updates to goals at this time. Pt prognosis is Fair.     Patient will continue to benefit from skilled outpatient occupational therapy to address the deficits listed in the problem list on initial evaluation provide patient/family education and to maximize patient's level of independence in the home and community environment.     Patient's spiritual, cultural and educational needs considered and patient agreeable to plan of care and goals.    Anticipated barriers to occupational therapy: Chronic condition form an acute incident. Guarding of the extremity    Goals:  LTG's (6 weeks):     1)   Decrease complaints of pain to  1 out of 10 at worst to increase functional hand use for ADL/work/leisure activities. progressing not met 4/17/2024  2)   Patient to score at 58% or more on FOTO to demonstrate improved perception of functional wrist use. progressing not met 4/17/2024   3)   Pt will return to near to prior level of function for ADLs and household management reporting I or Mod I with ADLs (dressing, feeding, grooming,  toileting). progressing not met 4/17/2024  4)  Increase right wrist MASTERSON by at worst 15 degrees to increase functional hand use for ADLs/work/leisure activities. progressing not met 4/17/2024  5)  Increase right thumb MASTERSON by at worst  15 degrees to increase functional hand use for ADLs/work/leisure activities. progressing not met 4/17/2024     STG's (3 weeks)  1)   Patient to be IND with HEP and modalities for pain/edema managment. MET  2)   Increase right wrist MASTERSON by at worst 8 degrees to increase functional hand use for ADLs/work/leisure activities. progressing not met 4/17/2024  3)   Increase  right thumb MASTERSON by at worst  15 degrees in Shoulder ABDuction to increase functional hand use for ADLs/work/leisure activities. MET  4)   Pt to perform  strength assessment as tolerable in at least 3 weeks to show progression in wrist stability and pain levels. Progressing not met 4/17/2024  5)   Patient to score at 50% or more on FOTO to demonstrate improved perception of functional wrist use. progressing not met 4/17/2024  6)   Decrease complaints of pain to  3 out of 10 at worst to increase functional hand use for ADL/work/leisure activities. progressing not met 4/17/2024     Plan     Updates/Grading for next session:        Continue OT POC    VINI Swanson   5/15/2024

## 2024-05-15 ENCOUNTER — CLINICAL SUPPORT (OUTPATIENT)
Dept: REHABILITATION | Facility: HOSPITAL | Age: 49
End: 2024-05-15
Payer: MEDICARE

## 2024-05-15 DIAGNOSIS — M25.531 RIGHT WRIST PAIN: Primary | ICD-10-CM

## 2024-05-15 PROCEDURE — 97022 WHIRLPOOL THERAPY: CPT | Mod: KX,CO

## 2024-05-15 PROCEDURE — 97110 THERAPEUTIC EXERCISES: CPT | Mod: KX,CO

## 2024-05-15 PROCEDURE — 97035 APP MDLTY 1+ULTRASOUND EA 15: CPT | Mod: KX,CO

## 2024-05-15 PROCEDURE — 97140 MANUAL THERAPY 1/> REGIONS: CPT | Mod: KX,CO

## 2024-05-16 ENCOUNTER — CLINICAL SUPPORT (OUTPATIENT)
Dept: REHABILITATION | Facility: HOSPITAL | Age: 49
End: 2024-05-16
Payer: MEDICARE

## 2024-05-16 DIAGNOSIS — M25.531 RIGHT WRIST PAIN: Primary | ICD-10-CM

## 2024-05-16 PROCEDURE — 97530 THERAPEUTIC ACTIVITIES: CPT | Mod: KX,CO

## 2024-05-16 PROCEDURE — 97140 MANUAL THERAPY 1/> REGIONS: CPT | Mod: KX,CO

## 2024-05-16 PROCEDURE — 97022 WHIRLPOOL THERAPY: CPT | Mod: KX,CO

## 2024-05-16 NOTE — PROGRESS NOTES
[facility]  Occupational Therapy Treatment Note     Date: 5/16/2024  Name: Teddy Ennis  Clinic Number: [id number]    Therapy Diagnosis:   Encounter Diagnosis   Name Primary?    Right wrist pain Yes           Physician: Jose M Desai MD    Physician Orders: Eval and Treat  Medical Diagnosis: M25.531 (ICD-10-CM) - Pain in right wrist   Surgical Procedure and Date: NA,   Evaluation Date: 4/17/2024  Insurance Authorization Period Expiration: 3/28/2024 - 3/28/2025  Plan of Care Certification Period: 07/17/2024  Date of Return to MD: none specified  Visit # / Visits authorized: 8 / 20  FOTO: 1/ 3     [employer] Access Code: [email or web address]     Precautions:  Standard     Time In: 1:05 PM  Time Out: 2 PM  Total Billable Time: 55 minutes    Subjective     Patient reports: didn't have work today but was playing on the computer with his mouse and is swollen  He was somewhat compliant with home exercise program given last session.   Response to previous treatment:amry well  Functional change: no change at this time    Pain: 5/10  Location: right hands      Objective     Hand ROM WFL - full fist  Some pain with thumb opposition  Elbow and Wrist ROM. Measured in degrees.    4/17/2024 4/17/2024 5/8/2024     Left Right Right   Supination/Pronation 85/85 75/90 65/90   Wrist Ext/Flex 75/46 65/40 70/40   Wrist RD/UD 20/15 16/15 15/20         R Thumb active range of motion 4/17/24  MP  IP  RA  PA  OPP    30 62 45 35 Full, p!    MASTERSON = 172     L Thumb active range of motion 4/17/24  MP  IP  RA  PA  OPP    43 57 55 47 Full   MASTERSON = 202    R Thumb active range of motion 5/8/24  MP  IP  RA  PA  OPP    36 64 40 32 Tip of SF with pain       Treatment     Teddy received the treatments listed below:      therapeutic activities to improve functional performance for 27 minutes, including  - AAROM with soccer ball flex/ext rolls on table x 2 min (NT)  - soccer ball sup/pro x 1 min  (NT)  - wrist maze x 3 min   - intrinsic  strengthening: adduction with foam wedges, abd with yellow RB x 1 min ea  (NT)  - true balance x 2 min  - wrist ext off table x 10 (NT)  - gyro wheel x 2 min   - strainer and tennis ball clockwise only 1 min ea  - pink frisbee holding on side c/cc x 1 min ea     manual therapy techniques: Soft tissue Mobilization were applied to the: right forearm and wrist for 10 minutes, including:  Vibration massage to dorsal FA and wrist   Kinesio tape application: luis over thumb and no stretch over ECRB       neuromuscular re-education activities to improve: Coordination, Kinesthetic, and Proprioception for 0 minutes. The following activities were included:  NT        Patient received ultrasound to  the right, dorsal wrist area to increase blood flow, circulation, tissue elasticity, pain management and for wound/scar management for 8 minutes @ 3 Mhz, Intensity 1.0 w/cm2 at 100% duty cycle.      Fluidotherapy: To the right hand for 10 min, continuous air, 115 deg, air speed 50 to decrease pain, edema & scar tissue, sensory re- education, and increased tissue extensibility prior to therex      (NT) Patient received paraffin bath to right hand(s) for 10 minutes to increase blood flow, circulation, pain management and for tissue elasticity prior to therex.      Patient Education and Home Exercises     Education provided:   - Radial nerve compression of the upper arm during sleep  - Progress towards goals     Written Home Exercises Provided: Patient instructed to cont prior HEP.  Exercises were reviewed and Teddy was able to demonstrate them prior to the end of the session.  Teddy demonstrated fair  understanding of the home exercise program provided. See electronic medical record under Patient Instructions for exercises provided during therapy sessions.       Assessment     Improved tolerance to tx today as compared to last session, min-mod rest breaks needed. Pt to continue to focus on wrist stability, mobility, tissue  alignment and decreased pain during daily, functional activity. FOTO not re-assessed this date due to patient requiring some help interpreting written questions. Therapist or his mother to help with FOTO in the upcoming sessions.     Teddy is progressing with minor gains towards his goals and there are no updates to goals at this time. Pt prognosis is Fair.     Patient will continue to benefit from skilled outpatient occupational therapy to address the deficits listed in the problem list on initial evaluation provide patient/family education and to maximize patient's level of independence in the home and community environment.     Patient's spiritual, cultural and educational needs considered and patient agreeable to plan of care and goals.    Anticipated barriers to occupational therapy: Chronic condition form an acute incident. Guarding of the extremity    Goals:  LTG's (6 weeks):     1)   Decrease complaints of pain to  1 out of 10 at worst to increase functional hand use for ADL/work/leisure activities. progressing not met 4/17/2024  2)   Patient to score at 58% or more on FOTO to demonstrate improved perception of functional wrist use. progressing not met 4/17/2024   3)   Pt will return to near to prior level of function for ADLs and household management reporting I or Mod I with ADLs (dressing, feeding, grooming, toileting). progressing not met 4/17/2024  4)  Increase right wrist MASTERSON by at worst 15 degrees to increase functional hand use for ADLs/work/leisure activities. progressing not met 4/17/2024  5)  Increase right thumb MASTERSON by at worst  15 degrees to increase functional hand use for ADLs/work/leisure activities. progressing not met 4/17/2024     STG's (3 weeks)  1)   Patient to be IND with HEP and modalities for pain/edema managment. MET  2)   Increase right wrist MASTERSON by at worst 8 degrees to increase functional hand use for ADLs/work/leisure activities. progressing not met 4/17/2024  3)   Increase   right thumb MASTERSON by at worst  15 degrees in Shoulder ABDuction to increase functional hand use for ADLs/work/leisure activities. MET  4)   Pt to perform  strength assessment as tolerable in at least 3 weeks to show progression in wrist stability and pain levels. Progressing not met 4/17/2024  5)   Patient to score at 50% or more on FOTO to demonstrate improved perception of functional wrist use. progressing not met 4/17/2024  6)   Decrease complaints of pain to  3 out of 10 at worst to increase functional hand use for ADL/work/leisure activities. progressing not met 4/17/2024     Plan     Updates/Grading for next session:        Continue OT POC    VINI Swanson   5/16/2024

## 2024-05-22 ENCOUNTER — LAB VISIT (OUTPATIENT)
Dept: LAB | Facility: HOSPITAL | Age: 49
End: 2024-05-22
Attending: INTERNAL MEDICINE
Payer: MEDICARE

## 2024-05-22 ENCOUNTER — CLINICAL SUPPORT (OUTPATIENT)
Dept: REHABILITATION | Facility: HOSPITAL | Age: 49
End: 2024-05-22
Payer: MEDICARE

## 2024-05-22 DIAGNOSIS — M25.531 RIGHT WRIST PAIN: Primary | ICD-10-CM

## 2024-05-22 DIAGNOSIS — E78.2 HYPERLIPIDEMIA, MIXED: ICD-10-CM

## 2024-05-22 LAB
CHOLEST SERPL-MCNC: 159 MG/DL (ref 120–199)
CHOLEST/HDLC SERPL: 5.1 {RATIO} (ref 2–5)
HDLC SERPL-MCNC: 31 MG/DL (ref 40–75)
HDLC SERPL: 19.5 % (ref 20–50)
LDLC SERPL CALC-MCNC: 98.4 MG/DL (ref 63–159)
NONHDLC SERPL-MCNC: 128 MG/DL
TRIGL SERPL-MCNC: 148 MG/DL (ref 30–150)

## 2024-05-22 PROCEDURE — 80061 LIPID PANEL: CPT | Mod: HCNC | Performed by: INTERNAL MEDICINE

## 2024-05-22 PROCEDURE — 97530 THERAPEUTIC ACTIVITIES: CPT | Mod: KX,CO

## 2024-05-22 PROCEDURE — 97022 WHIRLPOOL THERAPY: CPT | Mod: KX,CO

## 2024-05-22 PROCEDURE — 36415 COLL VENOUS BLD VENIPUNCTURE: CPT | Mod: HCNC | Performed by: INTERNAL MEDICINE

## 2024-05-22 PROCEDURE — 97140 MANUAL THERAPY 1/> REGIONS: CPT | Mod: KX,CO

## 2024-05-22 NOTE — PROGRESS NOTES
"[facility]  Occupational Therapy Treatment Note     Date: 5/22/2024  Name: Teddy Ennis  Clinic Number: [id number]    Therapy Diagnosis:   Encounter Diagnosis   Name Primary?    Right wrist pain Yes           Physician: Jose M Desai MD    Physician Orders: Eval and Treat  Medical Diagnosis: M25.531 (ICD-10-CM) - Pain in right wrist   Surgical Procedure and Date: NA,   Evaluation Date: 4/17/2024  Insurance Authorization Period Expiration: 3/28/2024 - 3/28/2025  Plan of Care Certification Period: 07/17/2024  Date of Return to MD: none specified  Visit # / Visits authorized: 8 / 20  FOTO: 1/ 3     Lob Access Code: [email or web address]     Precautions:  Standard     Time In: 1:25 PM  Time Out: 2:25 PM  Total Billable Time: 52 minutes    Subjective     Patient reports: "I'm feeling ok today but its still swollen, I don't understand." Also c/o tightness in palm  He was somewhat compliant with home exercise program given last session.   Response to previous treatment:mary well  Functional change: no change at this time    Pain: not rated /10  Location: right hands      Objective     Hand ROM WFL - full fist  Some pain with thumb opposition  Elbow and Wrist ROM. Measured in degrees.    4/17/2024 4/17/2024 5/8/2024     Left Right Right   Supination/Pronation 85/85 75/90 65/90   Wrist Ext/Flex 75/46 65/40 70/40   Wrist RD/UD 20/15 16/15 15/20         R Thumb active range of motion 4/17/24  MP  IP  RA  PA  OPP    30 62 45 35 Full, p!    MASTERSON = 172     L Thumb active range of motion 4/17/24  MP  IP  RA  PA  OPP    43 57 55 47 Full   MASTERSON = 202    R Thumb active range of motion 5/8/24  MP  IP  RA  PA  OPP    36 64 40 32 Tip of SF with pain       Treatment     Teddy received the treatments listed below:      therapeutic activities to improve functional performance for 18 minutes, including  - AAROM with soccer ball flex/ext rolls on table x 2 min (NT)  - soccer ball sup/pro x 1 min  (NT)  - wrist maze x 3 " min   - intrinsic strengthening: adduction with foam wedges, abd with yellow RB x 1 min ea  (NT)  - true balance x 2 min  - wrist ext off table x 10 (NT)  - gyro wheel x 2 min   - strainer and tennis ball clockwise only 1 min ea  - pink frisbee holding on side c/cc x 1 min ea     manual therapy techniques: Soft tissue Mobilization were applied to the: right forearm and wrist for 8 minutes, including:  Vibration massage to dorsal FA and (NT)  Tissue movers in palm    Kinesio tape application: wrist flex over ECRB       neuromuscular re-education activities to improve: Coordination, Kinesthetic, and Proprioception for 0 minutes. The following activities were included:  NT    Ice pack post session x 8 min     Patient received ultrasound to  the right, dorsal wrist area to increase blood flow, circulation, tissue elasticity, pain management and for wound/scar management for 8 minutes @ 3 Mhz, Intensity 1.0 w/cm2 at 100% duty cycle.      Fluidotherapy: To the right hand for 10 min, continuous air, 115 deg, air speed 50 to decrease pain, edema & scar tissue, sensory re- education, and increased tissue extensibility prior to therex      (NT) Patient received paraffin bath to right hand(s) for 10 minutes to increase blood flow, circulation, pain management and for tissue elasticity prior to therex.      Patient Education and Home Exercises     Education provided:   - Radial nerve compression of the upper arm during sleep  - Progress towards goals     Written Home Exercises Provided: Patient instructed to cont prior HEP.  Exercises were reviewed and Teddy was able to demonstrate them prior to the end of the session.  Teddy demonstrated fair  understanding of the home exercise program provided. See electronic medical record under Patient Instructions for exercises provided during therapy sessions.       Assessment     Improved tolerance to tx today as compared to last session no  rest breaks needed. Ed on use of ice post  workday for pain and inflammation relief, as well as swelling. Pt to continue to focus on wrist stability, mobility, tissue alignment and decreased pain during daily, functional activity. FOTO not re-assessed this date due to patient requiring some help interpreting written questions. Therapist or his mother to help with FOTO in the upcoming sessions.     Teddy is progressing with minor gains towards his goals and there are no updates to goals at this time. Pt prognosis is Fair.     Patient will continue to benefit from skilled outpatient occupational therapy to address the deficits listed in the problem list on initial evaluation provide patient/family education and to maximize patient's level of independence in the home and community environment.     Patient's spiritual, cultural and educational needs considered and patient agreeable to plan of care and goals.    Anticipated barriers to occupational therapy: Chronic condition form an acute incident. Guarding of the extremity    Goals:  LTG's (6 weeks):     1)   Decrease complaints of pain to  1 out of 10 at worst to increase functional hand use for ADL/work/leisure activities. progressing not met 4/17/2024  2)   Patient to score at 58% or more on FOTO to demonstrate improved perception of functional wrist use. progressing not met 4/17/2024   3)   Pt will return to near to prior level of function for ADLs and household management reporting I or Mod I with ADLs (dressing, feeding, grooming, toileting). progressing not met 4/17/2024  4)  Increase right wrist MASTERSON by at worst 15 degrees to increase functional hand use for ADLs/work/leisure activities. progressing not met 4/17/2024  5)  Increase right thumb MASTERSON by at worst  15 degrees to increase functional hand use for ADLs/work/leisure activities. progressing not met 4/17/2024     STG's (3 weeks)  1)   Patient to be IND with HEP and modalities for pain/edema managment. MET  2)   Increase right wrist MASTERSON by at worst  8 degrees to increase functional hand use for ADLs/work/leisure activities. progressing not met 4/17/2024  3)   Increase  right thumb MASTERSON by at worst  15 degrees in Shoulder ABDuction to increase functional hand use for ADLs/work/leisure activities. MET  4)   Pt to perform  strength assessment as tolerable in at least 3 weeks to show progression in wrist stability and pain levels. Progressing not met 4/17/2024  5)   Patient to score at 50% or more on FOTO to demonstrate improved perception of functional wrist use. progressing not met 4/17/2024  6)   Decrease complaints of pain to  3 out of 10 at worst to increase functional hand use for ADL/work/leisure activities. progressing not met 4/17/2024     Plan     Updates/Grading for next session:        Continue OT POC    VINI Swanson   5/22/2024

## 2024-05-23 ENCOUNTER — CLINICAL SUPPORT (OUTPATIENT)
Dept: REHABILITATION | Facility: HOSPITAL | Age: 49
End: 2024-05-23
Payer: MEDICARE

## 2024-05-23 DIAGNOSIS — M25.531 RIGHT WRIST PAIN: Primary | ICD-10-CM

## 2024-05-23 PROCEDURE — 97530 THERAPEUTIC ACTIVITIES: CPT

## 2024-05-23 PROCEDURE — 97140 MANUAL THERAPY 1/> REGIONS: CPT

## 2024-05-23 NOTE — PROGRESS NOTES
"[facility]  Occupational Therapy Treatment Note     Date: 5/23/2024  Name: Teddy Ennis  Clinic Number: [id number]    Therapy Diagnosis:   Encounter Diagnosis   Name Primary?    Right wrist pain Yes       Physician: Jose M Desai MD    Physician Orders: Eval and Treat  Medical Diagnosis: M25.531 (ICD-10-CM) - Pain in right wrist   Surgical Procedure and Date: NA,   Evaluation Date: 4/17/2024  Insurance Authorization Period Expiration: 3/28/2024 - 3/28/2025  Plan of Care Certification Period: 07/17/2024  Date of Return to MD: none specified  Visit # / Visits authorized: 8 / 20  FOTO: 1/ 3     [employer] Access Code: [email or web address]     Precautions:  Standard     Time In: 3:12 PM  Time Out: 4:00 PM  Total Billable Time: 48 minutes    Subjective     Patient reports: He worked hard today and is hurting. Hew asked what we will do after our final couple sessions booked.   He was somewhat compliant with home exercise program given last session.   Response to previous treatment:mary well  Functional change: no change at this time    Pain: not rated /10  Location: right hands      Objective     Hand ROM WFL - full fist  Some pain with thumb opposition  Elbow and Wrist ROM. Measured in degrees.    4/17/2024 4/17/2024 5/8/2024 5/23/24     Left Right Right Right   Supination/Pronation 85/85 75/90 65/90 68/90   Wrist Ext/Flex 75/46 65/40 70/40 70/45   Wrist RD/UD 20/15 16/15 15/20 15/20         R Thumb active range of motion 4/17/24  MP  IP  RA  PA  OPP    30 62 45 35 Full, p!    MASTERSON = 172     L Thumb active range of motion 4/17/24  MP  IP  RA  PA  OPP    43 57 55 47 Full   MASTERSON = 202    R Thumb active range of motion 5/8/24  MP  IP  RA  PA  OPP    36 64 40 32 Tip of SF with pain       Treatment     Teddy received the treatments listed below:      therapeutic activities to improve functional performance for 13 minutes, including  - passive range of motion: WF/WE x 10 with 5" holds  - WE stretch 3 x 30"  - " "intrinsic stretches for digits 2-4 x 5 with 5" holds each  - objective measurements assessed this date.     Not today:  AAROM with soccer ball flex/ext rolls on table x 2 min (NT)  - soccer ball sup/pro x 1 min  (NT)  - wrist maze x 3 min   - intrinsic strengthening: adduction with foam wedges, abd with yellow RB x 1 min ea  (NT)  - true balance x 2 min  - wrist ext off table x 10 (NT)  - gyro wheel x 2 min   - strainer and tennis ball clockwise only 1 min ea  - pink frisbee holding on side c/cc x 1 min ea     manual therapy techniques: Soft tissue Mobilization were applied to the: right forearm and wrist for 25 minutes, including:  Vibration massage to dorsal FA and (NT)  Tissue movers in palm  and dorsal/volar FA   carpal glides  Kinesio tape application: wrist flex over ECRB     Patient received ultrasound to  the right, dorsal wrist area to increase blood flow, circulation, tissue elasticity, pain management and for wound/scar management for 8 minutes @ 3 Mhz, Intensity 1.0 w/cm2 at 100% duty cycle.  (NT)    Fluidotherapy: To the right hand for 10 min, continuous air, 115 deg, air speed 50 to decrease pain, edema & scar tissue, sensory re- education, and increased tissue extensibility prior to therex  (NT)    Patient received paraffin bath to right hand(s) for 10 minutes to increase blood flow, circulation, pain management and for tissue elasticity prior to therex.      Patient Education and Home Exercises     Education provided:   - Radial nerve compression of the upper arm during sleep  - Progress towards goals     Written Home Exercises Provided: Patient instructed to cont prior HEP.  Exercises were reviewed and Teddy was able to demonstrate them prior to the end of the session.  Teddy demonstrated fair  understanding of the home exercise program provided. See electronic medical record under Patient Instructions for exercises provided during therapy sessions.       Assessment     Pt continues to " experience similar symptoms since the start of therapy. He has inconsistent symptoms at times with pain along the MP joints, especially between digits 2-4 during wrist flexor stretch with the wrist going into extension. Consistent objective measurements taken this date with the wrist being within functional limits. The therapist is to continue re-assessment of functional gains along with objective measurements. Pt received education on following up with the referring MD for appropriate imaging.     Teddy is progressing with minor gains towards his goals and there are no updates to goals at this time. Pt prognosis is Fair.     Patient will continue to benefit from skilled outpatient occupational therapy to address the deficits listed in the problem list on initial evaluation provide patient/family education and to maximize patient's level of independence in the home and community environment.     Patient's spiritual, cultural and educational needs considered and patient agreeable to plan of care and goals.    Anticipated barriers to occupational therapy: Chronic condition form an acute incident. Guarding of the extremity    Goals:  LTG's (6 weeks):     1)   Decrease complaints of pain to  1 out of 10 at worst to increase functional hand use for ADL/work/leisure activities. progressing not met 4/17/2024  2)   Patient to score at 58% or more on FOTO to demonstrate improved perception of functional wrist use. progressing not met 4/17/2024   3)   Pt will return to near to prior level of function for ADLs and household management reporting I or Mod I with ADLs (dressing, feeding, grooming, toileting). progressing not met 4/17/2024  4)  Increase right wrist MASTERSON by at worst 15 degrees to increase functional hand use for ADLs/work/leisure activities. progressing not met 4/17/2024  5)  Increase right thumb MASTERSON by at worst  15 degrees to increase functional hand use for ADLs/work/leisure activities. progressing not met  4/17/2024     STG's (3 weeks)  1)   Patient to be IND with HEP and modalities for pain/edema managment. MET  2)   Increase right wrist MASTERSON by at worst 8 degrees to increase functional hand use for ADLs/work/leisure activities. progressing not met 4/17/2024  3)   Increase  right thumb MASTERSON by at worst  15 degrees in Shoulder ABDuction to increase functional hand use for ADLs/work/leisure activities. MET  4)   Pt to perform  strength assessment as tolerable in at least 3 weeks to show progression in wrist stability and pain levels. Progressing not met 4/17/2024  5)   Patient to score at 50% or more on FOTO to demonstrate improved perception of functional wrist use. progressing not met 4/17/2024  6)   Decrease complaints of pain to  3 out of 10 at worst to increase functional hand use for ADL/work/leisure activities. progressing not met 4/17/2024     Plan     Updates/Grading for next session:        Continue OT DUNIA Alexander OT   5/23/2024

## 2024-05-28 ENCOUNTER — OFFICE VISIT (OUTPATIENT)
Dept: PRIMARY CARE CLINIC | Facility: CLINIC | Age: 49
End: 2024-05-28
Payer: MEDICARE

## 2024-05-28 VITALS
HEART RATE: 83 BPM | BODY MASS INDEX: 41.48 KG/M2 | DIASTOLIC BLOOD PRESSURE: 70 MMHG | HEIGHT: 72 IN | OXYGEN SATURATION: 97 % | SYSTOLIC BLOOD PRESSURE: 110 MMHG | WEIGHT: 306.25 LBS

## 2024-05-28 DIAGNOSIS — E78.2 HYPERLIPIDEMIA, MIXED: ICD-10-CM

## 2024-05-28 DIAGNOSIS — R42 VERTIGO: ICD-10-CM

## 2024-05-28 DIAGNOSIS — Z12.5 SCREENING FOR MALIGNANT NEOPLASM OF PROSTATE: ICD-10-CM

## 2024-05-28 DIAGNOSIS — J45.30 MILD PERSISTENT ASTHMA WITHOUT COMPLICATION: ICD-10-CM

## 2024-05-28 DIAGNOSIS — I10 ESSENTIAL HYPERTENSION: Primary | ICD-10-CM

## 2024-05-28 DIAGNOSIS — R73.01 IMPAIRED FASTING GLUCOSE: ICD-10-CM

## 2024-05-28 PROCEDURE — 3074F SYST BP LT 130 MM HG: CPT | Mod: CPTII,S$GLB,, | Performed by: INTERNAL MEDICINE

## 2024-05-28 PROCEDURE — 99214 OFFICE O/P EST MOD 30 MIN: CPT | Mod: S$GLB,,, | Performed by: INTERNAL MEDICINE

## 2024-05-28 PROCEDURE — 1159F MED LIST DOCD IN RCRD: CPT | Mod: CPTII,S$GLB,, | Performed by: INTERNAL MEDICINE

## 2024-05-28 PROCEDURE — 3008F BODY MASS INDEX DOCD: CPT | Mod: CPTII,S$GLB,, | Performed by: INTERNAL MEDICINE

## 2024-05-28 PROCEDURE — 99999 PR PBB SHADOW E&M-EST. PATIENT-LVL IV: CPT | Mod: PBBFAC,,, | Performed by: INTERNAL MEDICINE

## 2024-05-28 PROCEDURE — 3078F DIAST BP <80 MM HG: CPT | Mod: CPTII,S$GLB,, | Performed by: INTERNAL MEDICINE

## 2024-05-28 NOTE — PROGRESS NOTES
[facility] Note    Chief Complaint      Chief Complaint   Patient presents with    Follow-up     6 month        History of Present Illness      Teddy Ennis is a 48 y.o. male with chronic conditions of HTN, HLD, asthma, IFG, depression, allergic rhinitis  who presents today for: follow up chronic conditions.  Reports tripping on an exposed rebar spike on a sidewalk and injuring his right wrist.  Has seen Dr. Desai for evaluation.  Currently in a wrist brace.    Complains of vertigo/lightheadedness especially when overheated.    HTN: BP at goal on amlodipine.  HLD: Controlled on pravastatin. LDL 98, doing well.    Asthma, moderate persistent, allergic rhinitis: previously on immunotherapy.  On Breo.    IFG: A1C 5.9 last check.  Counseled on diet and exercise  Flu shot UTD.  TDAP 2019.  FITKIT 2022.       Past Medical History:  Past Medical History:   Diagnosis Date    Allergy     Asthma        Past Surgical History:   has a past surgical history that includes Ankle surgery (Left, 1995); Adenoidectomy; and Sinus surgery.    Family History:  family history includes Hypertension in his mother; No Known Problems in his father; Stroke in his mother.     Social History:  Social History     Tobacco Use    Smoking status: Never     Passive exposure: Never    Smokeless tobacco: Never   Substance Use Topics    Alcohol use: Not Currently    Drug use: Never       I personally reviewed all past medical, surgical, social and family history.    Review of Systems   Constitutional:  Negative for chills, fever and malaise/fatigue.   Respiratory:  Negative for shortness of breath.    Cardiovascular:  Negative for chest pain.   Gastrointestinal:  Negative for constipation, diarrhea, nausea and vomiting.   Skin:  Negative for rash.   Neurological:  Negative for weakness.   All other systems reviewed and are negative.       Medications:  Outpatient Encounter Medications as of 5/28/2024   Medication Sig Dispense  Refill    albuterol (PROVENTIL) 2.5 mg /3 mL (0.083 %) nebulizer solution Take 3 mLs (2.5 mg total) by nebulization every 4 to 6 hours as needed for Wheezing. 1080 mL 2    amLODIPine (NORVASC) 10 MG tablet TAKE 1 TABLET(10 MG) BY MOUTH EVERY DAY 90 tablet 3    azelastine (ASTELIN) 137 mcg (0.1 %) nasal spray 1 spray (137 mcg total) by Nasal route 2 (two) times daily. 30 mL 11    BREO ELLIPTA 200-25 mcg/dose DsDv diskus inhaler Inhale 1 puff into the lungs once daily. 60 each 11    EScitalopram oxalate (LEXAPRO) 20 MG tablet Take 1 tablet (20 mg total) by mouth once daily. 90 tablet 3    fexofenadine (ALLEGRA) 180 MG tablet Take 180 mg by mouth once daily.      fluticasone propionate (FLONASE) 50 mcg/actuation nasal spray 2 spray each nostril daily 48 g 2    magnesium sulfate in water (MAGNESIUM SULFATE 2 GRAM/50 ML) 2 gram/50 mL PgBk       meloxicam (MOBIC) 15 MG tablet Take 1 tablet (15 mg total) by mouth once daily. 30 tablet 2    montelukast (SINGULAIR) 10 mg tablet TAKE 1 TABLET BY MOUTH EVERY DAY 90 tablet 3    pravastatin (PRAVACHOL) 20 MG tablet TK 1 T PO D 90 tablet 3    [DISCONTINUED] albuterol (PROVENTIL/VENTOLIN HFA) 90 mcg/actuation inhaler INHALE 2 PUFFS INTO THE LUNGS EVERY 6 HOURS AS NEEDED FOR WHEEZING 25.5 g 3    [DISCONTINUED] BREO ELLIPTA 200-25 mcg/dose DsDv diskus inhaler Inhale 1 puff into the lungs once daily. 60 each 11     No facility-administered encounter medications on file as of 5/28/2024.       Allergies:  Review of patient's allergies indicates:   Allergen Reactions    Penicillins Hives       Health Maintenance:  Immunization History   Administered Date(s) Administered    COVID-19, MRNA, LN-S, PF (Pfizer) (Purple Cap) 07/25/2021, 08/15/2021    Influenza - Quadrivalent - PF *Preferred* (6 months and older) 09/30/2013, 09/17/2014, 09/03/2016, 09/29/2018, 08/26/2019, 09/12/2020, 10/02/2021    Influenza Split 09/30/2009, 09/30/2010, 09/27/2011, 09/30/2013, 09/17/2014    Tdap 09/29/2018,  11/29/2019      Health Maintenance   Topic Date Due    Hepatitis C Screening  Never done    Colorectal Cancer Screening  06/06/2024    Lipid Panel  05/22/2029    TETANUS VACCINE  11/29/2029        Physical Exam      Vital Signs  Pulse: 83  SpO2: 97 %  BP: 110/70  BP Location: Left arm  Patient Position: Sitting  Pain Score: 0-No pain  Height and Weight  Height: 6' (182.9 cm)  Weight: (!) 138.9 kg (306 lb 3.5 oz)  BSA (Calculated - sq m): 2.66 sq meters  BMI (Calculated): 41.5  Weight in (lb) to have BMI = 25: 183.9]    Physical Exam  Vitals reviewed.   Constitutional:       Appearance: He is well-developed.   HENT:      Head: Normocephalic and atraumatic.      Right Ear: External ear normal.      Left Ear: External ear normal.   Cardiovascular:      Rate and Rhythm: Normal rate and regular rhythm.      Heart sounds: Normal heart sounds. No murmur heard.  Pulmonary:      Effort: Pulmonary effort is normal.      Breath sounds: Normal breath sounds. No wheezing or rales.   Abdominal:      General: Bowel sounds are normal.      Palpations: Abdomen is soft.          Laboratory:  CBC:  Recent Labs   Lab 04/22/22 2149   WBC 12.78 H   RBC 5.13   Hemoglobin 15.2   Hematocrit 45.6   Platelets 295   MCV 89   MCH 29.6   MCHC 33.3     CMP:  Recent Labs   Lab 04/22/22 2149 11/11/22  0854 11/22/23  0857   Glucose 190 H 123 H 129 H   Calcium 9.3 9.1 9.2   Albumin 3.8 3.6 3.5   Total Protein 8.3 8.1 8.0   Sodium 138 136 137   Potassium 3.2 L 3.7 3.4 L   CO2 26 28 26   Chloride 101 99 101   BUN 9 11 9   Alkaline Phosphatase 149 H 154 H 147 H   ALT 23 22 35   AST 20 18 24   Total Bilirubin 0.6 0.7 0.9     URINALYSIS:       LIPIDS:  Recent Labs   Lab 11/11/22  0854 11/22/23  0857 05/22/24  0843   HDL 31 L 32 L 31 L   Cholesterol 153 149 159   Triglycerides 119 131 148   LDL Cholesterol 98.2 90.8 98.4   HDL/Cholesterol Ratio 20.3 21.5 19.5 L   Non-HDL Cholesterol 122 117 128   Total Cholesterol/HDL Ratio 4.9 4.7 5.1 H     TSH:       A1C:  Recent Labs   Lab 11/03/21  0827 11/11/22  0854 11/22/23  0857   Hemoglobin A1C 5.9 H 5.4 5.9 H       Assessment/Plan     Teddy Ennis is a 48 y.o.male with:    1. Essential hypertension  Continue current meds.    2. Hyperlipidemia, mixed  - Comprehensive Metabolic Panel; Future  - Lipid Panel; Future  Continue current meds.    3. Impaired fasting glucose  - Hemoglobin A1C; Future  Counseled on diet and exercise  4. Mild persistent asthma without complication  Continue current meds.    5. Screening for malignant neoplasm of prostate  - PSA, Screening; Future    6. Vertigo  - Ambulatory referral/consult to ENT; Future   Referring to ENT for testing.     Chronic conditions status updated as per HPI.  Other than changes above, cont current medications and maintain follow up with specialists.  Follow up in about 6 months (around 11/28/2024) for Follow up visit.    Future Appointments   Date Time Provider Department Center   5/30/2024  1:00 PM Anca Elizalde COTA/L [facility] OP [facility] [place] 2 fl   9/23/2024 11:00 AM Abiel Farmer MD [facility] ENT Alec Serrano   11/20/2024  9:00 AM LAB, APPOINTMENT [facility] LAB IM Alec dior PCW   11/25/2024  2:15 PM Doni Wood MD [facility]       Doni Wood MD  [facility]

## 2024-05-29 ENCOUNTER — CLINICAL SUPPORT (OUTPATIENT)
Dept: REHABILITATION | Facility: HOSPITAL | Age: 49
End: 2024-05-29
Payer: MEDICARE

## 2024-05-29 DIAGNOSIS — J45.901 EXACERBATION OF ASTHMA, UNSPECIFIED ASTHMA SEVERITY, UNSPECIFIED WHETHER PERSISTENT: ICD-10-CM

## 2024-05-29 DIAGNOSIS — M25.531 RIGHT WRIST PAIN: Primary | ICD-10-CM

## 2024-05-29 PROCEDURE — 97140 MANUAL THERAPY 1/> REGIONS: CPT

## 2024-05-29 PROCEDURE — 97035 APP MDLTY 1+ULTRASOUND EA 15: CPT

## 2024-05-29 PROCEDURE — 97110 THERAPEUTIC EXERCISES: CPT

## 2024-05-29 PROCEDURE — 97018 PARAFFIN BATH THERAPY: CPT | Mod: 59

## 2024-05-29 RX ORDER — ALBUTEROL SULFATE 90 UG/1
2 AEROSOL, METERED RESPIRATORY (INHALATION) EVERY 6 HOURS PRN
Qty: 25.5 G | Refills: 1 | Status: SHIPPED | OUTPATIENT
Start: 2024-05-29

## 2024-05-29 NOTE — TELEPHONE ENCOUNTER
----- Message from Nevaeh Jacobo sent at 5/29/2024 10:15 AM CDT -----  Contact: self [phone]  Requesting an RX refill or new RX.  Is this a refill or new RX: New   RX name and strength (copy/paste from chart):  albuterol (PROVENTIL/VENTOLIN HFA) 90 mcg/actuation inhaler  Is this a 30 day or 90 day RX:   Pharmacy name and phone # (copy/paste from chart):    [id number] - [place] - [facility] AT [facility] [address]  [address] [place] [address]  Phone: [phone] Fax: [phone]          The doctors have asked that we provide their patients with the following 2 reminders -- prescription refills can take up to 72 hours, and a friendly reminder that in the future you can use your [facility] account to request refills: yes

## 2024-05-29 NOTE — PROGRESS NOTES
[facility]  Occupational Therapy Treatment Note     Date: 5/29/2024  Name: Teddy Ennis  Clinic Number: [id number]    Therapy Diagnosis:   Encounter Diagnosis   Name Primary?    Right wrist pain Yes       Physician: Jose M Desai MD    Physician Orders: Eval and Treat  Medical Diagnosis: M25.531 (ICD-10-CM) - Pain in right wrist   Surgical Procedure and Date: NA,   Evaluation Date: 4/17/2024  Insurance Authorization Period Expiration: 3/28/2024 - 3/28/2025  Plan of Care Certification Period: 07/17/2024  Date of Return to MD: none specified  Visit # / Visits authorized: 11 / 20  FOTO: 1/ 3 - The pt requires assistance to interpret the written word.      [employer] Access Code: [email or web address]     Precautions:  Standard     Time In: 1:00 PM  Time Out: 2:00 PM  Total Billable Time: 48 minutes    Subjective     Patient reports: His wrist was not hurting on Saturday but has been the past couple days due to work. He saw his MD this past week with good reports on his general health. The patient spoke to his mother during the session and they are agreeable to speaking with Dr. Desai's ffice to schedule an appointment for re-assessment of carpal instability   He was somewhat compliant with home exercise program given last session.   Response to previous treatment:mary well  Functional change: no change at this time    Pain: not rated /10  Location: right hands      Objective     Hand ROM WFL - full fist  Some pain with thumb opposition  Elbow and Wrist ROM. Measured in degrees.    4/17/2024 4/17/2024 5/8/2024 5/23/24 5/29/24     Left Right Right Right Right   Supination/Pronation 85/85 75/90 65/90 68/90 65/90   Wrist Ext/Flex 75/46 65/40 70/40 70/45 70/50   Wrist RD/UD 20/15 16/15 15/20 15/20 15/23        R   Wrist =  20.8... 21.8                19.7....22.5     R Thumb active range of motion 4/17/24  MP  IP  RA  PA  OPP    30 62 45 35 Full, p!    MASTERSON = 172     L Thumb active range of motion 4/17/24  MP   "IP  RA  PA  OPP    43 57 55 47 Full   MASTERSON = 202    R Thumb active range of motion 5/8/24  MP  IP  RA  PA  OPP    36 64 40 32 Tip of SF with pain       Treatment     Teddy received the treatments listed below:      therapeutic activities to improve functional performance for 13 minutes, including  - passive range of motion: WF/WE x 10 with 5" holds  - WE stretch 10 x 10"  - TGE's x 10 each  - intrinsic stretches for digits 2-4 x 5 with 5" holds each (NT)  - objective measurements assessed this date.   - special testing re-assessed this date with decrease pain levels making for a more accurate assessment. MD referral and education provided to the patient.     Not today:  AAROM with soccer ball flex/ext rolls on table x 2 min (NT)  - soccer ball sup/pro x 1 min  (NT)  - wrist maze x 3 min   - intrinsic strengthening: adduction with foam wedges, abd with yellow RB x 1 min ea  (NT)  - true balance x 2 min  - wrist ext off table x 10 (NT)  - gyro wheel x 2 min   - strainer and tennis ball clockwise only 1 min ea  - pink frisbee holding on side c/cc x 1 min ea     LT shear Test = (+)  Ballotment/Shuck Test = (+)  Alves's Test = (-)    Pt has increased pain with radial deviation which is consistent with special testing of LT ligament sprain or dysfunction due to widening of the LT gap via the ECRL, ECRB and FCR.     manual therapy techniques: Soft tissue Mobilization were applied to the: right forearm and wrist for 15 minutes, including:  Vibration massage to dorsal FA and (NT)  Tissue movers and IASTM in dorsal/volar FA  carpal glides  Kinesio tape application: Carpal stabilization wrist circumferential around the wrist.     Patient received ultrasound to  the right, dorsal wrist area to increase blood flow, circulation, tissue elasticity, pain management and for wound/scar management for 8 minutes @ 3 Mhz, Intensity 1.0 w/cm2 at 100% duty cycle. (To LT% ligament area)    Fluidotherapy: To the right hand for 10 min, " continuous air, 115 deg, air speed 50 to decrease pain, edema & scar tissue, sensory re- education, and increased tissue extensibility prior to therex  (NT)    Patient received paraffin bath to right hand(s) for 10 minutes to increase blood flow, circulation, pain management and for tissue elasticity prior to therex.      Patient Education and Home Exercises     Education provided:   - Progress towards goals     Written Home Exercises Provided: Patient instructed to cont prior HEP.  Exercises were reviewed and Teddy was able to demonstrate them prior to the end of the session.  Teddy demonstrated fair  understanding of the home exercise program provided. See electronic medical record under Patient Instructions for exercises provided during therapy sessions.       Assessment     Pt continues to experience similar symptoms since the start of therapy. The pt tested positive for LT instability in the wrist. MD imaging or special testing required for confirmation.Consistent active range of motion objective measurements this date with rodrigo decrease in pain.  Pt received education on following up with the referring MD for appropriate imaging.     Teddy is progressing with minor gains towards his goals and there are no updates to goals at this time. Pt prognosis is Fair.     Patient will continue to benefit from skilled outpatient occupational therapy to address the deficits listed in the problem list on initial evaluation provide patient/family education and to maximize patient's level of independence in the home and community environment.     Patient's spiritual, cultural and educational needs considered and patient agreeable to plan of care and goals.    Anticipated barriers to occupational therapy: Chronic condition form an acute incident. Guarding of the extremity    Goals:  LTG's (6 weeks):     1)   Decrease complaints of pain to  1 out of 10 at worst to increase functional hand use for ADL/work/leisure  activities. progressing not met 4/17/2024  2)   Patient to score at 58% or more on FOTO to demonstrate improved perception of functional wrist use. progressing not met 4/17/2024   3)   Pt will return to near to prior level of function for ADLs and household management reporting I or Mod I with ADLs (dressing, feeding, grooming, toileting). progressing not met 4/17/2024  4)  Increase right wrist MASTERSON by at worst 15 degrees to increase functional hand use for ADLs/work/leisure activities. progressing not met 4/17/2024  5)  Increase right thumb MASTERSON by at worst  15 degrees to increase functional hand use for ADLs/work/leisure activities. progressing not met 4/17/2024     STG's (3 weeks)  1)   Patient to be IND with HEP and modalities for pain/edema managment. MET  2)   Increase right wrist MASTERSON by at worst 8 degrees to increase functional hand use for ADLs/work/leisure activities. progressing not met 4/17/2024  3)   Increase  right thumb MASTERSON by at worst  15 degrees in Shoulder ABDuction to increase functional hand use for ADLs/work/leisure activities. MET  4)   Pt to perform  strength assessment as tolerable in at least 3 weeks to show progression in wrist stability and pain levels. Progressing not met 4/17/2024  5)   Patient to score at 50% or more on FOTO to demonstrate improved perception of functional wrist use. progressing not met 4/17/2024  6)   Decrease complaints of pain to  3 out of 10 at worst to increase functional hand use for ADL/work/leisure activities. progressing not met 4/17/2024     Plan     Updates/Grading for next session:        Continue OT DUNIA Alexander OT   5/29/2024

## 2024-05-29 NOTE — TELEPHONE ENCOUNTER
No care due was identified.  [facility] Embedded Care Due Messages. Reference number: [id number].   5/29/2024 10:24:01 AM CDT

## 2024-05-30 ENCOUNTER — CLINICAL SUPPORT (OUTPATIENT)
Dept: REHABILITATION | Facility: HOSPITAL | Age: 49
End: 2024-05-30
Payer: MEDICARE

## 2024-05-30 DIAGNOSIS — M25.531 RIGHT WRIST PAIN: Primary | ICD-10-CM

## 2024-05-30 PROCEDURE — 97140 MANUAL THERAPY 1/> REGIONS: CPT | Mod: KX,CO

## 2024-05-30 PROCEDURE — 97022 WHIRLPOOL THERAPY: CPT | Mod: KX,CO

## 2024-05-30 PROCEDURE — 97110 THERAPEUTIC EXERCISES: CPT | Mod: KX,CO

## 2024-05-30 NOTE — PROGRESS NOTES
[facility]  Occupational Therapy Treatment Note     Date: 5/30/2024  Name: Teddy Ennis  Clinic Number: [id number]    Therapy Diagnosis:   Encounter Diagnosis   Name Primary?    Right wrist pain Yes         Physician: Jose M Desai MD    Physician Orders: Eval and Treat  Medical Diagnosis: M25.531 (ICD-10-CM) - Pain in right wrist   Surgical Procedure and Date: NA,   Evaluation Date: 4/17/2024  Insurance Authorization Period Expiration: 3/28/2024 - 3/28/2025  Plan of Care Certification Period: 07/17/2024  Date of Return to MD: none specified  Visit # / Visits authorized: 12 / 20  FOTO: 1/ 3 - The pt requires assistance to interpret the written word.      [employer] Access Code: [email or web address]     Precautions:  Standard     Time In: 12:57 PM  Time Out: 1:49 PM  Total Billable Time: 52 minutes    Subjective     Patient reports: feeling a little better today   He was somewhat compliant with home exercise program given last session.   Response to previous treatment:mary well  Functional change: no change at this time    Pain: not rated /10  Location: right hands      Objective     Hand ROM WFL - full fist  Some pain with thumb opposition  Elbow and Wrist ROM. Measured in degrees.    4/17/2024 4/17/2024 5/8/2024 5/23/24 5/29/24     Left Right Right Right Right   Supination/Pronation 85/85 75/90 65/90 68/90 65/90   Wrist Ext/Flex 75/46 65/40 70/40 70/45 70/50   Wrist RD/UD 20/15 16/15 15/20 15/20 15/23        R   Wrist =  20.8... 21.8                19.7....22.5     R Thumb active range of motion 4/17/24  MP  IP  RA  PA  OPP    30 62 45 35 Full, p!    MASTERSON = 172     L Thumb active range of motion 4/17/24  MP  IP  RA  PA  OPP    43 57 55 47 Full   MASTERSON = 202    R Thumb active range of motion 5/8/24  MP  IP  RA  PA  OPP    36 64 40 32 Tip of SF with pain     5/12/24  LT shear Test = (+)  Ballotment/Shuck Test = (+)  Alves's Test = (-)    Pt has increased pain with radial deviation which is consistent  with special testing of LT ligament sprain or dysfunction due to widening of the LT gap via the ECRL, ECRB and FCR.     Treatment     Teddy received the treatments listed below:      therapeutic activities to improve functional performance for 19 minutes, including  - gyro wheel x 2 min   - strainer and tennis ball clockwise only 1 min ea  - pink frisbee holding on side c/cc x 1 min ea   - true balance x 2 min  - oscillations with yellow flexbar x 2 min     Not today:  AAROM with soccer ball flex/ext rolls on table x 2 min (NT)  - soccer ball sup/pro x 1 min  (NT)  - wrist maze x 3 min   - intrinsic strengthening: adduction with foam wedges, abd with yellow RB x 1 min ea  (NT)    - wrist ext off table x 10 (NT)        manual therapy techniques: Soft tissue Mobilization were applied to the: right forearm and wrist for 15 minutes, including:  Vibration massage to dorsal FA and (NT)  Tissue movers and IASTM in dorsal/volar FA  carpal glides  Kinesio tape application: Carpal stabilization wrist circumferential around the wrist.     Patient received ultrasound to  the right, dorsal wrist area to increase blood flow, circulation, tissue elasticity, pain management and for wound/scar management for 8 minutes @ 3 Mhz, Intensity 1.0 w/cm2 at 100% duty cycle. (To LT% ligament area)    Fluidotherapy: To the right hand for 10 min, continuous air, 115 deg, air speed 50 to decrease pain, edema & scar tissue, sensory re- education, and increased tissue extensibility prior to therex      (NT) Patient received paraffin bath to right hand(s) for 10 minutes to increase blood flow, circulation, pain management and for tissue elasticity prior to therex.      Patient Education and Home Exercises     Education provided:   - Progress towards goals     Written Home Exercises Provided: Patient instructed to cont prior HEP.  Exercises were reviewed and Teddy was able to demonstrate them prior to the end of the session.  Teddy  demonstrated fair  understanding of the home exercise program provided. See electronic medical record under Patient Instructions for exercises provided during therapy sessions.       Assessment     Pt tolerated session well. Performed in LT friendly positions today.     Teddy is progressing with minor gains towards his goals and there are no updates to goals at this time. Pt prognosis is Fair.     Patient will continue to benefit from skilled outpatient occupational therapy to address the deficits listed in the problem list on initial evaluation provide patient/family education and to maximize patient's level of independence in the home and community environment.     Patient's spiritual, cultural and educational needs considered and patient agreeable to plan of care and goals.    Anticipated barriers to occupational therapy: Chronic condition form an acute incident. Guarding of the extremity    Goals:  LTG's (6 weeks):     1)   Decrease complaints of pain to  1 out of 10 at worst to increase functional hand use for ADL/work/leisure activities. progressing not met 4/17/2024  2)   Patient to score at 58% or more on FOTO to demonstrate improved perception of functional wrist use. progressing not met 4/17/2024   3)   Pt will return to near to prior level of function for ADLs and household management reporting I or Mod I with ADLs (dressing, feeding, grooming, toileting). progressing not met 4/17/2024  4)  Increase right wrist MASTERSON by at worst 15 degrees to increase functional hand use for ADLs/work/leisure activities. progressing not met 4/17/2024  5)  Increase right thumb MASTERSON by at worst  15 degrees to increase functional hand use for ADLs/work/leisure activities. progressing not met 4/17/2024     STG's (3 weeks)  1)   Patient to be IND with HEP and modalities for pain/edema managment. MET  2)   Increase right wrist MASTERSON by at worst 8 degrees to increase functional hand use for ADLs/work/leisure activities. progressing  not met 4/17/2024  3)   Increase  right thumb MASTERSON by at worst  15 degrees in Shoulder ABDuction to increase functional hand use for ADLs/work/leisure activities. MET  4)   Pt to perform  strength assessment as tolerable in at least 3 weeks to show progression in wrist stability and pain levels. Progressing not met 4/17/2024  5)   Patient to score at 50% or more on FOTO to demonstrate improved perception of functional wrist use. progressing not met 4/17/2024  6)   Decrease complaints of pain to  3 out of 10 at worst to increase functional hand use for ADL/work/leisure activities. progressing not met 4/17/2024     Plan     Updates/Grading for next session:        Continue OT POC    RAUL Swanson/MARIANA   5/30/2024

## 2024-06-06 ENCOUNTER — CLINICAL SUPPORT (OUTPATIENT)
Dept: REHABILITATION | Facility: HOSPITAL | Age: 49
End: 2024-06-06
Payer: MEDICARE

## 2024-06-06 DIAGNOSIS — M25.531 RIGHT WRIST PAIN: Primary | ICD-10-CM

## 2024-06-06 PROCEDURE — 97110 THERAPEUTIC EXERCISES: CPT | Mod: KX,CO

## 2024-06-06 PROCEDURE — 97140 MANUAL THERAPY 1/> REGIONS: CPT | Mod: KX,CO

## 2024-06-06 PROCEDURE — 97022 WHIRLPOOL THERAPY: CPT | Mod: KX,CO

## 2024-06-06 NOTE — PROGRESS NOTES
[facility]  Occupational Therapy Treatment Note     Date: 6/6/2024  Name: Teddy Ennis  Clinic Number: [id number]    Therapy Diagnosis:   Encounter Diagnosis   Name Primary?    Right wrist pain Yes           Physician: Jose M Desai MD    Physician Orders: Eval and Treat  Medical Diagnosis: M25.531 (ICD-10-CM) - Pain in right wrist   Surgical Procedure and Date: NA,   Evaluation Date: 4/17/2024  Insurance Authorization Period Expiration: 3/28/2024 - 3/28/2025  Plan of Care Certification Period: 07/17/2024  Date of Return to MD: none specified  Visit # / Visits authorized: 14 / 20  FOTO: 1/ 3 - The pt requires assistance to interpret the written word.      [employer] Access Code: [email or web address]     Precautions:  Standard     Time In: 10:26 PM  Time Out: 11:26 PM  Total Billable Time: 60 minutes    Subjective     Patient reports:   He was somewhat compliant with home exercise program given last session.   Response to previous treatment:mary well  Functional change: no change at this time    Pain: not rated /10  Location: right hands      Objective     Hand ROM WFL - full fist  Some pain with thumb opposition  Elbow and Wrist ROM. Measured in degrees.    4/17/2024 4/17/2024 5/8/2024 5/23/24 5/29/24     Left Right Right Right Right   Supination/Pronation 85/85 75/90 65/90 68/90 65/90   Wrist Ext/Flex 75/46 65/40 70/40 70/45 70/50   Wrist RD/UD 20/15 16/15 15/20 15/20 15/23        R   Wrist =  20.8... 21.8                19.7....22.5     R Thumb active range of motion 4/17/24  MP  IP  RA  PA  OPP    30 62 45 35 Full, p!    MASTERSON = 172     L Thumb active range of motion 4/17/24  MP  IP  RA  PA  OPP    43 57 55 47 Full   MASTERSON = 202    R Thumb active range of motion 5/8/24  MP  IP  RA  PA  OPP    36 64 40 32 Tip of SF with pain     5/12/24  LT shear Test = (+)  Ballotment/Shuck Test = (+)  Alves's Test = (-)    Pt has increased pain with radial deviation which is consistent with special testing of LT  ligament sprain or dysfunction due to widening of the LT gap via the ECRL, ECRB and FCR.     Treatment     Teddy received the treatments listed below:      therapeutic activities to improve functional performance for 32 minutes, including  - gyro wheel x 2 min   - strainer and tennis ball clockwise only 1 min ea  - pink frisbee holding on side c/cc x 1 min ea   - true balance x 2 min  - oscillations with yellow flexbar x 2 min   - dart throwers motion     Not today:  AAROM with soccer ball flex/ext rolls on table x 2 min (NT)  - soccer ball sup/pro x 1 min  (NT)  - wrist maze x 3 min   - intrinsic strengthening: adduction with foam wedges, abd with yellow RB x 1 min ea  (NT)    - wrist ext off table x 10 (NT)        manual therapy techniques: Soft tissue Mobilization were applied to the: right forearm and wrist for 10 minutes, including:  Vibration massage to dorsal FA and (NT)  Tissue movers and IASTM in dorsal/volar FA  carpal glides  Kinesio tape application: Carpal stabilization wrist circumferential around the wrist.     Patient received ultrasound to  the right, dorsal wrist area to increase blood flow, circulation, tissue elasticity, pain management and for wound/scar management for 8 minutes @ 3 Mhz, Intensity 1.0 w/cm2 at 100% duty cycle. (To LT% ligament area)    Fluidotherapy: To the right hand for 10 min, continuous air, 115 deg, air speed 50 to decrease pain, edema & scar tissue, sensory re- education, and increased tissue extensibility prior to therex      (NT) Patient received paraffin bath to right hand(s) for 10 minutes to increase blood flow, circulation, pain management and for tissue elasticity prior to therex.      Patient Education and Home Exercises     Education provided:   - Progress towards goals     Written Home Exercises Provided: Patient instructed to cont prior HEP.  Exercises were reviewed and Teddy was able to demonstrate them prior to the end of the session.  Teddy demonstrated  fair  understanding of the home exercise program provided. See electronic medical record under Patient Instructions for exercises provided during therapy sessions.       Assessment     Pt tolerated session well. Performed in LT friendly positions today.     Teddy is progressing with minor gains towards his goals and there are no updates to goals at this time. Pt prognosis is Fair.     Patient will continue to benefit from skilled outpatient occupational therapy to address the deficits listed in the problem list on initial evaluation provide patient/family education and to maximize patient's level of independence in the home and community environment.     Patient's spiritual, cultural and educational needs considered and patient agreeable to plan of care and goals.    Anticipated barriers to occupational therapy: Chronic condition form an acute incident. Guarding of the extremity    Goals:  LTG's (6 weeks):     1)   Decrease complaints of pain to  1 out of 10 at worst to increase functional hand use for ADL/work/leisure activities. progressing not met 4/17/2024  2)   Patient to score at 58% or more on FOTO to demonstrate improved perception of functional wrist use. progressing not met 4/17/2024   3)   Pt will return to near to prior level of function for ADLs and household management reporting I or Mod I with ADLs (dressing, feeding, grooming, toileting). progressing not met 4/17/2024  4)  Increase right wrist MASTERSON by at worst 15 degrees to increase functional hand use for ADLs/work/leisure activities. progressing not met 4/17/2024  5)  Increase right thumb MASTERSON by at worst  15 degrees to increase functional hand use for ADLs/work/leisure activities. progressing not met 4/17/2024     STG's (3 weeks)  1)   Patient to be IND with HEP and modalities for pain/edema managment. MET  2)   Increase right wrist MASTERSON by at worst 8 degrees to increase functional hand use for ADLs/work/leisure activities. progressing not met  4/17/2024  3)   Increase  right thumb MASTERSON by at worst  15 degrees in Shoulder ABDuction to increase functional hand use for ADLs/work/leisure activities. MET  4)   Pt to perform  strength assessment as tolerable in at least 3 weeks to show progression in wrist stability and pain levels. Progressing not met 4/17/2024  5)   Patient to score at 50% or more on FOTO to demonstrate improved perception of functional wrist use. progressing not met 4/17/2024  6)   Decrease complaints of pain to  3 out of 10 at worst to increase functional hand use for ADL/work/leisure activities. progressing not met 4/17/2024     Plan     Updates/Grading for next session:        Continue OT POC    RAUL Swanson/MARIANA   6/6/2024

## 2024-06-07 ENCOUNTER — CLINICAL SUPPORT (OUTPATIENT)
Dept: REHABILITATION | Facility: HOSPITAL | Age: 49
End: 2024-06-07
Payer: MEDICARE

## 2024-06-07 DIAGNOSIS — M25.531 RIGHT WRIST PAIN: Primary | ICD-10-CM

## 2024-06-07 PROCEDURE — 97530 THERAPEUTIC ACTIVITIES: CPT | Mod: KX,CO

## 2024-06-07 PROCEDURE — 97022 WHIRLPOOL THERAPY: CPT | Mod: KX,CO

## 2024-06-11 NOTE — PROGRESS NOTES
[facility]  Occupational Therapy Treatment Note     Date: 6/7/2024  Name: Teddy Ennis  Clinic Number: [id number]    Therapy Diagnosis:   Encounter Diagnosis   Name Primary?    Right wrist pain Yes           Physician: Jose M Desai MD    Physician Orders: Eval and Treat  Medical Diagnosis: M25.531 (ICD-10-CM) - Pain in right wrist   Surgical Procedure and Date: NA,   Evaluation Date: 4/17/2024  Insurance Authorization Period Expiration: 3/28/2024 - 3/28/2025  Plan of Care Certification Period: 07/17/2024  Date of Return to MD: none specified  Visit # / Visits authorized: 14 / 20  FOTO: 1/ 3 - The pt requires assistance to interpret the written word.      [employer] Access Code: [email or web address]     Precautions:  Standard     Time In: 10:26 PM  Time Out: 11:26 PM  Total Billable Time: 60 minutes    Subjective     Patient reports: continued pain, incr with weather   He was somewhat compliant with home exercise program given last session.   Response to previous treatment:mary well  Functional change: no change at this time    Pain: not rated /10  Location: right hands      Objective     Hand ROM WFL - full fist  Some pain with thumb opposition  Elbow and Wrist ROM. Measured in degrees.    4/17/2024 4/17/2024 5/8/2024 5/23/24 5/29/24     Left Right Right Right Right   Supination/Pronation 85/85 75/90 65/90 68/90 65/90   Wrist Ext/Flex 75/46 65/40 70/40 70/45 70/50   Wrist RD/UD 20/15 16/15 15/20 15/20 15/23        R   Wrist =  20.8... 21.8                19.7....22.5     R Thumb active range of motion 4/17/24  MP  IP  RA  PA  OPP    30 62 45 35 Full, p!    MASTERSON = 172     L Thumb active range of motion 4/17/24  MP  IP  RA  PA  OPP    43 57 55 47 Full   MASTERSON = 202    R Thumb active range of motion 5/8/24  MP  IP  RA  PA  OPP    36 64 40 32 Tip of SF with pain     5/12/24  LT shear Test = (+)  Ballotment/Shuck Test = (+)  Alves's Test = (-)    Pt has increased pain with radial deviation which is  consistent with special testing of LT ligament sprain or dysfunction due to widening of the LT gap via the ECRL, ECRB and FCR.     Treatment     Teddy received the treatments listed below:      therapeutic activities to improve functional performance for 42 minutes, including  - gyro wheel x 2 min   - strainer and tennis ball clockwise only 1 min ea  - pink frisbee holding on side c/cc x 1 min ea   - true balance x 2 min  - oscillations with yellow flexbar x 2 min   - dart throwers motion   Kinesio tape application: Carpal stabilization wrist circumferential around the wrist.     Not today:  AAROM with soccer ball flex/ext rolls on table x 2 min (NT)  - soccer ball sup/pro x 1 min  (NT)  - wrist maze x 3 min   - intrinsic strengthening: adduction with foam wedges, abd with yellow RB x 1 min ea  (NT)    - wrist ext off table x 10 (NT)        manual therapy techniques: Soft tissue Mobilization were applied to the: right forearm and wrist for 0 minutes, including:  Vibration massage to dorsal FA and (NT)  Tissue movers and IASTM in dorsal/volar FA  carpal glides      Patient received ultrasound to  the right, dorsal wrist area to increase blood flow, circulation, tissue elasticity, pain management and for wound/scar management for 8 minutes @ 3 Mhz, Intensity 1.0 w/cm2 at 100% duty cycle. (To LT% ligament area)    Fluidotherapy: To the right hand for 10 min, continuous air, 115 deg, air speed 50 to decrease pain, edema & scar tissue, sensory re- education, and increased tissue extensibility prior to therex      (NT) Patient received paraffin bath to right hand(s) for 10 minutes to increase blood flow, circulation, pain management and for tissue elasticity prior to therex.      Patient Education and Home Exercises     Education provided:   - Progress towards goals     Written Home Exercises Provided: Patient instructed to cont prior HEP.  Exercises were reviewed and Teddy was able to demonstrate them prior to the  end of the session.  Teddy demonstrated fair  understanding of the home exercise program provided. See electronic medical record under Patient Instructions for exercises provided during therapy sessions.       Assessment     Pt tolerated session well. Discussed progress and insurance limitation with pt and mother. Discussed putting therapy on hold until further evaluation from hand specialist in a couple weeks due to progress plateau. Pt and mother demo'd understanding.     Teddy is progressing with minor gains towards his goals and there are no updates to goals at this time. Pt prognosis is Fair.     Patient will continue to benefit from skilled outpatient occupational therapy to address the deficits listed in the problem list on initial evaluation provide patient/family education and to maximize patient's level of independence in the home and community environment.     Patient's spiritual, cultural and educational needs considered and patient agreeable to plan of care and goals.    Anticipated barriers to occupational therapy: Chronic condition form an acute incident. Guarding of the extremity    Goals:  LTG's (6 weeks):     1)   Decrease complaints of pain to  1 out of 10 at worst to increase functional hand use for ADL/work/leisure activities. progressing not met 4/17/2024  2)   Patient to score at 58% or more on FOTO to demonstrate improved perception of functional wrist use. progressing not met 4/17/2024   3)   Pt will return to near to prior level of function for ADLs and household management reporting I or Mod I with ADLs (dressing, feeding, grooming, toileting). progressing not met 4/17/2024  4)  Increase right wrist MASTERSON by at worst 15 degrees to increase functional hand use for ADLs/work/leisure activities. progressing not met 4/17/2024  5)  Increase right thumb MASTERSON by at worst  15 degrees to increase functional hand use for ADLs/work/leisure activities. progressing not met 4/17/2024     STG's (3  weeks)  1)   Patient to be IND with HEP and modalities for pain/edema managment. MET  2)   Increase right wrist MASTERSON by at worst 8 degrees to increase functional hand use for ADLs/work/leisure activities. progressing not met 4/17/2024  3)   Increase  right thumb MASTERSON by at worst  15 degrees in Shoulder ABDuction to increase functional hand use for ADLs/work/leisure activities. MET  4)   Pt to perform  strength assessment as tolerable in at least 3 weeks to show progression in wrist stability and pain levels. Progressing not met 4/17/2024  5)   Patient to score at 50% or more on FOTO to demonstrate improved perception of functional wrist use. progressing not met 4/17/2024  6)   Decrease complaints of pain to  3 out of 10 at worst to increase functional hand use for ADL/work/leisure activities. progressing not met 4/17/2024     Plan     Updates/Grading for next session: update measures; hold pending RAUL Jaramillo/MARIANA   6/7/2024

## 2024-06-12 ENCOUNTER — CLINICAL SUPPORT (OUTPATIENT)
Dept: REHABILITATION | Facility: HOSPITAL | Age: 49
End: 2024-06-12
Payer: MEDICARE

## 2024-06-12 DIAGNOSIS — M25.531 RIGHT WRIST PAIN: Primary | ICD-10-CM

## 2024-06-12 PROCEDURE — 97022 WHIRLPOOL THERAPY: CPT | Mod: KX,CO

## 2024-06-12 PROCEDURE — 97110 THERAPEUTIC EXERCISES: CPT | Mod: KX,CO

## 2024-06-12 NOTE — PROGRESS NOTES
"[facility]  Occupational Therapy Treatment Note     Date: 6/12/2024  Name: Teddy nEnis  Clinic Number: [id number]    Therapy Diagnosis:   Encounter Diagnosis   Name Primary?    Right wrist pain Yes             Physician: Jose M Desai MD    Physician Orders: Eval and Treat  Medical Diagnosis: M25.531 (ICD-10-CM) - Pain in right wrist   Surgical Procedure and Date: NA,   Evaluation Date: 4/17/2024  Insurance Authorization Period Expiration: 3/28/2024 - 3/28/2025  Plan of Care Certification Period: 07/17/2024  Date of Return to MD: none specified  Visit # / Visits authorized: 15 / 20  FOTO: 1/ 3 - The pt requires assistance to interpret the written word.      [employer] Access Code: [email or web address]     Precautions:  Standard     Time In: 1:55 PM  Time Out: 2:41 PM  Total Billable Time: 46 minutes    Subjective     Patient reports: "It swelled up a lot yesterday, its still swollen today."   He was somewhat compliant with home exercise program given last session.   Response to previous treatment:mary well  Functional change: no change at this time    Pain: not rated /10  Location: right hands      Objective     Hand ROM WFL - full fist  Some pain with thumb opposition  Elbow and Wrist ROM. Measured in degrees.    4/17/2024 4/17/2024 5/8/2024 5/23/24 5/29/24 6/12/24     Left Right Right Right Right    Supination/Pronation 85/85 75/90 65/90 68/90 65/90 64/   Wrist Ext/Flex 75/46 65/40 70/40 70/45 70/50 68/40   Wrist RD/UD 20/15 16/15 15/20 15/20 15/23 13/15   R wrist crease edema on 6/12/24= 19.4     R   Wrist =  20.8... 21.8                19.7....22.5     R Thumb active range of motion 4/17/24  MP  IP  RA  PA  OPP    30 62 45 35 Full, p!    MASTERSON = 172     L Thumb active range of motion 4/17/24  MP  IP  RA  PA  OPP    43 57 55 47 Full   MASTERSON = 202    R Thumb active range of motion 5/8/24  MP  IP  RA  PA  OPP    36 64 40 32 Tip of SF with pain     5/12/24  LT shear Test = (+)  Ballotment/Shuck Test = " (+)  Alves's Test = (-)    Pt has increased pain with radial deviation which is consistent with special testing of LT ligament sprain or dysfunction due to widening of the LT gap via the ECRL, ECRB and FCR.     Treatment     Teddy received the treatments listed below:      therapeutic activities to improve functional performance for 28 minutes, including  Updated measures  Ed on modalities for pain management  Ed on self application of K tape     Not today:  AAROM with soccer ball flex/ext rolls on table x 2 min (NT)  - soccer ball sup/pro x 1 min  (NT)  - wrist maze x 3 min   - intrinsic strengthening: adduction with foam wedges, abd with yellow RB x 1 min ea  (NT)    - wrist ext off table x 10 (NT)        manual therapy techniques: Soft tissue Mobilization were applied to the: right forearm and wrist for 0 minutes, including:  Vibration massage to dorsal FA and (NT)  Tissue movers and IASTM in dorsal/volar FA  carpal glides      Patient received ultrasound to  the right, dorsal wrist area to increase blood flow, circulation, tissue elasticity, pain management and for wound/scar management for 8 minutes @ 3 Mhz, Intensity 1.0 w/cm2 at 100% duty cycle. (To LT% ligament area)    Fluidotherapy: To the right hand for 10 min, continuous air, 115 deg, air speed 50 to decrease pain, edema & scar tissue, sensory re- education, and increased tissue extensibility prior to therex      (NT) Patient received paraffin bath to right hand(s) for 10 minutes to increase blood flow, circulation, pain management and for tissue elasticity prior to therex.      Patient Education and Home Exercises     Education provided:   - Progress towards goals     Written Home Exercises Provided: Patient instructed to cont prior HEP.  Exercises were reviewed and Teddy was able to demonstrate them prior to the end of the session.  Teddy demonstrated fair  understanding of the home exercise program provided. See electronic medical record under  Patient Instructions for exercises provided during therapy sessions.       Assessment     Pt tolerated session well. Discussed progress and insurance limitation with pt and mother. Discussed putting therapy on hold until further evaluation from hand specialist in a couple weeks due to progress plateau. Pt and mother demo'd understanding.     Teddy is progressing with minor gains towards his goals and there are no updates to goals at this time. Pt prognosis is Fair.     Patient will continue to benefit from skilled outpatient occupational therapy to address the deficits listed in the problem list on initial evaluation provide patient/family education and to maximize patient's level of independence in the home and community environment.     Patient's spiritual, cultural and educational needs considered and patient agreeable to plan of care and goals.    Anticipated barriers to occupational therapy: Chronic condition form an acute incident. Guarding of the extremity    Goals:  LTG's (6 weeks):     1)   Decrease complaints of pain to  1 out of 10 at worst to increase functional hand use for ADL/work/leisure activities. progressing not met 4/17/2024  2)   Patient to score at 58% or more on FOTO to demonstrate improved perception of functional wrist use. progressing not met 4/17/2024   3)   Pt will return to near to prior level of function for ADLs and household management reporting I or Mod I with ADLs (dressing, feeding, grooming, toileting). progressing not met 4/17/2024  4)  Increase right wrist MASTERSON by at worst 15 degrees to increase functional hand use for ADLs/work/leisure activities. progressing not met 4/17/2024  5)  Increase right thumb MASTERSON by at worst  15 degrees to increase functional hand use for ADLs/work/leisure activities. progressing not met 4/17/2024     STG's (3 weeks)  1)   Patient to be IND with HEP and modalities for pain/edema managment. MET  2)   Increase right wrist MASTERSON by at worst 8 degrees to  increase functional hand use for ADLs/work/leisure activities. progressing not met 4/17/2024  3)   Increase  right thumb MASTERSON by at worst  15 degrees in Shoulder ABDuction to increase functional hand use for ADLs/work/leisure activities. MET  4)   Pt to perform  strength assessment as tolerable in at least 3 weeks to show progression in wrist stability and pain levels. Progressing not met 4/17/2024  5)   Patient to score at 50% or more on FOTO to demonstrate improved perception of functional wrist use. progressing not met 4/17/2024  6)   Decrease complaints of pain to  3 out of 10 at worst to increase functional hand use for ADL/work/leisure activities. progressing not met 4/17/2024     Plan     Updates/Grading for next session:  hold pending RAUL Jaramillo/MARIANA   6/12/2024

## 2024-06-18 ENCOUNTER — TELEPHONE (OUTPATIENT)
Dept: PRIMARY CARE CLINIC | Facility: CLINIC | Age: 49
End: 2024-06-18
Payer: MEDICARE

## 2024-06-18 NOTE — TELEPHONE ENCOUNTER
There isn't an ENT in the [facility] system that can see him before October?  He may need to see one of the outside ENTs if so

## 2024-06-18 NOTE — TELEPHONE ENCOUNTER
----- Message from Kendra Figueroa sent at 6/18/2024 10:24 AM CDT -----  Contact: [phone]  Patient called, to inform PCP that he scheduled to be seen the ENT until October, patient ear still in pain. Would like to get an advise, patient stated that he needs something for his ears.  Please call . Thank you.

## 2024-06-27 ENCOUNTER — HOSPITAL ENCOUNTER (OUTPATIENT)
Dept: RADIOLOGY | Facility: HOSPITAL | Age: 49
Discharge: HOME | End: 2024-06-27
Attending: ORTHOPAEDIC SURGERY
Payer: MEDICARE

## 2024-06-27 ENCOUNTER — OFFICE VISIT (OUTPATIENT)
Dept: ORTHOPEDICS | Facility: CLINIC | Age: 49
End: 2024-06-27
Payer: MEDICARE

## 2024-06-27 VITALS — WEIGHT: 306.25 LBS | HEIGHT: 72 IN | BODY MASS INDEX: 41.48 KG/M2

## 2024-06-27 DIAGNOSIS — M79.641 PAIN OF RIGHT HAND: Primary | ICD-10-CM

## 2024-06-27 DIAGNOSIS — M25.531 RIGHT WRIST PAIN: ICD-10-CM

## 2024-06-27 DIAGNOSIS — M79.641 PAIN OF RIGHT HAND: ICD-10-CM

## 2024-06-27 PROCEDURE — 73130 X-RAY EXAM OF HAND: CPT | Mod: TC,HCNC,RT

## 2024-06-27 PROCEDURE — 99999 PR PBB SHADOW E&M-EST. PATIENT-LVL III: CPT | Mod: PBBFAC,HCNC,, | Performed by: ORTHOPAEDIC SURGERY

## 2024-06-27 PROCEDURE — 73130 X-RAY EXAM OF HAND: CPT | Mod: 26,HCNC,RT, | Performed by: INTERNAL MEDICINE

## 2024-06-27 PROCEDURE — 1159F MED LIST DOCD IN RCRD: CPT | Mod: HCNC,CPTII,S$GLB, | Performed by: ORTHOPAEDIC SURGERY

## 2024-06-27 PROCEDURE — 99214 OFFICE O/P EST MOD 30 MIN: CPT | Mod: HCNC,S$GLB,, | Performed by: ORTHOPAEDIC SURGERY

## 2024-06-27 PROCEDURE — 3008F BODY MASS INDEX DOCD: CPT | Mod: HCNC,CPTII,S$GLB, | Performed by: ORTHOPAEDIC SURGERY

## 2024-06-27 RX ORDER — IBUPROFEN 800 MG/1
800 TABLET ORAL 3 TIMES DAILY PRN
Qty: 90 TABLET | Refills: 0 | Status: SHIPPED | OUTPATIENT
Start: 2024-06-27

## 2024-06-27 NOTE — PROGRESS NOTES
[facility]  History & Physical  Orthopedics    SUBJECTIVE:      COVID-19 attestation:  This patient was treated during the COVID-19 pandemic.  This was discussed with the patient, they are aware of our current policies and procedures, were given the option of delaying their visit and or switching to a virtual visit, delaying their surgery when applicable, and they elect to proceed.    Chief Complaint: right wrist pain    Referring Provider: No ref. provider found     History of Present Illness:  Patient is a 48 y.o. right hand dominant male who presents today with complaints of right wrist pain.  Patient reportedly had a ground level fall around 6 months ago onto his right wrist, and has since been experiencing progressively worsening pain.  Per mother and patient, he presented to the emergency department and no acute fractures were identified at that time (per chart review, last presentation to ED for ground level fall was in December 2022).  Describes his pain as throbbing and indicates it involves the radial aspect of the dorsum of the right wrist.  Denies any numbness, tingling, or weakness.  States he has had multiple fractures of the right hand that occurred as a child, but denies any other previous injuries or surgeries to the right upper extremity.      Interval history February 1, 2024:  The patient returns today for re-evaluation.  He notes that his right wrist pain persists.  He was unable to fully complete his MRI unfortunately.  Today he is describing his right upper extremity wrist and hand pain as more of a numbness and tingling at nighttime which is unbearable.  He has no other complaints returns for re-evaluation.      Interval history March 28, 2024: The patient returns today for re-evaluation.  He notes that his pain persists.  He had a recent EMG to evaluate for carpal tunnel syndrome.  He returns for re-evaluation with no other new complaints.  He again notes that he was  unable to complete the MRI because precipitated his asthma and he got very red in the face.      Interval history June 27, 2024: The patient returns today for re-evaluation.  He notes that he has not improving with rest, bracing, and occupational therapy.  He reports that his pain is worst stemming from the volar aspect of the right small finger MCP joint radiating to the base of the hand and up the forearm.  He returns today with his mom for re-evaluation has no other complaints.    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    Onset of symptoms/DOI was reportedly 6 months prior to presentation today.    Symptoms are aggravated by activity and movement.    Symptoms are alleviated by rest.  Denies taking any medications, denies use of splints     Symptoms consist of pain, swelling, and erythema.    The patient rates their pain as   7/10    Attempted treatment(s) and/or interventions include activity modifications, rest, rest.     The patient denies any fevers, chills, N/V, D/C and presents for evaluation.       Past Medical History:   Diagnosis Date    Allergy     Asthma      Past Surgical History:   Procedure Laterality Date    ADENOIDECTOMY      ANKLE SURGERY Left 1995    SINUS SURGERY       Review of patient's allergies indicates:   Allergen Reactions    Penicillins Hives     Social History     Social History Narrative    Not on file     Family History   Problem Relation Name Age of Onset    Hypertension Mother      Stroke Mother      No Known Problems Father           Current Outpatient Medications:     albuterol (PROVENTIL) 2.5 mg /3 mL (0.083 %) nebulizer solution, Take 3 mLs (2.5 mg total) by nebulization every 4 to 6 hours as needed for Wheezing., Disp: 1080 mL, Rfl: 2    albuterol (PROVENTIL/VENTOLIN HFA) 90 mcg/actuation inhaler, Inhale 2 puffs into the lungs every 6 (six) hours as needed for Wheezing. Rescue, Disp: 25.5 g, Rfl: 1    amLODIPine (NORVASC) 10 MG tablet, TAKE 1 TABLET(10 MG) BY  MOUTH EVERY DAY, Disp: 90 tablet, Rfl: 3    azelastine (ASTELIN) 137 mcg (0.1 %) nasal spray, 1 spray (137 mcg total) by Nasal route 2 (two) times daily., Disp: 30 mL, Rfl: 11    BREO ELLIPTA 200-25 mcg/dose DsDv diskus inhaler, Inhale 1 puff into the lungs once daily., Disp: 60 each, Rfl: 11    EScitalopram oxalate (LEXAPRO) 20 MG tablet, Take 1 tablet (20 mg total) by mouth once daily., Disp: 90 tablet, Rfl: 3    fexofenadine (ALLEGRA) 180 MG tablet, Take 180 mg by mouth once daily., Disp: , Rfl:     fluticasone propionate (FLONASE) 50 mcg/actuation nasal spray, 2 spray each nostril daily, Disp: 48 g, Rfl: 2    magnesium sulfate in water (MAGNESIUM SULFATE 2 GRAM/50 ML) 2 gram/50 mL PgBk, , Disp: , Rfl:     meloxicam (MOBIC) 15 MG tablet, Take 1 tablet (15 mg total) by mouth once daily., Disp: 30 tablet, Rfl: 2    montelukast (SINGULAIR) 10 mg tablet, TAKE 1 TABLET BY MOUTH EVERY DAY, Disp: 90 tablet, Rfl: 3    pravastatin (PRAVACHOL) 20 MG tablet, TK 1 T PO D, Disp: 90 tablet, Rfl: 3      Review of Systems:  As per HPI otherwise noncontributory    OBJECTIVE:      Vital Signs (Most Recent):  Vitals:    06/27/24 1348   Weight: (!) 138.9 kg (306 lb 3.5 oz)   Height: 6' (1.829 m)       Body mass index is 41.53 kg/m².      Physical Exam:  Constitutional: The patient appears well-developed and well-nourished. No distress.   Skin: No lesions appreciated  Head: Normocephalic and atraumatic.   Nose: Nose normal.   Ears: No deformities seen  Eyes: Conjunctivae and EOM are normal.   Neck: No tracheal deviation present.   Cardiovascular: Normal rate and intact distal pulses.    Pulmonary/Chest: Effort normal. No respiratory distress.   Abdominal: There is no guarding.   Neurological: The patient is alert.   Psychiatric: The patient has a normal mood and affect.     Right Hand/Wrist Examination:    Observation/Inspection:  Swelling  none    Deformity  none  Discoloration  none     Scars   none    Atrophy  None    Tenderness  about the right radiocarpal joint   Tenderness to palpation at the right small finger A1 pulley today; no triggering   No effusion, no erythema, no swelling    HAND/WRIST EXAMINATION:  Finkelstein's Test   Neg  WHAT Test    Neg  Snuff box tenderness   Positive  Alves's Test    Neg  Hook of Hamate Tenderness  Neg  CMC grind    Neg  Circumduction test   Neg    Neurovascular Exam:  Digits WWP, brisk CR < 3s throughout  NVI motor/LTS to M/R/U nerves, radial pulse 2+  Tinel's Test - Carpal Tunnel  positive  Tinel's Test - Cubital Tunnel  Neg  Phalen's Test    Neg  Median Nerve Compression Test positive    ROM hand full, painless    ROM wrist full, but with pain     ROM elbow full, painless    Abdomen not guarded  Respirations nonlabored  Perfusion intact    Diagnostic Results:     Imaging - I independently viewed the patient's imaging as well as the radiology report.  Xrays of the patient's right hand demonstrate no evidence of any acute fractures or dislocations or significant degenerative changes.    MRI right wrist-Impression:     1. Examination terminated early at patient's request.  Completed sequences are degraded by motion artifact.  2. Dorsal capsular edema signal, not well evaluated secondary to aforementioned limitations.  3. Nondiagnostic evaluation of the triangular fibrocartilage complex and intrinsic wrist ligaments.    EMG - IMPRESSIONS:  This is a normal electrophysiologic study of the right upper extremity.  There is no electrodiagnostic evidence of a median neuropathy on the right.           ASSESSMENT/PLAN:      48 y.o. yo male with right hand and wrist pain  Plan: The patient and I had a thorough discussion today.  We discussed the working diagnosis as well as several other potential alternative diagnoses.  Treatment options were discussed, both conservative and surgical.  Conservative treatment options would include things such as activity modifications, workplace modifications, a period of rest,  oral vs topical OTC and prescription anti-inflammatory medications, occupational therapy, splinting/bracing, immobilization, corticosteroid injections, and others.  Surgical options were discussed as well.     At this point in time,  we discussed options.   Teddy does note that he had a recent fall on mother's day with some bruising and exacerbated pain in the right hand and wrist.  I would like to obtain x-rays of his wrist on the way out today.  He does note that given his lack of progress he would be willing to attempt an MRI of the right hand.  This will be ordered and scheduled today.  I would like to screen him for gout or other inflammatory /autoimmune processes with some rheumatological workup and labs.  I would also like to prescribe him some ibuprofen which will be sent to his pharmacy.  Follow up after his studies for re-evaluation or sooner for any problems.      Jose M Desai M.D.    Please be aware that this note has been generated with the assistance of [employer] voice-to-text.  Please excuse any spelling or grammatical errors.    Thank you for choosing Dr. Jose M Desai for your orthopedic hand and upper extremity care. It is our goal to provide you with exceptional care that will help keep you healthy, active, and get you back in the game.     If you felt that you received exemplary care today, please consider leaving feedback for Dr. Desai on [employer] at [email or web address].    Please do not hesitate to reach out to us via email, phone, or MyChart with any questions, concerns, or feedback.

## 2024-07-01 ENCOUNTER — TELEPHONE (OUTPATIENT)
Dept: ORTHOPEDICS | Facility: CLINIC | Age: 49
End: 2024-07-01
Payer: MEDICARE

## 2024-07-01 NOTE — TELEPHONE ENCOUNTER
Spoke to patient who reports swelling of the lips and face after taking Ibuprofen 800 mg. He says the swelling has subsided and is no longer present at the time of the call. I instructed him to discontinue use of the medication and go to the ED if the facial swelling returns.      Briana Donaldson MA  Medical Assistant to Dr. Ross Dunbar [facility]

## 2024-07-22 ENCOUNTER — OFFICE VISIT (OUTPATIENT)
Dept: URGENT CARE | Facility: CLINIC | Age: 49
End: 2024-07-22
Payer: MEDICARE

## 2024-07-22 ENCOUNTER — NURSE TRIAGE (OUTPATIENT)
Dept: ADMINISTRATIVE | Facility: CLINIC | Age: 49
End: 2024-07-22
Payer: MEDICARE

## 2024-07-22 VITALS
RESPIRATION RATE: 18 BRPM | BODY MASS INDEX: 40.63 KG/M2 | OXYGEN SATURATION: 97 % | DIASTOLIC BLOOD PRESSURE: 79 MMHG | WEIGHT: 300 LBS | SYSTOLIC BLOOD PRESSURE: 122 MMHG | TEMPERATURE: 99 F | HEIGHT: 72 IN | HEART RATE: 78 BPM

## 2024-07-22 DIAGNOSIS — J01.90 ACUTE BACTERIAL SINUSITIS: Primary | ICD-10-CM

## 2024-07-22 DIAGNOSIS — B96.89 ACUTE BACTERIAL SINUSITIS: Primary | ICD-10-CM

## 2024-07-22 PROCEDURE — 99213 OFFICE O/P EST LOW 20 MIN: CPT | Mod: S$GLB,,, | Performed by: NURSE PRACTITIONER

## 2024-07-22 RX ORDER — AMOXICILLIN AND CLAVULANATE POTASSIUM 875; 125 MG/1; MG/1
1 TABLET, FILM COATED ORAL 2 TIMES DAILY
Qty: 20 TABLET | Refills: 0 | Status: SHIPPED | OUTPATIENT
Start: 2024-07-22 | End: 2024-08-01

## 2024-07-22 NOTE — TELEPHONE ENCOUNTER
"Patient states c/o bilateral earache with yellow discharge noted. Patient states onset of symptoms have been "for a while" and he has scheduled an appointment with ENT for October, 2024, which was the first available appointment.     Patient advised to See PCP or Visit an Urgent Care Center for evaluation/treatment. Patient also advised to contact the [facility] for any worsening symptoms. Patient states understanding of care advice and states he will visit an Urgent Care Center today.     Reason for Disposition   White, yellow, or green discharge    Additional Information   Negative: Sounds like a life-threatening emergency to the triager   Negative: Moving the earlobe or touching the ear clearly increases the pain   Negative: Foreign body stuck in the ear (e.g., bug, piece of cotton)   Negative: Pink or red swelling behind the ear   Negative: Stiff neck (can't touch chin to chest)   Negative: Patient sounds very sick or weak to the triager   Negative: Severe earache pain   Negative: Fever > 103 F (39.4 C)   Negative: Pointed object was inserted into the ear canal (e.g., a pencil, stick, or wire)    Protocols used: Earache-A-OH    "

## 2024-07-22 NOTE — PROGRESS NOTES
"Subjective:      Patient ID: Teddy Ennis is a 48 y.o. male.    Vitals:  height is 6' (1.829 m) and weight is 136.1 kg (300 lb). His temperature is 98.6 °F (37 °C). His blood pressure is 122/79 and his pulse is 78. His respiration is 18 and oxygen saturation is 97%.     Chief Complaint: Otalgia    This is a 48 y.o. male who presents today with a chief complaint of   Ear pain located in and in area behind ears. Symptoms began a couple of weeks ago and have gradually worsened. Pt also experiencing some ear wax discharge, ear ringing, drowsiness, fatigue, loss of balance, dizziness, and sore throat. Pt has not taken any medication for symptoms. Pt has a PMH of chronic ear infections and had tympanostomy tubes when he was younger.     Otalgia   There is pain in both ears. This is a recurrent problem. The current episode started 1 to 4 weeks ago. The problem occurs constantly. The problem has been gradually worsening. There has been no fever. The pain is at a severity of 6/10. The pain is moderate. Associated symptoms include ear discharge ("wax"), hearing loss, rhinorrhea and a sore throat. Pertinent negatives include no abdominal pain, coughing, diarrhea, headaches, neck pain, rash or vomiting. Associated symptoms comments: Ear ringing, drowsiness, fatigue, and some loss of balance. He has tried nothing for the symptoms. His past medical history is significant for a chronic ear infection and a tympanostomy tube. There is no history of hearing loss.       Constitution: Positive for fatigue. Negative for chills and fever.   HENT:  Positive for ear pain, ear discharge ("wax"), hearing loss, postnasal drip, sinus pain and sore throat. Negative for sinus pressure.    Neck: Negative for neck pain.   Respiratory:  Negative for cough and sputum production.    Gastrointestinal:  Negative for abdominal pain, vomiting and diarrhea.   Skin:  Negative for rash.   Neurological:  Negative for headaches.      Objective: "     Physical Exam   Constitutional: He is oriented to person, place, and time. He appears well-developed. He is cooperative.  Non-toxic appearance. He does not appear ill. No distress.   HENT:   Head: Normocephalic and atraumatic.   Ears:   Right Ear: Hearing, tympanic membrane, external ear and ear canal normal. No mastoid tenderness.   Left Ear: Hearing, tympanic membrane, external ear and ear canal normal. No mastoid tenderness.   Nose: Mucosal edema, purulent discharge and sinus tenderness present. No rhinorrhea or nasal deformity. No epistaxis. Right sinus exhibits no maxillary sinus tenderness and no frontal sinus tenderness. Left sinus exhibits no maxillary sinus tenderness and no frontal sinus tenderness.   Mouth/Throat: Uvula is midline and mucous membranes are normal. No trismus in the jaw. Normal dentition. No uvula swelling. Posterior oropharyngeal erythema present. No oropharyngeal exudate or posterior oropharyngeal edema.   Bilateral scarring from tubes as a child      Comments: Bilateral scarring from tubes as a child  Eyes: Conjunctivae and lids are normal. No scleral icterus.   Neck: Trachea normal and phonation normal. Neck supple. No edema present. No erythema present. No neck rigidity present.   Cardiovascular: Normal rate, regular rhythm, normal heart sounds and normal pulses.   Pulmonary/Chest: Effort normal and breath sounds normal. No respiratory distress. He has no decreased breath sounds. He has no wheezes. He has no rhonchi.   Abdominal: Normal appearance.   Musculoskeletal: Normal range of motion.         General: No deformity. Normal range of motion.   Neurological: He is alert and oriented to person, place, and time. He exhibits normal muscle tone. Coordination normal.   Skin: Skin is warm, dry, intact, not diaphoretic and not pale.   Psychiatric: His speech is normal and behavior is normal. Judgment and thought content normal.   Nursing note and vitals reviewed.      Assessment:     1.  Acute bacterial sinusitis        Plan:     Here with his mother in clinic who is also a historian.  Acute bacterial sinusitis  -     amoxicillin-clavulanate 875-125mg (AUGMENTIN) 875-125 mg per tablet; Take 1 tablet by mouth 2 (two) times daily. for 10 days  Dispense: 20 tablet; Refill: 0      Patient Instructions   Please drink plenty of fluids.  Please get plenty of rest.  Please return here or go to the Emergency Department for any concerns or worsening of condition.  If you were prescribed antibiotics, please take them to completion.  If you do not have Hypertension or any history of palpitations, it is ok to take over the counter Sudafed or Mucinex D or Allegra-D or Claritin-D or Zyrtec-D.  If you do take one of the above, it is ok to combine that with plain over the counter Mucinex or Allegra or Claritin or Zyrtec.  If for example you are taking Zyrtec -D, you can combine that with Mucinex, but not Mucinex-D.  If you are taking Mucinex-D, you can combine that with plain Allegra or Claritin or Zyrtec.   If you do have Hypertension or palpitations, it is safe to take Coricidin HBP for relief of sinus symptoms.  We recommend you take over the counter Flonase (Fluticasone) or another nasally inhaled steroid unless you are already taking one.  Nasal irrigation with a saline spray or Netti Pot like device per their directions is also recommended.  If not allergic, please take over the counter Tylenol (Acetaminophen) and/or Motrin (Ibuprofen) as directed for control of pain and/or fever.  Please follow up with your primary care doctor or specialist as needed.    If you  smoke, please stop smoking.

## 2024-07-24 ENCOUNTER — HOSPITAL ENCOUNTER (OUTPATIENT)
Dept: RADIOLOGY | Facility: HOSPITAL | Age: 49
Discharge: HOME | End: 2024-07-24
Attending: ORTHOPAEDIC SURGERY
Payer: MEDICARE

## 2024-07-24 DIAGNOSIS — M25.531 RIGHT WRIST PAIN: ICD-10-CM

## 2024-07-24 DIAGNOSIS — M79.641 PAIN OF RIGHT HAND: ICD-10-CM

## 2024-07-24 PROCEDURE — 73218 MRI UPPER EXTREMITY W/O DYE: CPT | Mod: TC,HCNC,RT

## 2024-07-24 PROCEDURE — 73218 MRI UPPER EXTREMITY W/O DYE: CPT | Mod: 26,HCNC,RT, | Performed by: RADIOLOGY

## 2024-08-09 RX ORDER — PRAVASTATIN SODIUM 20 MG/1
TABLET ORAL
Qty: 90 TABLET | Refills: 3 | Status: SHIPPED | OUTPATIENT
Start: 2024-08-09

## 2024-08-09 RX ORDER — ESCITALOPRAM OXALATE 20 MG/1
20 TABLET ORAL DAILY
Qty: 90 TABLET | Refills: 3 | Status: SHIPPED | OUTPATIENT
Start: 2024-08-09

## 2024-08-13 ENCOUNTER — OFFICE VISIT (OUTPATIENT)
Dept: ORTHOPEDICS | Facility: CLINIC | Age: 49
End: 2024-08-13
Payer: MEDICARE

## 2024-08-13 VITALS — WEIGHT: 300.06 LBS | HEIGHT: 72 IN | BODY MASS INDEX: 40.64 KG/M2

## 2024-08-13 DIAGNOSIS — M25.531 RIGHT WRIST PAIN: Primary | ICD-10-CM

## 2024-08-13 DIAGNOSIS — M79.641 PAIN OF RIGHT HAND: ICD-10-CM

## 2024-08-13 PROCEDURE — 3008F BODY MASS INDEX DOCD: CPT | Mod: HCNC,CPTII,S$GLB, | Performed by: ORTHOPAEDIC SURGERY

## 2024-08-13 PROCEDURE — 1159F MED LIST DOCD IN RCRD: CPT | Mod: HCNC,CPTII,S$GLB, | Performed by: ORTHOPAEDIC SURGERY

## 2024-08-13 PROCEDURE — 99214 OFFICE O/P EST MOD 30 MIN: CPT | Mod: HCNC,S$GLB,, | Performed by: ORTHOPAEDIC SURGERY

## 2024-08-13 PROCEDURE — 99999 PR PBB SHADOW E&M-EST. PATIENT-LVL III: CPT | Mod: PBBFAC,HCNC,, | Performed by: ORTHOPAEDIC SURGERY

## 2024-08-13 NOTE — PROGRESS NOTES
[facility]  History & Physical  Orthopedics    SUBJECTIVE:      COVID-19 attestation:  This patient was treated during the COVID-19 pandemic.  This was discussed with the patient, they are aware of our current policies and procedures, were given the option of delaying their visit and or switching to a virtual visit, delaying their surgery when applicable, and they elect to proceed.    Chief Complaint: right wrist pain    Referring Provider: Self, Aaareferral     History of Present Illness:  Patient is a 49 y.o. right hand dominant male who presents today with complaints of right wrist pain.  Patient reportedly had a ground level fall around 6 months ago onto his right wrist, and has since been experiencing progressively worsening pain.  Per mother and patient, he presented to the emergency department and no acute fractures were identified at that time (per chart review, last presentation to ED for ground level fall was in December 2022).  Describes his pain as throbbing and indicates it involves the radial aspect of the dorsum of the right wrist.  Denies any numbness, tingling, or weakness.  States he has had multiple fractures of the right hand that occurred as a child, but denies any other previous injuries or surgeries to the right upper extremity.      Interval history February 1, 2024:  The patient returns today for re-evaluation.  He notes that his right wrist pain persists.  He was unable to fully complete his MRI unfortunately.  Today he is describing his right upper extremity wrist and hand pain as more of a numbness and tingling at nighttime which is unbearable.  He has no other complaints returns for re-evaluation.      Interval history March 28, 2024: The patient returns today for re-evaluation.  He notes that his pain persists.  He had a recent EMG to evaluate for carpal tunnel syndrome.  He returns for re-evaluation with no other new complaints.  He again notes that he was unable  to complete the MRI because precipitated his asthma and he got very red in the face.      Interval history June 27, 2024: The patient returns today for re-evaluation.  He notes that he has not improving with rest, bracing, and occupational therapy.  He reports that his pain is worst stemming from the volar aspect of the right small finger MCP joint radiating to the base of the hand and up the forearm.  He returns today with his mom for re-evaluation has no other complaints.      Interval history August 13, 2024: The patient returns today for re-evaluation.  He was recently sent for an MRI of his right hand as well as labs.  He was prescribed ibuprofen at his last visit and notes that this caused him to break out in a rash so he discontinued it.  He does note that his symptoms are improved today, fortunately.  No other complaints.    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    Onset of symptoms/DOI was reportedly 6 months prior to presentation today.    Symptoms are aggravated by activity and movement.    Symptoms are alleviated by rest.  Denies taking any medications, denies use of splints     Symptoms consist of pain, swelling, and erythema.    The patient rates their pain as   4/10    Attempted treatment(s) and/or interventions include activity modifications, rest, rest.     The patient denies any fevers, chills, N/V, D/C and presents for evaluation.       Past Medical History:   Diagnosis Date    Allergy     Asthma      Past Surgical History:   Procedure Laterality Date    ADENOIDECTOMY      ANKLE SURGERY Left 1995    SINUS SURGERY       Review of patient's allergies indicates:   Allergen Reactions    Ibuprofen Hives    Penicillins Hives     Social History     Social History Narrative    Not on file     Family History   Problem Relation Name Age of Onset    Hypertension Mother      Stroke Mother      No Known Problems Father           Current Outpatient Medications:     albuterol (PROVENTIL) 2.5 mg  /3 mL (0.083 %) nebulizer solution, Take 3 mLs (2.5 mg total) by nebulization every 4 to 6 hours as needed for Wheezing., Disp: 1080 mL, Rfl: 2    albuterol (PROVENTIL/VENTOLIN HFA) 90 mcg/actuation inhaler, Inhale 2 puffs into the lungs every 6 (six) hours as needed for Wheezing. Rescue, Disp: 25.5 g, Rfl: 1    amLODIPine (NORVASC) 10 MG tablet, TAKE 1 TABLET(10 MG) BY MOUTH EVERY DAY, Disp: 90 tablet, Rfl: 3    azelastine (ASTELIN) 137 mcg (0.1 %) nasal spray, 1 spray (137 mcg total) by Nasal route 2 (two) times daily., Disp: 30 mL, Rfl: 11    BREO ELLIPTA 200-25 mcg/dose DsDv diskus inhaler, Inhale 1 puff into the lungs once daily., Disp: 60 each, Rfl: 11    EScitalopram oxalate (LEXAPRO) 20 MG tablet, Take 1 tablet (20 mg total) by mouth once daily., Disp: 90 tablet, Rfl: 3    fexofenadine (ALLEGRA) 180 MG tablet, Take 180 mg by mouth once daily., Disp: , Rfl:     fluticasone propionate (FLONASE) 50 mcg/actuation nasal spray, 2 spray each nostril daily, Disp: 48 g, Rfl: 2    ibuprofen (ADVIL,MOTRIN) 800 MG tablet, Take 1 tablet (800 mg total) by mouth 3 (three) times daily as needed for Pain. (Patient not taking: Reported on 7/22/2024), Disp: 90 tablet, Rfl: 0    magnesium sulfate in water (MAGNESIUM SULFATE 2 GRAM/50 ML) 2 gram/50 mL PgBk, , Disp: , Rfl:     meloxicam (MOBIC) 15 MG tablet, Take 1 tablet (15 mg total) by mouth once daily. (Patient not taking: Reported on 7/22/2024), Disp: 30 tablet, Rfl: 2    montelukast (SINGULAIR) 10 mg tablet, TAKE 1 TABLET BY MOUTH EVERY DAY, Disp: 90 tablet, Rfl: 3    pravastatin (PRAVACHOL) 20 MG tablet, TK 1 T PO D, Disp: 90 tablet, Rfl: 3      Review of Systems:  As per HPI otherwise noncontributory    OBJECTIVE:      Vital Signs (Most Recent):  Vitals:    08/13/24 1444   Weight: (!) 136.1 kg (300 lb 0.7 oz)   Height: 6' (1.829 m)       Body mass index is 40.69 kg/m².      Physical Exam:  Constitutional: The patient appears well-developed and well-nourished. No distress.    Skin: No lesions appreciated  Head: Normocephalic and atraumatic.   Nose: Nose normal.   Ears: No deformities seen  Eyes: Conjunctivae and EOM are normal.   Neck: No tracheal deviation present.   Cardiovascular: Normal rate and intact distal pulses.    Pulmonary/Chest: Effort normal. No respiratory distress.   Abdominal: There is no guarding.   Neurological: The patient is alert.   Psychiatric: The patient has a normal mood and affect.     Right Hand/Wrist Examination:    Observation/Inspection:  Swelling  none    Deformity  none  Discoloration  none     Scars   none    Atrophy  None    No tenderness today.  No erythema or swelling.    HAND/WRIST EXAMINATION:  Finkelstein's Test   Neg  WHAT Test    Neg  Snuff box tenderness     Negative  Alves's Test    Neg  Hook of Hamate Tenderness  Neg  CMC grind    Neg  Circumduction test   Neg    Neurovascular Exam:  Digits WWP, brisk CR < 3s throughout  NVI motor/LTS to M/R/U nerves, radial pulse 2+    ROM hand full, painless    ROM wrist full, but with pain     ROM elbow full, painless    Abdomen not guarded  Respirations nonlabored  Perfusion intact    Diagnostic Results:     Imaging - I independently viewed the patient's imaging as well as the radiology report.  Xrays of the patient's right hand demonstrate no evidence of any acute fractures or dislocations or significant degenerative changes.    MRI right wrist-Impression:     1. Examination terminated early at patient's request.  Completed sequences are degraded by motion artifact.  2. Dorsal capsular edema signal, not well evaluated secondary to aforementioned limitations.  3. Nondiagnostic evaluation of the triangular fibrocartilage complex and intrinsic wrist ligaments.    EMG - IMPRESSIONS:  This is a normal electrophysiologic study of the right upper extremity.  There is no electrodiagnostic evidence of a median neuropathy on the right.          Uric acid, CRP, ESR-elevated    ASSESSMENT/PLAN:      49 y.o. yo male  with right hand and wrist pain  Plan: The patient and I had a thorough discussion today.  We discussed the working diagnosis as well as several other potential alternative diagnoses.  Treatment options were discussed, both conservative and surgical.  Conservative treatment options would include things such as activity modifications, workplace modifications, a period of rest, oral vs topical OTC and prescription anti-inflammatory medications, occupational therapy, splinting/bracing, immobilization, corticosteroid injections, and others.  Surgical options were discussed as well.     At this point in time,  Teddy is doing much better.  He likely is suffering from a gout flare .  He has improved and has hyperuricemia.  I would like to refer him to Rheumatology as well as back to occupational therapy for activities as tolerated without restrictions. He may follow up with me as needed.      Jose M Desai M.D.    Please be aware that this note has been generated with the assistance of [employer] voice-to-text.  Please excuse any spelling or grammatical errors.    Thank you for choosing Dr. Jose M Desai for your orthopedic hand and upper extremity care. It is our goal to provide you with exceptional care that will help keep you healthy, active, and get you back in the game.     If you felt that you received exemplary care today, please consider leaving feedback for Dr. Desai on [employer] at [email or web address].    Please do not hesitate to reach out to us via email, phone, or MyChart with any questions, concerns, or feedback.

## 2024-08-14 RX ORDER — MONTELUKAST SODIUM 10 MG/1
TABLET ORAL
Qty: 90 TABLET | Refills: 1 | Status: SHIPPED | OUTPATIENT
Start: 2024-08-14

## 2024-08-14 NOTE — TELEPHONE ENCOUNTER
----- Message from Krystle Malave sent at 8/14/2024  9:11 AM CDT -----  Contact: Ms Ennis mother of patient @ [phone]  Requesting an RX refill or new RX.    Is this a refill or new RX:     RX name and strength montelukast (SINGULAIR) 10 mg tablet    Is this a 30 day or 90 day RX:     Pharmacy name and phone #   [id number] - [place], [place] - [address] AT [facility] [address]  [facility] [place] [address]  Phone: [phone] Fax: [phone]        The doctors have asked that we provide their patients with the following 2 reminders -- prescription refills can take up to 72 hours, and a friendly reminder that in the future you can use your [facility] account to request refills: yes

## 2024-08-14 NOTE — TELEPHONE ENCOUNTER
No care due was identified.  [facility] Embedded Care Due Messages. Reference number: [id number].   8/14/2024 9:18:24 AM CDT

## 2024-08-15 ENCOUNTER — CLINICAL SUPPORT (OUTPATIENT)
Dept: REHABILITATION | Facility: HOSPITAL | Age: 49
End: 2024-08-15
Attending: ORTHOPAEDIC SURGERY
Payer: MEDICARE

## 2024-08-15 DIAGNOSIS — M79.641 PAIN OF RIGHT HAND: ICD-10-CM

## 2024-08-15 DIAGNOSIS — M25.531 RIGHT WRIST PAIN: ICD-10-CM

## 2024-08-15 DIAGNOSIS — R29.898 DECREASED GRIP STRENGTH OF RIGHT HAND: Primary | ICD-10-CM

## 2024-08-15 PROCEDURE — 97166 OT EVAL MOD COMPLEX 45 MIN: CPT

## 2024-08-15 PROCEDURE — 97022 WHIRLPOOL THERAPY: CPT

## 2024-08-15 PROCEDURE — 97530 THERAPEUTIC ACTIVITIES: CPT

## 2024-08-15 NOTE — PLAN OF CARE
[facility] Occupational Therapy  Initial Evaluation     Date: 8/15/2024  Patient: Teddy Ennis  Chart Number: [id number]    Therapy Diagnosis:   1. Decreased  strength of right hand        2. Right wrist pain  Ambulatory referral/consult to Physical/Occupational Therapy      3. Pain of right hand  Ambulatory referral/consult to Physical/Occupational Therapy        Medical Diagnosis:   M25.531 (ICD-10-CM) - Right wrist pain   M79.641 (ICD-10-CM) - Pain of right hand       Referring Physician: Jose M Desai MD  Physician Orders: Eval and Treat  Date of Return to MD: CHAI    Date of Injury: over a year ago  Date of Surgery: NA    Evaluation Date: 8/15/2024  Authorization Period: 8/13/24 - 8/13/24  Plan of Care Expiration: 11/7/24  Visit #/ Visits Authorized: 1 of 1  FOTO Completion: Initial eval (8/15/2024)  FOTO #2:  FOTO #3:    Time In: 2:10 pm  Time Out: 2:55 pm  Total Billable Time: 45 min    Precautions: Standard    Subjective     History of Current Condition: Teddy Ennis is a 49 y.o. year old Right hand dominant male who is known to this facility for prior OT treatment of right wrist pain s/p fall. He reports continued pain about ulnar and dorsal wrist. He had recent MRI (see below) and follow up with MD who recommended continued skilled OT services due to MRI results, decreased functional activity tolerance and continued wrist pain. Teddy Ennis is referred to Occupational Therapy for evaluation and treatment. Patient presents today alone.    Falls: two falls in past year    Involved Side: Right  Dominant Side: Right    Date of Onset: over a year ago  Imaging: MRI  FINDINGS:  The hook of the hamate, tubercle of the trapezium, scaphoid, and pisiform are intact.  No fractures, marrow replacement process, or evidence of osteomyelitis.  No periarticular masses.  No erosions or evidence of pannus formation/ synovial hypertrophy, noting limited assessment without IV contrast.   "The muscle signal is within normal limits.  There is mild increased signal around on flexor tendons, suggestive of tenosynovitis.  Mild ECU tenosynovitis noted.  No discrete tear/retraction.  No fluid collections or masses.     Small ganglion type cyst measuring 1.2 cm (series 5, image 44) proximal to the pisiform articulation.  Additional ganglion cyst measuring 1.1 cm at volar radio carpal articulation (series 5, image 45).  The median and ulnar nerves are unremarkable.  Mild increased signal in dorsal band of the scapholunate ligament, without disruption.  Small area of cartilage loss at the triscaphe joint with mild subchondral marrow changes.  Previous Therapy: OT at this facility for Right wrist    Pain:  Functional Pain Scale Rating 0-10:   Current: 5/10  At Best: 3/10  At Worst: 7/10    Location: Right ulnar wrist  Description: throbbing  Aggravating Factors: tight , repetitive activity  Easing Factors: heat    Functional Limitations/Social History:  Prior Level of Function: Independent with all ADLs/IADLs    Current Level of Function:   ADLs: stiff in the morning, mild pain  IADLs: pain and difficulty with cooking/chopping food  Leisure: watch football  Driving: No    Occupation:  flower shop  Working presently: employed  Duties: helps with delivery, pick ups    Patient's Goals for Therapy: "get my hand back to normal with no pain"    Past Medical History/Physical Systems Review:   Teddy Ennis  has a past medical history of Allergy and Asthma.    Teddy Ennis  has a past surgical history that includes Ankle surgery (Left, 1995); Adenoidectomy; and Sinus surgery.    Teddy has a current medication list which includes the following prescription(s): albuterol, albuterol, amlodipine, azelastine, breo ellipta, escitalopram oxalate, fexofenadine, fluticasone propionate, ibuprofen, magnesium sulfate in water, meloxicam, montelukast, and pravastatin.    Review of patient's allergies indicates: "   Allergen Reactions    Ibuprofen Hives    Penicillins Hives        Objective     Mental status: alert, oriented x3    Observation/Appearance:   No swelling or atrophy noted    Sensation: Patient denies numbness/tingling      ELBOW, WRIST RANGE OF MOTION:   Measured in degrees of active motion with goniometer   Right  8/15/2024 Left  8/15/2024   Elbow Extension/Flexion WNL WNL   Pronation/Supination WNL WNL   Wrist Extension/Flexion 65/50 70/50   Ulnar/Radial Deviation 30/15 30/15   Composite Wrist Flexion 10 40       STRENGTH: (Measured in pounds using a Dynamometer and pinch meter)   Right  8/15/2024 Left  8/15/2024    Setting 2 25, 20, 20* 75, 80, 75    Average 22 lb 77 lb   *pain throughout hand      Special Testing:  ECU synergy: pain    MMT EDC: 3/5, with pain dorsal hand    MMT ECRL/ECRB: 4/5 pain dorsal hand  MMT ECU: 4/5, pain    MMT FCU: 4/5, pain dorsal wrist  MMT FCR: 4/5, pain dorsal wrist      Intake Outcome Measure for FOTO Initial Evaluation Survey    Therapist reviewed FOTO scores for Teddy Ennis on 8/15/2024.   FOTO documents entered into [employer] - see Media section.    Intake Score: 45%         Treatment     Treatment Time In: 2:30 pm  Treatment Time Out: 2:55 pm  Total Treatment time separate from Evaluation time: 25 min    Supervised modalities after being cleared for contraindications: Fluidotherapy - 115 degrees and 50 speed, to Right hand for 10 minutes for increased blood flow and circulation, increased tissue extensibility, and pain management.      Teddy participated in dynamic functional therapeutic activities to improve functional performance for 15 minutes, including:  - Applied KT I band about dorsal and volar forearm/wrist for facilitation  - Wrist isometrics, 4 ways (10 reps)  - Composite wrist flexion stretching (3 x 30 sec)      Patient Education and Home Exercises     Patient/Family Education Provided:   - Role of OT, goals for OT, scheduling/cancellations - pt  verbalized understanding. Discussed insurance limitations with patient.    Written Home Exercises Provided: yes.  Exercises were reviewed and Teddy was able to demonstrate them prior to the end of the session.  Teddy demonstrated good  understanding of the education provided. See EMR under Patient Instructions for exercises provided during therapy sessions.     Pt was advised to perform these exercises free of pain, and to stop performing them if pain occurs.      Assessment     Teddy Ennis is a 49 y.o. male referred to outpatient occupational therapy and presents with a medical diagnosis of M25.531 (ICD-10-CM) - Right wrist pain M79.641 (ICD-10-CM) - Pain of right hand. Patient presents with pain, decreased muscle extensibility, decreased  strength interfering with functional use in daily activities. Pain mostly about wrist and digit extensors in area of dorsal compartment.  Teddy Ennis will benefit from continued skilled OT services to progress with above deficits and facilitate increased functional use of RUE.    Following medical record review it is determined that pt will benefit from occupational therapy services in order to maximize pain free and/or functional use of right hand/wrist. The following goals were discussed with the patient and patient is in agreement with them as to be addressed in the treatment plan. The patient's rehab potential is Fair.     Anticipated barriers to occupational therapy: time since onset of symptoms    Plan of care discussed with patient: Yes    Patient's spiritual, cultural and educational needs considered and patient is agreeable to the plan of care and goals as stated below:     Medical Necessity is demonstrated by the following  Occupational Profile/History  Co-morbidities and personal factors that may impact the plan of care [] LOW: Brief chart review  [x] MODERATE: Expanded chart review   [] HIGH: Extensive chart review    Moderate / High Support  Documentation: imaging, prior OT/MD notes     Examination  Performance deficits relating to physical, cognitive or psychosocial skills that result in activity limitations and/or participation restrictions  [] LOW: addressing 1-3 Performance deficits  [x] MODERATE: 3-5 Performance deficits  [] HIGH: 5+ Performance deficits (please support below)    Moderate / High Support Documentation:    Physical:  Joint Mobility  Muscle Power/Strength   Strength  Pain    Cognitive:  No Deficits    Psychosocial:    Habits  Routines     Treatment Options [] LOW: Limited options  [x] MODERATE: Several options  [] HIGH: Multiple options      Decision Making/ Complexity Score: moderate         The following goals were discussed with the patient and patient is in agreement with them as to be addressed in the treatment plan.     Long Term Goals (to be met by discharge):  Date Goal Met:     1.) Teddy Ennis will demonstrate significantly improved functional performance from re-assessment as measured by a FOTO Intake score of more than 65.    Goal Status:   In progress    2.) Teddy Ennis will return to near to prior level of function for ADLs and household management reporting independence or modified independence.    Goal Status:   In progress    3. Teddy Ennis will report pain 2 out of 10 at worst to increase functional use of affected hand for work and leisure tasks.    Goal Status:   In progress     Short Term Goals (to be met by 9/15/24):  Date Goal Met:     Teddy Ennis will be independent with home exercise program with written instructions.    Goal Status:   In progress    Teddy Ennis will demonstrate 25 degrees of composite wrist flexion to improve functional performance in ADLs/work/leisure tasks.    Goal Status:   In progress    Teddy Ennis will demonstrate within 20 lbs of  strength compared to unaffected hand to improve functional grasp for ADLs/work/leisure tasks.    Goal Status:   In  progress    Teddy Ennis will demonstrate the ability to complete ADL/IADL tasks with 4/10 pain.    Goal Status:   In progress    Teddy Ennis will be able to resume significantly greater occupational roles independently or modified as demonstrated by a FOTO Intake score of more than 55.    Goal Status:   In progress       Plan     Pt to be treated by Occupational Therapy 2 times per week for 12 weeks during the certification period from 8/15/2024 to 11/7/24 to achieve the established goals.     Treatment to include: Paraffin, Fluidotherapy, Manual therapy/joint mobilizations, Modalities for pain management, US 3 mhz, Therapeutic exercises/activities., Iontophoresis with 2.0 cc Dexamethasone, Strengthening, Orthotic Fabrication/Fit/Training, Electrical Modalities, Joint Protection, and Energy Conservation, as well as any other treatments deemed necessary based on the patient's needs or progress.       Marge Kelly, OTR/L, CHT

## 2024-08-15 NOTE — PATIENT INSTRUCTIONS
[facility], OCCUPATIONAL THERAPY  HOME EXERCISE PROGRAM         Composite wrist flexion stretch:  - Begin with your elbow bent and by your side  - Position hand palm down and make a gentle fist  - Use your other hand to gently bend fist down towards the floor  - You should feel a gentle stretch, no pain! If you need more of a stretch you can begin to straighten the elbow to the point you feel a comfortable stretch  - Hold 30 seconds. Repeat 3x.      Complete the following strengthening exercises 2x/day.  Hold each position x3 seconds then relax. Complete 10 repetitions each.     Resisted Wrist Extension   With forearm resting palm down, resist upward movement   of hand with other hand.       Resisted Wrist Flexion   With forearm resting palm up, resist upward movement   of hand with other hand.       Resisted Wrist Radial Deviation  With forearm resting with thumb up, use other hand to   resist upward movement of hand at wrist.       Resisted Wrist Ulnar Deviation  With forearm resting thumb up, use other hand to resist   downward movement of hand at wrist.    Therapist: JORGITO Platt/MARIANA

## 2024-08-20 ENCOUNTER — TELEPHONE (OUTPATIENT)
Dept: PRIMARY CARE CLINIC | Facility: CLINIC | Age: 49
End: 2024-08-20
Payer: MEDICARE

## 2024-08-20 ENCOUNTER — OFFICE VISIT (OUTPATIENT)
Dept: RHEUMATOLOGY | Facility: CLINIC | Age: 49
End: 2024-08-20
Payer: MEDICARE

## 2024-08-20 VITALS
BODY MASS INDEX: 40.46 KG/M2 | DIASTOLIC BLOOD PRESSURE: 73 MMHG | HEART RATE: 73 BPM | SYSTOLIC BLOOD PRESSURE: 114 MMHG | HEIGHT: 73 IN | WEIGHT: 305.31 LBS

## 2024-08-20 DIAGNOSIS — E66.01 CLASS 2 SEVERE OBESITY DUE TO EXCESS CALORIES WITH SERIOUS COMORBIDITY AND BODY MASS INDEX (BMI) OF 39.0 TO 39.9 IN ADULT: ICD-10-CM

## 2024-08-20 DIAGNOSIS — M79.641 PAIN OF RIGHT HAND: ICD-10-CM

## 2024-08-20 DIAGNOSIS — M25.531 RIGHT WRIST PAIN: Primary | ICD-10-CM

## 2024-08-20 DIAGNOSIS — R70.0 ELEVATED SED RATE: ICD-10-CM

## 2024-08-20 DIAGNOSIS — E79.0 HYPERURICEMIA: ICD-10-CM

## 2024-08-20 PROCEDURE — 3074F SYST BP LT 130 MM HG: CPT | Mod: HCNC,CPTII,S$GLB, | Performed by: INTERNAL MEDICINE

## 2024-08-20 PROCEDURE — 1160F RVW MEDS BY RX/DR IN RCRD: CPT | Mod: HCNC,CPTII,S$GLB, | Performed by: INTERNAL MEDICINE

## 2024-08-20 PROCEDURE — 99204 OFFICE O/P NEW MOD 45 MIN: CPT | Mod: HCNC,S$GLB,, | Performed by: INTERNAL MEDICINE

## 2024-08-20 PROCEDURE — 99999 PR PBB SHADOW E&M-EST. PATIENT-LVL IV: CPT | Mod: PBBFAC,HCNC,, | Performed by: INTERNAL MEDICINE

## 2024-08-20 PROCEDURE — 3008F BODY MASS INDEX DOCD: CPT | Mod: HCNC,CPTII,S$GLB, | Performed by: INTERNAL MEDICINE

## 2024-08-20 PROCEDURE — 1159F MED LIST DOCD IN RCRD: CPT | Mod: HCNC,CPTII,S$GLB, | Performed by: INTERNAL MEDICINE

## 2024-08-20 PROCEDURE — 3078F DIAST BP <80 MM HG: CPT | Mod: HCNC,CPTII,S$GLB, | Performed by: INTERNAL MEDICINE

## 2024-08-20 NOTE — TELEPHONE ENCOUNTER
----- Message from Sandra Gooedn sent at 8/20/2024  8:06 AM CDT -----  Contact: Pt  [phone]  Pt called in regards to he was seen at [facility] on yesterday for heat exhaustion  he was told to make a f/u jordi with his pcp please advise

## 2024-08-20 NOTE — TELEPHONE ENCOUNTER
----- Message from Shu Sanches sent at 8/20/2024  8:19 AM CDT -----  Contact: Self/[phone]  Patient is returning a phone call.    Who left a message for the patient: Gertrude     Does patient know what this is regarding:      Would you like a call back, or a response through your [facility] portal?:   call back     Comments: pt stated to call him at his mom's number [phone], Thank you

## 2024-08-21 ENCOUNTER — CLINICAL SUPPORT (OUTPATIENT)
Dept: REHABILITATION | Facility: HOSPITAL | Age: 49
End: 2024-08-21
Payer: MEDICARE

## 2024-08-21 DIAGNOSIS — M25.531 RIGHT WRIST PAIN: Primary | ICD-10-CM

## 2024-08-21 DIAGNOSIS — R29.898 DECREASED GRIP STRENGTH OF RIGHT HAND: ICD-10-CM

## 2024-08-21 PROCEDURE — 97022 WHIRLPOOL THERAPY: CPT | Mod: CO

## 2024-08-21 PROCEDURE — 97112 NEUROMUSCULAR REEDUCATION: CPT | Mod: CO

## 2024-08-21 PROCEDURE — 97140 MANUAL THERAPY 1/> REGIONS: CPT | Mod: CO

## 2024-08-21 NOTE — PROGRESS NOTES
[facility]  Occupational Therapy Treatment Note     Date: 8/21/2024  Name: Teddy Ennis  Clinic Number: [id number]    Therapy Diagnosis:   Encounter Diagnoses   Name Primary?    Right wrist pain Yes    Decreased  strength of right hand      Physician: Jose M Desai MD    Referring Physician: Jose M Desai MD  Physician Orders: Eval and Treat  Date of Return to MD: TBD     Date of Injury: over a year ago  Date of Surgery: NA     Evaluation Date: 8/15/2024  Authorization Period: 8/13/24 - 8/13/24  Plan of Care Expiration: 11/7/24  Visit #/ Visits Authorized: 15 of 20  FOTO Completion: Initial eval (8/15/2024)  FOTO #2:  FOTO #3:     Time In: 1:47  pm  Time Out: 2:40 pm  Total Billable Time: 53 min     Precautions: Standard      Subjective     Patient reports: tightness in his hand   He was compliant with home exercise program given last session.   Response to previous treatment:first f/u  Functional change: none yet     Pain: not rated/10  Location: right wrists      Objective     Mental status: alert, oriented x3     Observation/Appearance:   No swelling or atrophy noted     Sensation: Patient denies numbness/tingling        ELBOW, WRIST RANGE OF MOTION:   Measured in degrees of active motion with goniometer    Right  8/15/2024 Left  8/15/2024   Elbow Extension/Flexion WNL WNL   Pronation/Supination WNL WNL   Wrist Extension/Flexion 65/50 70/50   Ulnar/Radial Deviation 30/15 30/15   Composite Wrist Flexion 10 40         STRENGTH: (Measured in pounds using a Dynamometer and pinch meter)    Right  8/15/2024 Left  8/15/2024    Setting 2 25, 20, 20* 75, 80, 75    Average 22 lb 77 lb   *pain throughout hand        Special Testing:  ECU synergy: pain     MMT EDC: 3/5, with pain dorsal hand     MMT ECRL/ECRB: 4/5 pain dorsal hand  MMT ECU: 4/5, pain     MMT FCU: 4/5, pain dorsal wrist  MMT FCR: 4/5, pain dorsal wrist        Intake Outcome Measure for FOTO Initial Evaluation  Survey     Therapist reviewed FOTO scores for Teddy Ennis on 8/15/2024.   FOTO documents entered into [employer] - see Media section.     Intake Score: 45%          Treatment     Teddy received the treatments listed below:      Teddy received the following supervised modalities after being cleared for contradictions for 10 minutes:   Fluidotherapy: To R for 10 min, continuous air, 110 deg, air speed 100 to decrease pain, edema & scar tissue and increased tissue extensibility.        manual therapy techniques: Soft tissue Mobilization were applied to the: R wrist for 10 minutes, including:  STM with vibration tool to dorsal and volar wrist         neuromuscular re-education activities to improve: Kinesthetic and Proprioception for 33 minutes. The following activities were included:  Comp flex wrist stretch, 10 sec holds x 5   Wrist ext isometric x 10  Red flexbar isometrics, flex and ext x 10 ea   3 corners red flexbar bends x 2 min (no RD)  Oscillations with red flexbar x 2 min    Dart throwers slow x 10   K tape space corrector over dorso-ulnar wrist anchored circumferentially, distal glide volar and dorsal FA   Ed on obtaining a bullseye brace for wrist support and pain relief      Patient Education and Home Exercises     Education provided:   - cont HEP  - Progress towards goals     Written Home Exercises Provided: Pt instructed to continue prior HEP.  Exercises were reviewed and Teddy was able to demonstrate them prior to the end of the session.  Teddy demonstrated good  understanding of the home exercise program provided. See electronic medical record under Patient Instructions for exercises provided during therapy sessions.       Assessment     Pt tolerated session fairly well. Continues to c/o pain with resistance and quick motion. Will cont as tolerated.   Client Care conference completed with evaluating therapist in regards to this patients POC as evidenced by co signature of supervising therapist.        Teddy is progressing well towards his goals and there are no updates to goals at this time. Pt prognosis is Good.     Patient will continue to benefit from skilled outpatient occupational therapy to address the deficits listed in the problem list on initial evaluation provide patient/family education and to maximize patient's level of independence in the home and community environment.     Patient's spiritual, cultural and educational needs considered and patient agreeable to plan of care and goals.    Anticipated barriers to occupational therapy: none     Goals:  Long Term Goals (to be met by discharge):  Date Goal Met:       1.) Teddy Ennis will demonstrate significantly improved functional performance from re-assessment as measured by a FOTO Intake score of more than 65.     Goal Status:   In progress     2.) Teddy Ennis will return to near to prior level of function for ADLs and household management reporting independence or modified independence.     Goal Status:   In progress     3. Teddy Ennis will report pain 2 out of 10 at worst to increase functional use of affected hand for work and leisure tasks.     Goal Status:   In progress      Short Term Goals (to be met by 9/15/24):  Date Goal Met:       Teddy Ennis will be independent with home exercise program with written instructions.     Goal Status:   In progress     Teddy Ennis will demonstrate 25 degrees of composite wrist flexion to improve functional performance in ADLs/work/leisure tasks.     Goal Status:   In progress     Teddy Ennis will demonstrate within 20 lbs of  strength compared to unaffected hand to improve functional grasp for ADLs/work/leisure tasks.     Goal Status:   In progress     Teddy Ennis will demonstrate the ability to complete ADL/IADL tasks with 4/10 pain.     Goal Status:   In progress     Teddy Ennis will be able to resume significantly greater occupational roles  independently or modified as demonstrated by a FOTO Intake score of more than 55.     Goal Status:   In progress        Plan     Updates/Grading for next session: cont as tolerated    VINI Swanson   8/21/2024

## 2024-08-22 ENCOUNTER — OFFICE VISIT (OUTPATIENT)
Dept: PRIMARY CARE CLINIC | Facility: CLINIC | Age: 49
End: 2024-08-22
Payer: MEDICARE

## 2024-08-22 ENCOUNTER — LAB VISIT (OUTPATIENT)
Dept: LAB | Facility: HOSPITAL | Age: 49
End: 2024-08-22
Attending: NURSE PRACTITIONER
Payer: MEDICARE

## 2024-08-22 VITALS
DIASTOLIC BLOOD PRESSURE: 74 MMHG | WEIGHT: 301.81 LBS | HEIGHT: 73 IN | OXYGEN SATURATION: 96 % | HEART RATE: 90 BPM | BODY MASS INDEX: 40 KG/M2 | SYSTOLIC BLOOD PRESSURE: 120 MMHG

## 2024-08-22 DIAGNOSIS — E87.6 HYPOKALEMIA: ICD-10-CM

## 2024-08-22 DIAGNOSIS — J01.11 ACUTE RECURRENT FRONTAL SINUSITIS: ICD-10-CM

## 2024-08-22 DIAGNOSIS — T67.5XXD HEAT EXHAUSTION, SUBSEQUENT ENCOUNTER: Primary | ICD-10-CM

## 2024-08-22 LAB
ANION GAP SERPL CALC-SCNC: 6 MMOL/L (ref 8–16)
BUN SERPL-MCNC: 10 MG/DL (ref 6–20)
CALCIUM SERPL-MCNC: 9.5 MG/DL (ref 8.7–10.5)
CHLORIDE SERPL-SCNC: 103 MMOL/L (ref 95–110)
CO2 SERPL-SCNC: 30 MMOL/L (ref 23–29)
CREAT SERPL-MCNC: 0.8 MG/DL (ref 0.5–1.4)
EST. GFR  (NO RACE VARIABLE): >60 ML/MIN/1.73 M^2
GLUCOSE SERPL-MCNC: 111 MG/DL (ref 70–110)
POTASSIUM SERPL-SCNC: 3.7 MMOL/L (ref 3.5–5.1)
SODIUM SERPL-SCNC: 139 MMOL/L (ref 136–145)

## 2024-08-22 PROCEDURE — 3008F BODY MASS INDEX DOCD: CPT | Mod: CPTII,S$GLB,, | Performed by: NURSE PRACTITIONER

## 2024-08-22 PROCEDURE — 36415 COLL VENOUS BLD VENIPUNCTURE: CPT | Performed by: NURSE PRACTITIONER

## 2024-08-22 PROCEDURE — 1159F MED LIST DOCD IN RCRD: CPT | Mod: CPTII,S$GLB,, | Performed by: NURSE PRACTITIONER

## 2024-08-22 PROCEDURE — 99999 PR PBB SHADOW E&M-EST. PATIENT-LVL III: CPT | Mod: PBBFAC,,, | Performed by: NURSE PRACTITIONER

## 2024-08-22 PROCEDURE — 99214 OFFICE O/P EST MOD 30 MIN: CPT | Mod: S$GLB,,, | Performed by: NURSE PRACTITIONER

## 2024-08-22 PROCEDURE — 3078F DIAST BP <80 MM HG: CPT | Mod: CPTII,S$GLB,, | Performed by: NURSE PRACTITIONER

## 2024-08-22 PROCEDURE — 80048 BASIC METABOLIC PNL TOTAL CA: CPT | Mod: HCNC | Performed by: NURSE PRACTITIONER

## 2024-08-22 PROCEDURE — 3074F SYST BP LT 130 MM HG: CPT | Mod: CPTII,S$GLB,, | Performed by: NURSE PRACTITIONER

## 2024-08-22 RX ORDER — DOXYCYCLINE HYCLATE 100 MG
100 TABLET ORAL EVERY 12 HOURS
Qty: 14 TABLET | Refills: 0 | Status: SHIPPED | OUTPATIENT
Start: 2024-08-22 | End: 2024-08-29

## 2024-08-22 NOTE — PROGRESS NOTES
History of present illness:  49-year-old male accompanied by his mother.  He apparently fell on his right hand in January.  He developed pain in the right wrist.  He had had no prior problem with the wrists.  He continues to have pain in the area.  The pain is intermittent.  It is worse with activity.  It does wake him up at night.  It is better in the morning.  He has had occasional swelling in the wrists but no erythema or increased warmth.  He has had no other areas of joint swelling.  He has had some numbness in the hands.  He has had some pain in the entire arm.  He has had no similar pain on the left side.  He has no pain in the lower extremities.  The pain does interfere with activity.    He had gone to physical therapy with some relief.  He had worn a brace in the past.  Ibuprofen gives him some relief.  He has not been using topical medications.    No fever, headache, rash, conjunctivitis, oral ulcers, dry eyes or mouth, Raynaud's phenomenon, pleurisy, chronic or bloody diarrhea, vaginal or urethral discharge or ulcer, numbness or tingling, blood clots or phlebitis.  He has a family history of rheumatoid arthritis.      Systems review:   General: Weight has been stable.  He was seen in the emergency room because of heat exhaustion.  GI:  No abdominal pain or peptic ulcer disease.  No liver problems.  : No kidney or bladder problems     Physical examination:   Musculoskeletal:  Cervical spine is unremarkable.    He has tenderness in the entire right arm.  It is especially bad in the right wrist.  He has no soft tissue swelling, erythema, or increased warmth.  He actually has good range of motion of the entire arm.  Left arm is unremarkable.    Lumbar spine is within normal limits.    Hips, knees, ankles, small joints the feet are unremarkable.  Laboratory: He had elevated sed rate and CRP in July but he had just gotten over a sinus infection.  He has hyperuricemia.  He has negative rheumatoid factor, CCP,  YULISSA.  Radiology: MRI of the hand shows mild tenosynovitis but no synovitis.    Assessment:  I suspect we are dealing with a complex regional pain syndrome secondary to previous wrist injury.      Plans:  1.  Repeat inflammatory markers but I want to wait 2 weeks because of his recent heat exposure  2.  Continue meloxicam as before   3.  Continue physical therapy   4.  I did not give him a regular return appointment but would be happy to see follow-up if necessary.

## 2024-08-22 NOTE — PROGRESS NOTES
[facility] Note    Chief Complaint      Chief Complaint   Patient presents with    Hospital Follow Up       History of Present Illness      Teddy Ennis is a 49 y.o. male with chronic conditions of allergy,  asthma who presents today for: hospital follow up from heat exhaustion on 8/19/24. Was given IVF and felt better. Had labs and EKG unremarkable.   Pt also went to  on 7/22 for sinusitis. Was given Augmentin, it helped. Complaining of tinnitus, ear pain. Denies fever or chills.     Past Medical History:  Past Medical History:   Diagnosis Date    Allergy     Asthma        Past Surgical History:   has a past surgical history that includes Ankle surgery (Left, 1995); Adenoidectomy; and Sinus surgery.    Family History:  family history includes Hypertension in his mother; No Known Problems in his father; Stroke in his mother.     Social History:  Social History     Tobacco Use    Smoking status: Never     Passive exposure: Never    Smokeless tobacco: Never   Substance Use Topics    Alcohol use: Not Currently    Drug use: Never       Review of Systems   Constitutional:  Negative for chills and fever.   HENT:  Positive for ear pain, sinus pain and tinnitus.    Respiratory:  Negative for cough and shortness of breath.    Cardiovascular:  Negative for chest pain and palpitations.   Gastrointestinal:  Negative for constipation, diarrhea, nausea and vomiting.   Genitourinary:  Negative for dysuria and hematuria.   Musculoskeletal:  Negative for falls.   Neurological:  Negative for headaches.        Medications:  Outpatient Encounter Medications as of 8/22/2024   Medication Sig Dispense Refill    albuterol (PROVENTIL) 2.5 mg /3 mL (0.083 %) nebulizer solution Take 3 mLs (2.5 mg total) by nebulization every 4 to 6 hours as needed for Wheezing. 1080 mL 2    albuterol (PROVENTIL/VENTOLIN HFA) 90 mcg/actuation inhaler Inhale 2 puffs into the lungs every 6 (six) hours as needed for Wheezing. Rescue 25.5 g  1    amLODIPine (NORVASC) 10 MG tablet TAKE 1 TABLET(10 MG) BY MOUTH EVERY DAY 90 tablet 3    azelastine (ASTELIN) 137 mcg (0.1 %) nasal spray 1 spray (137 mcg total) by Nasal route 2 (two) times daily. 30 mL 11    BREO ELLIPTA 200-25 mcg/dose DsDv diskus inhaler Inhale 1 puff into the lungs once daily. 60 each 11    EScitalopram oxalate (LEXAPRO) 20 MG tablet Take 1 tablet (20 mg total) by mouth once daily. 90 tablet 3    fexofenadine (ALLEGRA) 180 MG tablet Take 180 mg by mouth once daily.      fluticasone propionate (FLONASE) 50 mcg/actuation nasal spray 2 spray each nostril daily 48 g 2    montelukast (SINGULAIR) 10 mg tablet TAKE 1 TABLET BY MOUTH EVERY DAY 90 tablet 1    pravastatin (PRAVACHOL) 20 MG tablet TK 1 T PO D 90 tablet 3    doxycycline (VIBRA-TABS) 100 MG tablet Take 1 tablet (100 mg total) by mouth every 12 (twelve) hours. for 7 days 14 tablet 0    ibuprofen (ADVIL,MOTRIN) 800 MG tablet Take 1 tablet (800 mg total) by mouth 3 (three) times daily as needed for Pain. (Patient not taking: Reported on 7/22/2024) 90 tablet 0    magnesium sulfate in water (MAGNESIUM SULFATE 2 GRAM/50 ML) 2 gram/50 mL PgBk  (Patient not taking: Reported on 7/22/2024)      meloxicam (MOBIC) 15 MG tablet Take 1 tablet (15 mg total) by mouth once daily. (Patient not taking: Reported on 7/22/2024) 30 tablet 2     No facility-administered encounter medications on file as of 8/22/2024.       Allergies:  Review of patient's allergies indicates:   Allergen Reactions    Ibuprofen Hives    Penicillins Hives       Health Maintenance:  Immunization History   Administered Date(s) Administered    COVID-19, MRNA, LN-S, PF (Pfizer) (Purple Cap) 07/25/2021, 08/15/2021    Influenza - Quadrivalent - PF *Preferred* (6 months and older) 09/30/2013, 09/17/2014, 09/03/2016, 09/29/2018, 08/26/2019, 09/12/2020, 10/02/2021    Influenza Split 09/30/2009, 09/30/2010, 09/27/2011, 09/30/2013, 09/17/2014    Tdap 09/29/2018, 11/29/2019      Health  "Maintenance   Topic Date Due    Colorectal Cancer Screening  06/06/2024    Lipid Panel  05/22/2029    TETANUS VACCINE  11/29/2029    Hepatitis C Screening  Completed        Physical Exam      Vital Signs  Pulse: 90  SpO2: 96 %  BP: 120/74  Pain Score:   5  Pain Loc: Ear  Height and Weight  Height: 6' 1" (185.4 cm)  Weight: (!) 136.9 kg (301 lb 13 oz)  BSA (Calculated - sq m): 2.66 sq meters  BMI (Calculated): 39.8  Weight in (lb) to have BMI = 25: 189.1]    Physical Exam  Constitutional:       Appearance: He is well-developed.   HENT:      Head: Normocephalic and atraumatic.   Neck:      Thyroid: No thyromegaly.   Cardiovascular:      Rate and Rhythm: Normal rate and regular rhythm.      Heart sounds: No murmur heard.  Pulmonary:      Effort: Pulmonary effort is normal. No respiratory distress.      Breath sounds: Normal breath sounds.   Abdominal:      General: There is no distension.      Palpations: Abdomen is soft.      Tenderness: There is no abdominal tenderness.   Skin:     General: Skin is warm and dry.   Neurological:      Mental Status: He is alert and oriented to person, place, and time.   Psychiatric:         Behavior: Behavior normal.          Laboratory:  CBC:  Recent Labs   Lab 04/22/22  2149 07/24/24  0708   WBC 12.78 H 10.09   RBC 5.13 5.41   Hemoglobin 15.2 16.1   Hematocrit 45.6 49.4   Platelets 295 340   MCV 89 91   MCH 29.6 29.8   MCHC 33.3 32.6     CMP:  Recent Labs   Lab 11/11/22  0854 11/22/23  0857 07/24/24  0708   Glucose 123 H 129 H 149 H   Calcium 9.1 9.2 9.0   Albumin 3.6 3.5 3.6   Total Protein 8.1 8.0 8.0   Sodium 136 137 137   Potassium 3.7 3.4 L 3.4 L   CO2 28 26 29   Chloride 99 101 100   BUN 11 9 8   Alkaline Phosphatase 154 H 147 H 145 H   ALT 22 35 20   AST 18 24 15   Total Bilirubin 0.7 0.9 0.7     URINALYSIS:       LIPIDS:  Recent Labs   Lab 11/11/22  0854 11/22/23  0857 05/22/24  0843   HDL 31 L 32 L 31 L   Cholesterol 153 149 159   Triglycerides 119 131 148   LDL Cholesterol " 98.2 90.8 98.4   HDL/Cholesterol Ratio 20.3 21.5 19.5 L   Non-HDL Cholesterol 122 117 128   Total Cholesterol/HDL Ratio 4.9 4.7 5.1 H     TSH:      A1C:  Recent Labs   Lab 11/03/21  0827 11/11/22  0854 11/22/23  0857   Hemoglobin A1C 5.9 H 5.4 5.9 H       Assessment/Plan     Teddy Ennis is a 49 y.o.male with:    1. Heat exhaustion, subsequent encounter  Resolved. Discussed frequent breaks, and staying hydrated.     2. Acute recurrent frontal sinusitis  - doxycycline (VIBRA-TABS) 100 MG tablet; Take 1 tablet (100 mg total) by mouth every 12 (twelve) hours. for 7 days  Dispense: 14 tablet; Refill: 0  - will rtc if no improvement.     3. Hypokalemia  - BASIC METABOLIC PANEL; Future  - will make sure normalized.        Chronic conditions status updated as per HPI.  Other than changes above, cont current medications and maintain follow up with specialists.  No follow-ups on file.    Future Appointments   Date Time Provider Department Center   8/23/2024  1:00 PM Anca Elizalde CARTER/L [facility] [place] 2 fl   8/26/2024  2:30 PM Anca Elizalde [facility] [place] 2 fl   8/28/2024  2:00 PM Anca Elizalde [facility] [place] 2 fl   9/3/2024  1:00 PM Mik Beasley, OT [facility] [place] 2 fl   9/3/2024  3:00 PM LAB, APPOINTMENT [facility] LAB  Alec Serrano [facility]   9/5/2024  1:00 PM Mik Beasley OT [facility] [place] 2 fl   9/10/2024  1:00 PM Anca Elizalde [facility]/L [facility] [place] 2 fl   9/12/2024  2:00 PM Mik Beasley, OT [facility] [place] 2 fl   9/23/2024 11:00 AM Abiel Farmer MD [facility] ULYSSES Serrano   11/20/2024  9:00 AM LAB, APPOINTMENT [facility] LAB IM Alec Serrano PCW   11/25/2024  2:15 PM Doni Wood MD [facility] Damaris Butts, CROW  [facility]

## 2024-08-23 ENCOUNTER — TELEPHONE (OUTPATIENT)
Dept: PRIMARY CARE CLINIC | Facility: CLINIC | Age: 49
End: 2024-08-23
Payer: MEDICARE

## 2024-08-23 ENCOUNTER — CLINICAL SUPPORT (OUTPATIENT)
Dept: REHABILITATION | Facility: HOSPITAL | Age: 49
End: 2024-08-23
Payer: MEDICARE

## 2024-08-23 DIAGNOSIS — R29.898 DECREASED GRIP STRENGTH OF RIGHT HAND: ICD-10-CM

## 2024-08-23 DIAGNOSIS — M25.531 RIGHT WRIST PAIN: Primary | ICD-10-CM

## 2024-08-23 PROCEDURE — 97140 MANUAL THERAPY 1/> REGIONS: CPT | Mod: CO

## 2024-08-23 PROCEDURE — 97110 THERAPEUTIC EXERCISES: CPT | Mod: CO

## 2024-08-23 PROCEDURE — 97022 WHIRLPOOL THERAPY: CPT | Mod: CO

## 2024-08-23 NOTE — PROGRESS NOTES
[facility]  Occupational Therapy Treatment Note     Date: 8/23/2024  Name: Teddy Ennis  Clinic Number: [id number]    Therapy Diagnosis:   Encounter Diagnoses   Name Primary?    Right wrist pain Yes    Decreased  strength of right hand        Physician: JoseM Desai MD    Referring Physician: Jose M Desai MD  Physician Orders: Eval and Treat  Date of Return to MD: TBD     Date of Injury: over a year ago  Date of Surgery: NA     Evaluation Date: 8/15/2024  Authorization Period: 8/13/24 - 8/13/24  Plan of Care Expiration: 11/7/24  Visit #/ Visits Authorized: 17 of 20  FOTO Completion: Initial eval (8/15/2024)  FOTO #2:  FOTO #3:     Time In: 1:01 pm  Time Out: 1:50  pm  Total Billable Time: 49 min     Precautions: Standard      Subjective     Patient reports: tightness in his hand   He was compliant with home exercise program given last session.   Response to previous treatment:first f/u  Functional change: none yet     Pain: not rated/10  Location: right wrists      Objective     Mental status: alert, oriented x3     Observation/Appearance:   No swelling or atrophy noted     Sensation: Patient denies numbness/tingling        ELBOW, WRIST RANGE OF MOTION:   Measured in degrees of active motion with goniometer    Right  8/15/2024 Left  8/15/2024   Elbow Extension/Flexion WNL WNL   Pronation/Supination WNL WNL   Wrist Extension/Flexion 65/50 70/50   Ulnar/Radial Deviation 30/15 30/15   Composite Wrist Flexion 10 40         STRENGTH: (Measured in pounds using a Dynamometer and pinch meter)    Right  8/15/2024 Left  8/15/2024    Setting 2 25, 20, 20* 75, 80, 75    Average 22 lb 77 lb   *pain throughout hand        Special Testing:  ECU synergy: pain     MMT EDC: 3/5, with pain dorsal hand     MMT ECRL/ECRB: 4/5 pain dorsal hand  MMT ECU: 4/5, pain     MMT FCU: 4/5, pain dorsal wrist  MMT FCR: 4/5, pain dorsal wrist        Intake Outcome Measure for FOTO Initial  Evaluation Survey     Therapist reviewed FOTO scores for Teddy Ennis on 8/15/2024.   FOTO documents entered into [employer] - see Media section.     Intake Score: 45%          Treatment     Teddy received the treatments listed below:      Teddy received the following supervised modalities after being cleared for contradictions for 10 minutes:   Fluidotherapy: To R for 10 min, continuous air, 110 deg, air speed 100 to decrease pain, edema & scar tissue and increased tissue extensibility.        manual therapy techniques: Soft tissue Mobilization were applied to the: R wrist for 10 minutes, including:  STM with vibration tool to dorsal and volar wrist         neuromuscular re-education activities to improve: Kinesthetic and Proprioception for 29 minutes. The following activities were included:  Comp flex wrist stretch, 10 sec holds x 5   Wrist ext isometric x 10  Red flexbar isometrics, flex and ext x 10 ea   3 corners red flexbar bends x 2 min (no RD)  Oscillations with red flexbar x 2 min    Dart throwers slow x 10   K tape space corrector over dorso-ulnar wrist anchored circumferentially, distal glide volar and dorsal FA   Ed on obtaining a bullseye brace for wrist support and pain relief      Patient Education and Home Exercises     Education provided:   - cont HEP  - Progress towards goals     Written Home Exercises Provided: Pt instructed to continue prior HEP.  Exercises were reviewed and Teddy was able to demonstrate them prior to the end of the session.  Teddy demonstrated good  understanding of the home exercise program provided. See electronic medical record under Patient Instructions for exercises provided during therapy sessions.       Assessment     Pt tolerated session  well. Will cont as tolerated.          Teddy is progressing well towards his goals and there are no updates to goals at this time. Pt prognosis is Good.     Patient will continue to benefit from skilled outpatient occupational  therapy to address the deficits listed in the problem list on initial evaluation provide patient/family education and to maximize patient's level of independence in the home and community environment.     Patient's spiritual, cultural and educational needs considered and patient agreeable to plan of care and goals.    Anticipated barriers to occupational therapy: none     Goals:  Long Term Goals (to be met by discharge):  Date Goal Met:       1.) Teddy Ennis will demonstrate significantly improved functional performance from re-assessment as measured by a FOTO Intake score of more than 65.     Goal Status:   In progress     2.) Teddy Ennis will return to near to prior level of function for ADLs and household management reporting independence or modified independence.     Goal Status:   In progress     3. Teddy Ennis will report pain 2 out of 10 at worst to increase functional use of affected hand for work and leisure tasks.     Goal Status:   In progress      Short Term Goals (to be met by 9/15/24):  Date Goal Met:       Teddy Ennis will be independent with home exercise program with written instructions.     Goal Status:   In progress     Teddy Ennis will demonstrate 25 degrees of composite wrist flexion to improve functional performance in ADLs/work/leisure tasks.     Goal Status:   In progress     Teddy Ennis will demonstrate within 20 lbs of  strength compared to unaffected hand to improve functional grasp for ADLs/work/leisure tasks.     Goal Status:   In progress     Teddy Ennis will demonstrate the ability to complete ADL/IADL tasks with 4/10 pain.     Goal Status:   In progress     Teddy Ennis will be able to resume significantly greater occupational roles independently or modified as demonstrated by a FOTO Intake score of more than 55.     Goal Status:   In progress        Plan     Updates/Grading for next session: cont as tolerated    Anca Elizalde  CARTER/L   8/23/2024

## 2024-08-23 NOTE — TELEPHONE ENCOUNTER
----- Message from Prosper Holden sent at 8/23/2024  2:45 PM CDT -----  Contact: Pt  [phone]  Type: Test Results    What test was performed? Labs    When and where were the test performed? 8/22/24

## 2024-08-26 ENCOUNTER — CLINICAL SUPPORT (OUTPATIENT)
Dept: REHABILITATION | Facility: HOSPITAL | Age: 49
End: 2024-08-26
Payer: MEDICARE

## 2024-08-26 DIAGNOSIS — M25.531 RIGHT WRIST PAIN: Primary | ICD-10-CM

## 2024-08-26 DIAGNOSIS — R29.898 DECREASED GRIP STRENGTH OF RIGHT HAND: ICD-10-CM

## 2024-08-26 PROCEDURE — 97530 THERAPEUTIC ACTIVITIES: CPT | Mod: KX,CO

## 2024-08-26 PROCEDURE — 97110 THERAPEUTIC EXERCISES: CPT | Mod: KX,CO

## 2024-08-26 PROCEDURE — 97140 MANUAL THERAPY 1/> REGIONS: CPT | Mod: KX,CO

## 2024-08-26 PROCEDURE — 97022 WHIRLPOOL THERAPY: CPT | Mod: KX,CO

## 2024-08-26 NOTE — PROGRESS NOTES
[facility]  Occupational Therapy Treatment Note     Date: 8/26/2024  Name: Teddy Ennis  Clinic Number: [id number]    Therapy Diagnosis:   Encounter Diagnoses   Name Primary?    Right wrist pain Yes    Decreased  strength of right hand        Physician: Jose M Desai MD    Referring Physician: Jose M Desai MD  Physician Orders: Eval and Treat  Date of Return to MD: TBD     Date of Injury: over a year ago  Date of Surgery: NA     Evaluation Date: 8/15/2024  Authorization Period: 8/13/24 - 8/13/24  Plan of Care Expiration: 11/7/24  Visit #/ Visits Authorized: 17 of 20  FOTO Completion: Initial eval (8/15/2024)  FOTO #2:  FOTO #3:     Time In: 1:01 pm  Time Out: 1:50  pm  Total Billable Time: 49 min     Precautions: Standard      Subjective     Patient reports: tightness in his hand, was chopping veggies yesterday  He was compliant with home exercise program given last session.   Response to previous treatment: good   Functional change: none yet     Pain: not rated/10  Location: right wrists      Objective     Mental status: alert, oriented x3     Observation/Appearance:   No swelling or atrophy noted     Sensation: Patient denies numbness/tingling        ELBOW, WRIST RANGE OF MOTION:   Measured in degrees of active motion with goniometer    Right  8/15/2024 Left  8/15/2024   Elbow Extension/Flexion WNL WNL   Pronation/Supination WNL WNL   Wrist Extension/Flexion 65/50 70/50   Ulnar/Radial Deviation 30/15 30/15   Composite Wrist Flexion 10 40         STRENGTH: (Measured in pounds using a Dynamometer and pinch meter)    Right  8/15/2024 Left  8/15/2024    Setting 2 25, 20, 20* 75, 80, 75    Average 22 lb 77 lb   *pain throughout hand        Special Testing:  ECU synergy: pain     MMT EDC: 3/5, with pain dorsal hand     MMT ECRL/ECRB: 4/5 pain dorsal hand  MMT ECU: 4/5, pain     MMT FCU: 4/5, pain dorsal wrist  MMT FCR: 4/5, pain dorsal wrist        Intake Outcome  Measure for FOTO Initial Evaluation Survey     Therapist reviewed FOTO scores for Teddy Ennis on 8/15/2024.   FOTO documents entered into [employer] - see Media section.     Intake Score: 45%          Treatment     Teddy received the treatments listed below:      Teddy received the following supervised modalities after being cleared for contradictions for 10 minutes:   Fluidotherapy: To R for 10 min, continuous air, 110 deg, air speed 100 to decrease pain, edema & scar tissue and increased tissue extensibility.        manual therapy techniques: Soft tissue Mobilization were applied to the: R wrist for 10 minutes, including:  STM with vibration tool to dorsal and volar wrist         neuromuscular re-education activities to improve: Kinesthetic and Proprioception for 29 minutes. The following activities were included:  Comp flex wrist stretch, 10 sec holds x 5   Prayer stretch 30 sec x 2   Digit spreads x 1 min   Red flexbar isometrics, flex and ext x 10 ea   3 corners red flexbar bends x 2 min (no RD)  Oscillations with red flexbar x 2 min    Alphabet ball spelling name  Strainer and tennis ball   Dart throwers slow x 10   K tape space corrector over dorso-ulnar wrist anchored circumferentially, distal glide volar and dorsal FA         Patient Education and Home Exercises     Education provided:   - cont HEP  - Progress towards goals     Written Home Exercises Provided: Pt instructed to continue prior HEP.  Exercises were reviewed and Teddy was able to demonstrate them prior to the end of the session.  Teddy demonstrated good  understanding of the home exercise program provided. See electronic medical record under Patient Instructions for exercises provided during therapy sessions.       Assessment     Pt tolerated session fairly well despite stiffness in his palm and fatigue. Will cont as tolerated.     Teddy is progressing well towards his goals and there are no updates to goals at this time. Pt prognosis  is Good.     Patient will continue to benefit from skilled outpatient occupational therapy to address the deficits listed in the problem list on initial evaluation provide patient/family education and to maximize patient's level of independence in the home and community environment.     Patient's spiritual, cultural and educational needs considered and patient agreeable to plan of care and goals.    Anticipated barriers to occupational therapy: none     Goals:  Long Term Goals (to be met by discharge):  Date Goal Met:       1.) Teddy Ennis will demonstrate significantly improved functional performance from re-assessment as measured by a FOTO Intake score of more than 65.     Goal Status:   In progress     2.) Teddy Ennis will return to near to prior level of function for ADLs and household management reporting independence or modified independence.     Goal Status:   In progress     3. Teddy Ennis will report pain 2 out of 10 at worst to increase functional use of affected hand for work and leisure tasks.     Goal Status:   In progress      Short Term Goals (to be met by 9/15/24):  Date Goal Met:       Teddy Ennis will be independent with home exercise program with written instructions.     Goal Status:   In progress     Teddy Ennis will demonstrate 25 degrees of composite wrist flexion to improve functional performance in ADLs/work/leisure tasks.     Goal Status:   In progress     Teddy Ennis will demonstrate within 20 lbs of  strength compared to unaffected hand to improve functional grasp for ADLs/work/leisure tasks.     Goal Status:   In progress     Teddy Ennis will demonstrate the ability to complete ADL/IADL tasks with 4/10 pain.     Goal Status:   In progress     Teddy Ennis will be able to resume significantly greater occupational roles independently or modified as demonstrated by a FOTO Intake score of more than 55.     Goal Status:   In progress         Plan     Updates/Grading for next session: cont as tolerated    RAUL Swanson/MARIANA   8/26/2024

## 2024-08-28 ENCOUNTER — CLINICAL SUPPORT (OUTPATIENT)
Dept: REHABILITATION | Facility: HOSPITAL | Age: 49
End: 2024-08-28
Payer: MEDICARE

## 2024-08-28 DIAGNOSIS — R29.898 DECREASED GRIP STRENGTH OF RIGHT HAND: ICD-10-CM

## 2024-08-28 DIAGNOSIS — M25.531 RIGHT WRIST PAIN: Primary | ICD-10-CM

## 2024-08-28 PROCEDURE — 97022 WHIRLPOOL THERAPY: CPT | Mod: KX,CO

## 2024-08-28 PROCEDURE — 97112 NEUROMUSCULAR REEDUCATION: CPT | Mod: KX,CO

## 2024-08-28 PROCEDURE — 97140 MANUAL THERAPY 1/> REGIONS: CPT | Mod: KX,CO

## 2024-08-28 NOTE — PROGRESS NOTES
[facility]  Occupational Therapy Treatment Note     Date: 8/28/2024  Name: Teddy Ennis  Clinic Number: [id number]    Therapy Diagnosis:   Encounter Diagnoses   Name Primary?    Right wrist pain Yes    Decreased  strength of right hand          Physician: Jose M Desai MD    Referring Physician: Jose M Desai MD  Physician Orders: Eval and Treat  Date of Return to MD: TBD     Date of Injury: over a year ago  Date of Surgery: NA     Evaluation Date: 8/15/2024  Authorization Period: 8/13/24 - 8/13/24  Plan of Care Expiration: 11/7/24  Visit #/ Visits Authorized: 19 of 20  FOTO Completion: Initial eval (8/15/2024)  FOTO #2:  FOTO #3:     Time In: 1:50 pm  Time Out: 2:40 pm  Total Billable Time: 50 min     Precautions: Standard      Subjective     Patient reports: has been wearing his new brace and its helping  He was compliant with home exercise program given last session.   Response to previous treatment: good   Functional change: wearing wrist brace     Pain: not rated/10  Location: right wrists      Objective     Mental status: alert, oriented x3     Observation/Appearance:   No swelling or atrophy noted     Sensation: Patient denies numbness/tingling        ELBOW, WRIST RANGE OF MOTION:   Measured in degrees of active motion with goniometer    Right  8/15/2024 Left  8/15/2024   Elbow Extension/Flexion WNL WNL   Pronation/Supination WNL WNL   Wrist Extension/Flexion 65/50 70/50   Ulnar/Radial Deviation 30/15 30/15   Composite Wrist Flexion 10 40         STRENGTH: (Measured in pounds using a Dynamometer and pinch meter)    Right  8/15/2024 Left  8/15/2024    Setting 2 25, 20, 20* 75, 80, 75    Average 22 lb 77 lb   *pain throughout hand        Special Testing:  ECU synergy: pain     MMT EDC: 3/5, with pain dorsal hand     MMT ECRL/ECRB: 4/5 pain dorsal hand  MMT ECU: 4/5, pain     MMT FCU: 4/5, pain dorsal wrist  MMT FCR: 4/5, pain dorsal wrist        Intake Outcome  Measure for FOTO Initial Evaluation Survey     Therapist reviewed FOTO scores for Teddy Ennis on 8/15/2024.   FOTO documents entered into [employer] - see Media section.     Intake Score: 45%          Treatment     Teddy received the treatments listed below:      Teddy received the following supervised modalities after being cleared for contradictions for 10 minutes:   Fluidotherapy: To R for 10 min, continuous air, 110 deg, air speed 100 to decrease pain, edema & scar tissue and increased tissue extensibility.        manual therapy techniques: Soft tissue Mobilization were applied to the: R wrist for 10 minutes, including:  STM with vibration tool to dorsal and volar wrist         neuromuscular re-education activities to improve: Kinesthetic and Proprioception for 30 minutes. The following activities were included:  Comp flex wrist stretch, 10 sec holds x 5   Prayer stretch 30 sec x 2   Digit spreads x 1 min   Red flexbar isometrics, flex and ext x 10 ea   3 corners red flexbar bends x 2 min (no RD)  Oscillations with red flexbar x 2 min    Alphabet ball spelling name  Pink Frisbee 1 way x 2 min    Dart throwers slow x 10   K tape space corrector over dorso-ulnar wrist anchored circumferentially, distal glide volar and dorsal FA         Patient Education and Home Exercises     Education provided:   - cont HEP  - Progress towards goals     Written Home Exercises Provided: Pt instructed to continue prior HEP.  Exercises were reviewed and Teddy was able to demonstrate them prior to the end of the session.  Teddy demonstrated good  understanding of the home exercise program provided. See electronic medical record under Patient Instructions for exercises provided during therapy sessions.       Assessment     Continues to have stiffness in his palm and sharp pains with certain motions. Will cont as tolerated.     Teddy is progressing well towards his goals and there are no updates to goals at this time. Pt  prognosis is Good.     Patient will continue to benefit from skilled outpatient occupational therapy to address the deficits listed in the problem list on initial evaluation provide patient/family education and to maximize patient's level of independence in the home and community environment.     Patient's spiritual, cultural and educational needs considered and patient agreeable to plan of care and goals.    Anticipated barriers to occupational therapy: none     Goals:  Long Term Goals (to be met by discharge):  Date Goal Met:       1.) Teddy Ennis will demonstrate significantly improved functional performance from re-assessment as measured by a FOTO Intake score of more than 65.     Goal Status:   In progress     2.) Teddy Ennis will return to near to prior level of function for ADLs and household management reporting independence or modified independence.     Goal Status:   In progress     3. Teddy Ennis will report pain 2 out of 10 at worst to increase functional use of affected hand for work and leisure tasks.     Goal Status:   In progress      Short Term Goals (to be met by 9/15/24):  Date Goal Met:       Teddy Ennis will be independent with home exercise program with written instructions.     Goal Status:   In progress     Teddy Ennis will demonstrate 25 degrees of composite wrist flexion to improve functional performance in ADLs/work/leisure tasks.     Goal Status:   In progress     Teddy Ennis will demonstrate within 20 lbs of  strength compared to unaffected hand to improve functional grasp for ADLs/work/leisure tasks.     Goal Status:   In progress     Teddy Ennis will demonstrate the ability to complete ADL/IADL tasks with 4/10 pain.     Goal Status:   In progress     Teddy Ennis will be able to resume significantly greater occupational roles independently or modified as demonstrated by a FOTO Intake score of more than 55.     Goal Status:   In  progress        Plan     Updates/Grading for next session: cont as tolerated    RAUL Swanson/MARIANA   8/28/2024

## 2024-09-03 ENCOUNTER — LAB VISIT (OUTPATIENT)
Dept: LAB | Facility: HOSPITAL | Age: 49
End: 2024-09-03
Attending: INTERNAL MEDICINE
Payer: MEDICARE

## 2024-09-03 ENCOUNTER — CLINICAL SUPPORT (OUTPATIENT)
Dept: REHABILITATION | Facility: HOSPITAL | Age: 49
End: 2024-09-03
Payer: MEDICARE

## 2024-09-03 DIAGNOSIS — M25.531 RIGHT WRIST PAIN: ICD-10-CM

## 2024-09-03 DIAGNOSIS — R29.898 DECREASED GRIP STRENGTH OF RIGHT HAND: ICD-10-CM

## 2024-09-03 DIAGNOSIS — M25.531 RIGHT WRIST PAIN: Primary | ICD-10-CM

## 2024-09-03 LAB
CRP SERPL-MCNC: 10 MG/L (ref 0–8.2)
ERYTHROCYTE [SEDIMENTATION RATE] IN BLOOD BY PHOTOMETRIC METHOD: 49 MM/HR (ref 0–23)

## 2024-09-03 PROCEDURE — 97112 NEUROMUSCULAR REEDUCATION: CPT | Mod: KX

## 2024-09-03 PROCEDURE — 85652 RBC SED RATE AUTOMATED: CPT | Mod: HCNC | Performed by: INTERNAL MEDICINE

## 2024-09-03 PROCEDURE — 86140 C-REACTIVE PROTEIN: CPT | Mod: HCNC | Performed by: INTERNAL MEDICINE

## 2024-09-03 PROCEDURE — 36415 COLL VENOUS BLD VENIPUNCTURE: CPT | Mod: HCNC | Performed by: INTERNAL MEDICINE

## 2024-09-03 PROCEDURE — 97140 MANUAL THERAPY 1/> REGIONS: CPT | Mod: KX

## 2024-09-03 PROCEDURE — 97018 PARAFFIN BATH THERAPY: CPT | Mod: KX

## 2024-09-03 NOTE — PROGRESS NOTES
[facility]  Occupational Therapy Treatment Note     Date: 9/3/2024  Name: Teddy Ennis  Clinic Number: [id number]    Therapy Diagnosis:   Encounter Diagnoses   Name Primary?    Right wrist pain Yes    Decreased  strength of right hand        Physician: Jose M Desai MD    Referring Physician: Jose M Desai MD  Physician Orders: Eval and Treat  Date of Return to MD: TBJUSTIN     Date of Injury: over a year ago  Date of Surgery: NA     Evaluation Date: 8/15/2024  Authorization Period: 8/13/24 - 8/13/24  Plan of Care Expiration: 11/7/24  Visit #/ Visits Authorized: 19 of 20  FOTO Completion: Initial eval (8/15/2024)  FOTO #2:  FOTO #3:     Time In: 1:00 pm  Time Out: 1:55 pm  Total Billable Time: 55 min     Precautions: Standard      Subjective     Patient reports: He has been wearing his brace but may have over did it at work today.   He was compliant with home exercise program given last session.   Response to previous treatment: good   Functional change: wearing wrist brace     Pain: not rated/10  Location: right wrists      Objective     Mental status: alert, oriented x3     Observation/Appearance:   No swelling or atrophy noted     Sensation: Patient denies numbness/tingling        ELBOW, WRIST RANGE OF MOTION:   Measured in degrees of active motion with goniometer    Right  8/15/2024 Left  8/15/2024   Elbow Extension/Flexion WNL WNL   Pronation/Supination WNL WNL   Wrist Extension/Flexion 65/50 70/50   Ulnar/Radial Deviation 30/15 30/15   Composite Wrist Flexion 10 40         STRENGTH: (Measured in pounds using a Dynamometer and pinch meter)    Right  8/15/2024 Left  8/15/2024    Setting 2 25, 20, 20* 75, 80, 75    Average 22 lb 77 lb   *pain throughout hand        Special Testing:  ECU synergy: pain     MMT EDC: 3/5, with pain dorsal hand     MMT ECRL/ECRB: 4/5 pain dorsal hand  MMT ECU: 4/5, pain     MMT FCU: 4/5, pain dorsal wrist  MMT FCR: 4/5, pain dorsal wrist    "     Intake Outcome Measure for FOTO Initial Evaluation Survey     Therapist reviewed FOTO scores for Teddy Ennis on 8/15/2024.   FOTO documents entered into [employer] - see Media section.     Intake Score: 45%          Treatment     Teddy received the treatments listed below:      Teddy received the following supervised modalities after being cleared for contradictions for 10 minutes:   Fluidotherapy: To R for 10 min, continuous air, 110 deg, air speed 100 to decrease pain, edema & scar tissue and increased tissue extensibility. (NT)    Patient received paraffin bath to the right hand(s) for 10 minutes to increase blood flow, circulation, pain management and for tissue elasticity prior to therex.      manual therapy techniques: Soft tissue Mobilization were applied to the: R wrist for 10 minutes, including:  STM with vibration tool to dorsal and volar wrist     neuromuscular re-education activities to improve: Kinesthetic and Proprioception for 30 minutes. The following activities were included:  Comp flex wrist stretch, 10 sec holds x 5   Composite fist with WF x 10 with 5" holds  Prayer stretch 30 sec x 2   Digit spreads x 1 min   Red flexbar isometrics, flex and ext x 10 ea   3 corners red flexbar bends x 2 min (no RD)  Oscillations with red flexbar x 2 min    Alphabet ball spelling name  Pink Frisbee 1 way x 2 min    Dart throwers slow x 10   K tape space corrector over dorso-ulnar wrist anchored circumferentially, distal glide volar and dorsal FA     Patient Education and Home Exercises     Education provided:   - cont HEP  - Progress towards goals     Written Home Exercises Provided: Pt instructed to continue prior HEP.  Exercises were reviewed and Teddy was able to demonstrate them prior to the end of the session.  Teddy demonstrated good  understanding of the home exercise program provided. See electronic medical record under Patient Instructions for exercises provided during therapy sessions.     "   Assessment     Continues to have stiffness in his palm and sharp pains with certain motions. Increased sensitivity along the dorsal aspect of the wrist during manual therapy. Good tolerance of all activities this date but with 2-3 breaks required throughout the entire session. Will cont as tolerated.     Teddy is progressing well towards his goals and there are no updates to goals at this time. Pt prognosis is Good.     Patient will continue to benefit from skilled outpatient occupational therapy to address the deficits listed in the problem list on initial evaluation provide patient/family education and to maximize patient's level of independence in the home and community environment.     Patient's spiritual, cultural and educational needs considered and patient agreeable to plan of care and goals.    Anticipated barriers to occupational therapy: none     Goals:  Long Term Goals (to be met by discharge):  Date Goal Met:       1.) eTddy Ennis will demonstrate significantly improved functional performance from re-assessment as measured by a FOTO Intake score of more than 65.     Goal Status:   In progress     2.) Teddy Ennis will return to near to prior level of function for ADLs and household management reporting independence or modified independence.     Goal Status:   In progress     3. Teddy Ennis will report pain 2 out of 10 at worst to increase functional use of affected hand for work and leisure tasks.     Goal Status:   In progress      Short Term Goals (to be met by 9/15/24):  Date Goal Met:       Teddy Ennis will be independent with home exercise program with written instructions.     Goal Status:   In progress     Teddy Ennis will demonstrate 25 degrees of composite wrist flexion to improve functional performance in ADLs/work/leisure tasks.     Goal Status:   In progress     Teddy Ennis will demonstrate within 20 lbs of  strength compared to unaffected hand to  improve functional grasp for ADLs/work/leisure tasks.     Goal Status:   In progress     Teddy Ennis will demonstrate the ability to complete ADL/IADL tasks with 4/10 pain.     Goal Status:   In progress     Teddy Ennis will be able to resume significantly greater occupational roles independently or modified as demonstrated by a FOTO Intake score of more than 55.     Goal Status:   In progress        Plan     Updates/Grading for next session: cont as tolerated    Mik Alexander OT   9/3/2024

## 2024-09-05 ENCOUNTER — CLINICAL SUPPORT (OUTPATIENT)
Dept: REHABILITATION | Facility: HOSPITAL | Age: 49
End: 2024-09-05
Payer: MEDICARE

## 2024-09-05 DIAGNOSIS — R29.898 DECREASED GRIP STRENGTH OF RIGHT HAND: ICD-10-CM

## 2024-09-05 DIAGNOSIS — M25.531 RIGHT WRIST PAIN: Primary | ICD-10-CM

## 2024-09-05 PROCEDURE — 97112 NEUROMUSCULAR REEDUCATION: CPT | Mod: KX

## 2024-09-05 PROCEDURE — 97018 PARAFFIN BATH THERAPY: CPT | Mod: KX

## 2024-09-05 PROCEDURE — 97140 MANUAL THERAPY 1/> REGIONS: CPT | Mod: KX

## 2024-09-05 NOTE — PROGRESS NOTES
[facility]  Occupational Therapy Treatment Note     Date: 9/5/2024  Name: Teddy Ennis  Clinic Number: [id number]    Therapy Diagnosis:   Encounter Diagnoses   Name Primary?    Right wrist pain Yes    Decreased  strength of right hand        Physician: Jose M Desai MD    Referring Physician: Jose M Desai MD  Physician Orders: Eval and Treat  Date of Return to MD: TBD     Date of Injury: over a year ago  Date of Surgery: NA     Evaluation Date: 8/15/2024  Authorization Period: 8/13/24 - 09/26/24  Plan of Care Expiration: 11/7/24  Visit #/ Visits Authorized: 20 of 40  FOTO Completion: Initial eval (8/15/2024)  FOTO #2:  FOTO #3:     Time In: 1:00 pm  Time Out: 1:55 pm  Total Billable Time: 55 min     Precautions: Standard    Subjective     Patient reports: His wrist hurts more this date due to the weather.   He was compliant with home exercise program given last session.   Response to previous treatment: good   Functional change: wearing wrist brace     Pain: not rated/10  Location: right wrists      Objective     Mental status: alert, oriented x3     Observation/Appearance:   No swelling or atrophy noted     Sensation: Patient denies numbness/tingling        ELBOW, WRIST RANGE OF MOTION:   Measured in degrees of active motion with goniometer    Right  8/15/2024 Left  8/15/2024 Right  9/5/2024   Elbow Extension/Flexion WNL WNL NT   Pronation/Supination WNL WNL NT   Wrist Extension/Flexion 65/50 70/50 55/30  Post-Treatment:  NT   Ulnar/Radial Deviation 30/15 30/15 NT   Composite Wrist Flexion 10 40 15         STRENGTH: (Measured in pounds using a Dynamometer and pinch meter)    Right  8/15/2024 Left  8/15/2024 Right  9/5/2024    Setting 2 25, 20, 20* 75, 80, 75 42;55;50    Average 22 lb 77 lb 49 lb   *pain throughout hand        Special Testing:  ECU synergy: pain     MMT EDC: 3/5, with pain dorsal hand     MMT ECRL/ECRB: 4/5 pain dorsal hand  MMT ECU: 4/5, pain    "  MMT FCU: 4/5, pain dorsal wrist  MMT FCR: 4/5, pain dorsal wrist        Intake Outcome Measure for FOTO Initial Evaluation Survey     Therapist reviewed FOTO scores for Teddy Ennis on 8/15/2024.   FOTO documents entered into [employer] - see Media section.     Intake Score: 45%          Treatment     Teddy received the treatments listed below:      Teddy received the following supervised modalities after being cleared for contradictions for 10 minutes:   Fluidotherapy: To R for 10 min, continuous air, 110 deg, air speed 100 to decrease pain, edema & scar tissue and increased tissue extensibility. (NT)    Patient received paraffin bath to the right hand(s) for 10 minutes to increase blood flow, circulation, pain management and for tissue elasticity prior to therex.      manual therapy techniques: Soft tissue Mobilization were applied to the: R wrist for 10 minutes, including:  STM with vibration tool to dorsal and volar wrist     neuromuscular re-education activities to improve: Kinesthetic and Proprioception for 30 minutes. The following activities were included:  Comp flex wrist stretch, 10 sec holds x 5   Composite fist with WF x 10 with 5" holds  WE stretch: 3 x 30"  Prayer stretch 30 sec x 2   Digit spreads x 1 min   Red flexbar isometrics, flex and ext x 10 ea   3 corners red flexbar bends x 2 min (no RD)  Oscillations with red flexbar x 2 min  (NT)  Alphabet ball spelling full name  Pink Frisbee 1 way x 2 min    Dart throwers slow x 10   Red power web push x 10  K tape space corrector over dorso-ulnar wrist anchored circumferentially, distal glide volar and dorsal FA     Patient Education and Home Exercises     Education provided:   - cont HEP  - Progress towards goals     Written Home Exercises Provided: Pt instructed to continue prior HEP.  Exercises were reviewed and Teddy was able to demonstrate them prior to the end of the session.  Teddy demonstrated good  understanding of the home exercise " program provided. See electronic medical record under Patient Instructions for exercises provided during therapy sessions.       Assessment     Continues to have stiffness in his palm and sharp pains with certain motions. Increased sensitivity along the dorsal aspect of the wrist during manual therapy. Good tolerance of all activities this date but with 2-3 breaks required throughout the entire session. Decreased range of motion measurements this date but with increased  strength this date. Will cont as tolerated.     Teddy is progressing well towards his goals and there are no updates to goals at this time. Pt prognosis is Good.     Patient will continue to benefit from skilled outpatient occupational therapy to address the deficits listed in the problem list on initial evaluation provide patient/family education and to maximize patient's level of independence in the home and community environment.     Patient's spiritual, cultural and educational needs considered and patient agreeable to plan of care and goals.    Anticipated barriers to occupational therapy: none     Goals:  Long Term Goals (to be met by discharge):  Date Goal Met:       1.) Teddy Ennis will demonstrate significantly improved functional performance from re-assessment as measured by a FOTO Intake score of more than 65.     Goal Status:   In progress     2.) Teddy Ennis will return to near to prior level of function for ADLs and household management reporting independence or modified independence.     Goal Status:   In progress     3. Teddy Ennis will report pain 2 out of 10 at worst to increase functional use of affected hand for work and leisure tasks.     Goal Status:   In progress      Short Term Goals (to be met by 9/15/24):  Date Goal Met:       Teddy Ennis will be independent with home exercise program with written instructions.     Goal Status:   In progress     Teddy Ennis will demonstrate 25 degrees of  composite wrist flexion to improve functional performance in ADLs/work/leisure tasks.     Goal Status:   In progress     Teddy Ennis will demonstrate within 20 lbs of  strength compared to unaffected hand to improve functional grasp for ADLs/work/leisure tasks.     Goal Status:   In progress     Teddy Ennis will demonstrate the ability to complete ADL/IADL tasks with 4/10 pain.     Goal Status:   In progress     Teddy Ennis will be able to resume significantly greater occupational roles independently or modified as demonstrated by a FOTO Intake score of more than 55.     Goal Status:   In progress        Plan     Updates/Grading for next session: cont as tolerated    Mik Alexander OT   9/5/2024

## 2024-09-10 ENCOUNTER — CLINICAL SUPPORT (OUTPATIENT)
Dept: REHABILITATION | Facility: HOSPITAL | Age: 49
End: 2024-09-10
Payer: MEDICARE

## 2024-09-10 DIAGNOSIS — M25.531 RIGHT WRIST PAIN: Primary | ICD-10-CM

## 2024-09-10 DIAGNOSIS — R29.898 DECREASED GRIP STRENGTH OF RIGHT HAND: ICD-10-CM

## 2024-09-10 PROCEDURE — 97112 NEUROMUSCULAR REEDUCATION: CPT | Mod: KX,CO

## 2024-09-10 PROCEDURE — 97022 WHIRLPOOL THERAPY: CPT | Mod: KX,CO

## 2024-09-10 PROCEDURE — 97140 MANUAL THERAPY 1/> REGIONS: CPT | Mod: KX,CO

## 2024-09-10 NOTE — PROGRESS NOTES
[facility]  Occupational Therapy Treatment Note     Date: 9/10/2024  Name: Teddy Ennis  Clinic Number: [id number]    Therapy Diagnosis:   Encounter Diagnoses   Name Primary?    Right wrist pain Yes    Decreased  strength of right hand          Physician: Jose M Desai MD    Referring Physician: Jose M Desai MD  Physician Orders: Eval and Treat  Date of Return to MD: TBD     Date of Injury: over a year ago  Date of Surgery: NA     Evaluation Date: 8/15/2024  Authorization Period: 8/13/24 - 09/26/24  Plan of Care Expiration: 11/7/24  Visit #/ Visits Authorized: 20 of 40  FOTO Completion: Initial eval (8/15/2024)  FOTO #2:  FOTO #3:     Time In: 12:58 pm  Time Out: 1:51 pm  Total Billable Time: 53 min     Precautions: Standard    Subjective     Patient reports: His wrist hurts more this date due to the weather.   He was compliant with home exercise program given last session.   Response to previous treatment: good   Functional change: wearing wrist brace     Pain: not rated/10  Location: right wrists      Objective     Mental status: alert, oriented x3     Observation/Appearance:   No swelling or atrophy noted     Sensation: Patient denies numbness/tingling        ELBOW, WRIST RANGE OF MOTION:   Measured in degrees of active motion with goniometer    Right  8/15/2024 Left  8/15/2024 Right  9/5/2024   Elbow Extension/Flexion WNL WNL NT   Pronation/Supination WNL WNL NT   Wrist Extension/Flexion 65/50 70/50 55/30  Post-Treatment:  NT   Ulnar/Radial Deviation 30/15 30/15 NT   Composite Wrist Flexion 10 40 15         STRENGTH: (Measured in pounds using a Dynamometer and pinch meter)    Right  8/15/2024 Left  8/15/2024 Right  9/5/2024    Setting 2 25, 20, 20* 75, 80, 75 42;55;50    Average 22 lb 77 lb 49 lb   *pain throughout hand        Special Testing:  ECU synergy: pain     MMT EDC: 3/5, with pain dorsal hand     MMT ECRL/ECRB: 4/5 pain dorsal hand  MMT ECU: 4/5, pain      MMT FCU: 4/5, pain dorsal wrist  MMT FCR: 4/5, pain dorsal wrist        Intake Outcome Measure for FOTO Initial Evaluation Survey     Therapist reviewed FOTO scores for Teddy Ennis on 8/15/2024.   FOTO documents entered into [employer] - see Media section.     Intake Score: 45%          Treatment     Teddy received the treatments listed below:      Teddy received the following supervised modalities after being cleared for contradictions for 10 minutes:   Fluidotherapy: To R for 10 min, continuous air, 110 deg, air speed 100 to decrease pain, edema & scar tissue and increased tissue extensibility.    Patient received paraffin bath to the right hand(s) for 10 minutes to increase blood flow, circulation, pain management and for tissue elasticity prior to therex.   (NT)    manual therapy techniques: Soft tissue Mobilization were applied to the: R wrist for 10 minutes, including:  STM with vibration tool to dorsal and volar wrist     neuromuscular re-education activities to improve: Kinesthetic and Proprioception for 33 minutes. The following activities were included:  Comp flex wrist stretch, 30 sec holds x 3   Prayer stretch 30 sec x 3  Digit spreads x 1 min   Red flexbar isometrics, flex and ext x 10 ea   3 corners red flexbar bends x 2 min (no RD)  Oscillations with red flexbar x 2 min  (NT)  Alphabet ball spelling full name  Pink Frisbee 2 ways supinated x 1 min ea    Dart throwers slow with and without 1#  x 10  [place] stick ( x 1 min walking, 1 min sitting 2* dizziness)  Red power web push x 10 (NT)  K tape space corrector over dorso-ulnar wrist anchored circumferentially, distal glide volar and dorsal FA     Patient Education and Home Exercises     Education provided:   - cont HEP  - Progress towards goals     Written Home Exercises Provided: Pt instructed to continue prior HEP.  Exercises were reviewed and Teddy was able to demonstrate them prior to the end of the session.  Teddy demonstrated good   understanding of the home exercise program provided. See electronic medical record under Patient Instructions for exercises provided during therapy sessions.       Assessment     Pt tolerated session fairly well. Cont to need breaks for activities req wrist endurance     Teddy is progressing well towards his goals and there are no updates to goals at this time. Pt prognosis is Good.     Patient will continue to benefit from skilled outpatient occupational therapy to address the deficits listed in the problem list on initial evaluation provide patient/family education and to maximize patient's level of independence in the home and community environment.     Patient's spiritual, cultural and educational needs considered and patient agreeable to plan of care and goals.    Anticipated barriers to occupational therapy: none     Goals:  Long Term Goals (to be met by discharge):  Date Goal Met:       1.) Teddy Ennis will demonstrate significantly improved functional performance from re-assessment as measured by a FOTO Intake score of more than 65.     Goal Status:   In progress     2.) Teddy Ennis will return to near to prior level of function for ADLs and household management reporting independence or modified independence.     Goal Status:   In progress     3. Teddy Ennis will report pain 2 out of 10 at worst to increase functional use of affected hand for work and leisure tasks.     Goal Status:   In progress      Short Term Goals (to be met by 9/15/24):  Date Goal Met:       Teddy Ennis will be independent with home exercise program with written instructions.     Goal Status:   In progress     Teddy Ennis will demonstrate 25 degrees of composite wrist flexion to improve functional performance in ADLs/work/leisure tasks.     Goal Status:   In progress     Teddy Ennis will demonstrate within 20 lbs of  strength compared to unaffected hand to improve functional grasp for  ADLs/work/leisure tasks.     Goal Status:   In progress     Teddy Ennis will demonstrate the ability to complete ADL/IADL tasks with 4/10 pain.     Goal Status:   In progress     Teddy Ennis will be able to resume significantly greater occupational roles independently or modified as demonstrated by a FOTO Intake score of more than 55.     Goal Status:   In progress        Plan     Updates/Grading for next session: cont as tolerated    VINI Swanson   9/10/2024

## 2024-09-17 ENCOUNTER — CLINICAL SUPPORT (OUTPATIENT)
Dept: REHABILITATION | Facility: HOSPITAL | Age: 49
End: 2024-09-17
Payer: MEDICARE

## 2024-09-17 ENCOUNTER — TELEPHONE (OUTPATIENT)
Dept: PRIMARY CARE CLINIC | Facility: CLINIC | Age: 49
End: 2024-09-17
Payer: MEDICARE

## 2024-09-17 DIAGNOSIS — R29.898 DECREASED GRIP STRENGTH OF RIGHT HAND: ICD-10-CM

## 2024-09-17 DIAGNOSIS — M25.531 RIGHT WRIST PAIN: Primary | ICD-10-CM

## 2024-09-17 PROCEDURE — 97140 MANUAL THERAPY 1/> REGIONS: CPT

## 2024-09-17 PROCEDURE — 97022 WHIRLPOOL THERAPY: CPT

## 2024-09-17 PROCEDURE — 97112 NEUROMUSCULAR REEDUCATION: CPT

## 2024-09-17 NOTE — TELEPHONE ENCOUNTER
Spoke with pt he state he has a bruise or rash on his wrist from therapy. He not sure how long it's been there. He did report it today while at therapy and is awaiting a call back from Dr. Desai. In the meantime he will wait on that call and if he doesn't hear back from them, he will call us back tomorrow.

## 2024-09-17 NOTE — TELEPHONE ENCOUNTER
----- Message from Arash Brewsterhop sent at 9/17/2024  3:55 PM CDT -----  Regarding: Rashes on Arm  Contact: Pt [phone]  [email or web address]     Patient is calling for Medical Advice regarding: Patient called to report he has rashes all over his arm down his wrist area. He tapes his arm at night. He wants to report this and get a callback from nurse.  He said he wants medical advice.     How long has patient had these symptoms: Today    Pharmacy name and phone#:    Patient wants a call back or thru [facility]: Call    Comments:    Please advise patient replies from provider may take up to 48 hours.

## 2024-09-17 NOTE — PROGRESS NOTES
[facility]  Occupational Therapy Treatment Note     Date: 9/17/2024  Name: Teddy Ennis  Clinic Number: [id number]    Therapy Diagnosis:   Encounter Diagnoses   Name Primary?    Right wrist pain Yes    Decreased  strength of right hand        Physician: Jose M Desai MD    Referring Physician: Jose M Desai MD  Physician Orders: Eval and Treat  Date of Return to MD: TBD     Date of Injury: over a year ago  Date of Surgery: NA     Evaluation Date: 8/15/2024  Authorization Period: 8/13/24 - 09/26/24  Plan of Care Expiration: 11/7/24  Visit #/ Visits Authorized: 22 of 40  FOTO Completion: Initial eval (8/15/2024)  FOTO #2:  FOTO #3:     Time In: 2:30 pm  Time Out: 3:30 pm  Total Billable Time: 60 min     Precautions: Standard    Subjective     Patient reports: He has been wearing his wrist brace for work duties but not always at home.   He was compliant with home exercise program given last session.   Response to previous treatment: good   Functional change: wearing wrist brace     Pain: 4/10  Location: right wrists      Objective     Mental status: alert, oriented x3     Observation/Appearance:   No swelling or atrophy noted     Sensation: Patient denies numbness/tingling        ELBOW, WRIST RANGE OF MOTION:   Measured in degrees of active motion with goniometer    Right  8/15/2024 Left  8/15/2024 Right  9/5/2024   Elbow Extension/Flexion WNL WNL NT   Pronation/Supination WNL WNL NT   Wrist Extension/Flexion 65/50 70/50 55/30  Post-Treatment:  NT   Ulnar/Radial Deviation 30/15 30/15 NT   Composite Wrist Flexion 10 40 15         STRENGTH: (Measured in pounds using a Dynamometer and pinch meter)    Right  8/15/2024 Left  8/15/2024 Right  9/5/2024    Setting 2 25, 20, 20* 75, 80, 75 42;55;50    Average 22 lb 77 lb 49 lb   *pain throughout hand        Special Testing:  ECU synergy: pain     MMT EDC: 3/5, with pain dorsal hand     MMT ECRL/ECRB: 4/5 pain dorsal hand  MMT  "ECU: 4/5, pain     MMT FCU: 4/5, pain dorsal wrist  MMT FCR: 4/5, pain dorsal wrist        Intake Outcome Measure for FOTO Initial Evaluation Survey     Therapist reviewed FOTO scores for Teddy Ennis on 8/15/2024.   FOTO documents entered into [employer] - see Media section.     Intake Score: 45%          Treatment     Teddy received the treatments listed below:      Teddy received the following supervised modalities after being cleared for contradictions for 10 minutes:   Fluidotherapy: To R for 10 min, continuous air, 110 deg, air speed 100 to decrease pain, edema & scar tissue and increased tissue extensibility.    Patient received paraffin bath to the right hand(s) for 10 minutes to increase blood flow, circulation, pain management and for tissue elasticity prior to therex.   (NT)    manual therapy techniques: Soft tissue Mobilization were applied to the: R wrist for 15 minutes, including:  STM with vibration tool to dorsal and volar wrist   Wrist mobilizations - distraction with minor oscillation  Intrinsic stretching x 5 with 10" holds for digits 2-5.    neuromuscular re-education activities to improve: Kinesthetic and Proprioception for 35 minutes. The following activities were included:  Comp flex wrist stretch, 30 sec holds x 3   Wrist extensor stretch and glide x 10 (elbow flex to extension with WE stretch)  Wrist isometrics WF/RD x 5 with 5" holds  Juxacisor x 3'  LLPS with 3.5 lb and MH x 5' - prone with wrist flexion over plinth at end of session  Prayer stretch 30 sec x 3  Digit spreads x 1 min  - NT  Red flexbar isometrics, flex and ext x 10 ea   3 corners red flexbar bends x 2 min (no RD)  Oscillations with red flexbar x 2 min  (NT)  Alphabet ball spelling full name -  NT  Pink Frisbee 2 ways supinated x 1 min ea   - NT  Dart throwers slow with and without 1#  x 10  [place] stick ( x 1 min walking, 1 min sitting 2* dizziness) - NT  Red power web push x 20  K tape space corrector over dorso-ulnar " wrist anchored circumferentially, distal glide volar and dorsal FA     Patient Education and Home Exercises     Education provided:   - cont HEP  - Progress towards goals     Written Home Exercises Provided: Pt instructed to continue prior HEP.  Exercises were reviewed and Teddy was able to demonstrate them prior to the end of the session.  Teddy demonstrated good  understanding of the home exercise program provided. See electronic medical record under Patient Instructions for exercises provided during therapy sessions.       Assessment     Pt tolerated session fairly well. Cont to need breaks for activities req wrist endurance. Noted intrinsic tightness this date.     Teddy is progressing well towards his goals and there are no updates to goals at this time. Pt prognosis is Good.     Patient will continue to benefit from skilled outpatient occupational therapy to address the deficits listed in the problem list on initial evaluation provide patient/family education and to maximize patient's level of independence in the home and community environment.     Patient's spiritual, cultural and educational needs considered and patient agreeable to plan of care and goals.    Anticipated barriers to occupational therapy: none     Goals:  Long Term Goals (to be met by discharge):  Date Goal Met:       1.) Teddy Ennis will demonstrate significantly improved functional performance from re-assessment as measured by a FOTO Intake score of more than 65.     Goal Status:   In progress     2.) Teddy Ennis will return to near to prior level of function for ADLs and household management reporting independence or modified independence.     Goal Status:   In progress     3. Teddy Ennis will report pain 2 out of 10 at worst to increase functional use of affected hand for work and leisure tasks.     Goal Status:   In progress      Short Term Goals (to be met by 9/15/24):  Date Goal Met:       Teddy Ennis will  be independent with home exercise program with written instructions.     Goal Status:   In progress     Teddy Ennis will demonstrate 25 degrees of composite wrist flexion to improve functional performance in ADLs/work/leisure tasks.     Goal Status:   In progress     Teddy Ennis will demonstrate within 20 lbs of  strength compared to unaffected hand to improve functional grasp for ADLs/work/leisure tasks.     Goal Status:   In progress     Teddy Ennis will demonstrate the ability to complete ADL/IADL tasks with 4/10 pain.     Goal Status:   In progress     Teddy Ennis will be able to resume significantly greater occupational roles independently or modified as demonstrated by a FOTO Intake score of more than 55.     Goal Status:   In progress        Plan     Updates/Grading for next session: cont as tolerated    Mik Alexander OT   9/17/2024

## 2024-09-18 ENCOUNTER — TELEPHONE (OUTPATIENT)
Dept: OTOLARYNGOLOGY | Facility: CLINIC | Age: 49
End: 2024-09-18
Payer: MEDICARE

## 2024-09-18 NOTE — TELEPHONE ENCOUNTER
I called patient to see if he would be able to come in at 10:00 AM on Monday for a audio but I didn't get a answer. I left patient a  to call back to see if he would be able to make it.

## 2024-09-18 NOTE — PROGRESS NOTES
[facility]  Occupational Therapy Treatment Note     Date: 9/19/2024  Name: Teddy Ennis  Clinic Number: [id number]    Therapy Diagnosis:   Encounter Diagnoses   Name Primary?    Right wrist pain Yes    Decreased  strength of right hand          Physician: Jose M Desai MD    Referring Physician: Jose M Desai MD  Physician Orders: Eval and Treat  Date of Return to MD: TBD     Date of Injury: over a year ago  Date of Surgery: NA     Evaluation Date: 8/15/2024  Authorization Period: 8/13/24 - 09/26/24  Plan of Care Expiration: 11/7/24  Visit #/ Visits Authorized: 23 of 40  FOTO Completion: Initial eval (8/15/2024)  FOTO #2:  FOTO #3:     Time In: 1:22 pm  Time Out: 2:15 pm  Total Billable Time: 53 min     Precautions: Standard    Subjective     Patient reports: rash from K tape   He was compliant with home exercise program given last session.   Response to previous treatment: good   Functional change: wearing wrist brace     Pain: 4/10  Location: right wrists      Objective     Mental status: alert, oriented x3     Observation/Appearance:   No swelling or atrophy noted     Sensation: Patient denies numbness/tingling        ELBOW, WRIST RANGE OF MOTION:   Measured in degrees of active motion with goniometer    Right  8/15/2024 Left  8/15/2024 Right  9/5/2024   Elbow Extension/Flexion WNL WNL NT   Pronation/Supination WNL WNL NT   Wrist Extension/Flexion 65/50 70/50 55/30  Post-Treatment:  NT   Ulnar/Radial Deviation 30/15 30/15 NT   Composite Wrist Flexion 10 40 15         STRENGTH: (Measured in pounds using a Dynamometer and pinch meter)    Right  8/15/2024 Left  8/15/2024 Right  9/5/2024    Setting 2 25, 20, 20* 75, 80, 75 42;55;50    Average 22 lb 77 lb 49 lb   *pain throughout hand        Special Testing:  ECU synergy: pain     MMT EDC: 3/5, with pain dorsal hand     MMT ECRL/ECRB: 4/5 pain dorsal hand  MMT ECU: 4/5, pain     MMT FCU: 4/5, pain dorsal wrist  MMT  "FCR: 4/5, pain dorsal wrist        Intake Outcome Measure for FOTO Initial Evaluation Survey     Therapist reviewed FOTO scores for Teddy Ennis on 8/15/2024.   FOTO documents entered into EPIC - see Media section.     Intake Score: 45%          Treatment     Teddy received the treatments listed below:      Teddy received the following supervised modalities after being cleared for contradictions for 10 minutes:   Fluidotherapy: To R for 10 min, continuous air, 110 deg, air speed 100 to decrease pain, edema & scar tissue and increased tissue extensibility.    Patient received paraffin bath to the right hand(s) for 10 minutes to increase blood flow, circulation, pain management and for tissue elasticity prior to therex.   (NT)    manual therapy techniques: Soft tissue Mobilization were applied to the: R wrist for 15 minutes, including:  STM with vibration tool to dorsal and volar wrist   Wrist mobilizations - distraction with minor oscillation (NT)  Intrinsic stretching x 5 with 10" holds for digits 2-5.    neuromuscular re-education activities to improve: Kinesthetic and Proprioception for 25 minutes. The following activities were included:  Comp flex wrist stretch, 30 sec holds x 3   Wrist extensor stretch and glide x 10 (elbow flex to extension with WE stretch) (NT)  Wrist isometrics WF/RD x 5 with 5" holds (NT)  Juxacisor x 3'  LLPS with 3.5 lb and MH x 5' - prone with wrist flexion over plinth at end of session (NT)  Prayer stretch 30 sec x 3  Digit spreads x 1 min  - NT  Red flexbar isometrics, flex and ext x 10 ea   3 corners red flexbar bends x 2 min (no RD)  Oscillations with red flexbar x 2 min    Alphabet ball spelling full name -  NT  Pink Frisbee 2 ways supinated x 1 min ea    Dart throwers slow with water weight x 30  Rawlings stick yellow ball yellow level name on table   Red power web push x 20  K tape dorsal FA - avoided volar 2* rash    Patient Education and Home Exercises     Education " provided:   - cont HEP  - Progress towards goals     Written Home Exercises Provided: Pt instructed to continue prior HEP.  Exercises were reviewed and Teddy was able to demonstrate them prior to the end of the session.  Teddy demonstrated good  understanding of the home exercise program provided. See electronic medical record under Patient Instructions for exercises provided during therapy sessions.       Assessment     Pt tolerated session fairly well. Cont to need breaks for activities req wrist endurance. Noted intrinsic tightness this date.     Teddy is progressing well towards his goals and there are no updates to goals at this time. Pt prognosis is Good.     Patient will continue to benefit from skilled outpatient occupational therapy to address the deficits listed in the problem list on initial evaluation provide patient/family education and to maximize patient's level of independence in the home and community environment.     Patient's spiritual, cultural and educational needs considered and patient agreeable to plan of care and goals.    Anticipated barriers to occupational therapy: none     Goals:  Long Term Goals (to be met by discharge):  Date Goal Met:       1.) Teddy Ennis will demonstrate significantly improved functional performance from re-assessment as measured by a FOTO Intake score of more than 65.     Goal Status:   In progress     2.) Teddy Ennis will return to near to prior level of function for ADLs and household management reporting independence or modified independence.     Goal Status:   In progress     3. Teddy Ennis will report pain 2 out of 10 at worst to increase functional use of affected hand for work and leisure tasks.     Goal Status:   In progress      Short Term Goals (to be met by 9/15/24):  Date Goal Met:       Teddy Ennis will be independent with home exercise program with written instructions.     Goal Status:   In progress     Teddy Ennis  will demonstrate 25 degrees of composite wrist flexion to improve functional performance in ADLs/work/leisure tasks.     Goal Status:   In progress     Teddy Ennis will demonstrate within 20 lbs of  strength compared to unaffected hand to improve functional grasp for ADLs/work/leisure tasks.     Goal Status:   In progress     Teddy Ennis will demonstrate the ability to complete ADL/IADL tasks with 4/10 pain.     Goal Status:   In progress     Teddy Ennis will be able to resume significantly greater occupational roles independently or modified as demonstrated by a FOTO Intake score of more than 55.     Goal Status:   In progress        Plan     Updates/Grading for next session: cont as tolerated    VINI Swanson   9/19/2024

## 2024-09-19 ENCOUNTER — CLINICAL SUPPORT (OUTPATIENT)
Dept: REHABILITATION | Facility: HOSPITAL | Age: 49
End: 2024-09-19
Payer: MEDICARE

## 2024-09-19 DIAGNOSIS — R29.898 DECREASED GRIP STRENGTH OF RIGHT HAND: ICD-10-CM

## 2024-09-19 DIAGNOSIS — M25.531 RIGHT WRIST PAIN: Primary | ICD-10-CM

## 2024-09-19 PROCEDURE — 97022 WHIRLPOOL THERAPY: CPT | Mod: KX,CO

## 2024-09-19 PROCEDURE — 97112 NEUROMUSCULAR REEDUCATION: CPT | Mod: KX,CO

## 2024-09-19 PROCEDURE — 97140 MANUAL THERAPY 1/> REGIONS: CPT | Mod: KX,CO

## 2024-09-23 ENCOUNTER — OFFICE VISIT (OUTPATIENT)
Dept: OTOLARYNGOLOGY | Facility: CLINIC | Age: 49
End: 2024-09-23
Payer: MEDICARE

## 2024-09-23 ENCOUNTER — CLINICAL SUPPORT (OUTPATIENT)
Dept: AUDIOLOGY | Facility: CLINIC | Age: 49
End: 2024-09-23
Payer: MEDICARE

## 2024-09-23 DIAGNOSIS — R42 DIZZINESS: ICD-10-CM

## 2024-09-23 DIAGNOSIS — G44.52 NEW DAILY PERSISTENT HEADACHE: Primary | ICD-10-CM

## 2024-09-23 DIAGNOSIS — H93.13 TINNITUS, BILATERAL: ICD-10-CM

## 2024-09-23 DIAGNOSIS — J32.9 CHRONIC SINUSITIS, UNSPECIFIED LOCATION: ICD-10-CM

## 2024-09-23 DIAGNOSIS — H90.3 SENSORINEURAL HEARING LOSS, BILATERAL: Primary | ICD-10-CM

## 2024-09-23 DIAGNOSIS — K02.9 DENTAL CARIES: ICD-10-CM

## 2024-09-23 DIAGNOSIS — R42 DIZZINESS AND GIDDINESS: ICD-10-CM

## 2024-09-23 PROCEDURE — 99204 OFFICE O/P NEW MOD 45 MIN: CPT | Mod: HCNC,S$GLB,, | Performed by: OTOLARYNGOLOGY

## 2024-09-23 PROCEDURE — 99999 PR PBB SHADOW E&M-EST. PATIENT-LVL IV: CPT | Mod: PBBFAC,HCNC,, | Performed by: OTOLARYNGOLOGY

## 2024-09-23 PROCEDURE — 92567 TYMPANOMETRY: CPT | Mod: HCNC,S$GLB,, | Performed by: AUDIOLOGIST

## 2024-09-23 PROCEDURE — 1159F MED LIST DOCD IN RCRD: CPT | Mod: HCNC,CPTII,S$GLB, | Performed by: OTOLARYNGOLOGY

## 2024-09-23 PROCEDURE — 92557 COMPREHENSIVE HEARING TEST: CPT | Mod: HCNC,S$GLB,, | Performed by: AUDIOLOGIST

## 2024-09-23 PROCEDURE — 1160F RVW MEDS BY RX/DR IN RCRD: CPT | Mod: HCNC,CPTII,S$GLB, | Performed by: OTOLARYNGOLOGY

## 2024-09-23 RX ORDER — METHYLPREDNISOLONE 4 MG/1
TABLET ORAL
Qty: 21 EACH | Refills: 0 | Status: SHIPPED | OUTPATIENT
Start: 2024-09-23

## 2024-09-23 RX ORDER — DOXYCYCLINE 100 MG/1
100 CAPSULE ORAL 2 TIMES DAILY
Qty: 20 CAPSULE | Refills: 0 | Status: SHIPPED | OUTPATIENT
Start: 2024-09-23

## 2024-09-23 NOTE — PATIENT INSTRUCTIONS
Saline sprays rinses and nasal steroids.  A steroid Dosepak and a 10 day course of antibiotic (doxycycline) as discussed and prescribed.      If headaches or worsening follow up with Neurology.  Referral provided today.      If the sinus issues persists complete a CT scan and return for further evaluation.    Seek dental care for abnormal CT findings of the teeth from 2023.  A certain degree of chronic dental infection maybe contributing to sinus complaints, headaches and dizziness.      Return with any worsening of symptoms, failure to improve, or any other concerns for further evaluation and treatment.      NASAL SALINE    Still saline comes in many preparations including sprays/mists, gels, and rinses.  Different preparations served different purposes.  Saline spray helps to briefly moisturize the nose and help clear mucus.  Saline gels coat the nose for longer protective benefit of keeping the linings the nose moist.  Saline rinses clear the nose and sinuses and a more thorough way in her best used for significant postnasal drip and sinus complaints.  A combination of saline sprays/mists, gels and rinses should be used to address routine nasal clearing and dryness issues as well as flushing for better control of allergy and postnasal drip symptoms.  There is no real risk of over use of nasal saline products.  Saline sprays do not have any of the potential rebound or addiction of nasal decongestant sprays.  Nasal saline sprays and rinses should be used prior to the application of any medicated nasal sprays such as nasal steroids or nasal antihistamine sprays.        INTRANASAL STEROID SPRAYS      Intranasal steroid sprays are available both by prescription and over-the-counter both in generic and brand name preparations.  They are all very similar in efficacy and side effect profiles.  Over-the-counter and prescription intranasal steroids include fluticasone propionate (Flonase), fluticasone furoate  (Sensimist), triamcinolone (Nasacort), and budesonide (Rhinocort).  While these medications are available as prescriptions as well there are few nasal steroids in her available by prescription only and include mometasone (Nasonex), flunisolide (Nasarel), and beclomethasone (QNASL).    Nasal steroids or the foundation of treatment of both allergy and other inflammatory conditions of the nose and sinuses.  They are safe for regular use and while there are many side effects listed most of these are steroid class effects and not typically encountered.  Typical side effects include dryness and even ulceration and bleeding of the nose.  These side effects can be minimized by proper application and proper moisturization with saline and saline gel.    Sometimes changing between 1 brand of nasal steroid and another can result in improved control of symptoms especially after long term use of one particular nasal steroid.    Proper application of the nasal steroid spray is accomplished by spraying towards the I/ear on the same side with the tip of the superior just barely inside the nostril with the chin slightly downward.  Any dripping should be gently inhaled not sniff test backwards into the throat.  Labeled instructions should be followed.        ASTELIN (Azelastine) nasal spray    Astelin is a topical nasal antihistamine which can be of additional benefit in controlling nasal allergy and postnasal drip.  Typically is recommended on an as-needed basis 1-2 sprays each nostril twice daily.  People often find it beneficial at night.  This typically added to her regimen of saline and nasal steroids as a 3rd line agent for as needed use.  Excessive use can cause excessive dryness and even nose bleeds.  It has a very strong taste which many people find intolerable.  Astelin needs to be stopped 5-7 days prior to any skin allergy testing just like oral antihistamines as it will inhibit the skin response.      SINUS RINSE  INSTRUCTIONS    Nasal Saline Irrigation Instructions  You can wash your nasal and sinus passages using nasal saline (salt water) irrigation. This   is simple and effective. Follow the instructions below, as well as the ones provided by your   physician.  Supplies  First, you will need a nasal saline irrigation bottle and rinse solution.   You can purchase nasal rinse kits that include these items (such as   NeilMed®, Ayr®, Simply Saline®, Ocean Complete®) at most drug   stores. You can also make your own saline irrigation solution by   adding kosher (non-iodine) salt and baking soda to distilled water.   Your physician may tell you to add medications like a steroid or   antibiotic to the rinse as needed.  Steps for nasal irrigation  Step 1. Fill the bottle  ? Wash your hands.  ? Fill the irrigation bottle with lukewarm distilled water or boiled water that has cooled.  Step 2. Mix the solution  ? Put the saline and salt packet contents into the bottle.  ? Tighten the top of the bottle and shake it gently to dissolve the mixture.  ? If you are making your own solution:   - Add 1/4 to 1/2 teaspoon of baking soda and 1/8 teaspoon of kosher (non-iodine) salt   into the bottle.   - Tighten the top of the bottle.   - Shake the bottle gently to dissolve the mixture.  Step 3. Get into position  ?  front of the sink.  ? Unless you were instructed to use another position, bend forward.   Then tilt your face down about 45 degrees so that you are looking   down into the sink.  ? Gently place the spout of the saline bottle against 1 of your nostrils.  [facility]  CARE AND TREATMENT  Patient Education  ©2018 [employer]  ©2018 [employer]  Step 4. Rinse  ? Breathing through your mouth, gently squeeze the   bottle. This will squirt the solution into your nostril. The   solution will start to drain from the other nostril. Some   may drain from your mouth. This is  normal.  ? Use 2 ounces (half of the bottle) on each nostril.  ? Afterwards, you may need to blow your nose gently to   help drain any solution that is left behind.  Step 5. Repeat  ? Repeat steps 3 and 4 with the other nostril.  You can watch a video to learn how to do nasal saline irrigation. Go to [email or web address] and   search for NeilMed Sinus Rinse.  Step 6.  Clean the bottle and cap. Air dry the Sinus Rinse bottle, cap, and tube on a clean paper towel, a lint free towel, or use NeilMed® NasaDOCK® or NasaDOCK plus (sold separately) to store the bottle, cap and tube.  Please read Warnings before using.  Our recommendation is to replace the bottle every three months.      NEILMED KAHLIL INSTRUCTIONS    It is very important to keep these devices clean and free from any contamination. Replace the bottle every 3 months.  NasaDock Plus  NasaDock NeilMed® SINUS RINSE Squeeze Bottle: Please perform routine inspections of the bottle and tube for any discolorations and cracks. If there are any visual signs of deterioration or permanent color changes, please clean thoroughly. If the discolorations remain after cleansing, discard the items and purchase new ones. Please follow these instructions after each use of the product. Be sure to replace your product after three months.  Step 1: Rinse the cap, tube and bottle using running water. Fill the bottle with distilled, micro-filtered (through 0.2 micron), reverse osmosis filtered, commercially bottled or previously boiled and cooled down water at lukewarm or body temperature..  Step 2: Add a few drops of dish washing liquid or baby shampoo.  Step 3: Attach the cap and tube to the bottle; hold your finger over the opening in the cap and shake the bottle vigorously.  Step 4: Squeeze the bottle hard to allow the soapy solution to clean the interior of the tube and the cap. Empty out the bottle completely.  Step 5: Rinse the soap from the bottle, cap and tube thoroughly and  place the items on a clean paper towel to dry or use the preferred NasaDOCK® or NasaDOCK plus.    The NasaDOCK® is a simple, hygienic way to dry and store the SINUS RINSE bottle, cap and tube. NasaDOCK® comes with various hanging options and is available in different colors. Our newest model also offers storage for our SINUS RINSE mixture packets. We strongly suggest using NasaDOCK® as an inexpensive, easy way to dry the cap, tube and SINUS RINSE bottle.        Cleaning:  Do not use a  to clean the inside of a bottle. While our bottle is  safe, a  will not adequately clean the SINUS RINSE bottle. The water jets in dishwashers cannot enter the narrow neck of the bottle, and portions of the bottles interior will not be cleaned thoroughly. Additional methods of cleaning the bottle include the use of concentrated white vinegar or isopropyl alcohol (70% concentration), followed by scrubbing and rinsing as described above.       Microwave Disinfection  Clean the device with soap and water as mentioned above and shake off the excess water. Now place the bottle, cap and tube in the microwave for 40 seconds. This will disinfect the bottle, cap and tube. If the microwave has been used recently, please make sure that the inside of the microwave has cooled back down to room temperature before using it to disinfect the bottle.    NeilMed NasaFlo® Neti Pot Users:  Use the same procedure as above.    Sinugator® Cleaning Directions:  Clean the Sinugator® by running plain water and dry with a clean lint free towel and then air dry the unit by keeping it open to the air. The nasal  tip, blue reservoir and white soft tube can be disinfected by cleaning with soap and water and shaking off the excess water before placing in the home microwave for 60 seconds. Clean the entire unit with a few drops of dishwashing liquid and water every three days to keep the unit clean. As a fi kusum  rinse to wash off any residual soap or tap water, use either distilled, micro-filtered (through 0.2 micron filter), commercially bottled or previously boiled & cooled down water. Please make sure to rinse thoroughly during each wash so no soap is left behind. DO NOT place the white motor unit in microwave for disinfection. Because of the units stainless steel components, this can cause damage or fire hazards.    General Principles of Maintenance & Storage:  When permissible use a microwave periodically to disinfect devices. Always store NeilMed® products in a cool and dry place with adequate ventilation. NasaDOCK® or NasaDOCK plus offer a simple hygienic way to air dry & neatly store the bottle, cap, tube and NasaFlo. Do not store the bottle with the cap screwed on, unless both are dry. Do not store the wet parts in a sealed plastic bag. If you travel before they are dry, wrap parts separately in paper towels. Hand soap or shampoo can be used for cleaning parts while away from home.        USE ONLY AS DIRECTED, IF SYMPTOMS PERSIST SEE YOUR DOCTOR/HEALTHCARE PROFESSIONAL. ALWAYS READ THE LABEL.

## 2024-09-23 NOTE — PROGRESS NOTES
[facility]  Occupational Therapy Treatment Note     Date: 9/24/2024  Name: Teddy Ennis  Clinic Number: [id number]    Therapy Diagnosis:   Encounter Diagnoses   Name Primary?    Right wrist pain Yes    Decreased  strength of right hand            Physician: Jose M Desai MD    Referring Physician: Jose M Desai MD  Physician Orders: Eval and Treat  Date of Return to MD: TBD     Date of Injury: over a year ago  Date of Surgery: NA     Evaluation Date: 8/15/2024  Authorization Period: 8/13/24 - 09/26/24  Plan of Care Expiration: 11/7/24  Visit #/ Visits Authorized: 24 of 40  FOTO Completion: Initial eval (8/15/2024)  FOTO #2:  FOTO #3:     Time In: 2:04 pm  Time Out: 2:59 pm  Total Billable Time: 55 min     Precautions: Standard    Subjective     Patient reports: still stiff, used his hand a lot over the weekend  He was compliant with home exercise program given last session.   Response to previous treatment: good   Functional change: wearing wrist brace     Pain: 4/10  Location: right wrists      Objective     Mental status: alert, oriented x3     Observation/Appearance:   No swelling or atrophy noted     Sensation: Patient denies numbness/tingling        ELBOW, WRIST RANGE OF MOTION:   Measured in degrees of active motion with goniometer    Right  8/15/2024 Left  8/15/2024 Right  9/5/2024   Elbow Extension/Flexion WNL WNL NT   Pronation/Supination WNL WNL NT   Wrist Extension/Flexion 65/50 70/50 55/30  Post-Treatment:  NT   Ulnar/Radial Deviation 30/15 30/15 NT   Composite Wrist Flexion 10 40 15         STRENGTH: (Measured in pounds using a Dynamometer and pinch meter)    Right  8/15/2024 Left  8/15/2024 Right  9/5/2024    Setting 2 25, 20, 20* 75, 80, 75 42;55;50    Average 22 lb 77 lb 49 lb   *pain throughout hand        Special Testing:  ECU synergy: pain     MMT EDC: 3/5, with pain dorsal hand     MMT ECRL/ECRB: 4/5 pain dorsal hand  MMT ECU: 4/5, pain     MMT  "FCU: 4/5, pain dorsal wrist  MMT FCR: 4/5, pain dorsal wrist        Intake Outcome Measure for FOTO Initial Evaluation Survey     Therapist reviewed FOTO scores for Teddy Ennis on 8/15/2024.   FOTO documents entered into [employer] - see Media section.     Intake Score: 45%          Treatment     Teddy received the treatments listed below:      Teddy received the following supervised modalities after being cleared for contradictions for 10 minutes:   Fluidotherapy: To R for 10 min, continuous air, 110 deg, air speed 100 to decrease pain, edema & scar tissue and increased tissue extensibility.    Patient received paraffin bath to the right hand(s) for 10 minutes to increase blood flow, circulation, pain management and for tissue elasticity prior to therex.   (NT)    manual therapy techniques: Soft tissue Mobilization were applied to the: R wrist for 8 minutes, including:  STM with vibration tool to dorsal and volar wrist   Wrist mobilizations - distraction with minor oscillation (NT)  Intrinsic stretching x 5 with 10" holds for digits 2-5. (NT)    neuromuscular re-education activities to improve: Kinesthetic and Proprioception for 37 minutes. The following activities were included:  Comp flex wrist stretch, 30 sec holds x 3   Wrist extensor stretch and glide x 10 (elbow flex to extension with WE stretch) (NT)  Wrist isometrics WF/RD x 5 with 5" holds (NT)  Juxacisor x 3' (NT)  LLPS with 3.5 lb and MH x 5' - prone with wrist flexion over plinth at end of session (NT)  Prayer stretch 30 sec x 3  Roll and stretch over green flexbar x 2 min   Digit spreads x 1 min  - NT  Green flexbar isometrics, flex and ext x 10 ea   3 corners Green flexbar bends x 2 min (no RD)  Oscillations with green flexbar x 2 min    Alphabet ball spelling full name -  NT  Pink Frisbee 2 ways supinated x 1 min ea    Dart throwers slow with water weight x 30  Whiteside stick yellow ball yellow level name on table   Red power web push x 20  K tape " dorsal and volar FA, pisiform boost, light touch used volarly     Patient Education and Home Exercises     Education provided:   - cont HEP  - Progress towards goals     Written Home Exercises Provided: Pt instructed to continue prior HEP.  Exercises were reviewed and Teddy was able to demonstrate them prior to the end of the session.  Teddy demonstrated good  understanding of the home exercise program provided. See electronic medical record under Patient Instructions for exercises provided during therapy sessions.       Assessment     Pt tolerated session well. Able to progress with green flexbar with less breaks today.  Noted intrinsic tightness this date.     Teddy is progressing well towards his goals and there are no updates to goals at this time. Pt prognosis is Good.     Patient will continue to benefit from skilled outpatient occupational therapy to address the deficits listed in the problem list on initial evaluation provide patient/family education and to maximize patient's level of independence in the home and community environment.     Patient's spiritual, cultural and educational needs considered and patient agreeable to plan of care and goals.    Anticipated barriers to occupational therapy: none     Goals:  Long Term Goals (to be met by discharge):  Date Goal Met:       1.) Teddy Ennis will demonstrate significantly improved functional performance from re-assessment as measured by a FOTO Intake score of more than 65.     Goal Status:   In progress     2.) Teddy Ennis will return to near to prior level of function for ADLs and household management reporting independence or modified independence.     Goal Status:   In progress     3. Teddy Ennis will report pain 2 out of 10 at worst to increase functional use of affected hand for work and leisure tasks.     Goal Status:   In progress      Short Term Goals (to be met by 9/15/24):  Date Goal Met:       Teddy Ennis will be  independent with home exercise program with written instructions.     Goal Status:   In progress     Teddy Ennis will demonstrate 25 degrees of composite wrist flexion to improve functional performance in ADLs/work/leisure tasks.     Goal Status:   In progress     Teddy Ennis will demonstrate within 20 lbs of  strength compared to unaffected hand to improve functional grasp for ADLs/work/leisure tasks.     Goal Status:   In progress     Teddy Ennis will demonstrate the ability to complete ADL/IADL tasks with 4/10 pain.     Goal Status:   In progress     Teddy Ennis will be able to resume significantly greater occupational roles independently or modified as demonstrated by a FOTO Intake score of more than 55.     Goal Status:   In progress        Plan     Updates/Grading for next session: cont as tolerated    VINI Swanson   9/24/2024

## 2024-09-23 NOTE — PROGRESS NOTES
[facility]    Subjective:      Patient: Teddy Ennis Patient PCP: Doni Wood MD         :  1975     Sex:  male      MRN:  [id number]          Date of Visit: 2024      Chief Complaint: Dizziness (Triggered by sinus per pt.), Ear Drainage, and Tinnitus      Patient ID: Teddy Ennis is a 49 y.o. male     Patient is a  lifelong NON-smoker with a past medical history of asthma, HTN, type 2 diabetes with a prior history of sinus surgery referred to me by Dr. Doni Wood in consultation for dizziness ear drainage and tinnitus.  Tinnitus is bilateral nonpulsatile and high-pitched an attributed to his headache.  He also feels a certain degree of dizziness with a worsening of the headache.    Patient has been complaining of a headache which he attributes to sinusitis since May.  He has a history of headaches with dizziness in the past and migraines which is all has been treated over-the-counter.  He does report discolored drainage, postnasal drip, intermittent loss of smell and nasal obstruction.  He points to the temples as well as the maxilla and periorbital areas in terms of the distribution of his headache.    Audiogram today without prior audio for comparison shows normal sloping to moderately severe-severe sensorineural hearing loss bilaterally left-greater-than-right.  Left asymmetry of only 5-10 dB other than at 8000 hertz where there is an asymmetry 15-20 decibels.  Speech audiometry as symmetric at 20 dB SRT and 100% discrimination bilaterally with normal type a tympanograms bilaterally.    No MRI.    2023 CT sinuses Images reviewed shows some cavitary changes /lucency of the roots of the right posterior maxillary molars.  There is circumferential significant thickening of the left maxillary sinus and a left-greater-than-right jim bullosa no evidence of prior ethmoid surgery and no evidence of any active mucoperiosteal thickening of the paranasal sinuses other than the  left maxillary.  Sphenoid sinuses visibly open middle ears without appreciable effusion.  Included mastoids without opacification or destructive process.  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                  Labs:  WBC   Date Value Ref Range Status   07/24/2024 10.09 3.90 - 12.70 K/uL Final     Hemoglobin   Date Value Ref Range Status   07/24/2024 16.1 14.0 - 18.0 g/dL Final     Platelets   Date Value Ref Range Status   07/24/2024 340 150 - 450 K/uL Final     Creatinine   Date Value Ref Range Status   08/22/2024 0.8 0.5 - 1.4 mg/dL Final     TSH   Date Value Ref Range Status   11/26/2019 1.351 0.400 - 4.000 uIU/mL Final     Hemoglobin A1C   Date Value Ref Range Status   11/22/2023 5.9 (H) 4.0 - 5.6 % Final     Comment:     ADA Screening Guidelines:  5.7-6.4%  Consistent with prediabetes  >or=6.5%  Consistent with diabetes    High levels of fetal hemoglobin interfere with the HbA1C  assay. Heterozygous hemoglobin variants (HbS, HgC, etc)do  not significantly interfere with this assay.   However, presence of multiple variants may affect accuracy.         Past Medical History  He has a past medical history of Allergy and Asthma.    Family / Surgical / Social History  His family history includes Hypertension in his mother; No Known Problems in his father; Stroke in his mother.    Past Surgical History:   Procedure Laterality Date    ADENOIDECTOMY      ANKLE SURGERY Left 1995    SINUS SURGERY         Social History     Tobacco Use    Smoking status: Never     Passive exposure: Never    Smokeless tobacco: Never   Substance and Sexual Activity    Alcohol use: Not Currently    Drug use: Never    Sexual activity: Not Currently     Partners: Female       Medications  He has a current medication list which includes the following prescription(s): albuterol, albuterol, amlodipine, azelastine, breo ellipta, escitalopram oxalate,  "fexofenadine, fluticasone propionate, ibuprofen, magnesium sulfate in water, meloxicam, montelukast, and pravastatin.      Allergies  Review of patient's allergies indicates:   Allergen Reactions    Ibuprofen Hives    Penicillins Hives       All medications, allergies, and past history have been reviewed.    Objective:      Vitals:      8/20/2024    10:08 AM 8/22/2024     2:54 PM 9/23/2024    10:32 AM   Vitals - 1 value per visit   SYSTOLIC 114 120    DIASTOLIC 73 74    Pulse 73 90    SPO2  96 %    Weight (lb) 305.34 301.81    Weight (kg) 138.5 136.9    Height 6' 1" (1.854 m) 6' 1" (1.854 m)    BMI (Calculated) 40.3 39.8    Pain Score Five Five Zero       There is no height or weight on file to calculate BSA.    Physical Exam:    GENERAL  APPEARANCE -  alert, appears stated age, cooperative, and moderately obese  BARRIER(S) TO COMMUNICATION -  none VOICE - appropriate for age and gender    INTEGUMENTARY  no suspicious head and neck lesions    HEENT  HEAD: Normocephalic, without obvious abnormality, atraumatic  FACE: INSPECTION - Symmetric, no signs of trauma, no suspicious lesion(s)      STRENGTH - facial symmetry intact     PALPATION -  No masses     SALIVARY GLANDS - non-tender with no appreciable mass    NECK/THYROID: normal atraumatic, no neck masses, normal thyroid, no jvd    EYES  Normal occular alignment and mobility with no visible nystagmus at rest    EARS/NOSE/MOUTH/THROAT  EARS  PINNAE AND EXTERNAL EARS - no suspicious lesion OTOSCOPIC EXAM (surgical microscopy was not used for visualization/instrumentation): EAR EXAM - Normal ear canals, tympanic membranes and mobility, and middle ear spaces bilaterally.  HEARING - grossly intact to voice/finger rub    NOSE AND SINUSES  EXTERNAL NOSE - Grossly normal for age/sex  SEPTUM - normal/no obstruction on anterior exam without decongestion TURBINATES - within normal limits MUCOSA - mild congestion.  Some drainage on the left side which appears clear.      MOUTH " AND THROAT   ORAL CAVITY, LIPS, TEETH, GUMS & TONGUE - moist, no suspicious lesions  OROPHARYNX /TONSILS/PHARYNGEAL WALLS/HYPOPHARYNX - no erythema or exudates  NASOPHARYNX - limited mirror exam - unable to visualize due to anatomy/gag  LARYNX -  - limited mirror exam - unable to visualize due to anatomy/gag      CHEST AND LUNG   INSPECTION & AUSCULTATION - normal effort, no stridor    CARDIOVASCULAR  AUSCULTATION & PERIPHERAL VASCULAR - regular rate and rhythm.    NEUROLOGIC  MENTAL STATUS - alert, interactive CRANIAL NERVES - normal    LYMPHATIC  HEAD AND NECK - non-palpable; SUPRACLAVICULAR - deferred      Procedure(s):  None          Assessment:      Problem List Items Addressed This Visit    None  Visit Diagnoses       New daily persistent headache    -  Primary    Dizziness        Chronic sinusitis, unspecified location        Dental caries                     Plan:      Saline sprays rinses and nasal steroids.  A steroid Dosepak and a 10 day course of antibiotic (doxycycline) as discussed and prescribed.      If headaches or worsening follow up with Neurology.  Referral provided today.      If the sinus issues persists complete a CT scan and return for further evaluation.    Seek dental care for abnormal CT findings of the teeth from 2023.  A certain degree of chronic dental infection maybe contributing to sinus complaints, headaches and dizziness.      Return with any worsening of symptoms, failure to improve, or any other concerns for further evaluation and treatment.              Voice recognition software was used in the creation of this note/communication and any sound-alike errors which may have occurred from its use should be taken in context when interpreting.  If such errors prevent a clear understanding of the note/communication, please contact the office for clarification.

## 2024-09-23 NOTE — PROGRESS NOTES
Audiologic Evaluation 9/23/2024:       Teddy Ennis, a 49 y.o. male, was seen today in the clinic for an audiologic evaluation for vertigo.  Mr. Ennis reported dizziness, headache, tinnitus and hearing loss for approximately the past year. He also noted his hears drain frequently.      Tympanometry revealed Type A tympanogram in the right ear and Type A tympanogram in the left ear. Audiogram results revealed normal hearing sloping to moderate sensorineural hearing loss in the right ear and normal hearing sloping to severe sensorineural hearing loss in the left ear.  Speech reception thresholds were noted at 20 dB in the right ear and 20 dB in the left ear.  Speech discrimination scores were 100% in the right ear and 100% in the left ear. Reliability for pure tone testing is considered fair, as the patient required frequent re-instruction.     Recommendations:  Otologic evaluation  Hearing aid consultation  Annual audiogram  Hearing protection when in noise

## 2024-09-24 ENCOUNTER — CLINICAL SUPPORT (OUTPATIENT)
Dept: REHABILITATION | Facility: HOSPITAL | Age: 49
End: 2024-09-24
Payer: MEDICARE

## 2024-09-24 DIAGNOSIS — M25.531 RIGHT WRIST PAIN: Primary | ICD-10-CM

## 2024-09-24 DIAGNOSIS — R29.898 DECREASED GRIP STRENGTH OF RIGHT HAND: ICD-10-CM

## 2024-09-24 PROCEDURE — 97112 NEUROMUSCULAR REEDUCATION: CPT | Mod: KX,CO

## 2024-09-24 PROCEDURE — 97140 MANUAL THERAPY 1/> REGIONS: CPT | Mod: KX,CO

## 2024-09-24 PROCEDURE — 97022 WHIRLPOOL THERAPY: CPT | Mod: KX,CO

## 2024-09-26 ENCOUNTER — CLINICAL SUPPORT (OUTPATIENT)
Dept: REHABILITATION | Facility: HOSPITAL | Age: 49
End: 2024-09-26
Payer: MEDICARE

## 2024-09-26 DIAGNOSIS — R29.898 DECREASED GRIP STRENGTH OF RIGHT HAND: ICD-10-CM

## 2024-09-26 DIAGNOSIS — M25.531 RIGHT WRIST PAIN: Primary | ICD-10-CM

## 2024-09-26 PROCEDURE — 97112 NEUROMUSCULAR REEDUCATION: CPT | Mod: KX

## 2024-09-26 PROCEDURE — 97140 MANUAL THERAPY 1/> REGIONS: CPT | Mod: KX

## 2024-09-26 PROCEDURE — 97018 PARAFFIN BATH THERAPY: CPT | Mod: KX

## 2024-09-26 NOTE — PROGRESS NOTES
[facility]  Occupational Therapy Treatment Note     Date: 9/26/2024  Name: Teddy Ennis  Clinic Number: [id number]    Therapy Diagnosis:   Encounter Diagnoses   Name Primary?    Right wrist pain Yes    Decreased  strength of right hand            Physician: Jose M Desai MD    Referring Physician: Jose M Desai MD  Physician Orders: Eval and Treat  Date of Return to MD: TBD     Date of Injury: over a year ago  Date of Surgery: NA     Evaluation Date: 8/15/2024  Authorization Period: 8/13/24 - 09/26/24  Plan of Care Expiration: 11/7/24  Visit #/ Visits Authorized: 25 of 40  FOTO Completion: Initial eval (8/15/2024)  FOTO #2:  FOTO #3:     Time In: 2:04 pm  Time Out: 2:59 pm  Total Billable Time: 55 min     Precautions: Standard    Subjective     Patient reports: Work activities bothered his wrist today  He was compliant with home exercise program given last session.   Response to previous treatment: good   Functional change: wearing wrist brace     Pain: 4/10  Location: right wrists      Objective     Mental status: alert, oriented x3     Observation/Appearance:   No swelling or atrophy noted     Sensation: Patient denies numbness/tingling        ELBOW, WRIST RANGE OF MOTION:   Measured in degrees of active motion with goniometer    Right  8/15/2024 Left  8/15/2024 Right  9/5/2024   Elbow Extension/Flexion WNL WNL NT   Pronation/Supination WNL WNL NT   Wrist Extension/Flexion 65/50 70/50 55/30  Post-Treatment:  NT   Ulnar/Radial Deviation 30/15 30/15 NT   Composite Wrist Flexion 10 40 15         STRENGTH: (Measured in pounds using a Dynamometer and pinch meter)    Right  8/15/2024 Left  8/15/2024 Right  9/5/2024    Setting 2 25, 20, 20* 75, 80, 75 42;55;50    Average 22 lb 77 lb 49 lb   *pain throughout hand        Special Testing:  ECU synergy: pain     MMT EDC: 3/5, with pain dorsal hand     MMT ECRL/ECRB: 4/5 pain dorsal hand  MMT ECU: 4/5, pain     MMT FCU: 4/5,  "pain dorsal wrist  MMT FCR: 4/5, pain dorsal wrist        Intake Outcome Measure for FOTO Initial Evaluation Survey     Therapist reviewed FOTO scores for Teddy Ennis on 8/15/2024.   FOTO documents entered into [employer] - see Media section.     Intake Score: 45%          Treatment     Teddy received the treatments listed below:      Teddy received the following supervised modalities after being cleared for contradictions for 10 minutes:   Fluidotherapy: To R for 10 min, continuous air, 110 deg, air speed 100 to decrease pain, edema & scar tissue and increased tissue extensibility.    Patient received paraffin bath to the right hand(s) for 10 minutes to increase blood flow, circulation, pain management and for tissue elasticity prior to therex.   (NT)    manual therapy techniques: Soft tissue Mobilization were applied to the: R wrist for 8 minutes, including:  STM with vibration tool to dorsal and volar wrist   Wrist mobilizations - distraction with minor oscillation   Intrinsic stretching x 5 with 10" holds for digits 2-5. (NT)    neuromuscular re-education activities to improve: Kinesthetic and Proprioception for 37 minutes. The following activities were included:  Comp flex wrist stretch x 10 with 10" holds over wedge  Wrist extensor stretch and glide x 10 (elbow flex to extension with WE stretch)   Juxacisor x 3' (NT)  LLPS with 3.5 lb and MH x 5' - prone with wrist flexion over plinth at end of session (NT)  Prayer stretch 30 sec x 3  Roll and stretch over green flexbar x 2 min   Green flexbar isometrics, flex and ext x 10 ea   3 corners Green flexbar bends x 2 min (no RD)  Oscillations with green flexbar x 2 min    Alphabet ball spelling full name -  NT  Pink Frisbee 2 ways supinated x 1 min ea  - NT  Dart throwers slow with water weight x 30  [place] stick yellow ball yellow level name on table - NT  Red power web push x 20  PRE - WF/RD with 1#: 2 x 10  - WE eccentric with 1# : 2 x 10  - supinator " eccentric with 1# x 10  K tape dorsal and volar FA, pisiform boost, light touch used     Patient Education and Home Exercises     Education provided:   - cont HEP  - Progress towards goals     Written Home Exercises Provided: Pt instructed to continue prior HEP.  Exercises were reviewed and Teddy was able to demonstrate them prior to the end of the session.  Teddy demonstrated good  understanding of the home exercise program provided. See electronic medical record under Patient Instructions for exercises provided during therapy sessions.       Assessment     Pt tolerated session well. Able to progress with green flexbar with less breaks today.  Noted intrinsic tightness this date. Therapist is to update objective measurements next session.     Teddy is progressing well towards his goals and there are no updates to goals at this time. Pt prognosis is Good.     Patient will continue to benefit from skilled outpatient occupational therapy to address the deficits listed in the problem list on initial evaluation provide patient/family education and to maximize patient's level of independence in the home and community environment.     Patient's spiritual, cultural and educational needs considered and patient agreeable to plan of care and goals.    Anticipated barriers to occupational therapy: none     Goals:  Long Term Goals (to be met by discharge):  Date Goal Met:       1.) Teddy Ennis will demonstrate significantly improved functional performance from re-assessment as measured by a FOTO Intake score of more than 65.     Goal Status:   In progress     2.) Teddy Ennis will return to near to prior level of function for ADLs and household management reporting independence or modified independence.     Goal Status:   In progress     3. Teddy Ennis will report pain 2 out of 10 at worst to increase functional use of affected hand for work and leisure tasks.     Goal Status:   In progress      Short  Term Goals (to be met by 9/15/24):  Date Goal Met:       Teddy Ennis will be independent with home exercise program with written instructions.     Goal Status:   In progress     Teddy Ennis will demonstrate 25 degrees of composite wrist flexion to improve functional performance in ADLs/work/leisure tasks.     Goal Status:   In progress     Teddy Ennis will demonstrate within 20 lbs of  strength compared to unaffected hand to improve functional grasp for ADLs/work/leisure tasks.     Goal Status:   In progress     Teddy Ennis will demonstrate the ability to complete ADL/IADL tasks with 4/10 pain.     Goal Status:   In progress     Teddy Ennis will be able to resume significantly greater occupational roles independently or modified as demonstrated by a FOTO Intake score of more than 55.     Goal Status:   In progress        Plan     Updates/Grading for next session: cont as tolerated    Mik Alexander OT   9/26/2024

## 2024-10-01 ENCOUNTER — CLINICAL SUPPORT (OUTPATIENT)
Dept: REHABILITATION | Facility: HOSPITAL | Age: 49
End: 2024-10-01
Payer: MEDICARE

## 2024-10-01 DIAGNOSIS — M25.531 RIGHT WRIST PAIN: Primary | ICD-10-CM

## 2024-10-01 DIAGNOSIS — R29.898 DECREASED GRIP STRENGTH OF RIGHT HAND: ICD-10-CM

## 2024-10-01 PROCEDURE — 97140 MANUAL THERAPY 1/> REGIONS: CPT | Mod: KX

## 2024-10-01 PROCEDURE — 97018 PARAFFIN BATH THERAPY: CPT | Mod: KX

## 2024-10-01 PROCEDURE — 97112 NEUROMUSCULAR REEDUCATION: CPT | Mod: KX

## 2024-10-01 NOTE — PROGRESS NOTES
-- DO NOT REPLY / DO NOT REPLY ALL --  -- Message is from [facility] ([facility]) --    General Patient Message: Was told to schedule with you after you put on holter monitor and was told to follow up with you or your APN for results.     Caller Information         Type Contact Phone/Fax    02/20/2024 01:04 PM CST Phone (Incoming) Dana Higgins (Self) [phone] (M)          Alternative phone number: No    Can a detailed message be left? Yes    Message Turnaround:     Is it Working Hours? Yes - Working Hours                      [facility]  Occupational Therapy Treatment Note     Date: 10/1/2024  Name: Teddy Ennis  Clinic Number: [id number]    Therapy Diagnosis:   Encounter Diagnoses   Name Primary?    Right wrist pain Yes    Decreased  strength of right hand      Physician: Jose M Desai MD    Referring Physician: Jose M Desai MD  Physician Orders: Eval and Treat  Date of Return to MD: CHAI     Date of Injury: over a year ago  Date of Surgery: NA     Evaluation Date: 8/15/2024  Authorization Period: 8/13/24 - 09/26/24  Plan of Care Expiration: 11/7/24  Visit #/ Visits Authorized: 25 of 40  FOTO Completion: Initial eval (8/15/2024)  FOTO #2:  FOTO #3:     Time In: 2:00 pm  Time Out: 2:55 pm  Total Billable Time: 55 min     Precautions: Standard    Subjective     Patient reports: Work activities bothered his wrist today but he feels his anti-inflammatory medication has improved his symptoms lately.   He was compliant with home exercise program given last session.   Response to previous treatment: good   Functional change: decreased brace wear, except during work activity    Pain: 4/10  Location: right wrists      Objective     Mental status: alert, oriented x3     Observation/Appearance:   No swelling or atrophy noted     Sensation: Patient denies numbness/tingling        ELBOW, WRIST RANGE OF MOTION:   Measured in degrees of active motion with goniometer    Right  8/15/2024 Left  8/15/2024 Right  9/5/2024 Right  10/1/2024   Elbow Extension/Flexion WNL WNL NT WNL   Pronation/Supination WNL WNL NT WNL   Wrist Extension/Flexion 65/50 70/50 55/30  Post-Treatment:  NT 65/52  Post:   Ulnar/Radial Deviation 30/15 30/15 NT 25/10   Composite Wrist Flexion 10 40 15 36         STRENGTH: (Measured in pounds using a Dynamometer and pinch meter)    Right  8/15/2024 Left  8/15/2024 Right  9/5/2024 Right  10/1/2024    Setting 2 25, 20, 20* 75, 80, 75 42;55;50 55; 70; 80    Average 22 lb 77 lb 49 lb 68 lb  "  *pain throughout hand        Special Testing:  ECU synergy: pain     MMT EDC: 3/5, with pain dorsal hand     MMT ECRL/ECRB: 4/5 pain dorsal hand  MMT ECU: 4/5, pain     MMT FCU: 4/5, pain dorsal wrist  MMT FCR: 4/5, pain dorsal wrist        Intake Outcome Measure for FOTO Initial Evaluation Survey     Therapist reviewed FOTO scores for Teddy Ennis on 8/15/2024.   FOTO documents entered into [employer] - see Media section.     Intake Score: 45%          Treatment     Teddy received the treatments listed below:      Teddy received the following supervised modalities after being cleared for contradictions for 10 minutes:   Fluidotherapy: To R for 10 min, continuous air, 110 deg, air speed 100 to decrease pain, edema & scar tissue and increased tissue extensibility.- NT    Patient received paraffin bath to the right hand(s) for 10 minutes to increase blood flow, circulation, pain management and for tissue elasticity prior to therex.      manual therapy techniques: Soft tissue Mobilization were applied to the: R wrist for 8 minutes, including:  STM with vibration tool to dorsal and volar wrist   Wrist mobilizations - distraction with minor oscillation   Intrinsic stretching x 5 with 10" holds for digits 2-5. (NT)    neuromuscular re-education activities to improve: Kinesthetic and Proprioception for 37 minutes. The following activities were included:  Comp flex wrist stretch x 10 with 10" holds over wedge  Wrist extensor stretch and glide x 10 (elbow flex to extension with WE stretch)   Juxacisor x 3' (NT)  LLPS with 3.5 lb and MH x 5' - prone with wrist flexion over plinth at end of session (NT)  Prayer stretch 30 sec x 3  Roll and stretch over green flexbar x 2 min  - NT  Green flexbar isometrics, flex and ext x 10 ea   3 corners Green flexbar bends x 2 min (no RD)  Oscillations with green flexbar x 2 min  - NT  Pink Frisbee 2 ways supinated x 1 min ea  - NT  Dart throwers slow with water weight x 30  [place] " stick yellow ball yellow level name on table - NT  Red power web push/pull x 20  PRE - WF/RD with 1#: 2 x 10  - WE eccentric with 1# : 2 x 10  - supinator eccentric with 1# x 10  K tape dorsal and volar FA, pisiform boost, light touch used   - objective measurements re-assessed this date.     Patient Education and Home Exercises     Education provided:   - cont HEP  - Progress towards goals     Written Home Exercises Provided: Pt instructed to continue prior HEP.  Exercises were reviewed and Teddy was able to demonstrate them prior to the end of the session.  Teddy demonstrated good  understanding of the home exercise program provided. See electronic medical record under Patient Instructions for exercises provided during therapy sessions.       Assessment     Pt tolerated session well. Able to progress with green flexbar with less breaks today. Improved range of motion and strength measurements this date.     Teddy is progressing well towards his goals and there are no updates to goals at this time. Pt prognosis is Good.     Patient will continue to benefit from skilled outpatient occupational therapy to address the deficits listed in the problem list on initial evaluation provide patient/family education and to maximize patient's level of independence in the home and community environment.     Patient's spiritual, cultural and educational needs considered and patient agreeable to plan of care and goals.    Anticipated barriers to occupational therapy: none     Goals:  Long Term Goals (to be met by discharge):  Date Goal Met:       1.) Teddy Ennis will demonstrate significantly improved functional performance from re-assessment as measured by a FOTO Intake score of more than 65.     Goal Status:   In progress     2.) Teddy Ennis will return to near to prior level of function for ADLs and household management reporting independence or modified independence.     Goal Status:   In progress     3.  Teddy Ennis will report pain 2 out of 10 at worst to increase functional use of affected hand for work and leisure tasks.     Goal Status:   In progress      Short Term Goals (to be met by 9/15/24):  Date Goal Met:       Teddy Ennis will be independent with home exercise program with written instructions.     Goal Status:   In progress     Teddy Ennis will demonstrate 25 degrees of composite wrist flexion to improve functional performance in ADLs/work/leisure tasks.     Goal Status:   In progress     Teddy Ennis will demonstrate within 20 lbs of  strength compared to unaffected hand to improve functional grasp for ADLs/work/leisure tasks.     Goal Status:   In progress     Teddy Ennis will demonstrate the ability to complete ADL/IADL tasks with 4/10 pain.     Goal Status:   In progress     Teddy Ennis will be able to resume significantly greater occupational roles independently or modified as demonstrated by a FOTO Intake score of more than 55.     Goal Status:   In progress        Plan     Updates/Grading for next session: cont as tolerated    Mik Alexander OT   10/1/2024

## 2024-10-07 ENCOUNTER — TELEPHONE (OUTPATIENT)
Dept: OTOLARYNGOLOGY | Facility: CLINIC | Age: 49
End: 2024-10-07
Payer: MEDICARE

## 2024-10-07 NOTE — TELEPHONE ENCOUNTER
----- Message from Caty sent at 10/7/2024  2:06 PM CDT -----  Type:  Needs Medical Advice    Who Called:  Pt  Symptoms (please be specific):  would like to get a call back, states he is still having ringing in the ear   Would the patient rather a call back or a response via [facility]? call  Best Call Back Number:  [phone]  Additional Information:

## 2024-10-07 NOTE — PROGRESS NOTES
"[facility]  Occupational Therapy Treatment Note     Date: 10/8/2024  Name: Teddy Ennis  Clinic Number: [id number]    Therapy Diagnosis:   No diagnosis found.    Physician: Jose M Desai MD    Referring Physician: Jose M Desai MD  Physician Orders: Eval and Treat  Date of Return to MD: TBJUSTIN     Date of Injury: over a year ago  Date of Surgery: NA     Evaluation Date: 8/15/2024  Authorization Period: 8/13/24 - 09/26/24  Plan of Care Expiration: 11/7/24  Visit #/ Visits Authorized: 27 of 40  FOTO Completion: Initial eval (8/15/2024)  FOTO #2:  FOTO #3:     Time In: 1:45 pm  Time Out: 2:38 pm  Total Billable Time: 53 min     Precautions: Standard    Subjective     Patient reports: "I hurt my wrist on Wednesday, my wrist stretched back and my whole hand went numb."   He was compliant with home exercise program given last session.   Response to previous treatment: good   Functional change: decreased brace wear, except during work activity    Pain: 4/10  Location: right wrists      Objective     Mental status: alert, oriented x3     Observation/Appearance:   No swelling or atrophy noted     Sensation: Patient denies numbness/tingling        ELBOW, WRIST RANGE OF MOTION:   Measured in degrees of active motion with goniometer    Right  8/15/2024 Left  8/15/2024 Right  9/5/2024 Right  10/1/2024   Elbow Extension/Flexion WNL WNL NT WNL   Pronation/Supination WNL WNL NT WNL   Wrist Extension/Flexion 65/50 70/50 55/30  Post-Treatment:  NT 65/52  Post:   Ulnar/Radial Deviation 30/15 30/15 NT 25/10   Composite Wrist Flexion 10 40 15 36         STRENGTH: (Measured in pounds using a Dynamometer and pinch meter)    Right  8/15/2024 Left  8/15/2024 Right  9/5/2024 Right  10/1/2024    Setting 2 25, 20, 20* 75, 80, 75 42;55;50 55; 70; 80    Average 22 lb 77 lb 49 lb 68 lb   *pain throughout hand        Special Testing:  ECU synergy: pain     MMT EDC: 3/5, with pain dorsal hand     MMT " "ECRL/ECRB: 4/5 pain dorsal hand  MMT ECU: 4/5, pain     MMT FCU: 4/5, pain dorsal wrist  MMT FCR: 4/5, pain dorsal wrist        Intake Outcome Measure for FOTO Initial Evaluation Survey     Therapist reviewed FOTO scores for Teddy Ennis on 8/15/2024.   FOTO documents entered into [employer] - see Media section.     Intake Score: 45%          Treatment     Teddy received the treatments listed below:      Teddy received the following supervised modalities after being cleared for contradictions for 10 minutes:   Fluidotherapy: To R for 10 min, continuous air, 110 deg, air speed 100 to decrease pain, edema & scar tissue and increased tissue extensibility.- NT    Patient received paraffin bath to the right hand(s) for 10 minutes to increase blood flow, circulation, pain management and for tissue elasticity prior to therex.      manual therapy techniques: Soft tissue Mobilization were applied to the: R wrist for 8 minutes, including:  STM with vibration tool to dorsal and volar wrist   Wrist mobilizations - distraction with minor oscillation (NT)  Intrinsic stretching x 5 with 10" holds for digits 2-5. (NT)    neuromuscular re-education activities to improve: Kinesthetic and Proprioception for 35 minutes. The following activities were included:  Comp flex wrist stretch x 10 with 10" holds over wedge (NT)  Wrist extensor stretch and glide x 10 (elbow flex to extension with WE stretch)   Juxacisor x 3'   LLPS with 3.5 lb and MH x 5' - prone with wrist flexion over plinth at end of session (NT)  Prayer stretch 30 sec x 3 (NT)  Roll and stretch over green flexbar x 2 min   Green flexbar isometrics, flex and ext x 10 ea  (NT)  3 corners Green flexbar bends x 2 min (no RD)  Oscillations with green flexbar x 2 min  - NT  Pink Frisbee 2 ways supinated x 1 min ea   Dart throwers slow with water weight x 30  [place] stick yellow ball yellow level name on table - NT  Red power web push/pull x 20 (NT)  PRE - WF/RD with 1#: 2 x 10 " (NT)  - WE eccentric with 1# : 2 x 10 (NT)  - supinator eccentric with mallet # x 10  K tape dorsal and volar FA, pisiform boost      Patient Education and Home Exercises     Education provided:   - cont HEP  - Progress towards goals     Written Home Exercises Provided: Pt instructed to continue prior HEP.  Exercises were reviewed and Teddy was able to demonstrate them prior to the end of the session.  Teddy demonstrated good  understanding of the home exercise program provided. See electronic medical record under Patient Instructions for exercises provided during therapy sessions.       Assessment     Pt tolerated session well. C/o tightness in his wrist and fingers.     Teddy is progressing well towards his goals and there are no updates to goals at this time. Pt prognosis is Good.     Patient will continue to benefit from skilled outpatient occupational therapy to address the deficits listed in the problem list on initial evaluation provide patient/family education and to maximize patient's level of independence in the home and community environment.     Patient's spiritual, cultural and educational needs considered and patient agreeable to plan of care and goals.    Anticipated barriers to occupational therapy: none     Goals:  Long Term Goals (to be met by discharge):  Date Goal Met:       1.) Teddy Ennis will demonstrate significantly improved functional performance from re-assessment as measured by a FOTO Intake score of more than 65.     Goal Status:   In progress     2.) Teddy Ennis will return to near to prior level of function for ADLs and household management reporting independence or modified independence.     Goal Status:   In progress     3. Teddy Ennis will report pain 2 out of 10 at worst to increase functional use of affected hand for work and leisure tasks.     Goal Status:   In progress      Short Term Goals (to be met by 9/15/24):  Date Goal Met:       Yasmany Raymon  will be independent with home exercise program with written instructions.     Goal Status:   In progress     Teddy Ennis will demonstrate 25 degrees of composite wrist flexion to improve functional performance in ADLs/work/leisure tasks.     Goal Status:   In progress     Teddy Ennis will demonstrate within 20 lbs of  strength compared to unaffected hand to improve functional grasp for ADLs/work/leisure tasks.     Goal Status:   In progress     Teddy Ennis will demonstrate the ability to complete ADL/IADL tasks with 4/10 pain.     Goal Status:   In progress     Teddy Ennis will be able to resume significantly greater occupational roles independently or modified as demonstrated by a FOTO Intake score of more than 55.     Goal Status:   In progress        Plan     Updates/Grading for next session: cont as tolerated    VINI Swanson   10/8/2024

## 2024-10-08 ENCOUNTER — CLINICAL SUPPORT (OUTPATIENT)
Dept: REHABILITATION | Facility: HOSPITAL | Age: 49
End: 2024-10-08
Payer: MEDICARE

## 2024-10-08 DIAGNOSIS — M25.531 RIGHT WRIST PAIN: Primary | ICD-10-CM

## 2024-10-08 DIAGNOSIS — R29.898 DECREASED GRIP STRENGTH OF RIGHT HAND: ICD-10-CM

## 2024-10-08 PROCEDURE — 97022 WHIRLPOOL THERAPY: CPT | Mod: KX,CO

## 2024-10-08 PROCEDURE — 97140 MANUAL THERAPY 1/> REGIONS: CPT | Mod: KX,CO

## 2024-10-08 PROCEDURE — 97112 NEUROMUSCULAR REEDUCATION: CPT | Mod: KX,CO

## 2024-10-09 ENCOUNTER — HOSPITAL ENCOUNTER (OUTPATIENT)
Dept: RADIOLOGY | Facility: HOSPITAL | Age: 49
Discharge: HOME | End: 2024-10-09
Attending: OTOLARYNGOLOGY
Payer: MEDICARE

## 2024-10-09 DIAGNOSIS — J32.9 CHRONIC SINUSITIS, UNSPECIFIED LOCATION: ICD-10-CM

## 2024-10-09 PROCEDURE — 70486 CT MAXILLOFACIAL W/O DYE: CPT | Mod: 26,HCNC,, | Performed by: RADIOLOGY

## 2024-10-09 PROCEDURE — 70486 CT MAXILLOFACIAL W/O DYE: CPT | Mod: TC,HCNC

## 2024-10-09 NOTE — TELEPHONE ENCOUNTER
----- Message from Caryl sent at 10/9/2024  4:34 PM CDT -----  Contact: [phone]  Type: Returning a call    Who left a message?Maria Ines Willett LPN    When did the practice call?10/8/24    Does patient know what this is regarding:    Would the patient rather a call back or a response via [facility]?callback    Comments:

## 2024-10-10 ENCOUNTER — CLINICAL SUPPORT (OUTPATIENT)
Dept: REHABILITATION | Facility: HOSPITAL | Age: 49
End: 2024-10-10
Payer: MEDICARE

## 2024-10-10 DIAGNOSIS — M25.531 RIGHT WRIST PAIN: Primary | ICD-10-CM

## 2024-10-10 DIAGNOSIS — R29.898 DECREASED GRIP STRENGTH OF RIGHT HAND: ICD-10-CM

## 2024-10-10 PROCEDURE — 97112 NEUROMUSCULAR REEDUCATION: CPT | Mod: KX,CO

## 2024-10-10 PROCEDURE — 97022 WHIRLPOOL THERAPY: CPT | Mod: KX,CO

## 2024-10-10 NOTE — PROGRESS NOTES
[facility]  Occupational Therapy Treatment Note     Date: 10/10/2024  Name: Teddy Ennis  Clinic Number: [id number]    Therapy Diagnosis:   Encounter Diagnoses   Name Primary?    Right wrist pain Yes    Decreased  strength of right hand        Physician: Jose M Desai MD    Referring Physician: Jose M Desai MD  Physician Orders: Eval and Treat  Date of Return to MD: TBD     Date of Injury: over a year ago  Date of Surgery: NA     Evaluation Date: 8/15/2024  Authorization Period: 8/13/24 - 09/26/24  Plan of Care Expiration: 11/7/24  Visit #/ Visits Authorized: 28 of 40  FOTO Completion: Initial eval (8/15/2024)  FOTO #2:  FOTO #3:     Time In: 1:54 pm  Time Out: 2:50 pm  Total Billable Time: 56 min     Precautions: Standard    Subjective     Patient reports: feeling better since he hurt it, wearing the brace more often   He was compliant with home exercise program given last session.   Response to previous treatment: good   Functional change: decreased brace wear, except during work activity    Pain: 4/10  Location: right wrists      Objective     Mental status: alert, oriented x3     Observation/Appearance:   No swelling or atrophy noted     Sensation: Patient denies numbness/tingling        ELBOW, WRIST RANGE OF MOTION:   Measured in degrees of active motion with goniometer    Right  8/15/2024 Left  8/15/2024 Right  9/5/2024 Right  10/1/2024   Elbow Extension/Flexion WNL WNL NT WNL   Pronation/Supination WNL WNL NT WNL   Wrist Extension/Flexion 65/50 70/50 55/30  Post-Treatment:  NT 65/52  Post:   Ulnar/Radial Deviation 30/15 30/15 NT 25/10   Composite Wrist Flexion 10 40 15 36         STRENGTH: (Measured in pounds using a Dynamometer and pinch meter)    Right  8/15/2024 Left  8/15/2024 Right  9/5/2024 Right  10/1/2024    Setting 2 25, 20, 20* 75, 80, 75 42;55;50 55; 70; 80    Average 22 lb 77 lb 49 lb 68 lb   *pain throughout hand        Special Testing:  ECU  "synergy: pain     MMT EDC: 3/5, with pain dorsal hand     MMT ECRL/ECRB: 4/5 pain dorsal hand  MMT ECU: 4/5, pain     MMT FCU: 4/5, pain dorsal wrist  MMT FCR: 4/5, pain dorsal wrist        Intake Outcome Measure for FOTO Initial Evaluation Survey     Therapist reviewed FOTO scores for Teddy Ennis on 8/15/2024.   FOTO documents entered into [employer] - see Media section.     Intake Score: 45%          Treatment     Teddy received the treatments listed below:      Teddy received the following supervised modalities after being cleared for contradictions for 10 minutes:   Fluidotherapy: To R for 10 min, continuous air, 110 deg, air speed 100 to decrease pain, edema & scar tissue and increased tissue extensibility.    manual therapy techniques: Soft tissue Mobilization were applied to the: R wrist for 10 minutes, including:  STM with vibration ball dorsal and volar wrist   Wrist mobilizations - distraction with minor oscillation (NT)  Intrinsic stretching x 5 with 10" holds for digits 2-5.    neuromuscular re-education activities to improve: Kinesthetic and Proprioception for 36 minutes. The following activities were included:  Comp flex wrist stretch x 10 with 10" holds over wedge (NT)  Wrist extensor stretch and glide x 10 (elbow flex to extension with WE stretch)   Juxacisor x 3'   LLPS with 3.5 lb and MH x 5' - prone with wrist flexion over plinth at end of session (NT)  Prayer stretch 30 sec x 3  Roll and stretch over green flexbar x 2 min   Green flexbar isometrics, flex and ext x 1 min   3 corners Green flexbar bends x 2 min (no RD)  Oscillations with green flexbar x 2 min   Pink Frisbee 2 ways supinated x 1 min ea   Dart throwers slow with water weight x 30  [place] stick yellow ball yellow level name on table - NT  Red power web push/pull x 20 (NT)  PRE - WF/RD with 1#: 2 x 10 (NT)  - WE eccentric with 1# : 2 x 10 (NT)  - supinator eccentric with mallet # x 10  - elbow PRE with 2# 2 x 10       K tape " dorsal and volar FA, pisiform boost      Patient Education and Home Exercises     Education provided:   - cont HEP  - Progress towards goals     Written Home Exercises Provided: Pt instructed to continue prior HEP.  Exercises were reviewed and Teddy was able to demonstrate them prior to the end of the session.  Teddy demonstrated good  understanding of the home exercise program provided. See electronic medical record under Patient Instructions for exercises provided during therapy sessions.       Assessment     Pt tolerated session well. C/o tightness in his wrist and fingers.   Teddy is progressing well towards his goals and there are no updates to goals at this time. Pt prognosis is Good.     Patient will continue to benefit from skilled outpatient occupational therapy to address the deficits listed in the problem list on initial evaluation provide patient/family education and to maximize patient's level of independence in the home and community environment.     Patient's spiritual, cultural and educational needs considered and patient agreeable to plan of care and goals.    Anticipated barriers to occupational therapy: none     Goals:  Long Term Goals (to be met by discharge):  Date Goal Met:       1.) Teddy Ennis will demonstrate significantly improved functional performance from re-assessment as measured by a FOTO Intake score of more than 65.     Goal Status:   In progress     2.) Teddy Ennis will return to near to prior level of function for ADLs and household management reporting independence or modified independence.     Goal Status:   In progress     3. Teddy Ennis will report pain 2 out of 10 at worst to increase functional use of affected hand for work and leisure tasks.     Goal Status:   In progress      Short Term Goals (to be met by 9/15/24):  Date Goal Met:       Teddy Ennis will be independent with home exercise program with written instructions.     Goal Status:   In  progress     Teddy Ennis will demonstrate 25 degrees of composite wrist flexion to improve functional performance in ADLs/work/leisure tasks.     Goal Status:   In progress     Teddy Ennis will demonstrate within 20 lbs of  strength compared to unaffected hand to improve functional grasp for ADLs/work/leisure tasks.     Goal Status:   In progress     Teddy Ennis will demonstrate the ability to complete ADL/IADL tasks with 4/10 pain.     Goal Status:   In progress     Teddy Ennis will be able to resume significantly greater occupational roles independently or modified as demonstrated by a FOTO Intake score of more than 55.     Goal Status:   In progress        Plan     Updates/Grading for next session: cont as tolerated    VINI Swanson   10/10/2024

## 2024-10-11 ENCOUNTER — PATIENT MESSAGE (OUTPATIENT)
Dept: OTOLARYNGOLOGY | Facility: CLINIC | Age: 49
End: 2024-10-11
Payer: MEDICARE

## 2024-10-11 NOTE — TELEPHONE ENCOUNTER
----- Message from Raphael Harley sent at 10/11/2024  3:57 PM CDT -----  Regarding: FW: Results  Contact: [phone]    ----- Message -----  From: Yao Chase  Sent: 10/11/2024   3:43 PM CDT  To: Darian Beebe Staff  Subject: Results                                          Missed Callback         Pt returning callback from missed call. Requesting to speak with   office regarding results. Pt request call back from the number left on his voicemail. He has programmed the number in his phone so it won't go to voicemail. Please call [phone].

## 2024-10-11 NOTE — TELEPHONE ENCOUNTER
Called pt back. Call finally went thru. Pt states that the ringing in the ears seemed to worsen after finishing the steroid pack. Advised that I would send message to Dr. Farmer to see if there are any other recommendations. Advised pt that Dr. Farmer may not see my message until Monday. Also went over results note that Dr. Farmer sent pt on the CT Sinuses. (I  did schedule virtual visit for Wednesday at 1220 pm for pt to discuss further with Dr. Farmer as well.)      Thanks, Maria Ines

## 2024-10-15 ENCOUNTER — CLINICAL SUPPORT (OUTPATIENT)
Dept: REHABILITATION | Facility: HOSPITAL | Age: 49
End: 2024-10-15
Payer: MEDICARE

## 2024-10-15 DIAGNOSIS — R29.898 DECREASED GRIP STRENGTH OF RIGHT HAND: ICD-10-CM

## 2024-10-15 DIAGNOSIS — M25.531 RIGHT WRIST PAIN: Primary | ICD-10-CM

## 2024-10-15 PROCEDURE — 97112 NEUROMUSCULAR REEDUCATION: CPT | Mod: KX

## 2024-10-15 PROCEDURE — 97140 MANUAL THERAPY 1/> REGIONS: CPT | Mod: KX

## 2024-10-15 PROCEDURE — 97018 PARAFFIN BATH THERAPY: CPT | Mod: KX

## 2024-10-15 NOTE — PROGRESS NOTES
[facility]  Occupational Therapy Treatment Note     Date: 10/15/2024  Name: Teddy Ennis  Clinic Number: [id number]    Therapy Diagnosis:   Encounter Diagnoses   Name Primary?    Right wrist pain Yes    Decreased  strength of right hand        Physician: Jose M Desai MD    Referring Physician: Jose M Desai MD  Physician Orders: Eval and Treat  Date of Return to MD: TBD     Date of Injury: over a year ago  Date of Surgery: NA     Evaluation Date: 8/15/2024  Authorization Period: 8/13/24 - 09/26/24  Plan of Care Expiration: 11/7/24  Visit #/ Visits Authorized: 28 of 40  FOTO Completion: Initial eval (8/15/2024)  FOTO #2:  FOTO #3:     Time In: 1:54 pm  Time Out: 2:50 pm  Total Billable Time: 56 min     Precautions: Standard    Subjective     Patient reports: feeling better since he hurt it, wearing the brace more often   He was compliant with home exercise program given last session.   Response to previous treatment: good   Functional change: decreased brace wear, except during work activity    Pain: 4/10  Location: right wrists      Objective     Mental status: alert, oriented x3     Observation/Appearance:   No swelling or atrophy noted     Sensation: Patient denies numbness/tingling        ELBOW, WRIST RANGE OF MOTION:   Measured in degrees of active motion with goniometer    Right  8/15/2024 Left  8/15/2024 Right  9/5/2024 Right  10/1/2024   Elbow Extension/Flexion WNL WNL NT WNL   Pronation/Supination WNL WNL NT WNL   Wrist Extension/Flexion 65/50 70/50 55/30  Post-Treatment:  NT 65/52  Post:   Ulnar/Radial Deviation 30/15 30/15 NT 25/10   Composite Wrist Flexion 10 40 15 36         STRENGTH: (Measured in pounds using a Dynamometer and pinch meter)    Right  8/15/2024 Left  8/15/2024 Right  9/5/2024 Right  10/1/2024    Setting 2 25, 20, 20* 75, 80, 75 42;55;50 55; 70; 80    Average 22 lb 77 lb 49 lb 68 lb   *pain throughout hand        Special Testing:  ECU  "synergy: pain     MMT EDC: 3/5, with pain dorsal hand     MMT ECRL/ECRB: 4/5 pain dorsal hand  MMT ECU: 4/5, pain     MMT FCU: 4/5, pain dorsal wrist  MMT FCR: 4/5, pain dorsal wrist        Intake Outcome Measure for FOTO Initial Evaluation Survey     Therapist reviewed FOTO scores for Teddy Ennis on 8/15/2024.   FOTO documents entered into EPIC - see Media section.     Intake Score: 45%          Treatment     Teddy received the treatments listed below:      Teddy received the following supervised modalities after being cleared for contradictions for 10 minutes:   Fluidotherapy: To R for 10 min, continuous air, 110 deg, air speed 100 to decrease pain, edema & scar tissue and increased tissue extensibility. - NT    Patient received paraffin bath to right hand(s) for 10 minutes to increase blood flow, circulation, pain management and for tissue elasticity prior to therex.      manual therapy techniques: Soft tissue Mobilization were applied to the: R wrist for 10 minutes, including:  STM with vibration ball dorsal and volar wrist   Wrist mobilizations - distraction with minor oscillation (NT)  Intrinsic stretching x 5 with 10" holds for digits 2-5.    neuromuscular re-education activities to improve: Kinesthetic and Proprioception for 36 minutes. The following activities were included:  Wrist extensor stretch and glide x 10 (elbow flex to extension with WE stretch)   Juxacisor x 2'   LLPS with 3.5 lb and MH x 5' - prone with wrist flexion over plinth at end of session (NT)  Prayer stretch 30 sec x 3 - NT  Roll and stretch over green flexbar x 2 min  - NT  Green flexbar isometrics, flex and ext x 1 min   3 corners Green flexbar bends x 2 min (no RD)  Oscillations with green flexbar x 2 min   Pink Frisbee 2 ways supinated x 1 min ea   Dart throwers slow with water weight x 30  [place] stick yellow ball yellow level name on table - NT  Red power web push/pull x 20 (NT)  PRE - WF/RD with 1#: 2 x 10 (NT)  - WE " "eccentric with 2# : 2 x 10   - supinator eccentric with mallet x 10  - elbow PRE (sup + neutral wrist) with 2# 2 x 10   - Wrist isometrics: UD/WF x 5 with 10" holds    K tape dorsal and volar FA, pisiform boost      Patient Education and Home Exercises     Education provided:   - cont HEP  - Progress towards goals     Written Home Exercises Provided: Pt instructed to continue prior HEP.  Exercises were reviewed and Teddy was able to demonstrate them prior to the end of the session.  Teddy demonstrated good  understanding of the home exercise program provided. See electronic medical record under Patient Instructions for exercises provided during therapy sessions.       Assessment     Pt tolerated session well. C/o tightness in his wrist and fingers plus soreness following exercise. The therapist focused on stretching and tendon loading following reported soreness.   Teddy is progressing well towards his goals and there are no updates to goals at this time. Pt prognosis is Good.     Patient will continue to benefit from skilled outpatient occupational therapy to address the deficits listed in the problem list on initial evaluation provide patient/family education and to maximize patient's level of independence in the home and community environment.     Patient's spiritual, cultural and educational needs considered and patient agreeable to plan of care and goals.    Anticipated barriers to occupational therapy: none     Goals:  Long Term Goals (to be met by discharge):  Date Goal Met:       1.) Teddy Ennis will demonstrate significantly improved functional performance from re-assessment as measured by a FOTO Intake score of more than 65.     Goal Status:   In progress     2.) Teddy Ennis will return to near to prior level of function for ADLs and household management reporting independence or modified independence.     Goal Status:   In progress     3. Teddy Ennis will report pain 2 out of 10 at " worst to increase functional use of affected hand for work and leisure tasks.     Goal Status:   In progress      Short Term Goals (to be met by 9/15/24):  Date Goal Met:       Teddy Ennis will be independent with home exercise program with written instructions.     Goal Status:   In progress     Teddy Ennis will demonstrate 25 degrees of composite wrist flexion to improve functional performance in ADLs/work/leisure tasks.     Goal Status:   In progress     Teddy Ennis will demonstrate within 20 lbs of  strength compared to unaffected hand to improve functional grasp for ADLs/work/leisure tasks.     Goal Status:   In progress     Teddy Ennis will demonstrate the ability to complete ADL/IADL tasks with 4/10 pain.     Goal Status:   In progress     Teddy Ennis will be able to resume significantly greater occupational roles independently or modified as demonstrated by a FOTO Intake score of more than 55.     Goal Status:   In progress        Plan     Updates/Grading for next session: cont as tolerated    Mik Alexander OT   10/15/2024

## 2024-10-16 ENCOUNTER — TELEPHONE (OUTPATIENT)
Dept: OTOLARYNGOLOGY | Facility: CLINIC | Age: 49
End: 2024-10-16
Payer: MEDICARE

## 2024-10-16 RX ORDER — ALBUTEROL SULFATE 0.83 MG/ML
2.5 SOLUTION RESPIRATORY (INHALATION)
Qty: 1080 ML | Refills: 2 | Status: SHIPPED | OUTPATIENT
Start: 2024-10-16

## 2024-10-16 NOTE — TELEPHONE ENCOUNTER
----- Message from Sarah sent at 10/16/2024 12:34 PM CDT -----  Contact: [phone]@[email or web address]  Good afternoon patient would like a call back to discuss getting another virtual apt. Patient had a apt today but it was booked out. Patient also would like to request lab orders. Please call patient to advise  [phone]

## 2024-10-16 NOTE — TELEPHONE ENCOUNTER
Called for pt again. Spoke to pt and mother. Apologized and let them know that Dr. Farmer was booked out ill today, but was going to do the virtual visit. It may be that because the template was closed for the day, the system did not connect the visit. I rescheduled to Monday for the virtual visit. Apologized again. Thanks, Maria Ines

## 2024-10-16 NOTE — TELEPHONE ENCOUNTER
----- Message from Bree sent at 10/16/2024  9:44 AM CDT -----  Contact: [phone] Patient  Requesting an RX refill or new RX.    Is this a refill or new RX: new    RX name and strength (copy/paste from chart):  albuterol (PROVENTIL/VENTOLIN HFA) 90 mcg/actuation inhaler    Is this a 30 day or 90 day RX:     Pharmacy name and phone # (copy/paste from chart):    [id number] - [place] - [address] AT [facility] [address] [place] [address]  Phone: [phone] Fax: [phone]

## 2024-10-16 NOTE — TELEPHONE ENCOUNTER
No care due was identified.  [facility] Embedded Care Due Messages. Reference number: [id number].   10/16/2024 9:51:02 AM CDT

## 2024-10-17 ENCOUNTER — CLINICAL SUPPORT (OUTPATIENT)
Dept: REHABILITATION | Facility: HOSPITAL | Age: 49
End: 2024-10-17
Payer: MEDICARE

## 2024-10-17 DIAGNOSIS — R29.898 DECREASED GRIP STRENGTH OF RIGHT HAND: ICD-10-CM

## 2024-10-17 DIAGNOSIS — M25.531 RIGHT WRIST PAIN: Primary | ICD-10-CM

## 2024-10-17 PROCEDURE — 97140 MANUAL THERAPY 1/> REGIONS: CPT | Mod: KX,CO

## 2024-10-17 PROCEDURE — 97022 WHIRLPOOL THERAPY: CPT | Mod: KX,CO

## 2024-10-17 PROCEDURE — 97112 NEUROMUSCULAR REEDUCATION: CPT | Mod: KX,CO

## 2024-10-17 NOTE — PROGRESS NOTES
[facility]  Occupational Therapy Treatment Note     Date: 10/17/2024  Name: Teddy Ennis  Clinic Number: [id number]    Therapy Diagnosis:   Encounter Diagnoses   Name Primary?    Right wrist pain Yes    Decreased  strength of right hand          Physician: Jose M Desai MD    Referring Physician: Jose M Desai MD  Physician Orders: Eval and Treat  Date of Return to MD: TBD     Date of Injury: over a year ago  Date of Surgery: NA     Evaluation Date: 8/15/2024  Authorization Period: 8/13/24 - 09/26/24  Plan of Care Expiration: 11/7/24  Visit #/ Visits Authorized: 30 of 40  FOTO Completion: Initial eval (8/15/2024)  FOTO #2:  FOTO #3:     Time In: 2:00 pm  Time Out: 2:55 pm  Total Billable Time: 55 min     Precautions: Standard    Subjective     Patient reports: no new updates , feeling sad   He was compliant with home exercise program given last session.   Response to previous treatment: good   Functional change: decreased brace wear, except during work activity    Pain: 4/10  Location: right wrists      Objective     Mental status: alert, oriented x3     Observation/Appearance:   No swelling or atrophy noted     Sensation: Patient denies numbness/tingling        ELBOW, WRIST RANGE OF MOTION:   Measured in degrees of active motion with goniometer    Right  8/15/2024 Left  8/15/2024 Right  9/5/2024 Right  10/1/2024   Elbow Extension/Flexion WNL WNL NT WNL   Pronation/Supination WNL WNL NT WNL   Wrist Extension/Flexion 65/50 70/50 55/30  Post-Treatment:  NT 65/52  Post:   Ulnar/Radial Deviation 30/15 30/15 NT 25/10   Composite Wrist Flexion 10 40 15 36         STRENGTH: (Measured in pounds using a Dynamometer and pinch meter)    Right  8/15/2024 Left  8/15/2024 Right  9/5/2024 Right  10/1/2024    Setting 2 25, 20, 20* 75, 80, 75 42;55;50 55; 70; 80    Average 22 lb 77 lb 49 lb 68 lb   *pain throughout hand        Special Testing:  ECU synergy: pain     MMT EDC: 3/5,  "with pain dorsal hand     MMT ECRL/ECRB: 4/5 pain dorsal hand  MMT ECU: 4/5, pain     MMT FCU: 4/5, pain dorsal wrist  MMT FCR: 4/5, pain dorsal wrist        Intake Outcome Measure for FOTO Initial Evaluation Survey     Therapist reviewed FOTO scores for Teddy Ennis on 8/15/2024.   FOTO documents entered into EPIC - see Media section.     Intake Score: 45%          Treatment     Teddy received the treatments listed below:      Teddy received the following supervised modalities after being cleared for contradictions for 10 minutes:   Fluidotherapy: To R for 10 min, continuous air, 110 deg, air speed 100 to decrease pain, edema & scar tissue and increased tissue extensibility.    Patient received paraffin bath to right hand(s) for 10 minutes to increase blood flow, circulation, pain management and for tissue elasticity prior to therex.  (NT)    manual therapy techniques: Soft tissue Mobilization were applied to the: R wrist for 10 minutes, including:  STM with vibration ball dorsal and volar wrist   Wrist mobilizations - distraction with minor oscillation (NT)  Intrinsic stretching x 5 with 10" holds for digits 2-5.    neuromuscular re-education activities to improve: Kinesthetic and Proprioception for 35 minutes. The following activities were included:  Wrist extensor stretch and glide x 10 (elbow flex to extension with WE stretch)   Juxacisor x 2'   LLPS with 3.5 lb and MH x 5' - prone with wrist flexion over plinth at end of session (NT)  Prayer stretch 30 sec x 3 - NT  Roll and stretch over green flexbar x 2 min  - NT  Green flexbar isometrics, flex and ext x 1 min   3 corners Green flexbar bends x 2 min (no RD)  Oscillations with green flexbar x 2 min   Pink Frisbee 2 ways supinated x 1 min ea   Dart throwers slow with 1#  x 30  [place] stick yellow ball yellow level name on table - NT  Red power web push/pull x 20 (NT)  PRE - WF/RD with 1#: 2 x 10 (NT)  - WE eccentric with 2# : 2 x 10   - supinator " "eccentric with mallet x 10  - elbow PRE (sup + neutral wrist) with 2# 2 x 10   - Wrist isometrics: UD/WF x 5 with 10" holds (NT)  -scapula rolls and squeezes  x 10   - robbers x 10     K tape dorsal and volar FA, space corrector at ulnar wrist, web space corrector dorsal hand     Patient Education and Home Exercises     Education provided:   - cont HEP  - Progress towards goals     Written Home Exercises Provided: Pt instructed to continue prior HEP.  Exercises were reviewed and Teddy was able to demonstrate them prior to the end of the session.  Teddy demonstrated good  understanding of the home exercise program provided. See electronic medical record under Patient Instructions for exercises provided during therapy sessions.       Assessment     Pt tolerated session well. Continued c/o tightness in hand. Also c/o tightness in shoulders and neck.     Teddy is progressing well towards his goals and there are no updates to goals at this time. Pt prognosis is Good.     Patient will continue to benefit from skilled outpatient occupational therapy to address the deficits listed in the problem list on initial evaluation provide patient/family education and to maximize patient's level of independence in the home and community environment.     Patient's spiritual, cultural and educational needs considered and patient agreeable to plan of care and goals.    Anticipated barriers to occupational therapy: none     Goals:  Long Term Goals (to be met by discharge):  Date Goal Met:       1.) Teddy Ennis will demonstrate significantly improved functional performance from re-assessment as measured by a FOTO Intake score of more than 65.     Goal Status:   In progress     2.) Teddy Ennis will return to near to prior level of function for ADLs and household management reporting independence or modified independence.     Goal Status:   In progress     3. Teddy Ennis will report pain 2 out of 10 at worst to " increase functional use of affected hand for work and leisure tasks.     Goal Status:   In progress      Short Term Goals (to be met by 9/15/24):  Date Goal Met:       Teddy Ennis will be independent with home exercise program with written instructions.     Goal Status:   In progress     Teddy Ennis will demonstrate 25 degrees of composite wrist flexion to improve functional performance in ADLs/work/leisure tasks.     Goal Status:   In progress     Teddy Ennis will demonstrate within 20 lbs of  strength compared to unaffected hand to improve functional grasp for ADLs/work/leisure tasks.     Goal Status:   In progress     Teddy Ennis will demonstrate the ability to complete ADL/IADL tasks with 4/10 pain.     Goal Status:   In progress     Teddy Ennis will be able to resume significantly greater occupational roles independently or modified as demonstrated by a FOTO Intake score of more than 55.     Goal Status:   In progress        Plan     Updates/Grading for next session: cont as tolerated    VINI Swanson   10/17/2024

## 2024-10-21 ENCOUNTER — OFFICE VISIT (OUTPATIENT)
Dept: OTOLARYNGOLOGY | Facility: CLINIC | Age: 49
End: 2024-10-21
Payer: MEDICARE

## 2024-10-21 ENCOUNTER — TELEPHONE (OUTPATIENT)
Dept: OTOLARYNGOLOGY | Facility: CLINIC | Age: 49
End: 2024-10-21
Payer: MEDICARE

## 2024-10-21 DIAGNOSIS — K02.9 DENTAL CARIES: ICD-10-CM

## 2024-10-21 DIAGNOSIS — H93.13 BILATERAL TINNITUS: ICD-10-CM

## 2024-10-21 DIAGNOSIS — R51.9 CHRONIC DAILY HEADACHE: Primary | ICD-10-CM

## 2024-10-21 PROCEDURE — 1160F RVW MEDS BY RX/DR IN RCRD: CPT | Mod: HCNC,CPTII,95, | Performed by: OTOLARYNGOLOGY

## 2024-10-21 PROCEDURE — 1159F MED LIST DOCD IN RCRD: CPT | Mod: HCNC,CPTII,95, | Performed by: OTOLARYNGOLOGY

## 2024-10-21 PROCEDURE — 99212 OFFICE O/P EST SF 10 MIN: CPT | Mod: 95,HCNC,, | Performed by: OTOLARYNGOLOGY

## 2024-10-21 RX ORDER — AMITRIPTYLINE HYDROCHLORIDE 10 MG/1
TABLET, FILM COATED ORAL
Qty: 90 TABLET | Refills: 0 | Status: SHIPPED | OUTPATIENT
Start: 2024-10-21

## 2024-10-21 NOTE — PROGRESS NOTES
[facility]    Subjective:      Patient: Teddy Ennis Patient PCP: Doni Wood MD         :  1975     Sex:  male      MRN:  [id number]          Date of Visit: 10/21/2024      Chief Complaint: No chief complaint on file.      Patient ID: Teddy Ennis is a 49 y.o. male     10/21/2024 virtual follow-up CT completed as the headache did not improve with treatment of sinusitis.  No evidence of active sinus disease on scan.  Septal deviation noted as well as lucency around tooth roots previously noted on head CT.  Patient's complaint of bilateral tinnitus and headache persist.  No focal neurologic symptoms.  Headache is generally mild or mild-to-moderate.  Made neurology appointment as requested but 1st available appointment was not until January at [facility].  His on waiting list.      2024 initial patient consultation Patient is a  lifelong NON-smoker with a past medical history of asthma, HTN, type 2 diabetes with a prior history of sinus surgery referred to me by Dr. Doni Wood in consultation for dizziness ear drainage and tinnitus.  Tinnitus is bilateral nonpulsatile and high-pitched an attributed to his headache.  He also feels a certain degree of dizziness with a worsening of the headache.    Patient has been complaining of a headache which he attributes to sinusitis since May.  He has a history of headaches with dizziness in the past and migraines which is all has been treated over-the-counter.  He does report discolored drainage, postnasal drip, intermittent loss of smell and nasal obstruction.  He points to the temples as well as the maxilla and periorbital areas in terms of the distribution of his headache.    Audiogram today without prior audio for comparison shows normal sloping to moderately severe-severe sensorineural hearing loss bilaterally left-greater-than-right.  Left asymmetry of only 5-10 dB other than at 8000 hertz where there is an asymmetry 15-20 decibels.   Speech audiometry as symmetric at 20 dB SRT and 100% discrimination bilaterally with normal type a tympanograms bilaterally.    No MRI.    04/28/2023 CT sinuses Images reviewed shows some cavitary changes /lucency of the roots of the right posterior maxillary molars.  There is circumferential significant thickening of the left maxillary sinus and a left-greater-than-right jim bullosa no evidence of prior ethmoid surgery and no evidence of any active mucoperiosteal thickening of the paranasal sinuses other than the left maxillary.  Sphenoid sinuses visibly open middle ears without appreciable effusion.  Included mastoids without opacification or destructive process.  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                  Labs:  WBC   Date Value Ref Range Status   07/24/2024 10.09 3.90 - 12.70 K/uL Final     Hemoglobin   Date Value Ref Range Status   07/24/2024 16.1 14.0 - 18.0 g/dL Final     Platelets   Date Value Ref Range Status   07/24/2024 340 150 - 450 K/uL Final     Creatinine   Date Value Ref Range Status   08/22/2024 0.8 0.5 - 1.4 mg/dL Final     TSH   Date Value Ref Range Status   11/26/2019 1.351 0.400 - 4.000 uIU/mL Final     Hemoglobin A1C   Date Value Ref Range Status   11/22/2023 5.9 (H) 4.0 - 5.6 % Final     Comment:     ADA Screening Guidelines:  5.7-6.4%  Consistent with prediabetes  >or=6.5%  Consistent with diabetes    High levels of fetal hemoglobin interfere with the HbA1C  assay. Heterozygous hemoglobin variants (HbS, HgC, etc)do  not significantly interfere with this assay.   However, presence of multiple variants may affect accuracy.         Past Medical History  He has a past medical history of Allergy and Asthma.    Family / Surgical / Social History  His family history includes Hypertension in his mother; No Known Problems in his father; Stroke in his mother.    Past Surgical History:   Procedure  "Laterality Date    ADENOIDECTOMY      ANKLE SURGERY Left 1995    SINUS SURGERY         Social History     Tobacco Use    Smoking status: Never     Passive exposure: Never    Smokeless tobacco: Never   Substance and Sexual Activity    Alcohol use: Not Currently    Drug use: Never    Sexual activity: Not Currently     Partners: Female       Medications  He has a current medication list which includes the following prescription(s): albuterol, albuterol, amlodipine, azelastine, breo ellipta, doxycycline, escitalopram oxalate, fexofenadine, fluticasone propionate, ibuprofen, magnesium sulfate in water, meloxicam, methylprednisolone, montelukast, and pravastatin.      Allergies  Review of patient's allergies indicates:   Allergen Reactions    Ibuprofen Hives    Penicillins Hives       All medications, allergies, and past history have been reviewed.    Objective:      Vitals:      8/20/2024    10:08 AM 8/22/2024     2:54 PM 9/23/2024    10:32 AM   Vitals - 1 value per visit   SYSTOLIC 114 120    DIASTOLIC 73 74    Pulse 73 90    SPO2  96 %    Weight (lb) 305.34 301.81    Weight (kg) 138.5 136.9    Height 6' 1" (1.854 m) 6' 1" (1.854 m)    BMI (Calculated) 40.3 39.8    Pain Score Five Five Zero       There is no height or weight on file to calculate BSA.    Physical Exam:    Facial exam with intact/symmetric movement not in any distress.  Hearing at conversational levels with video/audio visit appear normal.  Voice normal.  Not hypo nasal.  No active nasal drainage.    Procedure(s):  None          Assessment:      Problem List Items Addressed This Visit    None  Visit Diagnoses       Chronic daily headache    -  Primary    Dental caries        Bilateral tinnitus                       Plan:      Patient is scheduled to see Neurology 01/23/2024.  There on the wait list.  Offered earlier appointments outside of the [facility] but they are not interested in traveling.  We will start amitriptyline at low-dose and titrate " up as discussed with the patient in his mother by telehealth today for chronic daily headache not specifically for tinnitus hopefully improvement of headache will improve tinnitus.      Counseled again to see dentistry for bone loss around tooth roots consistent with chronic dental infection which needs periodontal care or extraction likely.    Return with any worsening of symptoms, failure to improve, or any other concerns for further evaluation and treatment.      The attending portion of this evaluation, treatment, and documentation was performed per Abiel Farmer MD via Telemedicine AudioVisual using the secure Epic software platform with two-way audio/video. The patient is located in [place]. The aforementioned video software was utilized to document the relevant history and physical exam.  Consent for televisit provided.    Time for visit 20 minutes.      Voice recognition software was used in the creation of this note/communication and any sound-alike errors which may have occurred from its use should be taken in context when interpreting.  If such errors prevent a clear understanding of the note/communication, please contact the office for clarification.

## 2024-10-21 NOTE — TELEPHONE ENCOUNTER
Called pt. I received message that pt having hard time checking in for the Virtual Visit.  Advised that he has to complete all of the pre-check items before the system will allow checking in. Assisted pt in getting logged on to his Virtual Visit. Thanks, Maria Ines

## 2024-10-22 ENCOUNTER — CLINICAL SUPPORT (OUTPATIENT)
Dept: REHABILITATION | Facility: HOSPITAL | Age: 49
End: 2024-10-22
Payer: MEDICARE

## 2024-10-22 DIAGNOSIS — M25.531 RIGHT WRIST PAIN: Primary | ICD-10-CM

## 2024-10-22 DIAGNOSIS — R29.898 DECREASED GRIP STRENGTH OF RIGHT HAND: ICD-10-CM

## 2024-10-22 PROCEDURE — 97140 MANUAL THERAPY 1/> REGIONS: CPT | Mod: KX,CO

## 2024-10-22 PROCEDURE — 97022 WHIRLPOOL THERAPY: CPT | Mod: KX,CO

## 2024-10-22 PROCEDURE — 97530 THERAPEUTIC ACTIVITIES: CPT | Mod: KX,CO

## 2024-10-22 NOTE — PROGRESS NOTES
[facility]  Occupational Therapy Treatment Note     Date: 10/22/2024  Name: Teddy Ennis  Clinic Number: [id number]    Therapy Diagnosis:   Encounter Diagnoses   Name Primary?    Right wrist pain Yes    Decreased  strength of right hand            Physician: Jose M Desai MD    Referring Physician: Jose M Desai MD  Physician Orders: Eval and Treat  Date of Return to MD: TBD     Date of Injury: over a year ago  Date of Surgery: NA     Evaluation Date: 8/15/2024  Authorization Period: 8/13/24 - 09/26/24  Plan of Care Expiration: 11/7/24  Visit #/ Visits Authorized: 32 of 40  FOTO Completion: Initial eval (8/15/2024)  FOTO #2:  FOTO #3:     Time In: 2:00 pm  Time Out: 2:48 pm  Total Billable Time: 48 min     Precautions: Standard    Subjective     Patient reports: no new updates  He was compliant with home exercise program given last session.   Response to previous treatment: good   Functional change: decreased brace wear, except during work activity    Pain: 4/10  Location: right wrists      Objective     Mental status: alert, oriented x3     Observation/Appearance:   No swelling or atrophy noted     Sensation: Patient denies numbness/tingling        ELBOW, WRIST RANGE OF MOTION:   Measured in degrees of active motion with goniometer    Right  8/15/2024 Left  8/15/2024 Right  9/5/2024 Right  10/1/2024   Elbow Extension/Flexion WNL WNL NT WNL   Pronation/Supination WNL WNL NT WNL   Wrist Extension/Flexion 65/50 70/50 55/30  Post-Treatment:  NT 65/52  Post:   Ulnar/Radial Deviation 30/15 30/15 NT 25/10   Composite Wrist Flexion 10 40 15 36         STRENGTH: (Measured in pounds using a Dynamometer and pinch meter)    Right  8/15/2024 Left  8/15/2024 Right  9/5/2024 Right  10/1/2024    Setting 2 25, 20, 20* 75, 80, 75 42;55;50 55; 70; 80    Average 22 lb 77 lb 49 lb 68 lb   *pain throughout hand        Special Testing:  ECU synergy: pain     MMT EDC: 3/5, with pain dorsal  "hand     MMT ECRL/ECRB: 4/5 pain dorsal hand  MMT ECU: 4/5, pain     MMT FCU: 4/5, pain dorsal wrist  MMT FCR: 4/5, pain dorsal wrist        Intake Outcome Measure for FOTO Initial Evaluation Survey     Therapist reviewed FOTO scores for Teddy Ennis on 8/15/2024.   FOTO documents entered into [employer] - see Media section.     Intake Score: 45%          Treatment     Teddy received the treatments listed below:      Teddy received the following supervised modalities after being cleared for contradictions for 10 minutes:   Fluidotherapy: To R for 10 min, continuous air, 110 deg, air speed 100 to decrease pain, edema & scar tissue and increased tissue extensibility.    Patient received paraffin bath to right hand(s) for 10 minutes to increase blood flow, circulation, pain management and for tissue elasticity prior to therex.  (NT)    manual therapy techniques: Soft tissue Mobilization were applied to the: R wrist for 10 minutes, including:  STM with vibration ball dorsal and volar wrist   Wrist mobilizations - distraction with minor oscillation (NT)  Intrinsic stretching x 5 with 10" holds for digits 2-5.    neuromuscular re-education activities to improve: Kinesthetic and Proprioception for 28 minutes. The following activities were included:  Wrist extensor stretch and glide x 10 (elbow flex to extension with WE stretch)   Juxacisor x 2'   LLPS with 3.5 lb and MH x 5' - prone with wrist flexion over plinth at end of session (NT)  Prayer stretch 30 sec x 3 - NT  Roll and stretch over green flexbar x 2 min  - NT  Green flexbar isometrics, flex and ext x 1 min   3 corners Green flexbar bends x 2 min (no RD)  Oscillations with green flexbar x 2 min   Pink Frisbee 2 ways supinated x 1 min ea   Dart throwers slow with 1#  x 30  [place] stick yellow ball yellow level name on table - NT  Red power web push/pull x 20  PRE - WF/RD with 1#: 2 x 10 (NT)  - WE eccentric with 2# : 2 x 10   - supinator eccentric with mallet x " "10  - elbow PRE (sup + neutral wrist) with 3# 2 x 10   - Wrist isometrics: UD/WF x 5 with 10" holds (NT)  -scapula rolls and squeezes  x 10   - robbers x 10     K tape dorsal and volar FA, space corrector at ulnar wrist, web space corrector dorsal hand     Patient Education and Home Exercises     Education provided:   - cont HEP  - Progress towards goals     Written Home Exercises Provided: Pt instructed to continue prior HEP.  Exercises were reviewed and Teddy was able to demonstrate them prior to the end of the session.  Teddy demonstrated good  understanding of the home exercise program provided. See electronic medical record under Patient Instructions for exercises provided during therapy sessions.       Assessment     Pt tolerated session well. Continued c/o tightness in hand. Also c/o tightness in shoulders and neck.     Teddy is progressing well towards his goals and there are no updates to goals at this time. Pt prognosis is Good.     Patient will continue to benefit from skilled outpatient occupational therapy to address the deficits listed in the problem list on initial evaluation provide patient/family education and to maximize patient's level of independence in the home and community environment.     Patient's spiritual, cultural and educational needs considered and patient agreeable to plan of care and goals.    Anticipated barriers to occupational therapy: none     Goals:  Long Term Goals (to be met by discharge):  Date Goal Met:       1.) Teddy Ennis will demonstrate significantly improved functional performance from re-assessment as measured by a FOTO Intake score of more than 65.     Goal Status:   In progress     2.) Teddy Ennis will return to near to prior level of function for ADLs and household management reporting independence or modified independence.     Goal Status:   In progress     3. Teddy Ennis will report pain 2 out of 10 at worst to increase functional use of " affected hand for work and leisure tasks.     Goal Status:   In progress      Short Term Goals (to be met by 9/15/24):  Date Goal Met:       Teddy Ennis will be independent with home exercise program with written instructions.     Goal Status:   In progress     Teddy Ennis will demonstrate 25 degrees of composite wrist flexion to improve functional performance in ADLs/work/leisure tasks.     Goal Status:   In progress     Teddy Ennis will demonstrate within 20 lbs of  strength compared to unaffected hand to improve functional grasp for ADLs/work/leisure tasks.     Goal Status:   In progress     Teddy Ennis will demonstrate the ability to complete ADL/IADL tasks with 4/10 pain.     Goal Status:   In progress     Teddy Ennis will be able to resume significantly greater occupational roles independently or modified as demonstrated by a FOTO Intake score of more than 55.     Goal Status:   In progress        Plan     Updates/Grading for next session: cont as tolerated    VINI Swanson   10/22/2024

## 2024-10-24 ENCOUNTER — CLINICAL SUPPORT (OUTPATIENT)
Dept: REHABILITATION | Facility: HOSPITAL | Age: 49
End: 2024-10-24
Payer: MEDICARE

## 2024-10-24 DIAGNOSIS — M25.531 RIGHT WRIST PAIN: Primary | ICD-10-CM

## 2024-10-24 DIAGNOSIS — R29.898 DECREASED GRIP STRENGTH OF RIGHT HAND: ICD-10-CM

## 2024-10-24 PROCEDURE — 97018 PARAFFIN BATH THERAPY: CPT

## 2024-10-24 PROCEDURE — 97140 MANUAL THERAPY 1/> REGIONS: CPT

## 2024-10-24 PROCEDURE — 97112 NEUROMUSCULAR REEDUCATION: CPT

## 2024-10-27 DIAGNOSIS — J45.901 EXACERBATION OF ASTHMA, UNSPECIFIED ASTHMA SEVERITY, UNSPECIFIED WHETHER PERSISTENT: ICD-10-CM

## 2024-10-28 RX ORDER — ALBUTEROL SULFATE 90 UG/1
INHALANT RESPIRATORY (INHALATION)
Qty: 25.5 G | Refills: 1 | Status: SHIPPED | OUTPATIENT
Start: 2024-10-28

## 2024-10-29 ENCOUNTER — CLINICAL SUPPORT (OUTPATIENT)
Dept: REHABILITATION | Facility: HOSPITAL | Age: 49
End: 2024-10-29
Payer: MEDICARE

## 2024-10-29 DIAGNOSIS — J45.20 MILD INTERMITTENT ASTHMA WITHOUT COMPLICATION: ICD-10-CM

## 2024-10-29 DIAGNOSIS — R29.898 DECREASED GRIP STRENGTH OF RIGHT HAND: ICD-10-CM

## 2024-10-29 DIAGNOSIS — M25.531 RIGHT WRIST PAIN: Primary | ICD-10-CM

## 2024-10-29 PROCEDURE — 97530 THERAPEUTIC ACTIVITIES: CPT | Mod: KX

## 2024-10-29 PROCEDURE — 97140 MANUAL THERAPY 1/> REGIONS: CPT | Mod: KX

## 2024-10-29 PROCEDURE — 97018 PARAFFIN BATH THERAPY: CPT | Mod: KX

## 2024-10-29 PROCEDURE — 97110 THERAPEUTIC EXERCISES: CPT | Mod: KX

## 2024-10-29 RX ORDER — FLUTICASONE FUROATE AND VILANTEROL TRIFENATATE 200; 25 UG/1; UG/1
1 POWDER RESPIRATORY (INHALATION) DAILY
Qty: 60 EACH | Refills: 11 | Status: SHIPPED | OUTPATIENT
Start: 2024-10-29

## 2024-11-06 NOTE — PROGRESS NOTES
[facility]  Occupational Therapy Treatment Note     Date: 11/7/2024  Name: Teddy Ennis  Clinic Number: [id number]    Therapy Diagnosis:   Encounter Diagnoses   Name Primary?    Right wrist pain Yes    Decreased  strength of right hand          Physician: Jose M Desai MD    Referring Physician: Jose M Desai MD  Physician Orders: Eval and Treat  Date of Return to MD: TBD     Date of Injury: over a year ago  Date of Surgery: NA     Evaluation Date: 8/15/2024  Authorization Period: 8/13/24 - 09/26/24  Plan of Care Expiration: 11/7/24  Visit #/ Visits Authorized: 34 of 40  FOTO Completion: Initial eval (8/15/2024)  FOTO #2:  FOTO #3:     Time In: 2 pm  Time Out: 2:55 pm  Total Billable Time: 55 min     Precautions: Standard    Subjective     Patient reports: smashed his pinky recently, incr pain bc of weather   He was compliant with home exercise program given last session.   Response to previous treatment: good   Functional change: decreased brace wear, except during work activity    Pain: 4/10  Location: right wrists      Objective     Mental status: alert, oriented x3     Observation/Appearance:   No swelling or atrophy noted     Sensation: Patient denies numbness/tingling        ELBOW, WRIST RANGE OF MOTION:   Measured in degrees of active motion with goniometer    Right  8/15/2024 Left  8/15/2024 Right  9/5/2024 Right  10/1/2024 Right  10/24/2024   Elbow Extension/Flexion WNL WNL NT WNL WNL   Pronation/Supination WNL WNL NT WNL WNL   Wrist Extension/Flexion 65/50 70/50 55/30  Post-Treatment:  NT 65/52  Post: 70/60   Ulnar/Radial Deviation 30/15 30/15 NT 25/10 20/15   Composite Wrist Flexion 10 40 15 36 43         STRENGTH: (Measured in pounds using a Dynamometer and pinch meter)    Right  8/15/2024 Left  8/15/2024 Right  9/5/2024 Right  10/1/2024 Right  10/24/2024    Setting 2 25, 20, 20* 75, 80, 75 42;55;50 55; 70; 80 60; 65; 63    Average 22 lb 77 lb 49 lb 68 lb  "63   *pain throughout hand        Special Testing:    10/24/2024  ECU synergy: pain  Tinel's for cubital tunnel = (+)    Not Today:  MMT EDC: 3/5, with pain dorsal hand     MMT ECRL/ECRB: 4/5 pain dorsal hand  MMT ECU: 4/5, pain     MMT FCU: 4/5, pain dorsal wrist  MMT FCR: 4/5, pain dorsal wrist        Intake Outcome Measure for FOTO Initial Evaluation Survey     Therapist reviewed FOTO scores for Teddy Ennis on 8/15/2024.   FOTO documents entered into [employer] - see Media section.     Intake Score: 45%          Treatment     Teddy received the treatments listed below:      Teddy received the following supervised modalities after being cleared for contradictions for 10 minutes:   Fluidotherapy: To R for 10 min, continuous air, 110 deg, air speed 100 to decrease pain, edema & scar tissue and increased tissue extensibility. (NT)    Patient received paraffin bath to right hand(s) for 10 minutes to increase blood flow, circulation, pain management and for tissue elasticity prior to therex.    manual therapy techniques: Soft tissue Mobilization were applied to the: R wrist for 10 minutes, including:  STM with vibration ball dorsal and volar wrist   Wrist mobilizations - distraction with minor oscillation (NT)  Intrinsic stretching x 5 with 10" holds for digits 2-5. (NT)    neuromuscular re-education activities to improve: Kinesthetic and Proprioception for 35 minutes. The following activities were included:  Wrist extensor stretch and glide x 10 (elbow flex to extension with WE stretch)   Juxacisor x 2'  (NT)   LLPS with 3.5 lb and MH x 5' - prone with wrist flexion over plinth at end of session (NT)  Prayer stretch 30 sec x 3   Roll and stretch over green flexbar x 2 min   Green flexbar isometrics, flex and ext x 1 min  - NT  3 corners Green flexbar bends x 2 min (no RD) (NT)   Oscillations with green flexbar x 2 min - NT  Pink Frisbee 2 ways supinated x 1 min ea - NT  Dart throwers slow with 1#  x 30  [place] " "stick yellow ball yellow level name on table - NT  Blue power web push/pull x 20  PRE - WF/RD with 2#: 2 x 10 (NT)  - WE eccentric with 2# : x 15 (NT)   - supinator eccentric with mallet x 10 (NT)   - elbow PRE (sup + neutral wrist) with 4#: 2 x 10   - Wrist isometrics: UD/WF x 5 with 10" holds (NT)  - scapula rolls and squeezes x 10   - robbers x 10   - Red flexbar: WF/WE/sup/pro x 10 each (NT)   - ulnar nerve glide x 5 - NT  - strainer and tennis ball 1 min ea way c/cc x 2 sets       K tape dorsal and volar FA, space corrector at ulnar wrist, web space corrector dorsal hand     Patient Education and Home Exercises     Education provided:   - cont HEP  - Progress towards goals     Written Home Exercises Provided: Pt instructed to continue prior HEP.  Exercises were reviewed and Teddy was able to demonstrate them prior to the end of the session.  Teddy demonstrated good  understanding of the home exercise program provided. See electronic medical record under Patient Instructions for exercises provided during therapy sessions.       Assessment     Pt tolerated session well today however difficulty completing multiple sets with elbow PRE due to soreness.   Teddy is progressing well towards his goals and there are no updates to goals at this time. Pt prognosis is Good.     Patient will continue to benefit from skilled outpatient occupational therapy to address the deficits listed in the problem list on initial evaluation provide patient/family education and to maximize patient's level of independence in the home and community environment.     Patient's spiritual, cultural and educational needs considered and patient agreeable to plan of care and goals.    Anticipated barriers to occupational therapy: none     Goals:  Long Term Goals (to be met by discharge):  Date Goal Met:       1.) Teddy Ennis will demonstrate significantly improved functional performance from re-assessment as measured by a FOTO Intake " score of more than 65.     Goal Status:   In progress     2.) Teddy Ennis will return to near to prior level of function for ADLs and household management reporting independence or modified independence.     Goal Status:   In progress     3. Teddy Ennis will report pain 2 out of 10 at worst to increase functional use of affected hand for work and leisure tasks.     Goal Status:   In progress      Short Term Goals (to be met by 9/15/24):  Date Goal Met:       Teddy Ennis will be independent with home exercise program with written instructions.     Goal Status:   In progress     Teddy Ennis will demonstrate 25 degrees of composite wrist flexion to improve functional performance in ADLs/work/leisure tasks.     Goal Status:   In progress     Teddy Ennis will demonstrate within 20 lbs of  strength compared to unaffected hand to improve functional grasp for ADLs/work/leisure tasks.     Goal Status:   In progress     Teddy Ennis will demonstrate the ability to complete ADL/IADL tasks with 4/10 pain.     Goal Status:   In progress     Teddy Ennis will be able to resume significantly greater occupational roles independently or modified as demonstrated by a FOTO Intake score of more than 55.     Goal Status:   In progress        Plan     Updates/Grading for next session: cont as tolerated    VINI Swanson   11/7/2024

## 2024-11-07 ENCOUNTER — CLINICAL SUPPORT (OUTPATIENT)
Dept: REHABILITATION | Facility: HOSPITAL | Age: 49
End: 2024-11-07
Payer: MEDICARE

## 2024-11-07 DIAGNOSIS — M25.531 RIGHT WRIST PAIN: Primary | ICD-10-CM

## 2024-11-07 DIAGNOSIS — R29.898 DECREASED GRIP STRENGTH OF RIGHT HAND: ICD-10-CM

## 2024-11-07 PROCEDURE — 97112 NEUROMUSCULAR REEDUCATION: CPT | Mod: KX,CO

## 2024-11-07 PROCEDURE — 97018 PARAFFIN BATH THERAPY: CPT | Mod: KX,CO

## 2024-11-11 DIAGNOSIS — J01.00 ACUTE NON-RECURRENT MAXILLARY SINUSITIS: ICD-10-CM

## 2024-11-12 RX ORDER — MONTELUKAST SODIUM 10 MG/1
TABLET ORAL
Qty: 90 TABLET | Refills: 1 | Status: SHIPPED | OUTPATIENT
Start: 2024-11-12

## 2024-11-12 RX ORDER — METHYLPREDNISOLONE 4 MG/1
TABLET ORAL
Qty: 21 EACH | Refills: 0 | Status: SHIPPED | OUTPATIENT
Start: 2024-11-12

## 2024-11-12 RX ORDER — PREDNISONE 10 MG/1
10 TABLET ORAL
Qty: 3 TABLET | Refills: 0 | OUTPATIENT
Start: 2024-11-12

## 2024-11-13 NOTE — PROGRESS NOTES
[facility]  Occupational Therapy Treatment Note     Date: 11/14/2024  Name: Teddy Ennis  Clinic Number: [id number]    Therapy Diagnosis:   Encounter Diagnoses   Name Primary?    Right wrist pain Yes    Decreased  strength of right hand            Physician: Jose M Desai MD    Referring Physician: Jose M Desai MD  Physician Orders: Eval and Treat  Date of Return to MD: TBJUSTIN     Date of Injury: over a year ago  Date of Surgery: NA     Evaluation Date: 8/15/2024  Authorization Period: 8/13/24 - 09/26/24  Plan of Care Expiration: 11/7/24  Visit #/ Visits Authorized: 35 of 40  FOTO Completion: Initial eval (8/15/2024)  FOTO #2:  FOTO #3:     Time In: 1:56 pm  Time Out: 2:50 pm  Total Billable Time: 54 min     Precautions: Standard    Subjective     Patient reports: :Its hurting a lot today.   He was compliant with home exercise program given last session.   Response to previous treatment: good   Functional change: decreased brace wear, except during work activity    Pain: 4/10  Location: right wrists      Objective     Mental status: alert, oriented x3     Observation/Appearance:   No swelling or atrophy noted     Sensation: Patient denies numbness/tingling        ELBOW, WRIST RANGE OF MOTION:   Measured in degrees of active motion with goniometer    Right  8/15/2024 Left  8/15/2024 Right  9/5/2024 Right  10/1/2024 Right  10/24/2024   Elbow Extension/Flexion WNL WNL NT WNL WNL   Pronation/Supination WNL WNL NT WNL WNL   Wrist Extension/Flexion 65/50 70/50 55/30  Post-Treatment:  NT 65/52  Post: 70/60   Ulnar/Radial Deviation 30/15 30/15 NT 25/10 20/15   Composite Wrist Flexion 10 40 15 36 43         STRENGTH: (Measured in pounds using a Dynamometer and pinch meter)    Right  8/15/2024 Left  8/15/2024 Right  9/5/2024 Right  10/1/2024 Right  10/24/2024     Setting 2 25, 20, 20* 75, 80, 75 42;55;50 55; 70; 80 60; 65; 63     Average 22 lb 77 lb 49 lb 68 lb 63    *pain  "throughout hand        Special Testing:    10/24/2024  ECU synergy: pain  Tinel's for cubital tunnel = (+)    Not Today:  MMT EDC: 3/5, with pain dorsal hand     MMT ECRL/ECRB: 4/5 pain dorsal hand  MMT ECU: 4/5, pain     MMT FCU: 4/5, pain dorsal wrist  MMT FCR: 4/5, pain dorsal wrist        Intake Outcome Measure for FOTO Initial Evaluation Survey     Therapist reviewed FOTO scores for Teddy Ennis on 8/15/2024.   FOTO documents entered into [employer] - see Media section.     Intake Score: 45%          Treatment     Teddy received the treatments listed below:      Teddy received the following supervised modalities after being cleared for contradictions for 10 minutes:   Patient received paraffin bath to right hand(s) for 10 minutes to increase blood flow, circulation, pain management and for tissue elasticity prior to therex.    manual therapy techniques: Soft tissue Mobilization were applied to the: R wrist for 8 minutes, including:  STM with vibration ball dorsal and volar wrist   Wrist mobilizations - distraction with minor oscillation (NT)  Intrinsic stretching x 5 with 10" holds for digits 2-5. (NT)    neuromuscular re-education activities to improve: Kinesthetic and Proprioception for 36 minutes. The following activities were included:  Wrist extensor stretch and glide x 10 (elbow flex to extension with WE stretch) (NT)  Juxacisor x 2'  (NT)   LLPS with 3.5 lb and MH x 5' - prone with wrist flexion over plinth at end of session (NT)  Prayer stretch 30 sec x 3   Roll and stretch over green flexbar x 2 min   Green flexbar isometrics, flex and ext x 1 min  - NT  3 corners Green flexbar bends x 2 min (no RD) (NT)   Oscillations with green flexbar x 2 min - NT  Pink Frisbee 2 ways supinated x 1 min ea - NT  Dart throwers slow with 1#  x 30  [place] stick yellow ball yellow level name on table - NT  Blue power web push/pull x 20 (NT)  PRE - WF/RD with 2#: 2 x 10 (NT)  - WE and RD eccentric with 2# : x 15   - " "supinator eccentric with mallet x 10 (NT)   - elbow PRE (sup + neutral wrist) with 4#: 2 x 10   - Wrist isometrics: UD/WF x 5 with 10" holds (NT)  - scapula rolls and squeezes x 10 (NT)  - robbers x 10   - Red flexbar: WF/WE/sup/pro x 10 each (NT)   - ulnar nerve glide x 5 - NT  - goal post open close x 10   - strainer and tennis ball 1 min ea way c/cc x 2 sets     Issued bulls eye brace for dorsoulnar wrist support  K tape dorsal and volar FA, space corrector at ulnar wrist, web space corrector dorsal hand (NT)    Patient Education and Home Exercises     Education provided:   - cont HEP  - Progress towards goals     Written Home Exercises Provided: Pt instructed to continue prior HEP.  Exercises were reviewed and Teddy was able to demonstrate them prior to the end of the session.  Teddy demonstrated good  understanding of the home exercise program provided. See electronic medical record under Patient Instructions for exercises provided during therapy sessions.       Assessment     Pt with continued c/o pain in his wrist, hand and FA. Continues to report incr pain with rainy weather and hand "giving out" and dropping items at time. Pt has attended 35/40 visits. Discussed with pt and mother ins limitation and advised pt to f/u with his referring doctor due to continued pain and symptoms   Teddy is progressing well towards his goals and there are no updates to goals at this time. Pt prognosis is Good.     Patient will continue to benefit from skilled outpatient occupational therapy to address the deficits listed in the problem list on initial evaluation provide patient/family education and to maximize patient's level of independence in the home and community environment.     Patient's spiritual, cultural and educational needs considered and patient agreeable to plan of care and goals.    Anticipated barriers to occupational therapy: none     Goals:  Long Term Goals (to be met by discharge):  Date Goal Met:       " 1.) Teddy Ennis will demonstrate significantly improved functional performance from re-assessment as measured by a FOTO Intake score of more than 65.     Goal Status:   In progress     2.) Teddy Ennis will return to near to prior level of function for ADLs and household management reporting independence or modified independence.     Goal Status:   In progress     3. Teddy Ennis will report pain 2 out of 10 at worst to increase functional use of affected hand for work and leisure tasks.     Goal Status:   In progress      Short Term Goals (to be met by 9/15/24):  Date Goal Met:       Teddy Ennis will be independent with home exercise program with written instructions.     Goal Status:   In progress     Teddy Ennis will demonstrate 25 degrees of composite wrist flexion to improve functional performance in ADLs/work/leisure tasks.     Goal Status:   In progress     Teddy Ennis will demonstrate within 20 lbs of  strength compared to unaffected hand to improve functional grasp for ADLs/work/leisure tasks.     Goal Status:   In progress     Teddy Ennis will demonstrate the ability to complete ADL/IADL tasks with 4/10 pain.     Goal Status:   In progress     Teddy Ennis will be able to resume significantly greater occupational roles independently or modified as demonstrated by a FOTO Intake score of more than 55.     Goal Status:   In progress        Plan     Updates/Grading for next session: cont as tolerated    VINI Swanson   11/14/2024

## 2024-11-14 ENCOUNTER — CLINICAL SUPPORT (OUTPATIENT)
Dept: REHABILITATION | Facility: HOSPITAL | Age: 49
End: 2024-11-14
Payer: MEDICARE

## 2024-11-14 DIAGNOSIS — M25.531 RIGHT WRIST PAIN: Primary | ICD-10-CM

## 2024-11-14 DIAGNOSIS — R29.898 DECREASED GRIP STRENGTH OF RIGHT HAND: ICD-10-CM

## 2024-11-14 PROCEDURE — 97018 PARAFFIN BATH THERAPY: CPT | Mod: CO

## 2024-11-14 PROCEDURE — 97112 NEUROMUSCULAR REEDUCATION: CPT | Mod: CO

## 2024-11-15 ENCOUNTER — TELEPHONE (OUTPATIENT)
Dept: PRIMARY CARE CLINIC | Facility: CLINIC | Age: 49
End: 2024-11-15
Payer: MEDICARE

## 2024-11-15 ENCOUNTER — TELEPHONE (OUTPATIENT)
Dept: ORTHOPEDICS | Facility: CLINIC | Age: 49
End: 2024-11-15
Payer: MEDICARE

## 2024-11-15 NOTE — TELEPHONE ENCOUNTER
----- Message from Med Assistant Mahmood sent at 11/15/2024  8:17 AM CST -----  Contact: [phone] Patient    ----- Message -----  From: Sunday Schultz MA  Sent: 11/14/2024   3:29 PM CST  To: Ela Francis MA; Doni Wood MD      ----- Message -----  From: Tahmina Mark  Sent: 11/14/2024   3:05 PM CST  To: Giselle Baxter Staff    Caller is requesting an earlier appointment then we can schedule.  Caller is requesting a message be sent to the provider.    If this is for urgent care symptoms, did you offer other providers at this location, providers at other locations, or [facility]? (yes, no, n/a):  n/a    If this is for the patients physical, did you offer to schedule next available and put on wait list, or to see NP or PA for their physical?  (yes, no, n/a):   n/a    When is the next available appointment with their provider:  12/6/2024    Reason for the appointment:  hand still hurting after PT    Patient preference of timeframe to be scheduled:  late PM, first available    Would the patient like a call back, or a response through their [facility] portal?:   call back    Comments:

## 2024-11-15 NOTE — TELEPHONE ENCOUNTER
----- Message from Raphael Brand sent at 11/14/2024  3:29 PM CST -----  Contact: [phone] Patient    ----- Message -----  From: Tahmina Mark  Sent: 11/14/2024   3:05 PM CST  To: Giselle Baxter Staff    Caller is requesting an earlier appointment then we can schedule.  Caller is requesting a message be sent to the provider.    If this is for urgent care symptoms, did you offer other providers at this location, providers at other locations, or [facility]? (yes, no, n/a):  n/a    If this is for the patients physical, did you offer to schedule next available and put on wait list, or to see NP or PA for their physical?  (yes, no, n/a):   n/a    When is the next available appointment with their provider:  12/6/2024    Reason for the appointment:  hand still hurting after PT    Patient preference of timeframe to be scheduled:  late PM, first available    Would the patient like a call back, or a response through their [facility] portal?:   call back    Comments:

## 2024-11-20 ENCOUNTER — LAB VISIT (OUTPATIENT)
Dept: LAB | Facility: HOSPITAL | Age: 49
End: 2024-11-20
Attending: INTERNAL MEDICINE
Payer: MEDICARE

## 2024-11-20 DIAGNOSIS — E78.2 HYPERLIPIDEMIA, MIXED: ICD-10-CM

## 2024-11-20 DIAGNOSIS — Z12.5 SCREENING FOR MALIGNANT NEOPLASM OF PROSTATE: ICD-10-CM

## 2024-11-20 DIAGNOSIS — R73.01 IMPAIRED FASTING GLUCOSE: ICD-10-CM

## 2024-11-20 LAB
ALBUMIN SERPL BCP-MCNC: 3.7 G/DL (ref 3.5–5.2)
ALP SERPL-CCNC: 139 U/L (ref 40–150)
ALT SERPL W/O P-5'-P-CCNC: 16 U/L (ref 10–44)
ANION GAP SERPL CALC-SCNC: 8 MMOL/L (ref 8–16)
AST SERPL-CCNC: 16 U/L (ref 10–40)
BILIRUB SERPL-MCNC: 0.9 MG/DL (ref 0.1–1)
BUN SERPL-MCNC: 13 MG/DL (ref 6–20)
CALCIUM SERPL-MCNC: 9.5 MG/DL (ref 8.7–10.5)
CHLORIDE SERPL-SCNC: 99 MMOL/L (ref 95–110)
CHOLEST SERPL-MCNC: 161 MG/DL (ref 120–199)
CHOLEST/HDLC SERPL: 5.4 {RATIO} (ref 2–5)
CO2 SERPL-SCNC: 29 MMOL/L (ref 23–29)
COMPLEXED PSA SERPL-MCNC: 0.12 NG/ML (ref 0–4)
CREAT SERPL-MCNC: 0.8 MG/DL (ref 0.5–1.4)
EST. GFR  (NO RACE VARIABLE): >60 ML/MIN/1.73 M^2
ESTIMATED AVG GLUCOSE: 126 MG/DL (ref 68–131)
GLUCOSE SERPL-MCNC: 129 MG/DL (ref 70–110)
HBA1C MFR BLD: 6 % (ref 4–5.6)
HDLC SERPL-MCNC: 30 MG/DL (ref 40–75)
HDLC SERPL: 18.6 % (ref 20–50)
LDLC SERPL CALC-MCNC: 104.8 MG/DL (ref 63–159)
NONHDLC SERPL-MCNC: 131 MG/DL
POTASSIUM SERPL-SCNC: 3.8 MMOL/L (ref 3.5–5.1)
PROT SERPL-MCNC: 8 G/DL (ref 6–8.4)
SODIUM SERPL-SCNC: 136 MMOL/L (ref 136–145)
TRIGL SERPL-MCNC: 131 MG/DL (ref 30–150)

## 2024-11-20 PROCEDURE — 36415 COLL VENOUS BLD VENIPUNCTURE: CPT | Mod: HCNC | Performed by: INTERNAL MEDICINE

## 2024-11-20 PROCEDURE — 80061 LIPID PANEL: CPT | Mod: HCNC | Performed by: INTERNAL MEDICINE

## 2024-11-20 PROCEDURE — 80053 COMPREHEN METABOLIC PANEL: CPT | Mod: HCNC | Performed by: INTERNAL MEDICINE

## 2024-11-20 PROCEDURE — 84153 ASSAY OF PSA TOTAL: CPT | Mod: HCNC | Performed by: INTERNAL MEDICINE

## 2024-11-20 PROCEDURE — 83036 HEMOGLOBIN GLYCOSYLATED A1C: CPT | Mod: HCNC | Performed by: INTERNAL MEDICINE

## 2024-11-21 ENCOUNTER — CLINICAL SUPPORT (OUTPATIENT)
Dept: REHABILITATION | Facility: HOSPITAL | Age: 49
End: 2024-11-21
Payer: MEDICARE

## 2024-11-21 DIAGNOSIS — M25.531 RIGHT WRIST PAIN: Primary | ICD-10-CM

## 2024-11-21 DIAGNOSIS — R29.898 DECREASED GRIP STRENGTH OF RIGHT HAND: ICD-10-CM

## 2024-11-21 PROCEDURE — 97112 NEUROMUSCULAR REEDUCATION: CPT | Mod: CO

## 2024-11-21 PROCEDURE — 97140 MANUAL THERAPY 1/> REGIONS: CPT | Mod: CO

## 2024-11-21 PROCEDURE — 97022 WHIRLPOOL THERAPY: CPT | Mod: CO

## 2024-11-21 NOTE — PROGRESS NOTES
"[facility]  Occupational Therapy Treatment Note     Date: 11/21/2024  Name: Teddy Ennis  Clinic Number: [id number]    Therapy Diagnosis:   Encounter Diagnoses   Name Primary?    Right wrist pain Yes    Decreased  strength of right hand              Physician: Jose M Desai MD    Referring Physician: Jose M Desai MD  Physician Orders: Eval and Treat  Date of Return to MD: CHAI     Date of Injury: over a year ago  Date of Surgery: NA     Evaluation Date: 8/15/2024  Authorization Period: 8/13/24 - 09/26/24  Plan of Care Expiration: 12/19/24  Visit #/ Visits Authorized: 36 of 40  FOTO Completion: Initial eval (8/15/2024)  FOTO #2:  FOTO #3:     Time In: 1:42 pm  Time Out: 2:42 pm  Total Billable Time: 60 min     Precautions: Standard    Subjective     Patient reports: continued pain. "Its swollen"  He was compliant with home exercise program given last session.   Response to previous treatment: good   Functional change: wearing bullseye brace     Pain: 4/10  Location: right wrists      Objective     Mental status: alert, oriented x3     Observation/Appearance:   No swelling or atrophy noted     Sensation: Patient denies numbness/tingling        ELBOW, WRIST RANGE OF MOTION:   Measured in degrees of active motion with goniometer    Right  8/15/2024 Left  8/15/2024 Right  9/5/2024 Right  10/1/2024 Right  10/24/2024   Elbow Extension/Flexion WNL WNL NT WNL WNL   Pronation/Supination WNL WNL NT WNL WNL   Wrist Extension/Flexion 65/50 70/50 55/30  Post-Treatment:  NT 65/52  Post: 70/60   Ulnar/Radial Deviation 30/15 30/15 NT 25/10 20/15   Composite Wrist Flexion 10 40 15 36 43         STRENGTH: (Measured in pounds using a Dynamometer and pinch meter)    Right  8/15/2024 Left  8/15/2024 Right  9/5/2024 Right  10/1/2024 Right  10/24/2024     Setting 2 25, 20, 20* 75, 80, 75 42;55;50 55; 70; 80 60; 65; 63     Average 22 lb 77 lb 49 lb 68 lb 63    *pain throughout hand      " "  Special Testing:    10/24/2024  ECU synergy: pain  Tinel's for cubital tunnel = (+)    Not Today:  MMT EDC: 3/5, with pain dorsal hand     MMT ECRL/ECRB: 4/5 pain dorsal hand  MMT ECU: 4/5, pain     MMT FCU: 4/5, pain dorsal wrist  MMT FCR: 4/5, pain dorsal wrist        Intake Outcome Measure for FOTO Initial Evaluation Survey     Therapist reviewed FOTO scores for Teddy Ennis on 8/15/2024.   FOTO documents entered into [employer] - see Media section.     Intake Score: 45%          Treatment     Teddy received the treatments listed below:      Teddy received the following supervised modalities after being cleared for contradictions for 10 minutes:   Patient received paraffin bath to right hand(s) for 10 minutes to increase blood flow, circulation, pain management and for tissue elasticity prior to therex.    manual therapy techniques: Soft tissue Mobilization were applied to the: R wrist for 8 minutes, including:  STM with vibration ball dorsal and volar wrist   Wrist mobilizations - distraction with minor oscillation (NT)  Intrinsic stretching x 5 with 10" holds for digits 2-5. (NT)    neuromuscular re-education activities to improve: Kinesthetic and Proprioception for 42 minutes. The following activities were included:  Wrist extensor stretch and glide x 10 (elbow flex to extension with WE stretch) (NT)  Juxacisor x 2'  (NT)   LLPS with 3.5 lb and MH x 5' - prone with wrist flexion over plinth at end of session (NT)  Prayer stretch 30 sec x 3   Roll and stretch over green flexbar x 2 min   Green flexbar isometrics, flex and ext x 1 min  - NT  3 corners Green flexbar bends x 2 min (no RD) (NT)   Oscillations with green flexbar x 2 min - NT  Pink Frisbee 2 ways supinated x 1 min ea - NT  Dart throwers slow with 1#  x 30  Palatine stick yellow ball yellow level name on table - NT  Blue power web push/pull x 20 (NT)  PRE - WF/RD with 2#: 2 x 10 (NT)  - WE and RD eccentric with 2# : x 15   - supinator eccentric " "with mallet x 10 (NT)   - elbow PRE (sup + neutral wrist) with 4#: 2 x 10   - Wrist isometrics: UD/WF x 5 with 10" holds (NT)  - scapula rolls and squeezes x 10 (NT)  - robbers x 10   - Red flexbar: WF/WE/sup/pro x 10 each (NT)   - ulnar nerve glide x 5 - NT  - goal post open close x 10   - strainer and tennis ball 1 min ea way c/cc x 2 sets     Issued bulls eye brace for dorsoulnar wrist support  K tape dorsal and volar FA, space corrector at ulnar wrist, web space corrector dorsal hand (NT)    Patient Education and Home Exercises     Education provided:   - cont HEP  - Progress towards goals     Written Home Exercises Provided: Pt instructed to continue prior HEP.  Exercises were reviewed and Teddy was able to demonstrate them prior to the end of the session.  Teddy demonstrated good  understanding of the home exercise program provided. See electronic medical record under Patient Instructions for exercises provided during therapy sessions.       Assessment     Continues to c/o pain and tightness.   Teddy is not progressing well towards his goals and there are no updates to goals at this time. Pt prognosis is Fair.     Patient's spiritual, cultural and educational needs considered and patient agreeable to plan of care and goals.    Anticipated barriers to occupational therapy: none     Goals:  Long Term Goals (to be met by discharge):  Date Goal Met:       1.) Teddy Ennis will demonstrate significantly improved functional performance from re-assessment as measured by a FOTO Intake score of more than 65.     Goal Status:   In progress     2.) Teddy Ennis will return to near to prior level of function for ADLs and household management reporting independence or modified independence.     Goal Status:   In progress     3. Teddy Ennis will report pain 2 out of 10 at worst to increase functional use of affected hand for work and leisure tasks.     Goal Status:   In progress      Short Term Goals " (to be met by 9/15/24):  Date Goal Met:       Teddy Ennis will be independent with home exercise program with written instructions.     Goal Status:   In progress     Teddy Ennis will demonstrate 25 degrees of composite wrist flexion to improve functional performance in ADLs/work/leisure tasks.     Goal Status:   In progress     Teddy Ennis will demonstrate within 20 lbs of  strength compared to unaffected hand to improve functional grasp for ADLs/work/leisure tasks.     Goal Status:   In progress     Teddy Ennis will demonstrate the ability to complete ADL/IADL tasks with 4/10 pain.     Goal Status:   In progress     Teddy Ennis will be able to resume significantly greater occupational roles independently or modified as demonstrated by a FOTO Intake score of more than 55.     Goal Status:   In progress        Plan     Updates/Grading for next session: cont as tolerated    VINI Swanson   11/21/2024

## 2024-11-25 ENCOUNTER — OFFICE VISIT (OUTPATIENT)
Dept: PRIMARY CARE CLINIC | Facility: CLINIC | Age: 49
End: 2024-11-25
Payer: MEDICARE

## 2024-11-25 ENCOUNTER — DOCUMENTATION ONLY (OUTPATIENT)
Dept: PRIMARY CARE CLINIC | Facility: CLINIC | Age: 49
End: 2024-11-25
Payer: MEDICARE

## 2024-11-25 VITALS
WEIGHT: 297.38 LBS | DIASTOLIC BLOOD PRESSURE: 70 MMHG | SYSTOLIC BLOOD PRESSURE: 120 MMHG | BODY MASS INDEX: 39.41 KG/M2 | HEIGHT: 73 IN | HEART RATE: 71 BPM | OXYGEN SATURATION: 97 %

## 2024-11-25 DIAGNOSIS — J45.30 MILD PERSISTENT ASTHMA WITHOUT COMPLICATION: ICD-10-CM

## 2024-11-25 DIAGNOSIS — R73.01 IMPAIRED FASTING GLUCOSE: ICD-10-CM

## 2024-11-25 DIAGNOSIS — I10 ESSENTIAL HYPERTENSION: Primary | ICD-10-CM

## 2024-11-25 DIAGNOSIS — Z12.12 ENCOUNTER FOR COLORECTAL CANCER SCREENING: ICD-10-CM

## 2024-11-25 DIAGNOSIS — E78.2 HYPERLIPIDEMIA, MIXED: ICD-10-CM

## 2024-11-25 DIAGNOSIS — Z12.11 ENCOUNTER FOR COLORECTAL CANCER SCREENING: ICD-10-CM

## 2024-11-25 DIAGNOSIS — J45.901 EXACERBATION OF ASTHMA, UNSPECIFIED ASTHMA SEVERITY, UNSPECIFIED WHETHER PERSISTENT: ICD-10-CM

## 2024-11-25 PROCEDURE — 3078F DIAST BP <80 MM HG: CPT | Mod: CPTII,S$GLB,, | Performed by: INTERNAL MEDICINE

## 2024-11-25 PROCEDURE — 3044F HG A1C LEVEL LT 7.0%: CPT | Mod: CPTII,S$GLB,, | Performed by: INTERNAL MEDICINE

## 2024-11-25 PROCEDURE — 3074F SYST BP LT 130 MM HG: CPT | Mod: CPTII,S$GLB,, | Performed by: INTERNAL MEDICINE

## 2024-11-25 PROCEDURE — 99214 OFFICE O/P EST MOD 30 MIN: CPT | Mod: S$GLB,,, | Performed by: INTERNAL MEDICINE

## 2024-11-25 PROCEDURE — 99999 PR PBB SHADOW E&M-EST. PATIENT-LVL III: CPT | Mod: PBBFAC,,, | Performed by: INTERNAL MEDICINE

## 2024-11-25 PROCEDURE — 3008F BODY MASS INDEX DOCD: CPT | Mod: CPTII,S$GLB,, | Performed by: INTERNAL MEDICINE

## 2024-11-25 RX ORDER — ALBUTEROL SULFATE 90 UG/1
2 INHALANT RESPIRATORY (INHALATION) EVERY 6 HOURS PRN
Qty: 18 G | Refills: 11 | Status: SHIPPED | OUTPATIENT
Start: 2024-11-25

## 2024-11-25 NOTE — PROGRESS NOTES
"[facility] Note    Chief Complaint      Chief Complaint   Patient presents with    Follow-up     6 MONTH       History of Present Illness      History of Present Illness    CHIEF COMPLAINT:  Mr. Ennis presents today for follow up.    RESPIRATORY ISSUES:  He reports feeling tight in his chest today. He recently completed a course of steroids, which he found helpful. He is currently using multiple inhalers, including a morning inhaler and another as needed when his chest feels tight. He also has a nebulizer machine with three refills available. He mentions using the inhaler more frequently this year compared to last year, possibly due to increased allergy symptoms. He notes a persistently runny nose and acknowledges that allergies seem to be affecting many people he knows, suggesting it may be related to the current pollen season.    HAND CONCERNS:  He reports ongoing hand issues that have persisted for approximately six months. He expresses frustration with the lack of resolution despite multiple consultations, including visits to Dr. Desai and rheumatology. He feels that his concerns have been dismissed, stating that he has been told "nothing's wrong" despite continued symptoms.    RECENT INJURY:  He reports experiencing a fall and suspects a possible rib fracture. He took two weeks off to recover from the injury. He denies any ongoing symptoms or concerns related to the suspected rib fracture.    IMMUNIZATIONS:  He received a flu vaccine three weeks ago. His tetanus vaccine is up to date.    PRE-DIABETES:  He has mild pre-diabetes, with A1c levels in the pre-diabetic range for the past couple of years. His condition has remained relatively stable without significant worsening. Recent A1c test results may have been slightly elevated due to recent steroid use. He is advised to limit carbohydrate intake, including bread, pasta, and rice, and to moderate consumption of sweets and " treats.      ROS:  Respiratory: +chest congestion  Psychiatric: +sleep difficulty       HTN: BP at goal on amlodipine.  HLD: Controlled on pravastatin. , doing well.    Asthma, moderate persistent, allergic rhinitis: previously on immunotherapy.  On Breo and alprazolam.    IFG: A1C 6.0.  Counseled on diet and exercise  Flu shot UTD.  TDAP 2019.  FITKIT 2022.       Assessment/Plan     Teddy Ennis is a 49 y.o.male with:    Assessment & Plan    Assessed patient's breathing issues, noting recent steroid pack use and current tightness  Considered revisiting inhaler regimen due to ongoing respiratory symptoms  Evaluated recent A1C results, indicating mild pre-diabetes  Acknowledged potential impact of recent steroid use on blood sugar  Noted patient's report of possible rib fracture from recent fall    ASTHMA AND RESPIRATORY ISSUES:  - Explained limitations of long-term steroid use due to potential side effects.  - Discussed relationship between current weather conditions and respiratory symptoms.  - Refilled inhaler medication.  - Continued nebulizer treatment (patient reported having 3 fills).  - Referred to pulmonologist for follow-up.  - Contact the office if breathing issues worsen before next appointment.    PREDIABETES:  - Provided information on managing pre-diabetes through dietary modifications.  - Mr. Ennis to minimize intake of carbohydrates (bread, pasta, rice).  - Mr. Ennis to limit consumption of sweets.  - A1C levels ordered.         1. Essential hypertension    2. Hyperlipidemia, mixed    3. Mild persistent asthma without complication  - Ambulatory referral/consult to Pulmonology; Future    4. Impaired fasting glucose    5. Encounter for colorectal cancer screening  - Fecal Immunochemical Test (iFOBT); Future    6. Exacerbation of asthma, unspecified asthma severity, unspecified whether persistent  - albuterol (PROVENTIL/VENTOLIN HFA) 90 mcg/actuation inhaler; Inhale 2 puffs into the  lungs every 6 (six) hours as needed for Wheezing. Rescue  Dispense: 18 g; Refill: 11      Chronic conditions status updated as per HPI.  Other than changes above, cont current medications and maintain follow up with specialists.  No follow-ups on file.    Future Appointments   Date Time Provider Department Center   12/5/2024  8:30 AM Jose M Desai MD [facility]   12/5/2024 10:00 AM FauziaMerlyton, OT [facility] OP RHB2 [place] 2 fl   12/12/2024  2:00 PM Merly Cage, OT [facility] OP RHB2 [place] 2 fl   1/23/2025  3:00 PM Milton Barrett MD [facility] [id number] [facility]   2/10/2025  3:30 PM Gonzalez Alamo MD [facility] PULMSVC [facility]   5/28/2025  3:30 PM [facility]           Past Medical History:  Past Medical History:   Diagnosis Date    Allergy     Asthma        Past Surgical History:   has a past surgical history that includes Ankle surgery (Left, 1995); Adenoidectomy; and Sinus surgery.    Family History:  family history includes Hypertension in his mother; No Known Problems in his father; Stroke in his mother.     Social History:  Social History     Tobacco Use    Smoking status: Never     Passive exposure: Never    Smokeless tobacco: Never   Substance Use Topics    Alcohol use: Not Currently    Drug use: Never       Medications:  Outpatient Encounter Medications as of 11/25/2024   Medication Sig Dispense Refill    albuterol (PROVENTIL) 2.5 mg /3 mL (0.083 %) nebulizer solution Take 3 mLs (2.5 mg total) by nebulization every 4 to 6 hours as needed for Wheezing. 1080 mL 2    albuterol (PROVENTIL/VENTOLIN HFA) 90 mcg/actuation inhaler Inhale 2 puffs into the lungs every 6 (six) hours as needed for Wheezing. Rescue 18 g 11    amitriptyline (ELAVIL) 10 MG tablet Take 10 mg nightly. Increase every few days to a week until symptoms are improved up to a maximum dose of 50 mg nightly 90 tablet 0    amLODIPine (NORVASC) 10 MG tablet TAKE 1 TABLET(10 MG) BY MOUTH EVERY DAY 90  tablet 3    azelastine (ASTELIN) 137 mcg (0.1 %) nasal spray 1 spray (137 mcg total) by Nasal route 2 (two) times daily. 30 mL 11    BREO ELLIPTA 200-25 mcg/dose DsDv diskus inhaler Inhale 1 puff into the lungs once daily. 60 each 11    doxycycline (VIBRAMYCIN) 100 MG Cap Take 1 capsule (100 mg total) by mouth 2 (two) times daily. 20 capsule 0    EScitalopram oxalate (LEXAPRO) 20 MG tablet Take 1 tablet (20 mg total) by mouth once daily. 90 tablet 3    fexofenadine (ALLEGRA) 180 MG tablet Take 180 mg by mouth once daily.      fluticasone propionate (FLONASE) 50 mcg/actuation nasal spray 2 spray each nostril daily 48 g 2    ibuprofen (ADVIL,MOTRIN) 800 MG tablet Take 1 tablet (800 mg total) by mouth 3 (three) times daily as needed for Pain. (Patient not taking: Reported on 7/22/2024) 90 tablet 0    magnesium sulfate in water (MAGNESIUM SULFATE 2 GRAM/50 ML) 2 gram/50 mL PgBk  (Patient not taking: Reported on 7/22/2024)      meloxicam (MOBIC) 15 MG tablet Take 1 tablet (15 mg total) by mouth once daily. (Patient not taking: Reported on 7/22/2024) 30 tablet 2    methylPREDNISolone (MEDROL DOSEPACK) 4 mg tablet FOLLOW PACKAGE DIRECTIONS (Patient not taking: Reported on 11/25/2024) 21 each 0    montelukast (SINGULAIR) 10 mg tablet TAKE 1 TABLET BY MOUTH EVERY DAY 90 tablet 1    pravastatin (PRAVACHOL) 20 MG tablet TK 1 T PO D 90 tablet 3    [DISCONTINUED] albuterol (PROVENTIL/VENTOLIN HFA) 90 mcg/actuation inhaler Inhale 2 puffs into the lungs every 6 (six) hours as needed for Wheezing. Rescue 25.5 g 1    [DISCONTINUED] albuterol (PROVENTIL/VENTOLIN HFA) 90 mcg/actuation inhaler INHALE 2 PUFFS INTO THE LUNGS EVERY 6 HOURS AS NEEDED FOR WHEEZING OR RESCUE 25.5 g 1    [DISCONTINUED] BREO ELLIPTA 200-25 mcg/dose DsDv diskus inhaler Inhale 1 puff into the lungs once daily. 60 each 11    [DISCONTINUED] methylPREDNISolone (MEDROL DOSEPACK) 4 mg tablet use as directed 21 each 0    [DISCONTINUED] montelukast (SINGULAIR) 10 mg  "tablet TAKE 1 TABLET BY MOUTH EVERY DAY 90 tablet 1     No facility-administered encounter medications on file as of 11/25/2024.       Allergies:  Review of patient's allergies indicates:   Allergen Reactions    Ibuprofen Hives    Penicillins Hives       Health Maintenance:  Immunization History   Administered Date(s) Administered    COVID-19, MRNA, LN-S, PF (Pfizer) (Purple Cap) 07/25/2021, 08/15/2021    Influenza - Quadrivalent - PF *Preferred* (6 months and older) 09/30/2013, 09/17/2014, 09/03/2016, 09/29/2018, 08/26/2019, 09/12/2020, 10/02/2021    Influenza - Trivalent - Afluria, Fluzone MDV 09/30/2009, 09/30/2010, 09/27/2011, 09/30/2013, 09/17/2014    Influenza - Trivalent - Fluarix, Flulaval, Fluzone, Afluria - PF 09/03/2016, 11/02/2024    Influenza Split 09/30/2009, 09/30/2010, 09/27/2011, 09/30/2013, 09/17/2014    Tdap 09/29/2018, 11/29/2019      Health Maintenance   Topic Date Due    Pneumococcal Vaccines (Age 0-64) (1 of 2 - PCV) Never done    HIV Screening  Never done    Colorectal Cancer Screening  06/06/2024    COVID-19 Vaccine (3 - 2024-25 season) 09/01/2024    Hemoglobin A1c (Prediabetes)  11/20/2025    Lipid Panel  11/20/2029    TETANUS VACCINE  11/29/2029    RSV Vaccine (Age 60+ and Pregnant patients) (1 - 1-dose 75+ series) 08/11/2051    Hepatitis C Screening  Completed    Influenza Vaccine  Completed        Physical Exam      Vital Signs  Pulse: 71  SpO2: 97 %  BP: 120/70  BP Location: Right arm  Patient Position: Sitting  Pain Score: 0-No pain  Height and Weight  Height: 6' 1" (185.4 cm)  Weight: 134.9 kg (297 lb 6.4 oz)  BSA (Calculated - sq m): 2.64 sq meters  BMI (Calculated): 39.2  Weight in (lb) to have BMI = 25: 189.1]    Physical Exam    General: No acute distress. Well-developed. Well-nourished.  Eyes: EOMI. Sclerae anicteric.  HENT: Normocephalic. Atraumatic. Nares patent. Moist oral mucosa.  Ears: Bilateral TMs clear. Bilateral EACs clear.  Cardiovascular: Regular rate. Regular rhythm. " No murmurs. No rubs. No gallops. Normal S1, S2.  Respiratory: Normal respiratory effort. Clear to auscultation bilaterally. No rales. No rhonchi. No wheezing.  Abdomen: Soft. Non-tender. Non-distended. Normoactive bowel sounds.  Musculoskeletal: No  obvious deformity.  Extremities: No lower extremity edema.  Neurological: Alert & oriented x3. No slurred speech. Normal gait.  Psychiatric: Normal mood. Normal affect. Good insight. Good judgment.  Skin: Warm. Dry. No rash.         Physical Exam  Vitals reviewed.   Constitutional:       Appearance: He is well-developed.   HENT:      Head: Normocephalic and atraumatic.      Right Ear: External ear normal.      Left Ear: External ear normal.   Cardiovascular:      Rate and Rhythm: Normal rate and regular rhythm.      Heart sounds: Normal heart sounds. No murmur heard.  Pulmonary:      Effort: Pulmonary effort is normal.      Breath sounds: Normal breath sounds. No wheezing or rales.   Abdominal:      General: Bowel sounds are normal.      Palpations: Abdomen is soft.         Laboratory:    Results              CBC:  Recent Labs   Lab 04/22/22  2149 07/24/24  0708   WBC 12.78 H 10.09   RBC 5.13 5.41   Hemoglobin 15.2 16.1   Hematocrit 45.6 49.4   Platelets 295 340   MCV 89 91   MCH 29.6 29.8   MCHC 33.3 32.6     CMP:  Recent Labs   Lab 11/22/23  0857 07/24/24  0708 08/22/24  1526 11/20/24  0901   Glucose 129 H 149 H   < > 129 H   Calcium 9.2 9.0   < > 9.5   Albumin 3.5 3.6  --  3.7   Total Protein 8.0 8.0  --  8.0   Sodium 137 137   < > 136   Potassium 3.4 L 3.4 L   < > 3.8   CO2 26 29   < > 29   Chloride 101 100   < > 99   BUN 9 8   < > 13   Alkaline Phosphatase 147 H 145 H  --  139   ALT 35 20  --  16   AST 24 15  --  16   Total Bilirubin 0.9 0.7  --  0.9    < > = values in this interval not displayed.     URINALYSIS:       LIPIDS:  Recent Labs   Lab 11/22/23  0857 05/22/24  0843 11/20/24  0901   HDL 32 L 31 L 30 L   Cholesterol 149 159 161   Triglycerides 131 148 131    LDL Cholesterol 90.8 98.4 104.8   HDL/Cholesterol Ratio 21.5 19.5 L 18.6 L   Non-HDL Cholesterol 117 128 131   Total Cholesterol/HDL Ratio 4.7 5.1 H 5.4 H     TSH:      A1C:  Recent Labs   Lab 11/11/22  0854 11/22/23  0857 11/20/24  0901   Hemoglobin A1C 5.4 5.9 H 6.0 H           This note was generated with the assistance of ambient listening technology. Verbal consent was obtained by the patient and accompanying visitor(s) for the recording of patient appointment to facilitate this note. I attest to having reviewed and edited the generated note for accuracy, though some syntax or spelling errors may persist. Please contact the author of this note for any clarification.      Doni Wood MD  [facility]

## 2024-11-27 ENCOUNTER — CLINICAL SUPPORT (OUTPATIENT)
Dept: REHABILITATION | Facility: HOSPITAL | Age: 49
End: 2024-11-27
Payer: MEDICARE

## 2024-11-27 DIAGNOSIS — R29.898 DECREASED GRIP STRENGTH OF RIGHT HAND: ICD-10-CM

## 2024-11-27 DIAGNOSIS — M25.531 RIGHT WRIST PAIN: Primary | ICD-10-CM

## 2024-11-27 PROCEDURE — 97140 MANUAL THERAPY 1/> REGIONS: CPT | Mod: CO

## 2024-11-27 PROCEDURE — 97112 NEUROMUSCULAR REEDUCATION: CPT | Mod: CO

## 2024-11-27 PROCEDURE — 97018 PARAFFIN BATH THERAPY: CPT | Mod: CO

## 2024-11-27 NOTE — PROGRESS NOTES
[facility]  Occupational Therapy Treatment Note     Date: 11/27/2024  Name: Teddy Ennis  Clinic Number: [id number]    Therapy Diagnosis:   Encounter Diagnoses   Name Primary?    Right wrist pain Yes    Decreased  strength of right hand                Physician: Jose M Desai MD    Referring Physician: Jose M Desai MD  Physician Orders: Eval and Treat  Date of Return to MD: TBD     Date of Injury: over a year ago  Date of Surgery: NA     Evaluation Date: 8/15/2024  Authorization Period: 8/13/24 - 09/26/24  Plan of Care Expiration: 12/19/24  Visit #/ Visits Authorized: 37 of 40  FOTO Completion: Initial eval (8/15/2024)  FOTO #2:  FOTO #3:     Time In: 2 pm  Time Out: 2:55 pm  Total Billable Time: 55 min     Precautions: Standard    Subjective     Patient reports: continued pain,   He was compliant with home exercise program given last session.   Response to previous treatment: fair   Functional change: wearing bullseye brace     Pain: 5/10  Location: right wrists      Objective     Mental status: alert, oriented x3     Observation/Appearance:   No swelling or atrophy noted     Sensation: Patient denies numbness/tingling        ELBOW, WRIST RANGE OF MOTION:   Measured in degrees of active motion with goniometer    Right  8/15/2024 Left  8/15/2024 Right  9/5/2024 Right  10/1/2024 Right  10/24/2024   Elbow Extension/Flexion WNL WNL NT WNL WNL   Pronation/Supination WNL WNL NT WNL WNL   Wrist Extension/Flexion 65/50 70/50 55/30  Post-Treatment:  NT 65/52  Post: 70/60   Ulnar/Radial Deviation 30/15 30/15 NT 25/10 20/15   Composite Wrist Flexion 10 40 15 36 43         STRENGTH: (Measured in pounds using a Dynamometer and pinch meter)    Right  8/15/2024 Left  8/15/2024 Right  9/5/2024 Right  10/1/2024 Right  10/24/2024 Right  11/27/24    Setting 2 25, 20, 20* 75, 80, 75 42;55;50 55; 70; 80 60; 65; 63 29, 39, 40 pain    Average 22 lb 77 lb 49 lb 68 lb 63 36   *pain  throughout hand        Special Testing:    10/24/2024  ECU synergy: pain  Tinel's for cubital tunnel = (+)    Not Today:  MMT EDC: 3/5, with pain dorsal hand     MMT ECRL/ECRB: 4/5 pain dorsal hand  MMT ECU: 4/5, pain     MMT FCU: 4/5, pain dorsal wrist  MMT FCR: 4/5, pain dorsal wrist        Intake Outcome Measure for FOTO Initial Evaluation Survey     Therapist reviewed FOTO scores for Teddy Ennis on 8/15/2024.   FOTO documents entered into [employer] - see Media section.     Intake Score: 45%          Treatment     Teddy received the treatments listed below:      Teddy received the following supervised modalities after being cleared for contradictions for 10 minutes:   Patient received paraffin bath to right hand(s) for 10 minutes to increase blood flow, circulation, pain management and for tissue elasticity prior to therex. vibration    manual therapy techniques: Soft tissue Mobilization were applied to the: R wrist for 8 minutes, including:  STM with Bridget tool dorsal and volar wrist     neuromuscular re-education activities to improve: Kinesthetic and Proprioception for 37 minutes. The following activities were included:  - robbers against wall x 10   - goal post open close x 10   - Median/ulnar nerve floss x 10   - red flexbar isometrics, flex and ext x 1 min  - 3 corners red flexbar bends x 2 min (no RD)   - Oscillations with red flexbar x 2 min   - ecu off table lifts ( wrist ext and UD) x 20   - Ktape of offload ulnar wrist and elbow         NOT TODAY:  Wrist extensor stretch and glide x 10 (elbow flex to extension with WE stretch) (NT)  Prayer stretch 30 sec x 3   Roll and stretch over green flexbar x 2 min   Pink Frisbee 2 ways supinated x 1 min ea - NT  Dart throwers slow with 1#  x 30  [place] stick yellow ball yellow level name on table - NT  scapula rolls and squeezes x 10 (NT)  strainer and tennis ball 1 min ea way c/cc x 2 sets         Patient Education and Home Exercises     Education provided:    - cont HEP  - Progress towards goals     Written Home Exercises Provided: Pt instructed to continue prior HEP.  Exercises were reviewed and Teddy was able to demonstrate them prior to the end of the session.  Teddy demonstrated good  understanding of the home exercise program provided. See electronic medical record under Patient Instructions for exercises provided during therapy sessions.       Assessment     Pt continues to c/o wrist pain, mid dorsal wrist and ulnar wrist, as well as tightness in his hand.  measures show decline in  strength. C/o pain also in the medial elbow and near the guyons canal. Pt has made little progress with therapy. Has a f/u with Dr Desai next week.         Teddy is not progressing well towards his goals and there are no updates to goals at this time. Pt prognosis is Fair.     Patient's spiritual, cultural and educational needs considered and patient agreeable to plan of care and goals.    Anticipated barriers to occupational therapy: none     Goals:  Long Term Goals (to be met by discharge):  Date Goal Met:       1.) Teddy Ennis will demonstrate significantly improved functional performance from re-assessment as measured by a FOTO Intake score of more than 65.     Goal Status:   In progress     2.) Teddy Ennis will return to near to prior level of function for ADLs and household management reporting independence or modified independence.     Goal Status:   In progress     3. Teddy Ennis will report pain 2 out of 10 at worst to increase functional use of affected hand for work and leisure tasks.     Goal Status:   In progress      Short Term Goals (to be met by 9/15/24):  Date Goal Met:       Teddy Ennis will be independent with home exercise program with written instructions.     Goal Status:   In progress     Teddy Ennis will demonstrate 25 degrees of composite wrist flexion to improve functional performance in ADLs/work/leisure tasks.      Goal Status:   In progress     Teddy Ennis will demonstrate within 20 lbs of  strength compared to unaffected hand to improve functional grasp for ADLs/work/leisure tasks.     Goal Status:   In progress     Teddy Ennis will demonstrate the ability to complete ADL/IADL tasks with 4/10 pain.     Goal Status:   In progress     Teddy Ennis will be able to resume significantly greater occupational roles independently or modified as demonstrated by a FOTO Intake score of more than 55.     Goal Status:   In progress        Plan     Updates/Grading for next session: pending F/u with RAUL Garibay/MARIANA   11/27/2024

## 2024-12-02 ENCOUNTER — TELEPHONE (OUTPATIENT)
Dept: PRIMARY CARE CLINIC | Facility: CLINIC | Age: 49
End: 2024-12-02
Payer: MEDICARE

## 2024-12-02 DIAGNOSIS — J45.909 ASTHMA, UNSPECIFIED ASTHMA SEVERITY, UNSPECIFIED WHETHER COMPLICATED, UNSPECIFIED WHETHER PERSISTENT: Primary | ICD-10-CM

## 2024-12-02 NOTE — TELEPHONE ENCOUNTER
----- Message from Collin sent at 12/2/2024 11:21 AM CST -----  Contact: mom @  [phone]  Requesting an RX refill or new RX.     Is this a refill or new RX:     RX name and strength (copy/paste from chart):  mask and tubing     Is this a 30 day or 90 day RX:     Pharmacy name and phone # (copy/paste from chart):    [id number] - [place] - [address] [facility] [address]  [facility] [place] [address]  Phone: [phone] Fax: [phone]            The doctors have asked that we provide their patients with the following 2 reminders -- prescription refills can take up to 72 hours, and a friendly reminder that in the future you can use your [facility] account to request refills: yes

## 2024-12-03 ENCOUNTER — TELEPHONE (OUTPATIENT)
Dept: PRIMARY CARE CLINIC | Facility: CLINIC | Age: 49
End: 2024-12-03
Payer: MEDICARE

## 2024-12-03 ENCOUNTER — OFFICE VISIT (OUTPATIENT)
Dept: URGENT CARE | Facility: CLINIC | Age: 49
End: 2024-12-03
Payer: MEDICARE

## 2024-12-03 VITALS
WEIGHT: 297 LBS | HEART RATE: 82 BPM | TEMPERATURE: 99 F | DIASTOLIC BLOOD PRESSURE: 78 MMHG | HEIGHT: 73 IN | SYSTOLIC BLOOD PRESSURE: 136 MMHG | BODY MASS INDEX: 39.36 KG/M2 | OXYGEN SATURATION: 97 % | RESPIRATION RATE: 18 BRPM

## 2024-12-03 DIAGNOSIS — J45.21 INTERMITTENT ASTHMA WITH ACUTE EXACERBATION, UNSPECIFIED ASTHMA SEVERITY: Primary | ICD-10-CM

## 2024-12-03 PROCEDURE — 94640 AIRWAY INHALATION TREATMENT: CPT | Mod: S$GLB,,, | Performed by: PHYSICIAN ASSISTANT

## 2024-12-03 PROCEDURE — 96372 THER/PROPH/DIAG INJ SC/IM: CPT | Mod: 59,S$GLB,, | Performed by: PHYSICIAN ASSISTANT

## 2024-12-03 PROCEDURE — 99214 OFFICE O/P EST MOD 30 MIN: CPT | Mod: 25,S$GLB,, | Performed by: PHYSICIAN ASSISTANT

## 2024-12-03 RX ORDER — ALBUTEROL SULFATE 0.83 MG/ML
2.5 SOLUTION RESPIRATORY (INHALATION)
Status: COMPLETED | OUTPATIENT
Start: 2024-12-03 | End: 2024-12-03

## 2024-12-03 RX ORDER — DEXAMETHASONE SODIUM PHOSPHATE 10 MG/ML
10 INJECTION INTRAMUSCULAR; INTRAVENOUS
Status: COMPLETED | OUTPATIENT
Start: 2024-12-03 | End: 2024-12-03

## 2024-12-03 RX ORDER — PREDNISONE 20 MG/1
TABLET ORAL
Qty: 8 TABLET | Refills: 0 | Status: SHIPPED | OUTPATIENT
Start: 2024-12-03 | End: 2024-12-08

## 2024-12-03 RX ADMIN — DEXAMETHASONE SODIUM PHOSPHATE 10 MG: 10 INJECTION INTRAMUSCULAR; INTRAVENOUS at 04:12

## 2024-12-03 RX ADMIN — ALBUTEROL SULFATE 2.5 MG: 0.83 SOLUTION RESPIRATORY (INHALATION) at 04:12

## 2024-12-03 NOTE — TELEPHONE ENCOUNTER
----- Message from Kendra sent at 12/3/2024  4:26 PM CST -----  Contact: [phone]  Patient called to inform that he has been seen at the urgent care for an asthma attack.

## 2024-12-03 NOTE — PROGRESS NOTES
"Subjective:      Patient ID: Teddy Ennis is a 49 y.o. male.    Vitals:  height is 6' 1" (1.854 m) and weight is 134.7 kg (297 lb). His oral temperature is 99.3 °F (37.4 °C). His blood pressure is 136/78 and his pulse is 82. His respiration is 18 and oxygen saturation is 97%.     Chief Complaint: Cough and Wheezing    This is a 49 y.o. male who presents today with a chief complaint of  wheezing, shortness for breath, cough x 2-3 days.  Patient states that cough will go away when the wheezing and shortness a breath improved after nebulizer treatment.  Denies other new associated symptoms.  No fever, sore throat, leg pain/swelling.  No CP.  No hemoptysis.  Symptoms are consistent with prior asthma flares per patient.  Patient states that this always happens this time of year when weather changes.  Has tried nebulizer at home with temporary relief but symptoms persist.      Cough  This is a new problem. The cough is Productive of sputum. Associated symptoms include headaches, nasal congestion, postnasal drip, a sore throat, shortness of breath, sweats and wheezing. Pertinent negatives include no chest pain, chills, ear congestion, ear pain, eye redness, fever, heartburn, hemoptysis, myalgias, rash, rhinorrhea or weight loss. His past medical history is significant for asthma. There is no history of bronchitis, COPD or pneumonia.       Constitution: Negative for chills, fatigue and fever.   HENT:  Positive for postnasal drip and sore throat. Negative for ear pain and congestion.    Neck: Negative for neck pain and neck stiffness.   Cardiovascular:  Negative for chest pain, leg swelling, palpitations and sob on exertion.   Eyes:  Negative for eye itching, eye pain and eye redness.   Respiratory:  Positive for cough, sputum production, shortness of breath, wheezing and asthma. Negative for chest tightness and bloody sputum.    Gastrointestinal:  Negative for abdominal pain, nausea, vomiting, diarrhea and heartburn. "   Genitourinary:  Negative for dysuria, frequency, urgency, flank pain and hematuria.   Musculoskeletal:  Negative for pain, joint pain and muscle ache.   Skin:  Negative for color change and rash.   Allergic/Immunologic: Positive for seasonal allergies and asthma.   Neurological:  Positive for headaches. Negative for dizziness, passing out, speech difficulty, disorientation and numbness.   Psychiatric/Behavioral:  Negative for disorientation.       Objective:     Physical Exam   Constitutional: He is oriented to person, place, and time. He appears well-developed. He is cooperative.  Non-toxic appearance. He does not appear ill. No distress.   HENT:   Head: Normocephalic and atraumatic.   Ears:   Right Ear: Hearing, tympanic membrane, external ear and ear canal normal.   Left Ear: Hearing, tympanic membrane, external ear and ear canal normal.   Nose: Nose normal. No mucosal edema, rhinorrhea or nasal deformity. No epistaxis. Right sinus exhibits no maxillary sinus tenderness and no frontal sinus tenderness. Left sinus exhibits no maxillary sinus tenderness and no frontal sinus tenderness.   Mouth/Throat: Uvula is midline, oropharynx is clear and moist and mucous membranes are normal. No trismus in the jaw. Normal dentition. No uvula swelling. No oropharyngeal exudate, posterior oropharyngeal edema or posterior oropharyngeal erythema.   Eyes: Conjunctivae and lids are normal. No scleral icterus.   Neck: Trachea normal and phonation normal. Neck supple. No edema present. No erythema present. No neck rigidity present.   Cardiovascular: Normal rate, regular rhythm, normal heart sounds and normal pulses.      Comments: No LE edema, swelling, pain, or ttp.    Pulmonary/Chest: Effort normal. No accessory muscle usage or stridor. No tachypnea and no bradypnea. No respiratory distress. He has no decreased breath sounds. He has wheezes (end expiratory) in the right upper field, the right middle field, the left upper field and  "the left middle field. He has no rhonchi. He has no rales.   Abdominal: Normal appearance.   Musculoskeletal: Normal range of motion.         General: No deformity. Normal range of motion.      Right lower leg: No edema.      Left lower leg: No edema.   Lymphadenopathy:     He has no cervical adenopathy.   Neurological: He is alert and oriented to person, place, and time. He exhibits normal muscle tone. Coordination normal.   Skin: Skin is warm, dry, intact, not diaphoretic and not pale.   Psychiatric: His speech is normal and behavior is normal. Judgment and thought content normal.   Nursing note and vitals reviewed.      Vitals:    12/03/24 1558 12/03/24 1601   BP: 136/78    BP Location: Right arm    Patient Position: Sitting    Pulse: 84 82   Resp: 18    Temp:  99.3 °F (37.4 °C)   TempSrc:  Oral   SpO2: 98% 97%   Weight: 134.7 kg (297 lb)    Height: 6' 1" (1.854 m)    Pulse ox above on triage and after neb treatment.  Patient has improvement in symptoms after albuterol neb.    Assessment:     1. Intermittent asthma with acute exacerbation, unspecified asthma severity        Plan:       Intermittent asthma with acute exacerbation, unspecified asthma severity  -     dexAMETHasone injection 10 mg  -     albuterol nebulizer solution 2.5 mg  -     predniSONE (DELTASONE) 20 MG tablet; Take 2 tablets (40 mg total) by mouth once daily for 3 days, THEN 1 tablet (20 mg total) once daily for 2 days.  Dispense: 8 tablet; Refill: 0      Patient feeling better after albuterol neb treatment.  Vital signs are stable.  Patient is well-appearing after neb treatment.  Will give dexamethasone injection for acute asthma exacerbation and monitor.  Prednisone to start tomorrow.  Continue home nebulizer p.r.n..  Follow up closely with PCP.  Strict ER precautions given for new or worsening symptoms.  This appears most consistent with acute asthma exacerbation, patient agrees and states that it feels the same.  Unlikely to be other " cardiovascular or pulmonary cause though some can not be ruled out without chest x-ray that we do not have in clinic.  Low risk for PE per Wells score.  Discussed ddx, home care, tx options, and given follow up precautions.  I have reviewed the patient's chart to view previous visits, labs, and imaging to assess PMH and look for any trends or previous treatments.

## 2024-12-03 NOTE — PATIENT INSTRUCTIONS
- Rest.    - Drink plenty of fluids.      - You received a steroid shot today.  Begin oral prednisone tomorrow.  This can elevate your blood pressure, elevate your blood sugar, water weight gain, nervous energy, redness to the face and dimpling of the skin where the shot goes in.   - Do not use steroids more than 3 times per year.   - If you have diabetes, please check you blood sugar frequently.  - If you have high blood pressure, please check your blood pressure frequently.     - Follow up with your PCP or specialty clinic as directed in the next 1-2 weeks if not improved or as needed.  You can call [phone] to schedule an appointment with the appropriate provider.    - Go to the ER or seek medical attention immediately if you develop new or worsening symptoms.     - You must understand that you have received an Urgent Care treatment only and that you may be released before all of your medical problems are known or treated.   - You, the patient, will arrange for follow up care as instructed.   - If your condition worsens or fails to improve we recommend that you receive another evaluation at the ER immediately or contact your PCP to discuss your concerns or return here.

## 2024-12-04 ENCOUNTER — TELEPHONE (OUTPATIENT)
Dept: PRIMARY CARE CLINIC | Facility: CLINIC | Age: 49
End: 2024-12-04
Payer: MEDICARE

## 2024-12-04 NOTE — TELEPHONE ENCOUNTER
Pt feeling better today. Advised to complete treatment UC sent home with him and to call back and/or go back to uc for another eval if sx worsen. Pt BLAKE.

## 2024-12-04 NOTE — TELEPHONE ENCOUNTER
----- Message from Nicci sent at 12/4/2024 10:11 AM CST -----  Contact: PT  [phone]  Would like to receive medical advice.    Would they like a call back or a response via [facility]:  call back    Additional information:  Teddy is calling to speak to the provider or staff and know if the provider received the message yesterday about the pt going to the Urgent care and that it was very bad and the pt's Oxygen was very low yesterday. Please call Teddy navarro for advice

## 2024-12-04 NOTE — PROGRESS NOTES
"[facility]  Occupational Therapy Treatment Note     Date: 12/5/2024  Name: Teddy Ennis  Clinic Number: [id number]    Therapy Diagnosis:   Encounter Diagnoses   Name Primary?    Right wrist pain Yes    Decreased  strength of right hand        Physician: Jose M Desai MD    Referring Physician: Jose M Desai MD  Physician Orders: Eval and Treat  Date of Return to MD: TBJUSTIN     Date of Injury: over a year ago  Date of Surgery: NA     Evaluation Date: 8/15/2024  Authorization Period: 8/13/24 - 09/26/24  Plan of Care Expiration: 12/19/24  Visit #/ Visits Authorized: 38 of 40  FOTO Completion: Initial eval (8/15/2024)     Time In: 2:00 pm  Time Out: 3:00 pm  Total Billable Time: 60 min     Precautions: Standard    Subjective     Patient reports: "I am still having pain right here on the back side of my hand."  He was compliant with home exercise program given last session.   Response to previous treatment: fair   Functional change: wearing bullseye brace     Pain: 6/10  Location: right wrist    Objective     Mental status: alert, oriented x3     Observation/Appearance:   No swelling or atrophy noted     Sensation: Patient denies numbness/tingling        ELBOW, WRIST RANGE OF MOTION:   Measured in degrees of active motion with goniometer    Right  8/15/2024 Left  8/15/2024 Right  9/5/2024 Right  10/1/2024 Right  10/24/2024   Elbow Extension/Flexion WNL WNL NT WNL WNL   Pronation/Supination WNL WNL NT WNL WNL   Wrist Extension/Flexion 65/50 70/50 55/30  Post-Treatment:  NT 65/52  Post: 70/60   Ulnar/Radial Deviation 30/15 30/15 NT 25/10 20/15   Composite Wrist Flexion 10 40 15 36 43         STRENGTH: (Measured in pounds using a Dynamometer and pinch meter)    Right  8/15/2024 Left  8/15/2024 Right  9/5/2024 Right  10/1/2024 Right  10/24/2024 Right  11/27/24    Setting 2 25, 20, 20* 75, 80, 75 42;55;50 55; 70; 80 60; 65; 63 29, 39, 40 pain    Average 22 lb 77 lb 49 lb 68 lb 63 " 36   *pain throughout hand        Special Testing:    10/24/2024  ECU synergy: pain  Tinel's for cubital tunnel = (+)    Not Today:  MMT EDC: 3/5, with pain dorsal hand     MMT ECRL/ECRB: 4/5 pain dorsal hand  MMT ECU: 4/5, pain     MMT FCU: 4/5, pain dorsal wrist  MMT FCR: 4/5, pain dorsal wrist        Intake Outcome Measure for FOTO Initial Evaluation Survey     Therapist reviewed FOTO scores for Teddy Ennis on 8/15/2024.   FOTO documents entered into [employer] - see Media section.     Intake Score: 45%          Treatment     Teddy received the treatments listed below:      Teddy received the following supervised modalities after being cleared for contradictions for 10 minutes:   Patient received paraffin bath to right hand(s) for 10 minutes to increase blood flow, circulation, pain management and for tissue elasticity prior to therex    manual therapy techniques: Soft tissue Mobilization were applied to the: R wrist for 10 minutes, including:  STM with Bridget tool dorsal and volar wrist     neuromuscular re-education activities to improve: Kinesthetic and Proprioception for 20 minutes. The following activities were included:  - robbers against wall x 10   - goal post open close x 10   - Median/ulnar nerve floss x 10   - red flexbar isometrics, flex and ext x 1 min  - 3 corners red flexbar bends x 2 min (no RD)   - Oscillations with red flexbar x 2 min   - ecu off table lifts ( wrist ext and UD) x 20     Orthotic Fabrication x 30 min  Fabricated a static custom forearm based wrist cockup orthosis for the R hand. Orthosis fabricated to maintain joint positioning and rest the wrist when sleeping and during work ax. Instructed on wear time with removal for HEP, bathing/hygiene. Instructed to monitor for pain/pressure, increased edema, or redness and to contact office for adjustments as needed. Patient reported good understanding of orthotic wear and care schedule. She was able to vivian and doff it independently  with min cuing. ()      Patient Education and Home Exercises     Education provided:   - cont HEP  - Progress towards goals     Written Home Exercises Provided: Pt instructed to continue prior HEP.  Exercises were reviewed and Teddy was able to demonstrate them prior to the end of the session.  Teddy demonstrated good  understanding of the home exercise program provided. See electronic medical record under Patient Instructions for exercises provided during therapy sessions.       Assessment   Upon arrival, patient states that he continues to have global pain of the R wrist. He went to the MD this morning for a re-eval where the doctor that he has been taking prednisone which seems to be helping. Previous MRI and EMG results were normal. At this point, the patient has not made much progress after 8 months and 40 visits of OT. His POC also expires soon and his MD did not recommend another order for therapy. Per goal status, he has not met many of his fxnl goals and FOTO updates were not issued t/o his treatment sessions. The patient has been wearing a wrist widget brace, but has not had any static wrist bracing to today, I fabricated a cutom wrist cock up brace. Regarding orthotic fabrication, the orthosis appeared to fit well with no problems noted. There were no complaints of discomfort from the patient at this time. The patient demonstrated competency with independently doffing and donning orthosis.  He did well with established activities today.  At this point, patient has reached max potential with skilled OT. I recommend patient transition to an independent HEP. I also spoke with the patient's mom about this. Patient agrees with this plan. I review and discuss discharge HEP and patient demonstrates and verbalizes understanding and independence with all information presented. This is to serve as a formal discharge from skilled OT.     Anticipated barriers to occupational therapy: none     Goals:  Long  Term Goals (to be met by discharge):  Date Goal Met:       1.) Teddy Ennis will demonstrate significantly improved functional performance from re-assessment as measured by a FOTO Intake score of more than 65.     Goal Status:   Not initiated 12/5/2024     2.) Teddy Ennis will return to near to prior level of function for ADLs and household management reporting independence or modified independence.     Goal Status:   Not met 12/5/2024     3. Teddy Ennis will report pain 2 out of 10 at worst to increase functional use of affected hand for work and leisure tasks.     Goal Status:   Not met 12/5/2024      Short Term Goals (to be met by 9/15/24):  Date Goal Met:       Teddy Ennis will be independent with home exercise program with written instructions.     Goal Status:   Met, 12/5/2024     Teddy Ennis will demonstrate 25 degrees of composite wrist flexion to improve functional performance in ADLs/work/leisure tasks.     Goal Status:   Met, 12/5/2024     Teddy Ennis will demonstrate within 20 lbs of  strength compared to unaffected hand to improve functional grasp for ADLs/work/leisure tasks.     Goal Status:   Met, 12/5/2024     Teddy Ennis will demonstrate the ability to complete ADL/IADL tasks with 4/10 pain.     Goal Status:   Not met 12/5/2024      Teddy Ennis will be able to resume significantly greater occupational roles independently or modified as demonstrated by a FOTO Intake score of more than 55.     Goal Status:  Not initiated 12/5/2024        Plan     Discharged     Merly Andrews Fauzia, OT   12/5/2024

## 2024-12-05 ENCOUNTER — CLINICAL SUPPORT (OUTPATIENT)
Dept: REHABILITATION | Facility: HOSPITAL | Age: 49
End: 2024-12-05
Payer: MEDICARE

## 2024-12-05 ENCOUNTER — HOSPITAL ENCOUNTER (OUTPATIENT)
Dept: RADIOLOGY | Facility: HOSPITAL | Age: 49
Discharge: HOME | End: 2024-12-05
Attending: ORTHOPAEDIC SURGERY
Payer: MEDICARE

## 2024-12-05 ENCOUNTER — OFFICE VISIT (OUTPATIENT)
Dept: ORTHOPEDICS | Facility: CLINIC | Age: 49
End: 2024-12-05
Payer: MEDICARE

## 2024-12-05 DIAGNOSIS — M25.531 RIGHT WRIST PAIN: Primary | ICD-10-CM

## 2024-12-05 DIAGNOSIS — M79.641 PAIN OF RIGHT HAND: Primary | ICD-10-CM

## 2024-12-05 DIAGNOSIS — M79.641 RIGHT HAND PAIN: ICD-10-CM

## 2024-12-05 DIAGNOSIS — R29.898 DECREASED GRIP STRENGTH OF RIGHT HAND: ICD-10-CM

## 2024-12-05 DIAGNOSIS — M79.641 RIGHT HAND PAIN: Primary | ICD-10-CM

## 2024-12-05 PROCEDURE — 73130 X-RAY EXAM OF HAND: CPT | Mod: TC,HCNC,RT

## 2024-12-05 PROCEDURE — 99999 PR PBB SHADOW E&M-EST. PATIENT-LVL III: CPT | Mod: PBBFAC,HCNC,, | Performed by: ORTHOPAEDIC SURGERY

## 2024-12-05 PROCEDURE — L3906 WHO W/O JOINTS CF: HCPCS

## 2024-12-05 PROCEDURE — 73130 X-RAY EXAM OF HAND: CPT | Mod: 26,HCNC,RT, | Performed by: RADIOLOGY

## 2024-12-05 PROCEDURE — 97112 NEUROMUSCULAR REEDUCATION: CPT

## 2024-12-05 PROCEDURE — 1159F MED LIST DOCD IN RCRD: CPT | Mod: HCNC,CPTII,S$GLB, | Performed by: ORTHOPAEDIC SURGERY

## 2024-12-05 PROCEDURE — 3044F HG A1C LEVEL LT 7.0%: CPT | Mod: HCNC,CPTII,S$GLB, | Performed by: ORTHOPAEDIC SURGERY

## 2024-12-05 PROCEDURE — 97140 MANUAL THERAPY 1/> REGIONS: CPT

## 2024-12-05 PROCEDURE — 99213 OFFICE O/P EST LOW 20 MIN: CPT | Mod: HCNC,S$GLB,, | Performed by: ORTHOPAEDIC SURGERY

## 2024-12-05 NOTE — PATIENT INSTRUCTIONS
[facility] Occupational Therapy  Orthosis Instructions and Proof of Delivery        Date: 12/5/2024  Name: Teddy Ennis  MRN: [id number]  YOB: 1975  Referring Provider: Jose M Desai MD  Diagnosis:   M25.531 (ICD-10-CM) - Right wrist pain   M79.641 (ICD-10-CM) - Pain of right hand     [facility] Level II Code and Description   Custom Wrist Orthosis for the RUE.       Orthosis Information  (x) Custom Fabricated                (x) Right                                      (x) Static                                       Orthosis Instructions  Wear the orthosis as needed to minimize your symptoms  Remove for hygiene, exercises, dressing changes    Cleaning and Maintenance  Keep your orthosis away from any heat sources. Do not leave in your car.  Keep your orthosis away from pets.  You may clean your orthosis with cool water and soap or a mild cleanser.  Your orthosis may need adjustments due to changes in your medical condition (swelling, dressing size, additional surgery, etc.)  Monitor your skin color and integrity.  Do not try to adjust the orthosis on your own.     If you have any questions or concerns, please contact the therapy office at [phone].     I, Teddy Ennis , have personally received the above described orthosis along with instructions on the wear, care, and precautions related to this orthosis. I understand that I am responsible for payment to [facility] of my deductible, coinsurance, or other portion of my charges not paid in full by my insurance plans, including any item that is not covered under the insurance plan.     Signature: _________________________________ Date: __________________    Therapist: GUEVARA Newby OTR/L

## 2024-12-05 NOTE — PROGRESS NOTES
[facility]  History & Physical  Orthopedics    SUBJECTIVE:      COVID-19 attestation:  This patient was treated during the COVID-19 pandemic.  This was discussed with the patient, they are aware of our current policies and procedures, were given the option of delaying their visit and or switching to a virtual visit, delaying their surgery when applicable, and they elect to proceed.    Chief Complaint: right wrist pain    Referring Provider: Self, Aaareferral     History of Present Illness:  Patient is a 49 y.o. right hand dominant male who presents today with complaints of right wrist pain.  Patient reportedly had a ground level fall around 6 months ago onto his right wrist, and has since been experiencing progressively worsening pain.  Per mother and patient, he presented to the emergency department and no acute fractures were identified at that time (per chart review, last presentation to ED for ground level fall was in December 2022).  Describes his pain as throbbing and indicates it involves the radial aspect of the dorsum of the right wrist.  Denies any numbness, tingling, or weakness.  States he has had multiple fractures of the right hand that occurred as a child, but denies any other previous injuries or surgeries to the right upper extremity.      Interval history February 1, 2024:  The patient returns today for re-evaluation.  He notes that his right wrist pain persists.  He was unable to fully complete his MRI unfortunately.  Today he is describing his right upper extremity wrist and hand pain as more of a numbness and tingling at nighttime which is unbearable.  He has no other complaints returns for re-evaluation.      Interval history March 28, 2024: The patient returns today for re-evaluation.  He notes that his pain persists.  He had a recent EMG to evaluate for carpal tunnel syndrome.  He returns for re-evaluation with no other new complaints.  He again notes that he was unable  to complete the MRI because precipitated his asthma and he got very red in the face.      Interval history June 27, 2024: The patient returns today for re-evaluation.  He notes that he has not improving with rest, bracing, and occupational therapy.  He reports that his pain is worst stemming from the volar aspect of the right small finger MCP joint radiating to the base of the hand and up the forearm.  He returns today with his mom for re-evaluation has no other complaints.      Interval history August 13, 2024: The patient returns today for re-evaluation.  He was recently sent for an MRI of his right hand as well as labs.  He was prescribed ibuprofen at his last visit and notes that this caused him to break out in a rash so he discontinued it.  He does note that his symptoms are improved today, fortunately.  No other complaints.      Interval history December 5, 2024: The patient returns today for re-evaluation.  He has been evaluated by Rheumatology at this point in time.  It does not appear that he has been started on any antigout medications at this point but he did get placed on some prednisone pills which she began yesterday and notes that that has been improving his right hand and wrist pain since  initiation of that medication.  Otherwise he notes stable pain throughout the right hand and wrist and has no new complaints today.  He did suffer a recent fall which he notes exacerbated his symptoms and he returns today with his mom for re-evaluation.    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    Onset of symptoms/DOI was reportedly 6 months prior to presentation today.    Symptoms are aggravated by activity and movement.    Symptoms are alleviated by rest.  Denies taking any medications, denies use of splints     Symptoms consist of pain, swelling, and erythema.    The patient rates their pain as   6/10    Attempted treatment(s) and/or interventions include activity modifications, rest, rest.      The patient denies any fevers, chills, N/V, D/C and presents for evaluation.       Past Medical History:   Diagnosis Date    Allergy     Asthma      Past Surgical History:   Procedure Laterality Date    ADENOIDECTOMY      ANKLE SURGERY Left 1995    SINUS SURGERY       Review of patient's allergies indicates:   Allergen Reactions    Ibuprofen Hives    Penicillins Hives     Social History     Social History Narrative    Not on file     Family History   Problem Relation Name Age of Onset    Hypertension Mother      Stroke Mother      No Known Problems Father           Current Outpatient Medications:     albuterol (PROVENTIL) 2.5 mg /3 mL (0.083 %) nebulizer solution, Take 3 mLs (2.5 mg total) by nebulization every 4 to 6 hours as needed for Wheezing., Disp: 1080 mL, Rfl: 2    albuterol (PROVENTIL/VENTOLIN HFA) 90 mcg/actuation inhaler, Inhale 2 puffs into the lungs every 6 (six) hours as needed for Wheezing. Rescue, Disp: 18 g, Rfl: 11    amitriptyline (ELAVIL) 10 MG tablet, Take 10 mg nightly. Increase every few days to a week until symptoms are improved up to a maximum dose of 50 mg nightly, Disp: 90 tablet, Rfl: 0    amLODIPine (NORVASC) 10 MG tablet, TAKE 1 TABLET(10 MG) BY MOUTH EVERY DAY, Disp: 90 tablet, Rfl: 3    azelastine (ASTELIN) 137 mcg (0.1 %) nasal spray, 1 spray (137 mcg total) by Nasal route 2 (two) times daily., Disp: 30 mL, Rfl: 11    BREO ELLIPTA 200-25 mcg/dose DsDv diskus inhaler, Inhale 1 puff into the lungs once daily., Disp: 60 each, Rfl: 11    doxycycline (VIBRAMYCIN) 100 MG Cap, Take 1 capsule (100 mg total) by mouth 2 (two) times daily., Disp: 20 capsule, Rfl: 0    EScitalopram oxalate (LEXAPRO) 20 MG tablet, Take 1 tablet (20 mg total) by mouth once daily., Disp: 90 tablet, Rfl: 3    fexofenadine (ALLEGRA) 180 MG tablet, Take 180 mg by mouth once daily., Disp: , Rfl:     fluticasone propionate (FLONASE) 50 mcg/actuation nasal spray, 2 spray each nostril daily, Disp: 48 g, Rfl: 2     ibuprofen (ADVIL,MOTRIN) 800 MG tablet, Take 1 tablet (800 mg total) by mouth 3 (three) times daily as needed for Pain. (Patient not taking: Reported on 7/22/2024), Disp: 90 tablet, Rfl: 0    magnesium sulfate in water (MAGNESIUM SULFATE 2 GRAM/50 ML) 2 gram/50 mL PgBk, , Disp: , Rfl:     meloxicam (MOBIC) 15 MG tablet, Take 1 tablet (15 mg total) by mouth once daily. (Patient not taking: Reported on 7/22/2024), Disp: 30 tablet, Rfl: 2    methylPREDNISolone (MEDROL DOSEPACK) 4 mg tablet, FOLLOW PACKAGE DIRECTIONS (Patient not taking: Reported on 11/25/2024), Disp: 21 each, Rfl: 0    montelukast (SINGULAIR) 10 mg tablet, TAKE 1 TABLET BY MOUTH EVERY DAY, Disp: 90 tablet, Rfl: 1    pravastatin (PRAVACHOL) 20 MG tablet, TK 1 T PO D, Disp: 90 tablet, Rfl: 3    predniSONE (DELTASONE) 20 MG tablet, Take 2 tablets (40 mg total) by mouth once daily for 3 days, THEN 1 tablet (20 mg total) once daily for 2 days., Disp: 8 tablet, Rfl: 0      Review of Systems:  As per HPI otherwise noncontributory    OBJECTIVE:      Vital Signs (Most Recent):  There were no vitals filed for this visit.      There is no height or weight on file to calculate BMI.      Physical Exam:  Constitutional: The patient appears well-developed and well-nourished. No distress.   Skin: No lesions appreciated  Head: Normocephalic and atraumatic.   Nose: Nose normal.   Ears: No deformities seen  Eyes: Conjunctivae and EOM are normal.   Neck: No tracheal deviation present.   Cardiovascular: Normal rate and intact distal pulses.    Pulmonary/Chest: Effort normal. No respiratory distress.   Abdominal: There is no guarding.   Neurological: The patient is alert.   Psychiatric: The patient has a normal mood and affect.     Right Hand/Wrist Examination:    Observation/Inspection:  Swelling  none    Deformity  none  Discoloration  none     Scars   none    Atrophy  None    Moderate tenderness globally about the right hand and wrist today.  No erythema or  swelling.    HAND/WRIST EXAMINATION:  Finkelstein's Test   Neg  WHAT Test    Neg  Snuff box tenderness     Negative  Alves's Test    Neg  Hook of Hamate Tenderness  Neg  CMC grind    Neg  Circumduction test   Neg    Neurovascular Exam:  Digits WWP, brisk CR < 3s throughout  NVI motor/LTS to M/R/U nerves, radial pulse 2+    ROM hand full, painless    ROM wrist full, but with pain     ROM elbow full, painless    Abdomen not guarded  Respirations nonlabored  Perfusion intact    Diagnostic Results:     Imaging - I independently viewed the patient's imaging as well as the radiology report.  Xrays of the patient's right hand demonstrate no evidence of any acute fractures or dislocations or significant degenerative changes.    MRI right wrist-Impression:     1. Examination terminated early at patient's request.  Completed sequences are degraded by motion artifact.  2. Dorsal capsular edema signal, not well evaluated secondary to aforementioned limitations.  3. Nondiagnostic evaluation of the triangular fibrocartilage complex and intrinsic wrist ligaments.    EMG - IMPRESSIONS:  This is a normal electrophysiologic study of the right upper extremity.  There is no electrodiagnostic evidence of a median neuropathy on the right.          Uric acid, CRP, ESR-elevated    ASSESSMENT/PLAN:      49 y.o. yo male with right hand and wrist pain, likely gout  Plan: The patient and I had a thorough discussion today.  We discussed the working diagnosis as well as several other potential alternative diagnoses.  Treatment options were discussed, both conservative and surgical.  Conservative treatment options would include things such as activity modifications, workplace modifications, a period of rest, oral vs topical OTC and prescription anti-inflammatory medications, occupational therapy, splinting/bracing, immobilization, corticosteroid injections, and others.  Surgical options were discussed as well.     At this point in time,  Teddy   has had recurrence of his right hand and wrist pain status post recent follow up.  I would like to check another uric acid level as this was elevated in July.  He is improving with some prednisone pills and we will continue to observe his symptoms on this medication for further improvement.  Follow up with me in 2 weeks for re-evaluation or sooner for any problems.  I will give him a note to hold him out of work for 2 weeks.            Jose M Desai M.D.    Please be aware that this note has been generated with the assistance of [employer] voice-to-text.  Please excuse any spelling or grammatical errors.    Thank you for choosing Dr. Jose M Desai for your orthopedic hand and upper extremity care. It is our goal to provide you with exceptional care that will help keep you healthy, active, and get you back in the game.     If you felt that you received exemplary care today, please consider leaving feedback for Dr. Desai on [employer] at [email or web address].    Please do not hesitate to reach out to us via email, phone, or MyChart with any questions, concerns, or feedback.

## 2024-12-09 DIAGNOSIS — M25.531 RIGHT WRIST PAIN: ICD-10-CM

## 2024-12-09 RX ORDER — MELOXICAM 15 MG/1
15 TABLET ORAL DAILY
Qty: 1 TABLET | Refills: 0 | Status: SHIPPED | OUTPATIENT
Start: 2024-12-09

## 2024-12-09 RX ORDER — MELOXICAM 15 MG/1
15 TABLET ORAL DAILY
Qty: 30 TABLET | Refills: 1 | Status: SHIPPED | OUTPATIENT
Start: 2024-12-09

## 2024-12-13 ENCOUNTER — TELEPHONE (OUTPATIENT)
Dept: ORTHOPEDICS | Facility: CLINIC | Age: 49
End: 2024-12-13
Payer: MEDICARE

## 2024-12-13 ENCOUNTER — PATIENT MESSAGE (OUTPATIENT)
Dept: ORTHOPEDICS | Facility: CLINIC | Age: 49
End: 2024-12-13
Payer: MEDICARE

## 2024-12-13 NOTE — TELEPHONE ENCOUNTER
Called pt twice, calls went straight to answering service, left vm.      ----- Message from Juanita sent at 12/13/2024  8:02 AM CST -----  Name of Who is Calling: NUPUR GILLIAM [[id number]]      What is the request in detail: pt accidentally cxl his appt  at [facility] for 12/19 . Requesting to reschedule it back on for same date and time. Please advise .       Can the clinic reply by [facility]: YES      What Number to Call Back if not in [facility]: Telephone Information:  Mobile          [phone]

## 2024-12-13 NOTE — TELEPHONE ENCOUNTER
Spoke with Pt and he said he accidentally cx his appt, I let him know his appt is no longer avail and sanaz him for 01/28. Also placed him on wait list. He stated he needs a sooner appt to go back to work because Dr. Desai only had him out of work for 2 weeks. Told him I would send a message to Dr. Desai assistant to see if he can be fitted in anywhere and they will give him a call if so.

## 2024-12-16 ENCOUNTER — TELEPHONE (OUTPATIENT)
Dept: ORTHOPEDICS | Facility: CLINIC | Age: 49
End: 2024-12-16
Payer: MEDICARE

## 2024-12-16 NOTE — TELEPHONE ENCOUNTER
Called pt to notify them of appt change, no answer, left voicemail w/ updated information.     ----- Message from Med Assistant Warren sent at 12/16/2024  8:31 AM CST -----  I rebooked it. Please call him.  ----- Message -----  From: Mojgan Benitez MA  Sent: 12/13/2024  10:45 AM CST  To: Briana Donaldson MA    Pt said he accidentally cx his appt, I let him know his appt is no longer avail and sanaz him for 01/28. He stated he needs a sooner appt to go back to work because Dr. Desai only had him out of work for 2 weeks.  ----- Message -----  From: Jackie Shaw  Sent: 12/13/2024  10:26 AM CST  To: [facility]is[facility] Salguero Staff    Type:  sooner Appointment Request    Name of Caller:NUPUR GILLIAM [[id number]]    When is the first available appointment?No access    Symptoms:    Would the patient rather a call back or a response via [facility]? Call back     Best Call Back Number:[phone]    Additional Information: patient states he accidentally canceled his appointment with the provider for 12/19. Patient states he did not mean to cancel his appointment and wants to see if possible to have him rescheduled for that date. Patient was advised at the moment there is no sooner openings prior to 01/28 at [place] with the provider. Patient states he would like to see if possible to be seen on his original date. Patient would like  a call back with further information.

## 2024-12-19 ENCOUNTER — OFFICE VISIT (OUTPATIENT)
Dept: ORTHOPEDICS | Facility: CLINIC | Age: 49
End: 2024-12-19
Payer: MEDICARE

## 2024-12-19 DIAGNOSIS — M25.531 RIGHT WRIST PAIN: Primary | ICD-10-CM

## 2024-12-19 PROCEDURE — 99999 PR PBB SHADOW E&M-EST. PATIENT-LVL III: CPT | Mod: PBBFAC,HCNC,, | Performed by: ORTHOPAEDIC SURGERY

## 2024-12-19 RX ORDER — MELOXICAM 15 MG/1
15 TABLET ORAL DAILY
Qty: 30 TABLET | Refills: 2 | Status: SHIPPED | OUTPATIENT
Start: 2024-12-19

## 2024-12-19 RX ORDER — TRIAMCINOLONE ACETONIDE 40 MG/ML
40 INJECTION, SUSPENSION INTRA-ARTICULAR; INTRAMUSCULAR
Status: DISCONTINUED | OUTPATIENT
Start: 2024-12-19 | End: 2024-12-19 | Stop reason: HOSPADM

## 2024-12-19 RX ADMIN — TRIAMCINOLONE ACETONIDE 40 MG: 40 INJECTION, SUSPENSION INTRA-ARTICULAR; INTRAMUSCULAR at 09:12

## 2024-12-19 NOTE — PROGRESS NOTES
[facility]  History & Physical  Orthopedics    SUBJECTIVE:      COVID-19 attestation:  This patient was treated during the COVID-19 pandemic.  This was discussed with the patient, they are aware of our current policies and procedures, were given the option of delaying their visit and or switching to a virtual visit, delaying their surgery when applicable, and they elect to proceed.    Chief Complaint: right wrist pain    Referring Provider: No ref. provider found     History of Present Illness:  Patient is a 49 y.o. right hand dominant male who presents today with complaints of right wrist pain.  Patient reportedly had a ground level fall around 6 months ago onto his right wrist, and has since been experiencing progressively worsening pain.  Per mother and patient, he presented to the emergency department and no acute fractures were identified at that time (per chart review, last presentation to ED for ground level fall was in December 2022).  Describes his pain as throbbing and indicates it involves the radial aspect of the dorsum of the right wrist.  Denies any numbness, tingling, or weakness.  States he has had multiple fractures of the right hand that occurred as a child, but denies any other previous injuries or surgeries to the right upper extremity.      Interval history February 1, 2024:  The patient returns today for re-evaluation.  He notes that his right wrist pain persists.  He was unable to fully complete his MRI unfortunately.  Today he is describing his right upper extremity wrist and hand pain as more of a numbness and tingling at nighttime which is unbearable.  He has no other complaints returns for re-evaluation.      Interval history March 28, 2024: The patient returns today for re-evaluation.  He notes that his pain persists.  He had a recent EMG to evaluate for carpal tunnel syndrome.  He returns for re-evaluation with no other new complaints.  He again notes that he was  unable to complete the MRI because precipitated his asthma and he got very red in the face.      Interval history June 27, 2024: The patient returns today for re-evaluation.  He notes that he has not improving with rest, bracing, and occupational therapy.  He reports that his pain is worst stemming from the volar aspect of the right small finger MCP joint radiating to the base of the hand and up the forearm.  He returns today with his mom for re-evaluation has no other complaints.      Interval history August 13, 2024: The patient returns today for re-evaluation.  He was recently sent for an MRI of his right hand as well as labs.  He was prescribed ibuprofen at his last visit and notes that this caused him to break out in a rash so he discontinued it.  He does note that his symptoms are improved today, fortunately.  No other complaints.      Interval history December 5, 2024: The patient returns today for re-evaluation.  He has been evaluated by Rheumatology at this point in time.  It does not appear that he has been started on any antigout medications at this point but he did get placed on some prednisone pills which she began yesterday and notes that that has been improving his right hand and wrist pain since  initiation of that medication.  Otherwise he notes stable pain throughout the right hand and wrist and has no new complaints today.  He did suffer a recent fall which he notes exacerbated his symptoms and he returns today with his mom for re-evaluation.    Interval history December 19, 2024: The patient returns today for re-evaluation.  He reports persistent right wrist pain.  Therapy has completed their treatment with him.  No new complaints.    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    Onset of symptoms/DOI was reportedly 6 months prior to presentation today.    Symptoms are aggravated by activity and movement.    Symptoms are alleviated by rest.  Denies taking any medications, denies use  of splints     Symptoms consist of pain, swelling, and erythema.    The patient rates their pain as   6/10    Attempted treatment(s) and/or interventions include activity modifications, rest, rest.     The patient denies any fevers, chills, N/V, D/C and presents for evaluation.       Past Medical History:   Diagnosis Date    Allergy     Asthma      Past Surgical History:   Procedure Laterality Date    ADENOIDECTOMY      ANKLE SURGERY Left 1995    SINUS SURGERY       Review of patient's allergies indicates:   Allergen Reactions    Ibuprofen Hives    Penicillins Hives     Social History     Social History Narrative    Not on file     Family History   Problem Relation Name Age of Onset    Hypertension Mother      Stroke Mother      No Known Problems Father           Current Outpatient Medications:     albuterol (PROVENTIL) 2.5 mg /3 mL (0.083 %) nebulizer solution, Take 3 mLs (2.5 mg total) by nebulization every 4 to 6 hours as needed for Wheezing., Disp: 1080 mL, Rfl: 2    albuterol (PROVENTIL/VENTOLIN HFA) 90 mcg/actuation inhaler, Inhale 2 puffs into the lungs every 6 (six) hours as needed for Wheezing. Rescue, Disp: 18 g, Rfl: 11    amitriptyline (ELAVIL) 10 MG tablet, Take 10 mg nightly. Increase every few days to a week until symptoms are improved up to a maximum dose of 50 mg nightly, Disp: 90 tablet, Rfl: 0    amLODIPine (NORVASC) 10 MG tablet, TAKE 1 TABLET(10 MG) BY MOUTH EVERY DAY, Disp: 90 tablet, Rfl: 3    azelastine (ASTELIN) 137 mcg (0.1 %) nasal spray, 1 spray (137 mcg total) by Nasal route 2 (two) times daily., Disp: 30 mL, Rfl: 11    BREO ELLIPTA 200-25 mcg/dose DsDv diskus inhaler, Inhale 1 puff into the lungs once daily., Disp: 60 each, Rfl: 11    doxycycline (VIBRAMYCIN) 100 MG Cap, Take 1 capsule (100 mg total) by mouth 2 (two) times daily., Disp: 20 capsule, Rfl: 0    EScitalopram oxalate (LEXAPRO) 20 MG tablet, Take 1 tablet (20 mg total) by mouth once daily., Disp: 90 tablet, Rfl: 3     fexofenadine (ALLEGRA) 180 MG tablet, Take 180 mg by mouth once daily., Disp: , Rfl:     fluticasone propionate (FLONASE) 50 mcg/actuation nasal spray, 2 spray each nostril daily, Disp: 48 g, Rfl: 2    magnesium sulfate in water (MAGNESIUM SULFATE 2 GRAM/50 ML) 2 gram/50 mL PgBk, , Disp: , Rfl:     meloxicam (MOBIC) 15 MG tablet, Take 1 tablet (15 mg total) by mouth once daily., Disp: 30 tablet, Rfl: 1    meloxicam (MOBIC) 15 MG tablet, Take 1 tablet (15 mg total) by mouth once daily., Disp: 1 tablet, Rfl: 0    meloxicam (MOBIC) 15 MG tablet, Take 1 tablet (15 mg total) by mouth once daily., Disp: 30 tablet, Rfl: 2    methylPREDNISolone (MEDROL DOSEPACK) 4 mg tablet, FOLLOW PACKAGE DIRECTIONS (Patient not taking: Reported on 11/25/2024), Disp: 21 each, Rfl: 0    montelukast (SINGULAIR) 10 mg tablet, TAKE 1 TABLET BY MOUTH EVERY DAY, Disp: 90 tablet, Rfl: 1    pravastatin (PRAVACHOL) 20 MG tablet, TK 1 T PO D, Disp: 90 tablet, Rfl: 3      Review of Systems:  As per HPI otherwise noncontributory    OBJECTIVE:      Vital Signs (Most Recent):  There were no vitals filed for this visit.      There is no height or weight on file to calculate BMI.      Physical Exam:  Constitutional: The patient appears well-developed and well-nourished. No distress.   Skin: No lesions appreciated  Head: Normocephalic and atraumatic.   Nose: Nose normal.   Ears: No deformities seen  Eyes: Conjunctivae and EOM are normal.   Neck: No tracheal deviation present.   Cardiovascular: Normal rate and intact distal pulses.    Pulmonary/Chest: Effort normal. No respiratory distress.   Abdominal: There is no guarding.   Neurological: The patient is alert.   Psychiatric: The patient has a normal mood and affect.     Right Hand/Wrist Examination:    Observation/Inspection:  Swelling  none    Deformity  none  Discoloration  none     Scars   none    Atrophy  None    Mild tenderness to the ulnar aspect of the right dorsal wrist today.  No erythema or  swelling.    HAND/WRIST EXAMINATION:  Finkelstein's Test   Neg  WHAT Test    Neg  Snuff box tenderness     Negative  Alves's Test    Neg  Hook of Hamate Tenderness  Neg  CMC grind    Neg  Circumduction test   Neg    Neurovascular Exam:  Digits WWP, brisk CR < 3s throughout  NVI motor/LTS to M/R/U nerves, radial pulse 2+    ROM hand full, painless    ROM wrist full, but with pain     ROM elbow full, painless    Abdomen not guarded  Respirations nonlabored  Perfusion intact    Diagnostic Results:     Imaging - I independently viewed the patient's imaging as well as the radiology report.  Xrays of the patient's right hand demonstrate no evidence of any acute fractures or dislocations or significant degenerative changes.    MRI right hand-    Impression:     No radiographic evidence of gout or inflammatory arthritis.   Mild tenosynovitis and scapholunate ligament sprain, as above.  No discrete tear or fractures.   Two small ganglion cysts in the proximal wrist, as above.    EMG - IMPRESSIONS:  This is a normal electrophysiologic study of the right upper extremity.  There is no electrodiagnostic evidence of a median neuropathy on the right.          Uric acid, CRP, ESR-elevated    ASSESSMENT/PLAN:      49 y.o. yo male with right right wrist pain Plan: The patient and I had a thorough discussion today.  We discussed the working diagnosis as well as several other potential alternative diagnoses.  Treatment options were discussed, both conservative and surgical.  Conservative treatment options would include things such as activity modifications, workplace modifications, a period of rest, oral vs topical OTC and prescription anti-inflammatory medications, occupational therapy, splinting/bracing, immobilization, corticosteroid injections, and others.  Surgical options were discussed as well.     At this point in time,  Teddy notes that he was unable to  his meloxicam previously prescribed.  I will send a new  prescription of this to his pharmacy today.  Additionally, I will perform a corticosteroid injection to his right wrist.  No findings on his recent MRI to explain his symptoms today.  Follow up as needed.  May return to work without restrictions.      Jose M Desai M.D.    Please be aware that this note has been generated with the assistance of [employer] voice-to-text.  Please excuse any spelling or grammatical errors.    Thank you for choosing Dr. Jose M Desai for your orthopedic hand and upper extremity care. It is our goal to provide you with exceptional care that will help keep you healthy, active, and get you back in the game.     If you felt that you received exemplary care today, please consider leaving feedback for Dr. Desai on [employer] at [email or web address].    Please do not hesitate to reach out to us via email, phone, or MyChart with any questions, concerns, or feedback.

## 2024-12-19 NOTE — PROCEDURES
Intermediate Joint Aspiration/Injection: R radiocarpal    Date/Time: 12/19/2024 9:00 AM    Performed by: Jose M Desai MD  Authorized by: Jose M Desai MD    Consent Done?:  Yes (Verbal)  Indications:  Pain  Site marked: The procedure site was marked    Timeout: Prior to procedure the correct patient, procedure, and site was verified      Location:  Wrist  Site:  R radiocarpal  Prep: Patient was prepped and draped in usual sterile fashion    Ultrasonic Guidance for needle placement: No  Needle size:  25 G  Approach:  Dorsal  Medications:  40 mg triamcinolone acetonide 40 mg/mL  Patient tolerance:  Patient tolerated the procedure well with no immediate complications

## 2024-12-27 ENCOUNTER — TELEPHONE (OUTPATIENT)
Dept: PRIMARY CARE CLINIC | Facility: CLINIC | Age: 49
End: 2024-12-27
Payer: MEDICARE

## 2024-12-27 DIAGNOSIS — M25.531 RIGHT WRIST PAIN: Primary | ICD-10-CM

## 2024-12-27 NOTE — TELEPHONE ENCOUNTER
----- Message from Lita sent at 12/27/2024  1:56 PM CST -----  Contact: Mom  [phone]  Would like to receive medical advice.    Would they like a call back or a response via [facility]:  call back    Additional information:  Mom is calling to get a referral for a hand specialist.  She would like the referral for Dr. Franco at [facility].  Please call to advise.

## 2024-12-30 ENCOUNTER — TELEPHONE (OUTPATIENT)
Dept: PRIMARY CARE CLINIC | Facility: CLINIC | Age: 49
End: 2024-12-30
Payer: MEDICARE

## 2024-12-30 NOTE — TELEPHONE ENCOUNTER
----- Message from Kristina sent at 12/30/2024 10:04 AM CST -----  Contact: [phone] Patient  Caller is requesting an earlier appointment then we can schedule.  Caller is requesting a message be sent to the provider.    If this is for urgent care symptoms, did you offer other providers at this location, providers at other locations, or [facility]? (yes, no, n/a):  yes-pt states he went to  yesterday and there was a 4 hour wait     If this is for the patients physical, did you offer to schedule next available and put on wait list, or to see NP or PA for their physical?  (yes, no, n/a):  N/A    When is the next available appointment with their provider:  N/A    Reason for the appointment:  sore throat and hoarseness since 12/26/2024   Symptoms: Sore Throat, Hoarseness  Outcome: Schedule an appointment to be seen within 24 hours.  Reason: Caller denied all higher acuity questions    The caller rejected this outcome.    Patient preference of timeframe to be scheduled:  tomorrow    Would the patient like a call back, or a response through their [facility] portal?:  call back     Comments:

## 2025-02-03 DIAGNOSIS — J45.20 MILD INTERMITTENT ASTHMA WITHOUT COMPLICATION: ICD-10-CM

## 2025-02-03 RX ORDER — MONTELUKAST SODIUM 10 MG/1
TABLET ORAL
Qty: 90 TABLET | Refills: 1 | Status: SHIPPED | OUTPATIENT
Start: 2025-02-03

## 2025-02-03 RX ORDER — FLUTICASONE FUROATE AND VILANTEROL TRIFENATATE 200; 25 UG/1; UG/1
1 POWDER RESPIRATORY (INHALATION) DAILY
Qty: 60 EACH | Refills: 11 | Status: SHIPPED | OUTPATIENT
Start: 2025-02-03

## 2025-02-03 NOTE — TELEPHONE ENCOUNTER
No care due was identified.  [facility] Embedded Care Due Messages. Reference number: [id number].   2/03/2025 8:55:12 AM CST

## 2025-02-03 NOTE — TELEPHONE ENCOUNTER
----- Message from Roosevelt sent at 2/3/2025  8:52 AM CST -----  Contact: [phone]  Requesting an RX refill or new RX.    Is this a refill or new RX: refills (2)    RX name and strength (copy/paste from chart):      - BREO ELLIPTA 200-25 mcg/dose DsDv diskus inhaler    - montelukast (SINGULAIR) 10 mg tablet    Is this a 30 day or 90 day RX:  90    Pharmacy name and phone # (copy/paste from chart):    [id number] - [place] - [address] [facility]  [address]  [facility] [place] [address]  Phone: [phone] Fax: [phone]    The doctors have asked that we provide their patients with the following 2 reminders -- prescription refills can take up to 72 hours, and a friendly reminder that in the future you can use your [facility] account to request refills: y

## 2025-02-07 ENCOUNTER — OFFICE VISIT (OUTPATIENT)
Dept: URGENT CARE | Facility: CLINIC | Age: 50
End: 2025-02-07
Payer: MEDICARE

## 2025-02-07 ENCOUNTER — TELEPHONE (OUTPATIENT)
Dept: PRIMARY CARE CLINIC | Facility: CLINIC | Age: 50
End: 2025-02-07
Payer: MEDICARE

## 2025-02-07 VITALS
HEART RATE: 76 BPM | SYSTOLIC BLOOD PRESSURE: 133 MMHG | HEIGHT: 73 IN | RESPIRATION RATE: 20 BRPM | OXYGEN SATURATION: 96 % | WEIGHT: 297 LBS | TEMPERATURE: 98 F | DIASTOLIC BLOOD PRESSURE: 74 MMHG | BODY MASS INDEX: 39.36 KG/M2

## 2025-02-07 DIAGNOSIS — J01.90 ACUTE BACTERIAL SINUSITIS: Primary | ICD-10-CM

## 2025-02-07 DIAGNOSIS — J02.9 SORE THROAT: ICD-10-CM

## 2025-02-07 DIAGNOSIS — B96.89 ACUTE BACTERIAL SINUSITIS: Primary | ICD-10-CM

## 2025-02-07 LAB
CTP QC/QA: YES
CTP QC/QA: YES
MOLECULAR STREP A: NEGATIVE
POC MOLECULAR INFLUENZA A AGN: NEGATIVE
POC MOLECULAR INFLUENZA B AGN: NEGATIVE

## 2025-02-07 PROCEDURE — 87651 STREP A DNA AMP PROBE: CPT | Mod: QW,S$GLB,,

## 2025-02-07 PROCEDURE — 87502 INFLUENZA DNA AMP PROBE: CPT | Mod: QW,S$GLB,,

## 2025-02-07 PROCEDURE — 99213 OFFICE O/P EST LOW 20 MIN: CPT | Mod: S$GLB,,,

## 2025-02-07 RX ORDER — DOXYCYCLINE 100 MG/1
100 CAPSULE ORAL 2 TIMES DAILY
Qty: 14 CAPSULE | Refills: 0 | Status: SHIPPED | OUTPATIENT
Start: 2025-02-07 | End: 2025-02-14

## 2025-02-07 NOTE — PROGRESS NOTES
"Subjective:      Patient ID: Teddy Ennis is a 49 y.o. male.    Vitals:  height is 6' 1" (1.854 m) and weight is 134.7 kg (297 lb). His oral temperature is 97.6 °F (36.4 °C). His blood pressure is 133/74 and his pulse is 76. His respiration is 20 and oxygen saturation is 96%.     Chief Complaint: Sinus Problem    50 y/o M here for congestion, headache, postnasal drip, sore throat x2 weeks. Denies fever, chills, body aches, chest pain, SoB.    Sinus Problem  This is a new problem. The current episode started 1 to 4 weeks ago. The problem has been gradually worsening since onset. There has been no fever. He is experiencing no pain. Associated symptoms include congestion, headaches and a sore throat. Pertinent negatives include no chills, coughing, ear pain, neck pain, shortness of breath or sneezing. (Runny nose) Past treatments include nothing.       Constitution: Negative for chills and fever.   HENT:  Positive for congestion and sore throat. Negative for ear pain.    Neck: Negative for neck pain.   Cardiovascular:  Negative for chest pain.   Respiratory:  Negative for cough and shortness of breath.    Gastrointestinal:  Negative for abdominal pain, nausea, vomiting and diarrhea.   Musculoskeletal:  Negative for muscle ache.   Allergic/Immunologic: Negative for sneezing.   Neurological:  Positive for headaches. Negative for dizziness.      Objective:     Physical Exam   Constitutional: He is oriented to person, place, and time. He appears well-developed.   HENT:   Head: Normocephalic and atraumatic.   Ears:   Right Ear: Tympanic membrane, external ear and ear canal normal.   Left Ear: Tympanic membrane, external ear and ear canal normal.   Nose: Congestion present. Right sinus exhibits maxillary sinus tenderness. Left sinus exhibits maxillary sinus tenderness.   Mouth/Throat: Oropharynx is clear and moist. Mucous membranes are moist. Oropharynx is clear.   Eyes: Conjunctivae, EOM and lids are normal. Pupils are " equal, round, and reactive to light.   Neck: Trachea normal and phonation normal. Neck supple.   Cardiovascular: Normal rate, regular rhythm, normal heart sounds and normal pulses.   Pulmonary/Chest: Effort normal and breath sounds normal. No respiratory distress.   Musculoskeletal: Normal range of motion.         General: Normal range of motion.   Neurological: He is alert and oriented to person, place, and time.   Skin: Skin is warm, dry and intact.   Psychiatric: His speech is normal and behavior is normal. Judgment and thought content normal.   Nursing note and vitals reviewed.    Results for orders placed or performed in visit on 02/07/25   POCT Influenza A/B MOLECULAR    Collection Time: 02/07/25  4:09 PM   Result Value Ref Range    POC Molecular Influenza A Ag Negative Negative    POC Molecular Influenza B Ag Negative Negative     Acceptable Yes    POCT Strep A, Molecular    Collection Time: 02/07/25  4:37 PM   Result Value Ref Range    Molecular Strep A, POC Negative Negative     Acceptable Yes          Assessment:     1. Acute bacterial sinusitis    2. Sore throat      Plan:     Acute bacterial sinusitis  -     POCT Influenza A/B MOLECULAR  -     doxycycline (VIBRAMYCIN) 100 MG Cap; Take 1 capsule (100 mg total) by mouth 2 (two) times daily. for 7 days  Dispense: 14 capsule; Refill: 0    Sore throat  -     POCT Strep A, Molecular                Patient Instructions                                                             Sinusitis   If your condition worsens or fails to improve we recommend that you receive another evaluation at the ER immediately or contact your PCP to discuss your concerns or return here. You must understand that you've received an urgent care treatment only and that you may be released before all your medical problems are known or treated. You the patient will arrange for followup care as instructed.   If we discussed that I think your illness is viral it  "will not respond to antibiotics and it will last 10-14 days. However, if over the next few days the symptoms worsen start the antibiotics I have given you.   If we discussed that you require antibiotics start them now and take them to completion.   If you are female and on BCP and do take the antibiotics, use additional methods to prevent pregnancy while on the antibiotics and for one cycle after.   Flonase (fluticasone) is a nasal spray which is available over the counter and may help with your symptoms   Zyrtec D, Claritin D or allegra D can also help with symptoms of congestion and drainage.   If you have hypertension avoid using the "D" which is the decongestant   If you just have drainage you can take plain zyrtec, claritin or allegra   If you just have a congested feeling you can take pseudoephedrine (unless you have high blood pressure) which you have to sign for behind the counter. Do not buy the phenylephrine which is on the shelf as it is not effective   Rest and fluids are also important.   Tylenol or ibuprofen can also be used as directed for pain unless you have an allergy to them or medical condition such as stomach ulcers, kidney or liver disease or blood thinners etc for which you should not be taking these type of medications.       "

## 2025-02-07 NOTE — PATIENT INSTRUCTIONS
"                                                          Sinusitis   If your condition worsens or fails to improve we recommend that you receive another evaluation at the ER immediately or contact your PCP to discuss your concerns or return here. You must understand that you've received an urgent care treatment only and that you may be released before all your medical problems are known or treated. You the patient will arrange for followup care as instructed.   If we discussed that I think your illness is viral it will not respond to antibiotics and it will last 10-14 days. However, if over the next few days the symptoms worsen start the antibiotics I have given you.   If we discussed that you require antibiotics start them now and take them to completion.   If you are female and on BCP and do take the antibiotics, use additional methods to prevent pregnancy while on the antibiotics and for one cycle after.   Flonase (fluticasone) is a nasal spray which is available over the counter and may help with your symptoms   Zyrtec D, Claritin D or allegra D can also help with symptoms of congestion and drainage.   If you have hypertension avoid using the "D" which is the decongestant   If you just have drainage you can take plain zyrtec, claritin or allegra   If you just have a congested feeling you can take pseudoephedrine (unless you have high blood pressure) which you have to sign for behind the counter. Do not buy the phenylephrine which is on the shelf as it is not effective   Rest and fluids are also important.   Tylenol or ibuprofen can also be used as directed for pain unless you have an allergy to them or medical condition such as stomach ulcers, kidney or liver disease or blood thinners etc for which you should not be taking these type of medications.     "

## 2025-02-07 NOTE — TELEPHONE ENCOUNTER
----- Message from Smita sent at 2/7/2025 11:16 AM CST -----  Contact: Mrs. Moreland [phone]  .1MEDICALADVICE     Patient is calling for Medical Advice regarding: scratchy throat and can not talk caller states he needs an antibiotic.     How long has patient had these symptoms: last 3 days    Pharmacy name and phone#:      [id number] - [place] - [address] AT [facility] [place] [address]  Phone: [phone] Fax: [phone]         Patient wants a call back or thru [facility]: call back     Comments:    Please advise patient replies from provider may take up to 48 hours.

## 2025-02-24 ENCOUNTER — TELEPHONE (OUTPATIENT)
Dept: PRIMARY CARE CLINIC | Facility: CLINIC | Age: 50
End: 2025-02-24
Payer: MEDICARE

## 2025-02-24 NOTE — LETTER
February 24, 2025                     [facility]  [address]  [place] [address]  Phone: [phone]   Patient: Teddy Ennis   MR Number: [id number]   YOB: 1975   Date of Visit: 2/24/2025       To whom it may concern:    Please excuse Teddy Ennis from [employer] duty summons on 4/09 due to uncontrolled asthma.       If you have questions, please contact my office.     Sincerely,         Doni Wood MD

## 2025-02-24 NOTE — TELEPHONE ENCOUNTER
----- Message from Becki sent at 2/24/2025  1:35 PM CST -----  Contact: [phone]  .1MEDICALADVICE Patient is calling for Medical Advice regarding:need a jury duty letter for the pt How long has patient had these symptoms:Pharmacy name and phone#:Patient wants a call back or thru [facility]:call back Comments:Pts mother is calling she states they are needing a letter to send to the jury duty for the pt please advise the date is 04/09 please advise Please advise patient replies from provider may take up to 48 hours.

## 2025-03-08 ENCOUNTER — HOSPITAL ENCOUNTER (EMERGENCY)
Facility: HOSPITAL | Age: 50
Discharge: HOME | End: 2025-03-08
Attending: STUDENT IN AN ORGANIZED HEALTH CARE EDUCATION/TRAINING PROGRAM
Payer: MEDICARE

## 2025-03-08 VITALS
BODY MASS INDEX: 39.36 KG/M2 | HEART RATE: 78 BPM | WEIGHT: 297 LBS | HEIGHT: 73 IN | DIASTOLIC BLOOD PRESSURE: 78 MMHG | OXYGEN SATURATION: 100 % | SYSTOLIC BLOOD PRESSURE: 142 MMHG | TEMPERATURE: 98 F | RESPIRATION RATE: 18 BRPM

## 2025-03-08 DIAGNOSIS — M25.511 RIGHT SHOULDER PAIN, UNSPECIFIED CHRONICITY: ICD-10-CM

## 2025-03-08 DIAGNOSIS — J45.901 EXACERBATION OF ASTHMA, UNSPECIFIED ASTHMA SEVERITY, UNSPECIFIED WHETHER PERSISTENT: Primary | ICD-10-CM
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
## 2025-03-08 LAB
ALBUMIN SERPL BCP-MCNC: 4 G/DL (ref 3.5–5.2)
ALP SERPL-CCNC: 141 U/L (ref 40–150)
ALT SERPL W/O P-5'-P-CCNC: 29 U/L (ref 10–44)
ANION GAP SERPL CALC-SCNC: 11 MMOL/L (ref 8–16)
AST SERPL-CCNC: 26 U/L (ref 10–40)
BASOPHILS # BLD AUTO: 0.09 K/UL (ref 0–0.2)
BASOPHILS NFR BLD: 0.5 % (ref 0–1.9)
BILIRUB SERPL-MCNC: 0.7 MG/DL (ref 0.1–1)
BUN SERPL-MCNC: 12 MG/DL (ref 6–20)
CALCIUM SERPL-MCNC: 9 MG/DL (ref 8.7–10.5)
CHLORIDE SERPL-SCNC: 102 MMOL/L (ref 95–110)
CO2 SERPL-SCNC: 24 MMOL/L (ref 23–29)
CREAT SERPL-MCNC: 0.8 MG/DL (ref 0.5–1.4)
DIFFERENTIAL METHOD BLD: ABNORMAL
EOSINOPHIL # BLD AUTO: 0.1 K/UL (ref 0–0.5)
EOSINOPHIL NFR BLD: 0.7 % (ref 0–8)
ERYTHROCYTE [DISTWIDTH] IN BLOOD BY AUTOMATED COUNT: 12.8 % (ref 11.5–14.5)
EST. GFR  (NO RACE VARIABLE): >60 ML/MIN/1.73 M^2
GLUCOSE SERPL-MCNC: 153 MG/DL (ref 70–110)
HCT VFR BLD AUTO: 47.2 % (ref 40–54)
HGB BLD-MCNC: 16.3 G/DL (ref 14–18)
IMM GRANULOCYTES # BLD AUTO: 0.08 K/UL (ref 0–0.04)
IMM GRANULOCYTES NFR BLD AUTO: 0.5 % (ref 0–0.5)
LYMPHOCYTES # BLD AUTO: 2 K/UL (ref 1–4.8)
LYMPHOCYTES NFR BLD: 11.4 % (ref 18–48)
MCH RBC QN AUTO: 31.2 PG (ref 27–31)
MCHC RBC AUTO-ENTMCNC: 34.5 G/DL (ref 32–36)
MCV RBC AUTO: 90 FL (ref 82–98)
MONOCYTES # BLD AUTO: 1 K/UL (ref 0.3–1)
MONOCYTES NFR BLD: 5.5 % (ref 4–15)
NEUTROPHILS # BLD AUTO: 14.3 K/UL (ref 1.8–7.7)
NEUTROPHILS NFR BLD: 81.4 % (ref 38–73)
NRBC BLD-RTO: 0 /100 WBC
PLATELET # BLD AUTO: 313 K/UL (ref 150–450)
PMV BLD AUTO: 8.5 FL (ref 9.2–12.9)
POTASSIUM SERPL-SCNC: 3.3 MMOL/L (ref 3.5–5.1)
PROT SERPL-MCNC: 8.8 G/DL (ref 6–8.4)
RBC # BLD AUTO: 5.23 M/UL (ref 4.6–6.2)
SODIUM SERPL-SCNC: 137 MMOL/L (ref 136–145)
WBC # BLD AUTO: 17.58 K/UL (ref 3.9–12.7)

## 2025-03-08 PROCEDURE — 94640 AIRWAY INHALATION TREATMENT: CPT | Mod: HCNC

## 2025-03-08 PROCEDURE — 85025 COMPLETE CBC W/AUTO DIFF WBC: CPT | Mod: HCNC

## 2025-03-08 PROCEDURE — 25000242 PHARM REV CODE 250 ALT 637 W/ HCPCS: Mod: HCNC

## 2025-03-08 PROCEDURE — 99285 EMERGENCY DEPT VISIT HI MDM: CPT | Mod: 25,HCNC

## 2025-03-08 PROCEDURE — 63600175 PHARM REV CODE 636 W HCPCS: Mod: HCNC

## 2025-03-08 PROCEDURE — 80053 COMPREHEN METABOLIC PANEL: CPT | Mod: HCNC

## 2025-03-08 PROCEDURE — 25500020 PHARM REV CODE 255: Mod: HCNC

## 2025-03-08 RX ORDER — PREDNISONE 20 MG/1
40 TABLET ORAL DAILY
Qty: 10 TABLET | Refills: 0 | Status: SHIPPED | OUTPATIENT
Start: 2025-03-08 | End: 2025-03-13

## 2025-03-08 RX ORDER — IPRATROPIUM BROMIDE AND ALBUTEROL SULFATE 2.5; .5 MG/3ML; MG/3ML
3 SOLUTION RESPIRATORY (INHALATION)
Status: COMPLETED | OUTPATIENT
Start: 2025-03-08 | End: 2025-03-08

## 2025-03-08 RX ORDER — PREDNISONE 20 MG/1
40 TABLET ORAL
Status: COMPLETED | OUTPATIENT
Start: 2025-03-08 | End: 2025-03-08

## 2025-03-08 RX ADMIN — IPRATROPIUM BROMIDE AND ALBUTEROL SULFATE 3 ML: 2.5; .5 SOLUTION RESPIRATORY (INHALATION) at 03:03

## 2025-03-08 RX ADMIN — PREDNISONE 40 MG: 20 TABLET ORAL at 06:03

## 2025-03-08 RX ADMIN — IOHEXOL 100 ML: 350 INJECTION, SOLUTION INTRAVENOUS at 05:03
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
## 2025-03-09 NOTE — DISCHARGE INSTRUCTIONS
Take medication as prescribed.  Follow up with primary care doctor.  Thank you for coming to our Emergency Department today. It is important to remember that some problems or medical conditions are difficult to diagnose and may not be found or addressed during your Emergency Department visit.  These conditions often start with non-specific symptoms and can only be diagnosed on follow up visits with your primary care physician or specialist when the symptoms continue or change. Please remember that all medical conditions can change, and we cannot predict how you will be feeling tomorrow or the next day. Return to the ER with any questions/concerns, new/concerning symptoms, worsening or failure to improve.       Be sure to follow up with your primary care doctor and review all labs/imaging/tests that were performed during your ER visit with them. It is very common for us to identify non-emergent incidental findings which must be followed up with your primary care physician.  Some labs/imaging/tests may be outside of the normal range, and require non-emergent follow-up and/or further investigation/treatment/procedures/testing to help diagnose/exclude/prevent complications or other potentially serious medical conditions. Some abnormalities may not have been discussed or addressed during your ER visit. Some lab results may not return during your ER visit but can be accessible by downloading the free [facility] [email or web address] or by visiting [email or web address] . It is important for you to review all labs/imaging/tests which are outside of the normal range with your physician.    An ER visit does not replace a primary care visit, and many screening tests or follow-up tests cannot be ordered by an ER doctor or performed by the ER. Some tests may even require pre-approval.    If you do not have a primary care doctor, you may contact the one listed on your discharge paperwork or you may also call the [facility]  [facility] at [phone] , or [address] [place] [address] at  [phone] to schedule an appointment, or establish care with a primary care doctor or even a specialist and to obtain information about local resources. It is important to your health that you have a primary care doctor.    Please take all medications as directed. We have done our best to select a medication for you that will treat your condition however, all medications may potentially have side-effects and it is impossible to predict which medications may give you side-effects or what those side-effects (if any) those medications may give you.  If you feel that you are having a negative effect or side-effect of any medication you should stop taking those medications immediately and seek medical attention. If you feel that you are having a life-threatening reaction call 911.        Do not drive, swim, climb to height, take a bath, operate heavy machinery, drink alcohol or take potentially sedating medications, sign any legal documents or make any important decisions for 24 hours if you have received any pain medications, sedatives or mood altering drugs during your ER visit or within 24 hours of taking them if they have been prescribed to you.     You can find additional resources for Dentists, hearing aids, durable medical equipment, low cost pharmacies and other resources at [email or web address]

## 2025-03-17 ENCOUNTER — OFFICE VISIT (OUTPATIENT)
Dept: PRIMARY CARE CLINIC | Facility: CLINIC | Age: 50
End: 2025-03-17
Payer: MEDICARE

## 2025-03-17 VITALS
HEART RATE: 81 BPM | BODY MASS INDEX: 38.86 KG/M2 | SYSTOLIC BLOOD PRESSURE: 138 MMHG | OXYGEN SATURATION: 97 % | HEIGHT: 73 IN | DIASTOLIC BLOOD PRESSURE: 76 MMHG | WEIGHT: 293.19 LBS

## 2025-03-17 DIAGNOSIS — J45.20 MILD INTERMITTENT ASTHMA WITHOUT COMPLICATION: ICD-10-CM

## 2025-03-17 DIAGNOSIS — E66.812 CLASS 2 SEVERE OBESITY DUE TO EXCESS CALORIES WITH SERIOUS COMORBIDITY AND BODY MASS INDEX (BMI) OF 39.0 TO 39.9 IN ADULT: ICD-10-CM

## 2025-03-17 DIAGNOSIS — F41.9 ANXIETY: ICD-10-CM

## 2025-03-17 DIAGNOSIS — E66.01 CLASS 2 SEVERE OBESITY DUE TO EXCESS CALORIES WITH SERIOUS COMORBIDITY AND BODY MASS INDEX (BMI) OF 39.0 TO 39.9 IN ADULT: ICD-10-CM

## 2025-03-17 DIAGNOSIS — E78.2 HYPERLIPIDEMIA, MIXED: ICD-10-CM

## 2025-03-17 DIAGNOSIS — I10 ESSENTIAL HYPERTENSION: ICD-10-CM
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
## 2025-03-17 PROCEDURE — 99214 OFFICE O/P EST MOD 30 MIN: CPT | Mod: S$GLB,,, | Performed by: NURSE PRACTITIONER

## 2025-03-17 PROCEDURE — 99999 PR PBB SHADOW E&M-EST. PATIENT-LVL III: CPT | Mod: PBBFAC,,, | Performed by: NURSE PRACTITIONER

## 2025-03-17 PROCEDURE — 1159F MED LIST DOCD IN RCRD: CPT | Mod: CPTII,S$GLB,, | Performed by: NURSE PRACTITIONER

## 2025-03-17 PROCEDURE — 3078F DIAST BP <80 MM HG: CPT | Mod: CPTII,S$GLB,, | Performed by: NURSE PRACTITIONER

## 2025-03-17 PROCEDURE — 3075F SYST BP GE 130 - 139MM HG: CPT | Mod: CPTII,S$GLB,, | Performed by: NURSE PRACTITIONER

## 2025-03-17 PROCEDURE — 3008F BODY MASS INDEX DOCD: CPT | Mod: CPTII,S$GLB,, | Performed by: NURSE PRACTITIONER

## 2025-03-17 RX ORDER — PRAVASTATIN SODIUM 20 MG/1
TABLET ORAL
Qty: 90 TABLET | Refills: 1 | Status: SHIPPED | OUTPATIENT
Start: 2025-03-17

## 2025-03-17 RX ORDER — ESCITALOPRAM OXALATE 20 MG/1
20 TABLET ORAL DAILY
Qty: 90 TABLET | Refills: 1 | Status: SHIPPED | OUTPATIENT
Start: 2025-03-17

## 2025-03-17 RX ORDER — AMLODIPINE BESYLATE 10 MG/1
10 TABLET ORAL DAILY
Qty: 90 TABLET | Refills: 3 | Status: SHIPPED | OUTPATIENT
Start: 2025-03-17
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
## 2025-03-20 DIAGNOSIS — J30.9 ALLERGIC RHINITIS, UNSPECIFIED SEASONALITY, UNSPECIFIED TRIGGER: ICD-10-CM

## 2025-03-20 DIAGNOSIS — J30.2 SEASONAL ALLERGIC RHINITIS, UNSPECIFIED TRIGGER: ICD-10-CM

## 2025-03-20 NOTE — TELEPHONE ENCOUNTER
----- Message from Nicci sent at 3/20/2025  2:45 PM CDT -----  Contact: [phone]  Pt needs a refill on azelastine (ASTELIN) 137 mcg (0.1 %) nasal spray called into [id number] - [place] - [address] AT [facility] & [id number] [place] [address]Axfxn: [phone] Fax: [phone] mom/dad/guardian can be reached at [phone]_______________________Ck needs a refill on fluticasone propionate (FLONASE) 50 mcg/actuation nasal spray called into [id number] - [place] - [address] AT [facility] & [id number] [place] [address]Ualuv: [phone] Fax: [phone] mom/dad/guardian can be reached at [phone] is calling to say he saw the NP on Monday and was told both Nasal sprays would be sent. Teddy states the pharmacy has not received anything on behalf of the pt. Please call Teddy back for advice  
No care due was identified.  [facility] Embedded Care Due Messages. Reference number: [id number].   3/20/2025 3:00:14 PM CDT  
ED MD

## 2025-03-21 DIAGNOSIS — J30.9 ALLERGIC RHINITIS, UNSPECIFIED SEASONALITY, UNSPECIFIED TRIGGER: ICD-10-CM

## 2025-03-21 DIAGNOSIS — J30.2 SEASONAL ALLERGIC RHINITIS, UNSPECIFIED TRIGGER: ICD-10-CM

## 2025-03-21 RX ORDER — AZELASTINE 1 MG/ML
1 SPRAY, METERED NASAL 2 TIMES DAILY
Qty: 30 ML | Refills: 11 | Status: SHIPPED | OUTPATIENT
Start: 2025-03-21 | End: 2026-03-21

## 2025-03-21 RX ORDER — AZELASTINE 1 MG/ML
1 SPRAY, METERED NASAL 2 TIMES DAILY
Qty: 30 ML | Refills: 1 | OUTPATIENT
Start: 2025-03-21 | End: 2026-03-21

## 2025-03-21 RX ORDER — FLUTICASONE PROPIONATE 50 MCG
SPRAY, SUSPENSION (ML) NASAL
Qty: 48 G | Refills: 2 | Status: SHIPPED | OUTPATIENT
Start: 2025-03-21

## 2025-04-18 NOTE — PROGRESS NOTES
[facility] Audio Only Virtual Visit Note      Chief Complaint      Chief Complaint   Patient presents with    Sinusitis       History of Present Illness      Teddy Ennis is a 44 y.o. male with chronic conditions of HTN, HLD, asthma, IFG, allergic rhinitis who presents today for: complaints of sinus congestion and facial pressure.  Present at baseline with regular allergies but has been worse in the past week.  Denies fevers, chills, cough, shortness of breath.  Has been using flonase, azelastine, allegra, singulair.  Had visit with Dr. Micky Chase on 2/12/2020 for similar symptoms and was given betamethasone injection and doxycycline course which improved symptoms.    Past Medical History:  History reviewed. No pertinent past medical history.    Past Surgical History:   has a past surgical history that includes Ankle surgery (Left, 1995).    Family History:  family history includes Hypertension in his mother; No Known Problems in his father; Stroke in his mother.     Social History:  Social History     Tobacco Use    Smoking status: Never Smoker    Smokeless tobacco: Never Used   Substance Use Topics    Alcohol use: Not Currently    Drug use: Not on file       Review of Systems   Constitutional: Negative for chills, fever and malaise/fatigue.   HENT: Positive for congestion and sinus pain. Negative for sore throat.    Respiratory: Negative for cough, sputum production and shortness of breath.    Cardiovascular: Negative for chest pain.   Gastrointestinal: Negative for constipation, diarrhea, nausea and vomiting.   Skin: Negative for rash.   Neurological: Negative for weakness.        Medications:  Outpatient Encounter Medications as of 4/28/2020   Medication Sig Dispense Refill    albuterol (PROVENTIL) 2.5 mg /3 mL (0.083 %) nebulizer solution albuterol sulfate 2.5 mg/3 mL (0.083 %) solution for nebulization      amLODIPine (NORVASC) 10 MG tablet TK 1 T PO D  1    azelastine (ASTELIN) 137  Patient found with minimally responsive at home by BF on evening of 4/17 surrounded by empty methadone bottle  Last normal time 4/16 10:30 PM  CT head negative  UDS + for meth, fentanyl, methadone, benzos and cannabinoids  Highest suspicion is for intoxication in setting of polysubstance use   Patient denies taking methamphetamines or other illicit drugs despite utox  Awake, alert and oriented x3 as of morning of 4/19      mcg (0.1 %) nasal spray 1 spray (137 mcg total) by Nasal route 2 (two) times daily. 30 mL 11    azithromycin (Z-RM) 250 MG tablet Take 2 tablets by mouth on day 1; Take 1 tablet by mouth on days 2-5 6 tablet 0    escitalopram oxalate (LEXAPRO) 20 MG tablet TK 1 T PO D  3    fluticasone furoate-vilanterol (BREO ELLIPTA) 100-25 mcg/dose diskus inhaler Inhale 1 puff into the lungs once daily. Controller 1 each 11    fluticasone propionate (FLONASE) 50 mcg/actuation nasal spray fluticasone propionate 50 mcg/actuation nasal spray,suspension      montelukast (SINGULAIR) 10 mg tablet TK 1 T PO D  1    pravastatin (PRAVACHOL) 20 MG tablet TK 1 T PO D  1    VENTOLIN HFA 90 mcg/actuation inhaler INL 2 PFS ITL TID PRF WHZ  2     No facility-administered encounter medications on file as of 4/28/2020.        Allergies:  Review of patient's allergies indicates:   Allergen Reactions    Penicillins Hives       Health Maintenance:    There is no immunization history on file for this patient.   Health Maintenance   Topic Date Due    TETANUS VACCINE  08/11/1994    Lipid Panel  11/26/2024        Physical Exam      Audio Only Visit    Laboratory:  CBC:  Recent Labs   Lab 11/26/19  0803   WBC 8.84   RBC 5.22   Hemoglobin 15.4   Hematocrit 47.3   Platelets 324   Mean Corpuscular Volume 91   Mean Corpuscular Hemoglobin 29.5   Mean Corpuscular Hemoglobin Conc 32.6     CMP:  Recent Labs   Lab 11/26/19  0803   Glucose 116 H   Calcium 9.4   Albumin 3.9   Total Protein 8.1   Sodium 141   Potassium 3.6   CO2 26   Chloride 103   BUN, Bld 10   Alkaline Phosphatase 147 H   ALT 26   AST 25   Total Bilirubin 1.0     URINALYSIS:       LIPIDS:  Recent Labs   Lab 11/26/19  0803   TSH 1.351   HDL 32 L   Cholesterol 171   Triglycerides 182 H   LDL Cholesterol 102.6   Hdl/Cholesterol Ratio 18.7 L   Non-HDL Cholesterol 139   Total Cholesterol/HDL Ratio 5.3 H     TSH:  Recent Labs   Lab 11/26/19  0803   TSH 1.351     A1C:  Recent Labs   Lab  12/03/19  0803   Hemoglobin A1C 5.4       Assessment/Plan     Teddy Ennis is a 44 y.o.male with:    1. Sinusitis, unspecified chronicity, unspecified location  - azithromycin (Z-RM) 250 MG tablet; Take 2 tablets by mouth on day 1; Take 1 tablet by mouth on days 2-5  Dispense: 6 tablet; Refill: 0     Will give zpak, allergic to penicillin and had recent course of doxycycline for this.  Already on allegra, flonase, azelastine and singulair as above.  If has another episode, will refer to ENT for evaluation.     Doni Wood MD  [facility]                  Established Patient - Audio Only Telehealth Visit     The patient location is: Home  The chief complaint leading to consultation is: sinus congestion  Visit type: Virtual visit with audio only (telephone)     The reason for the audio only service rather than synchronous audio and video virtual visit was related to technical difficulties or patient preference/necessity.     Each patient to whom I provide medical services by telemedicine is:  (1) informed of the relationship between the physician and patient and the respective role of any other health care provider with respect to management of the patient; and (2) notified that they may decline to receive medical services by telemedicine and may withdraw from such care at any time. Patient verbally consented to receive this service via voice-only telephone call.     This service was not originating from a related E/M service provided within the previous 7 days nor will  to an E/M service or procedure within the next 24 hours or my soonest available appointment.  Prevailing standard of care was able to be met in this audio-only visit.

## 2025-05-07 ENCOUNTER — OFFICE VISIT (OUTPATIENT)
Dept: PULMONOLOGY | Facility: CLINIC | Age: 50
End: 2025-05-07
Payer: MEDICARE

## 2025-05-07 VITALS
DIASTOLIC BLOOD PRESSURE: 76 MMHG | SYSTOLIC BLOOD PRESSURE: 134 MMHG | WEIGHT: 291.69 LBS | BODY MASS INDEX: 39.51 KG/M2 | HEIGHT: 72 IN | OXYGEN SATURATION: 96 % | HEART RATE: 78 BPM

## 2025-05-07 DIAGNOSIS — J45.30 MILD PERSISTENT ASTHMA WITHOUT COMPLICATION: ICD-10-CM

## 2025-05-07 DIAGNOSIS — J30.1 SEASONAL ALLERGIC RHINITIS DUE TO POLLEN: Primary | ICD-10-CM

## 2025-05-07 PROCEDURE — 99999 PR PBB SHADOW E&M-EST. PATIENT-LVL III: CPT | Mod: PBBFAC,HCNC,, | Performed by: INTERNAL MEDICINE

## 2025-05-07 PROCEDURE — 1160F RVW MEDS BY RX/DR IN RCRD: CPT | Mod: CPTII,HCNC,S$GLB, | Performed by: INTERNAL MEDICINE

## 2025-05-07 PROCEDURE — 3075F SYST BP GE 130 - 139MM HG: CPT | Mod: CPTII,HCNC,S$GLB, | Performed by: INTERNAL MEDICINE

## 2025-05-07 PROCEDURE — 3008F BODY MASS INDEX DOCD: CPT | Mod: CPTII,HCNC,S$GLB, | Performed by: INTERNAL MEDICINE

## 2025-05-07 PROCEDURE — 3078F DIAST BP <80 MM HG: CPT | Mod: CPTII,HCNC,S$GLB, | Performed by: INTERNAL MEDICINE

## 2025-05-07 PROCEDURE — 1159F MED LIST DOCD IN RCRD: CPT | Mod: CPTII,HCNC,S$GLB, | Performed by: INTERNAL MEDICINE

## 2025-05-07 PROCEDURE — 99214 OFFICE O/P EST MOD 30 MIN: CPT | Mod: HCNC,S$GLB,, | Performed by: INTERNAL MEDICINE
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
## 2025-05-28 ENCOUNTER — OFFICE VISIT (OUTPATIENT)
Dept: PRIMARY CARE CLINIC | Facility: CLINIC | Age: 50
End: 2025-05-28
Payer: MEDICARE

## 2025-05-28 VITALS
DIASTOLIC BLOOD PRESSURE: 70 MMHG | OXYGEN SATURATION: 97 % | WEIGHT: 291.88 LBS | SYSTOLIC BLOOD PRESSURE: 130 MMHG | HEIGHT: 72 IN | HEART RATE: 76 BPM | BODY MASS INDEX: 39.53 KG/M2

## 2025-05-28 DIAGNOSIS — J45.30 MILD PERSISTENT ASTHMA WITHOUT COMPLICATION: ICD-10-CM

## 2025-05-28 DIAGNOSIS — I10 ESSENTIAL HYPERTENSION: Primary | ICD-10-CM

## 2025-05-28 DIAGNOSIS — J30.9 ALLERGIC RHINITIS, UNSPECIFIED SEASONALITY, UNSPECIFIED TRIGGER: ICD-10-CM

## 2025-05-28 DIAGNOSIS — Z12.5 SCREENING FOR MALIGNANT NEOPLASM OF PROSTATE: ICD-10-CM

## 2025-05-28 DIAGNOSIS — J30.2 SEASONAL ALLERGIC RHINITIS, UNSPECIFIED TRIGGER: ICD-10-CM

## 2025-05-28 DIAGNOSIS — R73.01 IMPAIRED FASTING GLUCOSE: ICD-10-CM

## 2025-05-28 DIAGNOSIS — F41.9 ANXIETY: ICD-10-CM

## 2025-05-28 DIAGNOSIS — E78.2 HYPERLIPIDEMIA, MIXED: ICD-10-CM

## 2025-05-28 PROCEDURE — 3075F SYST BP GE 130 - 139MM HG: CPT | Mod: CPTII,S$GLB,, | Performed by: INTERNAL MEDICINE

## 2025-05-28 PROCEDURE — 3078F DIAST BP <80 MM HG: CPT | Mod: CPTII,S$GLB,, | Performed by: INTERNAL MEDICINE

## 2025-05-28 PROCEDURE — 99999 PR PBB SHADOW E&M-EST. PATIENT-LVL III: CPT | Mod: PBBFAC,,, | Performed by: INTERNAL MEDICINE

## 2025-05-28 PROCEDURE — 3008F BODY MASS INDEX DOCD: CPT | Mod: CPTII,S$GLB,, | Performed by: INTERNAL MEDICINE

## 2025-05-28 PROCEDURE — 99214 OFFICE O/P EST MOD 30 MIN: CPT | Mod: S$GLB,,, | Performed by: INTERNAL MEDICINE

## 2025-05-28 RX ORDER — AZELASTINE 1 MG/ML
1 SPRAY, METERED NASAL 2 TIMES DAILY
Qty: 30 ML | Refills: 11 | Status: SHIPPED | OUTPATIENT
Start: 2025-05-28 | End: 2026-05-28

## 2025-05-28 RX ORDER — PRAVASTATIN SODIUM 20 MG/1
TABLET ORAL
Qty: 90 TABLET | Refills: 3 | Status: SHIPPED | OUTPATIENT
Start: 2025-05-28

## 2025-05-28 RX ORDER — FLUTICASONE PROPIONATE 50 MCG
SPRAY, SUSPENSION (ML) NASAL
Qty: 48 G | Refills: 2 | Status: SHIPPED | OUTPATIENT
Start: 2025-05-28

## 2025-05-28 RX ORDER — ESCITALOPRAM OXALATE 20 MG/1
20 TABLET ORAL DAILY
Qty: 90 TABLET | Refills: 3 | Status: SHIPPED | OUTPATIENT
Start: 2025-05-28

## 2025-05-28 RX ORDER — MONTELUKAST SODIUM 10 MG/1
TABLET ORAL
Qty: 90 TABLET | Refills: 3 | Status: SHIPPED | OUTPATIENT
Start: 2025-05-28

## 2025-05-28 RX ORDER — AMLODIPINE BESYLATE 10 MG/1
10 TABLET ORAL DAILY
Qty: 90 TABLET | Refills: 3 | Status: SHIPPED | OUTPATIENT
Start: 2025-05-28
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
## 2025-06-30 ENCOUNTER — PATIENT MESSAGE (OUTPATIENT)
Dept: ADMINISTRATIVE | Facility: HOSPITAL | Age: 50
End: 2025-06-30
Payer: MEDICARE

## 2025-07-11 ENCOUNTER — TELEPHONE (OUTPATIENT)
Dept: PRIMARY CARE CLINIC | Facility: CLINIC | Age: 50
End: 2025-07-11
Payer: MEDICARE

## 2025-07-11 NOTE — TELEPHONE ENCOUNTER
Copied from CRM #[id number]. Topic: General Inquiry - Patient Advice  >> Jul 11, 2025 10:20 AM Lucie wrote:  .1MEDICALADVICE     Patient is calling for Medical Advice regarding:patient calling to notify that he is out of surgery, blood pressure was a little high and went down in recovery room . Asked for a call back if you have any questions    How long has patient had these symptoms:    Pharmacy name and phone#:    Patient wants a call back or thru [facility], provide patient's call back phone number:[phone] or [phone]    Comments:    Please advise patient replies from provider may take up to 48 hours.

## 2025-07-18 DIAGNOSIS — M19.039 WRIST ARTHRITIS: Primary | ICD-10-CM

## 2025-07-22 ENCOUNTER — CLINICAL SUPPORT (OUTPATIENT)
Dept: REHABILITATION | Facility: HOSPITAL | Age: 50
End: 2025-07-22
Payer: MEDICARE

## 2025-07-22 DIAGNOSIS — M25.531 RIGHT WRIST PAIN: ICD-10-CM

## 2025-07-22 DIAGNOSIS — M25.631 STIFFNESS OF RIGHT WRIST JOINT: Primary | ICD-10-CM

## 2025-07-22 PROCEDURE — 97110 THERAPEUTIC EXERCISES: CPT

## 2025-07-22 PROCEDURE — 97763 ORTHC/PROSTC MGMT SBSQ ENC: CPT

## 2025-07-22 PROCEDURE — 97165 OT EVAL LOW COMPLEX 30 MIN: CPT

## 2025-07-22 NOTE — PROGRESS NOTES
"  Outpatient Rehab    Occupational Therapy Evaluation    Patient Name: Teddy Ennis  MRN: [id number]  YOB: 1975  Encounter Date: 7/22/2025    Therapy Diagnosis:   Encounter Diagnoses   Name Primary?    Stiffness of right wrist joint Yes    Right wrist pain      Physician: Manuel Franco MD    Physician Orders: Eval and Treat  Medical Diagnosis: Wrist arthritis  Surgical Diagnosis: Open Repair of SL Ligament, LT Ligament, and TFCC, Neurectomy AIN/PIN   Surgical Date: 7/11/2025  Days Since Last Surgery: 11    Visit # / Visits Authorized: 1 / 1  Insurance Authorization Period: 7/18/2025 to 7/18/2026  Date of Evaluation: 7/22/2025  Plan of Care Certification: 7/22/2025 to 10/14/2025     Time In: 1353   Time Out: 1500  Total Time (in minutes): 67   Total Billable Time (in minutes): 60    Intake Outcome Measure for FOTO Survey    Therapist reviewed FOTO scores for Teddy Ennis on 7/22/2025.   FOTO report - see Media section or FOTO account episode details.     Intake Score (%): 28    Precautions:  Right Upper Extremity Weight-Bearing Status: Non-weight-bearing  Additional Precautions and Protocol Details: Pt is to wear custom orthosis at all times except for wrist and thumb arom exercises and for showering.     Subjective   History of Present Illness  Teddy is a 49 y.o. male who reports to occupational therapy with a chief concern of "It hurts with the weather change.".     The patient reports a medical diagnosis of Internal Derangement Right with SL, LT, and TFCC Tears.  Patient reports a surgery of Open Repair of SL Ligament, LT Ligament, and TFCC, Neurectomy AIN/PIN. Surgery occurred on 07/11/25.         History of Present Condition/Illness: Reports that he sustained an injury to the right wrist when he tripped on a spike in the side walk in [place]. Was taken to the hospital. Dx'd with a sprain of the shoulder. He experienced right wrist pain. Sought attention from Dr. Desai after the " wrist pain progressively worsened. He was referred to OT and underwent OT for approximately a year.  He then sought a second opinion from Dr. Franco. Patient received two injections without relief. Sx was then performed as noted above.     Activities of Daily Living  Social history was obtained from Patient.    General Prior Level of Function Comments: full use  General Current Level of Function Comments: limited  Patient Roles: Caregiver for adult  Patient Responsibilities: Health management, Home management, Laundry, Meal prep, Personal ADL    Previously independent with activities of daily living? Yes     Currently independent with activities of daily living? No     Modified independence with compensatory techniques and requires increased time to complete.      Previously independent with instrumental activities of daily living? Yes     Currently independent with instrumental activities of daily living? No  Activities currently needing assistance include: Meal prep, Home establishment and management, and Grocery/shopping.            Pain     Patient reports a current pain level of 4/10. Pain at best is reported as 0/10. Pain at worst is reported as 6/10.   Location: wrist  Clinical Progression (since onset): Improved  Pain Qualities: Throbbing  Pain-Relieving Factors: Ice, Medications - prescription  Pain-Aggravating Factors: Other (Comment)  Other Pain-Aggravating Factors: Wether change.         Treatment History  Treatments  Previously Received Treatments: Yes  Previous Treatments: Occupational therapy  Currently Receiving Treatments: No    Living Arrangements  Living Situation  Living Arrangements: Parent  Support Systems: Family members, Parent, Friends/neighbors        Employment  Employment Status: Employed part-time   Family [employer].      Past Medical History/Physical Systems Review:   Teddy Ennis  has a past medical history of Allergy, Asthma, and Seasonal allergies.    Teddy Ennis  has a  past surgical history that includes Ankle surgery (Left, 1995); Adenoidectomy; and Sinus surgery.    Teddy has a current medication list which includes the following prescription(s): albuterol, albuterol, amlodipine, azelastine, escitalopram oxalate, fexofenadine, fluticasone propionate, fluticasone-umeclidin-vilanter, magnesium sulfate in water, meloxicam, montelukast, and pravastatin.    Review of patient's allergies indicates:   Allergen Reactions    Ibuprofen Hives    Penicillins Hives        Objective   Orthosis Details  In this visit, actions taken with the first orthosis variety included Modification/adjustment.          Orthosis Laterality: Right  Fabrication Status: Custom  Orthosis Type: Static  Orthosis Design: Forearm-based     - Wrist cock-up          Orthosis Position: Orthosis required reheating and remolding due to it no longer conforming well, likely due to decrease in edema.         Wrist/Hand Observations   Patient presented in custom orthosis fabricated at Dr. Franco's office. Scope sites healthy in appearance. Surgical incision dorso-ulnar aspect of the distal FA. Mild localized edema visible dorsal-distal FA.       Upper Extremity Sensation            Teddy denies sensory deficits.         Wrist Range of Motion  Right Wrist   Active (deg) Passive (deg) Pain Comment   Flexion 12         Extension 45         Radial Deviation           Ulnar Deviation                      Digit 1 - Thumb Active Range of Motion  Right Thumb Opposition Active Range of Motion: 4                        Treatment:  Therapeutic Exercise  TE 1: AROM:  Hook, MP Flex, Straight Fist, Fist 10 reps each,  TE 2: AROM Thumb: Opposition to each finger tip, 10 reps  TE 3: AROM Wrist Flex/Ext,10 reps ea  TE 4: Educated in HEP    Time Entry(in minutes):  OT Evaluation (Low) Time Entry: 35  Orthotic/Prosthetic Mgmt and/or Training (Subs Encounter) Time Entry: 10  Therapeutic Exercise Time Entry: 15    Assessment & Plan    Assessment  Teddy presents with a condition of Low complexity.   Will Comorbidities Impact Care: No       ADL Limitations : Dressing, Feeding, Personal hygiene and grooming, Bathing/showering  IADL Limitations: Grocery/shopping, Meal preparation and cleanup, Home establishment and management, Driving  Functional Limitations: Carrying objects, Fine motor coordination, Manipulating objects, Pain with ADLs/IADLs, Range of motion, Proprioception                 Evaluation/Treatment Response: Patient responded to treatment well, Patient limited by pain  Patient Goal for Therapy (OT): Regain pain-free functional use of the right wrist.  Prognosis: Good  Assessment Details: Patient has been referred to outpatient occupational/hand therapy and presents with decreased hand function. Patient displays limited ROM, edema, and increased pain.  His/her main goal for therapy is full use of UE. Following medical record review it is determined that patient will benefit from occupational therapy services in order to maximize pain free and/or functional use of UE The patient's rehab potential is excellent.     Plan  From an occupational therapy perspective, the patient would benefit from: Skilled Rehab Services    Planned therapy interventions include: Therapeutic exercise, Therapeutic activities, Neuromuscular re-education, Manual therapy, ADLs/IADLs, and Orthotic management and training.    Planned modalities to include: Fluidotherapy, Ultrasound, and Paraffin bath.        Visit Frequency: 2 times Per Week for 12 Weeks.       This plan was discussed with Patient.   Discussion participants: Agreed Upon Plan of Care             The patient's spiritual, cultural, and educational needs were considered, and the patient is agreeable to the plan of care and goals.           Goals:   Active       Long Term Goals        Patient will demonstrate significantly improved functional performance as evidenced by  a 30 point Increase in FOTO  function score.        Start:  07/22/25    Expected End:  10/14/25            Pt will achieve full AROM of the wrist to improve function with ADLs/work/leisure tasks.        Start:  07/22/25    Expected End:  10/14/25            Patient will report pain 2 out of 10 at worst with use of the affected hand in IADL's.         Start:  07/22/25    Expected End:  10/14/25            Pt will report independence in ADL/IADL's with use of the involved Hand/wrist/UE         Start:  07/22/25    Expected End:  10/14/25            Patient will achieve right  strength that is 90% that of the left  non-dominant hand to improve function with ADLs/work/leisure tasks.        Start:  07/22/25    Expected End:  10/14/25               Short Term Goals        Patient will demonstrate significantly improved functional performance as evidenced by  a 10 point Increase in FOTO function score.        Start:  07/22/25    Expected End:  10/14/25            Pt will increase in combined wrist flex/extension   by 30 degrees  to improve function with ADLs/work/leisure tasks.        Start:  07/22/25    Expected End:  09/02/25            Patient will report pain 2 out of 10 at worst with use of the affected hand  in light ADL's.        Start:  07/22/25    Expected End:  09/02/25            Pt will report independence in light ADL's with use of the involved Hand/wrist/UE        Start:  07/22/25    Expected End:  09/02/25                Rosa Tyler OT

## 2025-07-22 NOTE — LETTER
July 22, 2025  Manuel Franco MD  [address]  [address]  [facility] [place] [address]      To whom it may concern,     The attached plan of care is being sent to you for review and reference.    You may indicate your approval by signing the document electronically, or by faxing/mailing a signed copy of the final page of this document back to the attention of Rosa Tyler, OT:         Plan of Care 7/22/25   Effective from: 7/22/2025  Effective to: 10/14/2025    Plan ID: [id number]            Participants as of Finalize on 7/22/2025    Name Type Comments Contact Info    Manuel Franco MD Referring Provider  [phone]       Last Plan Note     Author: Rosa Tyler OT Status: Addendum Last edited: 7/22/2025  2:00 PM         Outpatient Rehab    Occupational Therapy Evaluation    Patient Name: Teddy Ennis  MRN: [id number]  YOB: 1975  Encounter Date: 7/22/2025    Therapy Diagnosis:   Encounter Diagnoses   Name Primary?    Stiffness of right wrist joint Yes    Right wrist pain      Physician: Manuel Franco MD    Physician Orders: Eval and Treat  Medical Diagnosis: Wrist arthritis  Surgical Diagnosis: Open Repair of SL Ligament, LT Ligament, and TFCC, Neurectomy AIN/PIN   Surgical Date: 7/11/2025  Days Since Last Surgery: 11    Visit # / Visits Authorized: 1 / 1  Insurance Authorization Period: 7/18/2025 to 7/18/2026  Date of Evaluation: 7/22/2025  Plan of Care Certification: 7/22/2025 to 10/14/2025     Time In: 1353   Time Out: 1500  Total Time (in minutes): 67   Total Billable Time (in minutes): 60    Intake Outcome Measure for FOTO Survey    Therapist reviewed FOTO scores for Teddy Ennis on 7/22/2025.   FOTO report - see Media section or FOTO account episode details.     Intake Score (%): 28    Precautions:  Right Upper Extremity Weight-Bearing Status: Non-weight-bearing  Additional Precautions and Protocol Details: Pt is to wear custom orthosis at all times  "except for wrist and thumb arom exercises and for showering.     Subjective   History of Present Illness  Teddy is a 49 y.o. male who reports to occupational therapy with a chief concern of "It hurts with the weather change.".     The patient reports a medical diagnosis of Internal Derangement Right with SL, LT, and TFCC Tears.  Patient reports a surgery of Open Repair of SL Ligament, LT Ligament, and TFCC, Neurectomy AIN/PIN. Surgery occurred on 07/11/25.         History of Present Condition/Illness: Reports that he sustained an injury to the right wrist when he tripped on a spike in the side walk in [place]. Was taken to the hospital. Dx'd with a sprain of the shoulder. He experienced right wrist pain. Sought attention from Dr. Desai after the wrist pain progressively worsened. He was referred to OT and underwent OT for approximately a year.  He then sought a second opinion from Dr. Franco. Patient received two injections without relief. Sx was then performed as noted above.     Activities of Daily Living  Social history was obtained from Patient.    General Prior Level of Function Comments: full use  General Current Level of Function Comments: limited  Patient Roles: Caregiver for adult  Patient Responsibilities: Health management, Home management, Laundry, Meal prep, Personal ADL    Previously independent with activities of daily living? Yes     Currently independent with activities of daily living? No     Modified independence with compensatory techniques and requires increased time to complete.      Previously independent with instrumental activities of daily living? Yes     Currently independent with instrumental activities of daily living? No  Activities currently needing assistance include: Meal prep, Home establishment and management, and Grocery/shopping.            Pain     Patient reports a current pain level of 4/10. Pain at best is reported as 0/10. Pain at worst is reported as 6/10. "   Location: wrist  Clinical Progression (since onset): Improved  Pain Qualities: Throbbing  Pain-Relieving Factors: Ice, Medications - prescription  Pain-Aggravating Factors: Other (Comment)  Other Pain-Aggravating Factors: Wether change.         Treatment History  Treatments  Previously Received Treatments: Yes  Previous Treatments: Occupational therapy  Currently Receiving Treatments: No    Living Arrangements  Living Situation  Living Arrangements: Parent  Support Systems: Family members, Parent, Friends/neighbors        Employment  Employment Status: Employed part-time   Family [employer].      Past Medical History/Physical Systems Review:   Teddy Ennis  has a past medical history of Allergy, Asthma, and Seasonal allergies.    Teddy Ennis  has a past surgical history that includes Ankle surgery (Left, 1995); Adenoidectomy; and Sinus surgery.    Teddy has a current medication list which includes the following prescription(s): albuterol, albuterol, amlodipine, azelastine, escitalopram oxalate, fexofenadine, fluticasone propionate, fluticasone-umeclidin-vilanter, magnesium sulfate in water, meloxicam, montelukast, and pravastatin.    Review of patient's allergies indicates:   Allergen Reactions    Ibuprofen Hives    Penicillins Hives        Objective   Orthosis Details  In this visit, actions taken with the first orthosis variety included Modification/adjustment.          Orthosis Laterality: Right  Fabrication Status: Custom  Orthosis Type: Static  Orthosis Design: Forearm-based     - Wrist cock-up          Orthosis Position: Orthosis required reheating and remolding due to it no longer conforming well, likely due to decrease in edema.         Wrist/Hand Observations   Patient presented in custom orthosis fabricated at Dr. Franco's office. Scope sites healthy in appearance. Surgical incision dorso-ulnar aspect of the distal FA. Mild localized edema visible dorsal-distal FA.       Upper Extremity  Sensation            Teddy denies sensory deficits.         Wrist Range of Motion  Right Wrist   Active (deg) Passive (deg) Pain Comment   Flexion 12         Extension 45         Radial Deviation           Ulnar Deviation                      Digit 1 - Thumb Active Range of Motion  Right Thumb Opposition Active Range of Motion: 4                        Treatment:  Therapeutic Exercise  TE 1: AROM:  Hook, MP Flex, Straight Fist, Fist 10 reps each,  TE 2: AROM Thumb: Opposition to each finger tip, 10 reps  TE 3: AROM Wrist Flex/Ext,10 reps ea  TE 4: Educated in HEP    Time Entry(in minutes):  OT Evaluation (Low) Time Entry: 35  Orthotic/Prosthetic Mgmt and/or Training (Subs Encounter) Time Entry: 10  Therapeutic Exercise Time Entry: 15    Assessment & Plan   Assessment  Teddy presents with a condition of Low complexity.   Will Comorbidities Impact Care: No       ADL Limitations : Dressing, Feeding, Personal hygiene and grooming, Bathing/showering  IADL Limitations: Grocery/shopping, Meal preparation and cleanup, Home establishment and management, Driving  Functional Limitations: Carrying objects, Fine motor coordination, Manipulating objects, Pain with ADLs/IADLs, Range of motion, Proprioception                 Evaluation/Treatment Response: Patient responded to treatment well, Patient limited by pain  Patient Goal for Therapy (OT): Regain pain-free functional use of the right wrist.  Prognosis: Good  Assessment Details: Patient has been referred to outpatient occupational/hand therapy and presents with decreased hand function. Patient displays limited ROM, edema, and increased pain.  His/her main goal for therapy is full use of UE. Following medical record review it is determined that patient will benefit from occupational therapy services in order to maximize pain free and/or functional use of UE The patient's rehab potential is excellent.     Plan  From an occupational therapy perspective, the patient would  benefit from: Skilled Rehab Services    Planned therapy interventions include: Therapeutic exercise, Therapeutic activities, Neuromuscular re-education, Manual therapy, ADLs/IADLs, and Orthotic management and training.    Planned modalities to include: Fluidotherapy, Ultrasound, and Paraffin bath.        Visit Frequency: 2 times Per Week for 12 Weeks.       This plan was discussed with Patient.   Discussion participants: Agreed Upon Plan of Care             The patient's spiritual, cultural, and educational needs were considered, and the patient is agreeable to the plan of care and goals.           Goals:   Active       Long Term Goals        Patient will demonstrate significantly improved functional performance as evidenced by  a 30 point Increase in FOTO function score.        Start:  07/22/25    Expected End:  10/14/25            Pt will achieve full AROM of the wrist to improve function with ADLs/work/leisure tasks.        Start:  07/22/25    Expected End:  10/14/25            Patient will report pain 2 out of 10 at worst with use of the affected hand in IADL's.         Start:  07/22/25    Expected End:  10/14/25            Pt will report independence in ADL/IADL's with use of the involved Hand/wrist/UE         Start:  07/22/25    Expected End:  10/14/25            Patient will achieve right  strength that is 90% that of the left  non-dominant hand to improve function with ADLs/work/leisure tasks.        Start:  07/22/25    Expected End:  10/14/25               Short Term Goals        Patient will demonstrate significantly improved functional performance as evidenced by  a 10 point Increase in FOTO function score.        Start:  07/22/25    Expected End:  10/14/25            Pt will increase in combined wrist flex/extension   by 30 degrees  to improve function with ADLs/work/leisure tasks.        Start:  07/22/25    Expected End:  09/02/25            Patient will report pain 2 out of 10 at worst with use of  the affected hand  in light ADL's.        Start:  07/22/25    Expected End:  09/02/25            Pt will report independence in light ADL's with use of the involved Hand/wrist/UE        Start:  07/22/25    Expected End:  09/02/25                Rosa Tyler OT           Current Participants as of 7/22/2025    Name Type Comments Contact Info    Manuel Franco MD Referring Provider  [phone]    Signature pending            Sincerely,      Rosa Tyler OT  [facility]                                                            Dear Rosa Tyler OT,    RE: Mr. Teddy Ennis, MRN: [id number]    I certify that I have reviewed the attached plan of care and agree to the details within.        ___________________________  ___________________________  Provider Printed Name   Provider Signed Name      ___________________________  Date and Time

## 2025-07-22 NOTE — PATIENT INSTRUCTIONS
[facility], OCCUPATIONAL THERAPY  HOME EXERCISE PROGRAM               Complete massage 2-3 minutes 4 times a day:        Complete 10 repetitions of each exercise 4-6 times a day:                                             Copyright © I. All rights reserved.     Therapist: JORGITO Brunson/MARIANA, YVES h

## 2025-07-24 ENCOUNTER — CLINICAL SUPPORT (OUTPATIENT)
Dept: REHABILITATION | Facility: HOSPITAL | Age: 50
End: 2025-07-24
Payer: MEDICARE

## 2025-07-24 DIAGNOSIS — M25.531 RIGHT WRIST PAIN: Primary | ICD-10-CM

## 2025-07-24 DIAGNOSIS — M25.631 STIFFNESS OF RIGHT WRIST JOINT: ICD-10-CM

## 2025-07-24 PROCEDURE — 97140 MANUAL THERAPY 1/> REGIONS: CPT

## 2025-07-24 PROCEDURE — 97110 THERAPEUTIC EXERCISES: CPT

## 2025-07-24 NOTE — PROGRESS NOTES
"  Outpatient Rehab    Occupational Therapy Visit    Patient Name: Teddy Ennis  MRN: [id number]  YOB: 1975  Encounter Date: 7/24/2025    Therapy Diagnosis:   Encounter Diagnoses   Name Primary?    Right wrist pain Yes    Stiffness of right wrist joint      Physician: Manuel Franco MD    Physician Orders: Eval and Treat  Medical Diagnosis: Primary osteoarthritis, unspecified wrist  Surgical Diagnosis: Open Repair of SL Ligament, LT Ligament, and TFCC, Neurectomy AIN/PIN   Surgical Date: 7/11/2025  Days Since Last Surgery: 13    Visit # / Visits Authorized: 1 / 10  Insurance Authorization Period: 7/23/2025 to 12/31/2025  Date of Evaluation: 7/22/2025  Plan of Care Certification: 7/22/2025 to 10/14/2025      Time In: 1500   Time Out: 1545  Total Time (in minutes): 45   Total Billable Time (in minutes): 45    FOTO:  Intake Score (%): 28  Survey Score 2 (%): Not applicable for this Episode  Survey Score 3 (%): Not applicable for this Episode    Precautions:  Right Upper Extremity Weight-Bearing Status: Non-weight-bearing  Additional Precautions and Protocol Details: Pt is to wear custom orthosis at all times except for wrist and thumb arom exercises and for showering.     Subjective   "I feel like it's just a little tight and a little more swollen, but I stop the exercises when I feel a little bit of pain.".  Pain reported as 5/10.      Objective            Treatment:  Therapeutic Exercise  TE 1: AROM:  Hook, MP Flex, Straight Fist, Fist 10 reps each,  TE 2: AROM Thumb: Opposition to each finger tip, 10 reps  TE 3: AROM Wrist Flex/Ext,10 reps ea (wrist flex in neutral to avoid sup/pro)  TE 4: wrist wheel flex/ext  TE 5: finger<>palm translation with small poms for light prehensile ax  Manual Therapy  MT 1: Gentle IA/STM to affected area in order to increase soft tissue extensibility, decrease pain, and tenderness/hypersensitivity in the stated area, and promote scar tissue remodeling.  MT 2: tissue " movers around incisions  Modalities  Moist Heat (min): Patient tolerates MHP x 10 min in order to decrease pain and increase soft tissue extensibility in preparation for ROM, concurrent with assessment of deficits.    Time Entry(in minutes):  Hot/Cold Pack Time Entry: 10  Manual Therapy Time Entry: 15  Therapeutic Exercise Time Entry: 30    Assessment & Plan   Assessment: Patient is currently 1w6d p/o. He is doing well overall, having a little swelling around the incision sites. He reports compliance with HEP, but will stop if he starts to feel any pain. He is doing well with gentle exercises, avoiding excessive forearm rotation now 2/2 tfcc involvement. Notes most discomfort with wrist flexion. Tolerated session well today, no increase in pain.   Evaluation/Treatment Tolerance: Patient tolerated treatment well    The patient will continue to benefit from skilled outpatient occupational therapy in order to address the deficits listed in the problem list on the initial evaluation, provide patient and family education, and maximize the patients level of independence in the home and community environments.     The patient's spiritual, cultural, and educational needs were considered, and the patient is agreeable to the plan of care and goals.           Plan: Patient would benefit from skilled OT services. Continue POC as tolerated.    Goals:   Active       Long Term Goals        Patient will demonstrate significantly improved functional performance as evidenced by  a 30 point Increase in FOTO function score.  (Progressing)       Start:  07/22/25    Expected End:  10/14/25            Pt will achieve full AROM of the wrist to improve function with ADLs/work/leisure tasks.  (Progressing)       Start:  07/22/25    Expected End:  10/14/25            Patient will report pain 2 out of 10 at worst with use of the affected hand in IADL's.   (Progressing)       Start:  07/22/25    Expected End:  10/14/25            Pt will  report independence in ADL/IADL's with use of the involved Hand/wrist/UE   (Progressing)       Start:  07/22/25    Expected End:  10/14/25            Patient will achieve right  strength that is 90% that of the left  non-dominant hand to improve function with ADLs/work/leisure tasks.  (Progressing)       Start:  07/22/25    Expected End:  10/14/25               Short Term Goals        Patient will demonstrate significantly improved functional performance as evidenced by  a 10 point Increase in FOTO function score.  (Progressing)       Start:  07/22/25    Expected End:  10/14/25            Pt will increase in combined wrist flex/extension   by 30 degrees  to improve function with ADLs/work/leisure tasks.  (Progressing)       Start:  07/22/25    Expected End:  09/02/25            Patient will report pain 2 out of 10 at worst with use of the affected hand  in light ADL's.  (Progressing)       Start:  07/22/25    Expected End:  09/02/25            Pt will report independence in light ADL's with use of the involved Hand/wrist/UE  (Progressing)       Start:  07/22/25    Expected End:  09/02/25                Merly Cage, OT

## 2025-07-28 ENCOUNTER — CLINICAL SUPPORT (OUTPATIENT)
Dept: REHABILITATION | Facility: HOSPITAL | Age: 50
End: 2025-07-28
Payer: MEDICARE

## 2025-07-28 DIAGNOSIS — M25.531 RIGHT WRIST PAIN: Primary | ICD-10-CM

## 2025-07-28 DIAGNOSIS — M25.631 STIFFNESS OF RIGHT WRIST JOINT: ICD-10-CM

## 2025-07-28 PROCEDURE — 97110 THERAPEUTIC EXERCISES: CPT | Mod: CO

## 2025-07-28 PROCEDURE — 97140 MANUAL THERAPY 1/> REGIONS: CPT | Mod: CO

## 2025-07-28 NOTE — PROGRESS NOTES
"  Outpatient Rehab    Occupational Therapy Visit    Patient Name: Teddy Ennis  MRN: [id number]  YOB: 1975  Encounter Date: 7/28/2025    Therapy Diagnosis:   Encounter Diagnoses   Name Primary?    Right wrist pain Yes    Stiffness of right wrist joint      Physician: Manuel Franco MD    Physician Orders: Eval and Treat  Medical Diagnosis: Primary osteoarthritis, unspecified wrist  Surgical Diagnosis: Open Repair of SL Ligament, LT Ligament, and TFCC, Neurectomy AIN/PIN   Surgical Date: 7/11/2025  Days Since Last Surgery: 17    Visit # / Visits Authorized: 2 / 10  Insurance Authorization Period: 7/23/2025 to 12/31/2025  Date of Evaluation: 7/22/2025  Plan of Care Certification: 7/22/2025 to 10/14/2025      Time In: 1451   Time Out: 1529  Total Time (in minutes): 38   Total Billable Time (in minutes): 28    FOTO:  Intake Score (%): 28  Survey Score 2 (%): Not applicable for this Episode  Survey Score 3 (%): Not applicable for this Episode    Precautions:  Right Upper Extremity Weight-Bearing Status: Non-weight-bearing  Additional Precautions and Protocol Details: Pt is to wear custom orthosis at all times except for wrist and thumb arom exercises and for showering.     Subjective   "It feels a lot better since the surgery.".  Pain reported as 4/10.      Objective            Treatment:  Therapeutic Exercise  TE 1: AROM:  Hook, MP Flex, Straight Fist, Fist 10 reps each,  TE 2: AROM Thumb: Opposition to each finger tip, 10 reps  TE 3: AROM Wrist Flex/Ext,10 reps ea (wrist flex in neutral to avoid sup/pro)  TE 4: wrist wheel flex/ext  TE 5: finger<>palm translation with small poms for light prehensile ax  TE 6: isospheres on the table x 2 min  Manual Therapy  MT 1: Gentle IA/STM to affected area in order to increase soft tissue extensibility, decrease pain, and tenderness/hypersensitivity in the stated area, and promote scar tissue remodeling.  Modalities  Moist Heat (min): Patient tolerates MHP x 10 " min in order to decrease pain and increase soft tissue extensibility in preparation for ROM, concurrent with assessment of deficits.    Time Entry(in minutes):  Hot/Cold Pack Time Entry: 10  Manual Therapy Time Entry: 10  Therapeutic Exercise Time Entry: 18    Assessment & Plan   Assessment: Good tolerance today despite mild fatigue.   Evaluation/Treatment Tolerance: Patient tolerated treatment well    The patient will continue to benefit from skilled outpatient occupational therapy in order to address the deficits listed in the problem list on the initial evaluation, provide patient and family education, and maximize the patients level of independence in the home and community environments.     The patient's spiritual, cultural, and educational needs were considered, and the patient is agreeable to the plan of care and goals.           Plan: Patient would benefit from skilled OT services. Continue POC as tolerated.    Goals:   Active       Long Term Goals        Patient will demonstrate significantly improved functional performance as evidenced by  a 30 point Increase in FOTO function score.  (Progressing)       Start:  07/22/25    Expected End:  10/14/25            Pt will achieve full AROM of the wrist to improve function with ADLs/work/leisure tasks.  (Progressing)       Start:  07/22/25    Expected End:  10/14/25            Patient will report pain 2 out of 10 at worst with use of the affected hand in IADL's.   (Progressing)       Start:  07/22/25    Expected End:  10/14/25            Pt will report independence in ADL/IADL's with use of the involved Hand/wrist/UE   (Progressing)       Start:  07/22/25    Expected End:  10/14/25            Patient will achieve right  strength that is 90% that of the left  non-dominant hand to improve function with ADLs/work/leisure tasks.  (Progressing)       Start:  07/22/25    Expected End:  10/14/25               Short Term Goals        Patient will demonstrate  significantly improved functional performance as evidenced by  a 10 point Increase in FOTO function score.  (Progressing)       Start:  07/22/25    Expected End:  10/14/25            Pt will increase in combined wrist flex/extension   by 30 degrees  to improve function with ADLs/work/leisure tasks.  (Progressing)       Start:  07/22/25    Expected End:  09/02/25            Patient will report pain 2 out of 10 at worst with use of the affected hand  in light ADL's.  (Progressing)       Start:  07/22/25    Expected End:  09/02/25            Pt will report independence in light ADL's with use of the involved Hand/wrist/UE  (Progressing)       Start:  07/22/25    Expected End:  09/02/25                VINI Swanson      Client Care conference completed with evaluating therapist in regards to this patients POC as evidenced by co signature of supervising therapist.   Supervising OT, Rosa Tyler, was readily available to answer questions about the client's intervention at the time of the provision of services

## 2025-07-31 ENCOUNTER — CLINICAL SUPPORT (OUTPATIENT)
Dept: REHABILITATION | Facility: HOSPITAL | Age: 50
End: 2025-07-31
Payer: MEDICARE

## 2025-07-31 DIAGNOSIS — M25.631 STIFFNESS OF RIGHT WRIST JOINT: ICD-10-CM

## 2025-07-31 DIAGNOSIS — M25.531 RIGHT WRIST PAIN: Primary | ICD-10-CM

## 2025-07-31 PROCEDURE — 97110 THERAPEUTIC EXERCISES: CPT

## 2025-07-31 PROCEDURE — 97112 NEUROMUSCULAR REEDUCATION: CPT

## 2025-07-31 NOTE — PROGRESS NOTES
Outpatient Rehab    Occupational Therapy Visit    Patient Name: Teddy Ennis  MRN: [id number]  YOB: 1975  Encounter Date: 7/31/2025    Therapy Diagnosis:   Encounter Diagnoses   Name Primary?    Right wrist pain Yes    Stiffness of right wrist joint      Physician: Manuel Franco MD    Physician Orders: Eval and Treat  Medical Diagnosis: Primary osteoarthritis, unspecified wrist  Surgical Diagnosis: Open Repair of SL Ligament, LT Ligament, and TFCC, Neurectomy AIN/PIN   Surgical Date: 7/11/2025  Days Since Last Surgery: 20    Visit # / Visits Authorized: 3 / 10  Insurance Authorization Period: 7/23/2025 to 12/31/2025  Date of Evaluation: 7/22/2025  Plan of Care Certification: 7/22/2025 to 10/14/2025      Time In: 1400   Time Out: 1452  Total Time (in minutes): 52   Total Billable Time (in minutes): 48    FOTO:  Intake Score (%): 28  Survey Score 2 (%): Not applicable for this Episode  Survey Score 3 (%): Not applicable for this Episode    Precautions:  Right Upper Extremity Weight-Bearing Status: Non-weight-bearing  Additional Precautions and Protocol Details: Pt is to wear custom orthosis at all times except for wrist and thumb arom exercises and for showering.     Subjective   Teddy was without new report..         Objective            Treatment:  Therapeutic Exercise  TE 1: AROM:  Hook, MP Flex, Straight Fist, Fist 10 reps each,  TE 2: AROM Thumb: Opposition to each finger tip, 10 reps  TE 3: AROM Wrist Flex/Ext,20+ reps, Gentle Short arc wrist r/ud x 20 reps  TE 4: wrist wheel flex/ext x 3 min  TE 5: wrist arom over wedge, holding small dowel, fa in 3 places, 1/20 reps each plane  TE 6: FA sup/pro holding small dowel, 20 reps  Manual Therapy  MT 1: Instrucred in self scar mobilization of sx sites and added to HEP.  Balance/Neuromuscular Re-Education  NMR 1: finger<>palm translation with small poms for light prehensile x 4 min  NMR 2: isospheres on the table x 2 min  Modalities  Moist Heat  (min): Patient tolerates MHP x 10 min in order to decrease pain and increase soft tissue extensibility in preparation for ROM, concurrent with assessment of deficits.    Time Entry(in minutes):  Hot/Cold Pack Time Entry: 10  Manual Therapy Time Entry: 2  Neuromuscular Re-Education Time Entry: 8  Therapeutic Exercise Time Entry: 28    Assessment & Plan   Assessment:         The patient will continue to benefit from skilled outpatient occupational therapy in order to address the deficits listed in the problem list on the initial evaluation, provide patient and family education, and maximize the patients level of independence in the home and community environments.     The patient's spiritual, cultural, and educational needs were considered, and the patient is agreeable to the plan of care and goals.           Plan:      Goals:   Active       Long Term Goals        Patient will demonstrate significantly improved functional performance as evidenced by  a 30 point Increase in FOTO function score.  (Progressing)       Start:  07/22/25    Expected End:  10/14/25            Pt will achieve full AROM of the wrist to improve function with ADLs/work/leisure tasks.  (Progressing)       Start:  07/22/25    Expected End:  10/14/25            Patient will report pain 2 out of 10 at worst with use of the affected hand in IADL's.   (Progressing)       Start:  07/22/25    Expected End:  10/14/25            Pt will report independence in ADL/IADL's with use of the involved Hand/wrist/UE   (Progressing)       Start:  07/22/25    Expected End:  10/14/25            Patient will achieve right  strength that is 90% that of the left  non-dominant hand to improve function with ADLs/work/leisure tasks.  (Progressing)       Start:  07/22/25    Expected End:  10/14/25               Short Term Goals        Patient will demonstrate significantly improved functional performance as evidenced by  a 10 point Increase in FOTO function score.   (Progressing)       Start:  07/22/25    Expected End:  10/14/25            Pt will increase in combined wrist flex/extension   by 30 degrees  to improve function with ADLs/work/leisure tasks.  (Progressing)       Start:  07/22/25    Expected End:  09/02/25            Patient will report pain 2 out of 10 at worst with use of the affected hand  in light ADL's.  (Progressing)       Start:  07/22/25    Expected End:  09/02/25            Pt will report independence in light ADL's with use of the involved Hand/wrist/UE  (Progressing)       Start:  07/22/25    Expected End:  09/02/25                Rosa Tyler, OT

## 2025-08-04 ENCOUNTER — CLINICAL SUPPORT (OUTPATIENT)
Dept: REHABILITATION | Facility: HOSPITAL | Age: 50
End: 2025-08-04
Payer: MEDICARE

## 2025-08-04 DIAGNOSIS — M25.531 RIGHT WRIST PAIN: Primary | ICD-10-CM

## 2025-08-04 DIAGNOSIS — M25.631 STIFFNESS OF RIGHT WRIST JOINT: ICD-10-CM

## 2025-08-04 PROCEDURE — 97112 NEUROMUSCULAR REEDUCATION: CPT

## 2025-08-04 PROCEDURE — 97110 THERAPEUTIC EXERCISES: CPT

## 2025-08-04 NOTE — PROGRESS NOTES
Outpatient Rehab    Occupational Therapy Visit    Patient Name: Teddy Ennis  MRN: [id number]  YOB: 1975  Encounter Date: 8/4/2025    Therapy Diagnosis:   Encounter Diagnoses   Name Primary?    Right wrist pain Yes    Stiffness of right wrist joint      Physician: Manuel Franco MD    Physician Orders: Eval and Treat  Medical Diagnosis: Primary osteoarthritis, unspecified wrist  Surgical Diagnosis: Open Repair of SL Ligament, LT Ligament, and TFCC, Neurectomy AIN/PIN   Surgical Date: 7/11/2025  Days Since Last Surgery: 24    Visit # / Visits Authorized: 4 / 10  Insurance Authorization Period: 7/23/2025 to 12/31/2025  Date of Evaluation: 7/22/2025  Plan of Care Certification: 7/22/2025 to 10/14/2025      Time In: 1345   Time Out: 1446  Total Time (in minutes): 61   Total Billable Time (in minutes): 50 min plus 10 non-billable    FOTO:  Intake Score (%): 28  Survey Score 2 (%): Not applicable for this Episode  Survey Score 3 (%): Not applicable for this Episode    Precautions:  Right Upper Extremity Weight-Bearing Status: Non-weight-bearing  Additional Precautions and Protocol Details: Pt is to wear custom orthosis at all times except for wrist and thumb arom exercises and for showering.     Subjective   Reports only mild pain with ROM exercises.  Pain reported as 1/10. 1-2/10    Objective            Treatment:  Therapeutic Exercise  TE 1: AROM:  Hook, MP Flex, Straight Fist, Fist 10 reps each,  TE 2: AROM Thumb: Opposition to each finger tip, 10 reps  TE 3: AROM Wrist Flex/Ext,20+ reps, Gentle Short arc wrist r/ud x 20 reps  TE 4: wrist wheel flex/ext x 3 min  TE 5: wrist arom over wedge, holding small dowel, fa in 3 places, 2/10  reps each plane  TE 6: Wrist arom holding small dowel: Dart throwers plane x 2/10  TE 7: Wrist arom holding small dowel: circumduction each way 2/10 each  TE 8: FA sup/pro holding small dowel, 20 reps  Manual Therapy  MT 1: Deep friction scar massage to healed incision  Call to patient.  Updated patient that Dr. Poon office is closed and writer is unable to obtain notes and recent imaging reports at this time. Patient aware that I will call to obtain records on Monday. Patient verbalized understanding and has no further questions at this time.   and one scope site.  MT 2: tissue movers around incisions  Balance/Neuromuscular Re-Education  NMR 1: finger<>palm translation with small poms for light prehensile x1 set with rest periods  NMR 2: gyro wheel x 3 min  NMR 3: in-hand- manipulation: alphabet ball x 1 set with rest periods  Modalities  Moist Heat (min): Patient tolerates MHP x 10 min in order to decrease pain and increase soft tissue extensibility in preparation for ROM, concurrent with assessment of deficits.    Time Entry(in minutes):  Hot/Cold Pack Time Entry: 10  Manual Therapy Time Entry: 2  Neuromuscular Re-Education Time Entry: 15  Therapeutic Exercise Time Entry: 33    Assessment & Plan   Assessment: Teddy is tolerating treatment well though requires breaks due to discomfort and fatigue.       The patient will continue to benefit from skilled outpatient occupational therapy in order to address the deficits listed in the problem list on the initial evaluation, provide patient and family education, and maximize the patients level of independence in the home and community environments.     The patient's spiritual, cultural, and educational needs were considered, and the patient is agreeable to the plan of care and goals.           Plan: Continue per OT POC with focus on return to funcitonal independence    Goals:   Active       Long Term Goals        Patient will demonstrate significantly improved functional performance as evidenced by  a 30 point Increase in FOTO function score.  (Progressing)       Start:  07/22/25    Expected End:  10/14/25            Pt will achieve full AROM of the wrist to improve function with ADLs/work/leisure tasks.  (Progressing)       Start:  07/22/25    Expected End:  10/14/25            Patient will report pain 2 out of 10 at worst with use of the affected hand in IADL's.   (Progressing)       Start:  07/22/25    Expected End:  10/14/25            Pt will report independence in ADL/IADL's with use of the involved  Hand/wrist/UE   (Progressing)       Start:  07/22/25    Expected End:  10/14/25            Patient will achieve right  strength that is 90% that of the left  non-dominant hand to improve function with ADLs/work/leisure tasks.  (Progressing)       Start:  07/22/25    Expected End:  10/14/25               Short Term Goals        Patient will demonstrate significantly improved functional performance as evidenced by  a 10 point Increase in FOTO function score.  (Progressing)       Start:  07/22/25    Expected End:  10/14/25            Pt will increase in combined wrist flex/extension   by 30 degrees  to improve function with ADLs/work/leisure tasks.  (Progressing)       Start:  07/22/25    Expected End:  09/02/25            Patient will report pain 2 out of 10 at worst with use of the affected hand  in light ADL's.  (Progressing)       Start:  07/22/25    Expected End:  09/02/25            Pt will report independence in light ADL's with use of the involved Hand/wrist/UE  (Progressing)       Start:  07/22/25    Expected End:  09/02/25                Rosa Tyler OT

## 2025-08-06 ENCOUNTER — TELEPHONE (OUTPATIENT)
Dept: PRIMARY CARE CLINIC | Facility: CLINIC | Age: 50
End: 2025-08-06
Payer: MEDICARE

## 2025-08-06 DIAGNOSIS — J02.9 PHARYNGITIS, UNSPECIFIED ETIOLOGY: Primary | ICD-10-CM

## 2025-08-06 RX ORDER — AZITHROMYCIN 250 MG/1
TABLET, FILM COATED ORAL
Qty: 6 TABLET | Refills: 0 | Status: SHIPPED | OUTPATIENT
Start: 2025-08-06 | End: 2025-08-11

## 2025-08-06 NOTE — TELEPHONE ENCOUNTER
Patient is calling to notify he is having throat sore sound like strep. Earliest appointment we have is 8/13/25. Can you send something in for him or would you advise he go to ?

## 2025-08-06 NOTE — TELEPHONE ENCOUNTER
Copied from CRM #[id number]. Topic: General Inquiry - Patient Advice  >> Aug 6, 2025 10:18 AM Smita wrote:  .[email or web address]     Patient is calling for Medical Advice regarding: Patient is calling to notify he is having throat sore sound like strep     How long has patient had these symptoms: a week     Pharmacy name and phone#:      [id number] - [place], [place] - [address] [facility] [place] [address]  Phone: [phone] Fax: [phone]         Patient wants a call back or thru [facility], provide patient's call back phone number: Patient phone [phone]    Comments: Thank you     Please advise patient replies from provider may take up to 48 hours.

## 2025-08-06 NOTE — PROGRESS NOTES
"  Outpatient Rehab    Occupational Therapy Visit    Patient Name: Teddy Ennis  MRN: [id number]  YOB: 1975  Encounter Date: 8/7/2025    Therapy Diagnosis:   Encounter Diagnoses   Name Primary?    Right wrist pain Yes    Stiffness of right wrist joint      Physician: Manuel Franco MD    Physician Orders: Eval and Treat  Medical Diagnosis: Primary osteoarthritis, unspecified wrist  Surgical Diagnosis: Open Repair of SL Ligament, LT Ligament, and TFCC, Neurectomy AIN/PIN   Surgical Date: 7/11/2025  Days Since Last Surgery: 27    Visit # / Visits Authorized: 5 / 10  Insurance Authorization Period: 7/23/2025 to 12/31/2025  Date of Evaluation: 7/22/2025  Plan of Care Certification: 7/22/2025 to 10/14/2025      Time In: 1405   Time Out: 1459  Total Time (in minutes): 54   Total Billable Time (in minutes): 36    FOTO:  Intake Score (%): 28  Survey Score 2 (%): Not applicable for this Episode  Survey Score 3 (%): Not applicable for this Episode    Precautions:  Right Upper Extremity Weight-Bearing Status: Non-weight-bearing  Additional Precautions and Protocol Details: Pt is to wear custom orthosis at all times except for wrist and thumb arom exercises and for showering.     Subjective   "It swelled up today.".    not rated    Objective     Wrist Range of Motion  Right Wrist   Active (deg) Passive (deg) Pain Comment   Flexion 16 (+4)         Extension 64         Radial Deviation           Ulnar Deviation                      Digit 1 - Thumb Active Range of Motion  Right Thumb Opposition Active Range of Motion: 7                         Treatment:  Therapeutic Exercise  TE 4: wrist wheel flex/ext x 3 min  TE 5: wrist arom over wedge, holding small dowel, fa in 3 places, 2/10  reps each plane  TE 6: Wrist arom holding small dowel: Dart throwers plane x 2/10  TE 9: updated measures  Manual Therapy  MT 2: tissue movers around incisions  Balance/Neuromuscular Re-Education  NMR 1: finger<>palm translation with " small poms for light prehensile x1 set with rest periods  NMR 2: gyro wheel x 3 min  Modalities  Moist Heat (min): Patient tolerates MHP x 10 min in order to decrease pain and increase soft tissue extensibility in preparation for ROM, concurrent with assessment of deficits.  Cryotherapy (Minutes\Location): Pt recieved icepack X8 minutes post treatment    Time Entry(in minutes):  Hot/Cold Pack Time Entry: 18  Manual Therapy Time Entry: 5  Neuromuscular Re-Education Time Entry: 10  Therapeutic Exercise Time Entry: 21    Assessment & Plan   Assessment: Pt presented today with a blood blister on the scope site that appears to have increased in circumferential redness. Area has been scabbing off and on, possible pt has picked it off. Updated measures show improvement with ROM.   Evaluation/Treatment Tolerance: Patient tolerated treatment well    The patient will continue to benefit from skilled outpatient occupational therapy in order to address the deficits listed in the problem list on the initial evaluation, provide patient and family education, and maximize the patients level of independence in the home and community environments.     The patient's spiritual, cultural, and educational needs were considered, and the patient is agreeable to the plan of care and goals.           Plan: Continue per OT POC with focus on return to funcitonal independence    Goals:   Active       Long Term Goals        Patient will demonstrate significantly improved functional performance as evidenced by  a 30 point Increase in FOTO function score.  (Progressing)       Start:  07/22/25    Expected End:  10/14/25            Pt will achieve full AROM of the wrist to improve function with ADLs/work/leisure tasks.  (Progressing)       Start:  07/22/25    Expected End:  10/14/25            Patient will report pain 2 out of 10 at worst with use of the affected hand in IADL's.   (Progressing)       Start:  07/22/25    Expected End:  10/14/25             Pt will report independence in ADL/IADL's with use of the involved Hand/wrist/UE   (Progressing)       Start:  07/22/25    Expected End:  10/14/25            Patient will achieve right  strength that is 90% that of the left  non-dominant hand to improve function with ADLs/work/leisure tasks.  (Progressing)       Start:  07/22/25    Expected End:  10/14/25               Short Term Goals        Patient will demonstrate significantly improved functional performance as evidenced by  a 10 point Increase in FOTO function score.  (Progressing)       Start:  07/22/25    Expected End:  10/14/25            Pt will increase in combined wrist flex/extension   by 30 degrees  to improve function with ADLs/work/leisure tasks.  (Progressing)       Start:  07/22/25    Expected End:  09/02/25            Patient will report pain 2 out of 10 at worst with use of the affected hand  in light ADL's.  (Progressing)       Start:  07/22/25    Expected End:  09/02/25            Pt will report independence in light ADL's with use of the involved Hand/wrist/UE  (Progressing)       Start:  07/22/25    Expected End:  09/02/25                VINI Swanson    Supervising OT, Rosa Tyler, was readily available to answer questions about the client's intervention at the time of the provision of services

## 2025-08-07 ENCOUNTER — CLINICAL SUPPORT (OUTPATIENT)
Dept: REHABILITATION | Facility: HOSPITAL | Age: 50
End: 2025-08-07
Payer: MEDICARE

## 2025-08-07 DIAGNOSIS — M25.631 STIFFNESS OF RIGHT WRIST JOINT: ICD-10-CM

## 2025-08-07 DIAGNOSIS — M25.531 RIGHT WRIST PAIN: Primary | ICD-10-CM

## 2025-08-07 PROCEDURE — 97110 THERAPEUTIC EXERCISES: CPT | Mod: CO

## 2025-08-07 PROCEDURE — 97112 NEUROMUSCULAR REEDUCATION: CPT | Mod: CO

## 2025-08-12 ENCOUNTER — CLINICAL SUPPORT (OUTPATIENT)
Dept: REHABILITATION | Facility: HOSPITAL | Age: 50
End: 2025-08-12
Payer: MEDICARE

## 2025-08-12 DIAGNOSIS — M25.631 STIFFNESS OF RIGHT WRIST JOINT: ICD-10-CM

## 2025-08-12 DIAGNOSIS — M25.531 RIGHT WRIST PAIN: Primary | ICD-10-CM

## 2025-08-12 PROCEDURE — 97112 NEUROMUSCULAR REEDUCATION: CPT

## 2025-08-12 PROCEDURE — 97110 THERAPEUTIC EXERCISES: CPT

## 2025-08-12 PROCEDURE — 97018 PARAFFIN BATH THERAPY: CPT

## 2025-08-14 ENCOUNTER — CLINICAL SUPPORT (OUTPATIENT)
Dept: REHABILITATION | Facility: HOSPITAL | Age: 50
End: 2025-08-14
Payer: MEDICARE

## 2025-08-14 DIAGNOSIS — M25.631 STIFFNESS OF RIGHT WRIST JOINT: ICD-10-CM

## 2025-08-14 DIAGNOSIS — R29.898 DECREASED GRIP STRENGTH OF RIGHT HAND: Primary | ICD-10-CM

## 2025-08-14 DIAGNOSIS — M25.531 RIGHT WRIST PAIN: ICD-10-CM

## 2025-08-14 PROCEDURE — 97018 PARAFFIN BATH THERAPY: CPT | Mod: CO

## 2025-08-14 PROCEDURE — 97110 THERAPEUTIC EXERCISES: CPT | Mod: CO

## 2025-08-14 PROCEDURE — 97112 NEUROMUSCULAR REEDUCATION: CPT | Mod: CO

## 2025-08-18 ENCOUNTER — CLINICAL SUPPORT (OUTPATIENT)
Dept: REHABILITATION | Facility: HOSPITAL | Age: 50
End: 2025-08-18
Payer: MEDICARE

## 2025-08-18 DIAGNOSIS — M25.631 STIFFNESS OF RIGHT WRIST JOINT: ICD-10-CM

## 2025-08-18 DIAGNOSIS — M25.531 RIGHT WRIST PAIN: Primary | ICD-10-CM

## 2025-08-18 PROCEDURE — 97110 THERAPEUTIC EXERCISES: CPT

## 2025-08-18 PROCEDURE — 97140 MANUAL THERAPY 1/> REGIONS: CPT

## 2025-08-22 ENCOUNTER — CLINICAL SUPPORT (OUTPATIENT)
Dept: REHABILITATION | Facility: HOSPITAL | Age: 50
End: 2025-08-22
Payer: MEDICARE

## 2025-08-22 DIAGNOSIS — M25.531 RIGHT WRIST PAIN: Primary | ICD-10-CM

## 2025-08-22 DIAGNOSIS — M25.631 STIFFNESS OF RIGHT WRIST JOINT: ICD-10-CM

## 2025-08-22 PROCEDURE — 97018 PARAFFIN BATH THERAPY: CPT | Mod: CO

## 2025-08-22 PROCEDURE — 97110 THERAPEUTIC EXERCISES: CPT | Mod: CO

## 2025-08-22 PROCEDURE — 97112 NEUROMUSCULAR REEDUCATION: CPT | Mod: CO

## 2025-08-26 ENCOUNTER — CLINICAL SUPPORT (OUTPATIENT)
Dept: REHABILITATION | Facility: HOSPITAL | Age: 50
End: 2025-08-26
Payer: MEDICARE

## 2025-08-26 DIAGNOSIS — M25.631 STIFFNESS OF RIGHT WRIST JOINT: ICD-10-CM

## 2025-08-26 DIAGNOSIS — R29.898 DECREASED GRIP STRENGTH OF RIGHT HAND: Primary | ICD-10-CM

## 2025-08-26 DIAGNOSIS — M25.531 RIGHT WRIST PAIN: ICD-10-CM

## 2025-08-26 PROCEDURE — 97530 THERAPEUTIC ACTIVITIES: CPT | Mod: CO

## 2025-08-26 PROCEDURE — 97110 THERAPEUTIC EXERCISES: CPT | Mod: CO

## 2025-08-28 ENCOUNTER — CLINICAL SUPPORT (OUTPATIENT)
Dept: REHABILITATION | Facility: HOSPITAL | Age: 50
End: 2025-08-28
Payer: MEDICARE

## 2025-08-28 ENCOUNTER — PATIENT MESSAGE (OUTPATIENT)
Dept: REHABILITATION | Facility: HOSPITAL | Age: 50
End: 2025-08-28

## 2025-08-28 DIAGNOSIS — M25.631 STIFFNESS OF RIGHT WRIST JOINT: ICD-10-CM

## 2025-08-28 DIAGNOSIS — M25.531 RIGHT WRIST PAIN: Primary | ICD-10-CM

## 2025-08-28 PROCEDURE — 97530 THERAPEUTIC ACTIVITIES: CPT

## 2025-08-28 PROCEDURE — 97110 THERAPEUTIC EXERCISES: CPT

## 2025-08-28 PROCEDURE — 97112 NEUROMUSCULAR REEDUCATION: CPT

## 2025-09-03 ENCOUNTER — CLINICAL SUPPORT (OUTPATIENT)
Dept: REHABILITATION | Facility: HOSPITAL | Age: 50
End: 2025-09-03
Payer: MEDICARE

## 2025-09-03 DIAGNOSIS — M25.531 RIGHT WRIST PAIN: Primary | ICD-10-CM

## 2025-09-03 DIAGNOSIS — M25.631 STIFFNESS OF RIGHT WRIST JOINT: ICD-10-CM

## 2025-09-03 PROCEDURE — 97112 NEUROMUSCULAR REEDUCATION: CPT | Mod: CO

## 2025-09-03 PROCEDURE — 97018 PARAFFIN BATH THERAPY: CPT | Mod: CO

## 2025-09-03 PROCEDURE — 97530 THERAPEUTIC ACTIVITIES: CPT | Mod: CO

## 2025-09-05 ENCOUNTER — CLINICAL SUPPORT (OUTPATIENT)
Dept: REHABILITATION | Facility: HOSPITAL | Age: 50
End: 2025-09-05
Payer: MEDICARE

## 2025-09-05 DIAGNOSIS — M25.631 STIFFNESS OF RIGHT WRIST JOINT: ICD-10-CM

## 2025-09-05 DIAGNOSIS — M25.531 RIGHT WRIST PAIN: Primary | ICD-10-CM

## 2025-09-05 PROCEDURE — 97018 PARAFFIN BATH THERAPY: CPT | Mod: CO

## 2025-09-05 PROCEDURE — 97112 NEUROMUSCULAR REEDUCATION: CPT | Mod: CO

## 2025-09-05 PROCEDURE — 97530 THERAPEUTIC ACTIVITIES: CPT | Mod: CO
